# Patient Record
Sex: FEMALE | Race: WHITE | Employment: OTHER | URBAN - METROPOLITAN AREA
[De-identification: names, ages, dates, MRNs, and addresses within clinical notes are randomized per-mention and may not be internally consistent; named-entity substitution may affect disease eponyms.]

---

## 2017-03-27 ENCOUNTER — HOSPITAL ENCOUNTER (OUTPATIENT)
Dept: MRI IMAGING | Age: 72
Discharge: HOME | End: 2017-03-27
Attending: PHYSICAL MEDICINE & REHABILITATION
Payer: MEDICARE

## 2017-03-27 DIAGNOSIS — M51.36 DDD (DEGENERATIVE DISC DISEASE), LUMBAR: ICD-10-CM

## 2017-03-27 DIAGNOSIS — M54.50 BILATERAL LOW BACK PAIN: ICD-10-CM

## 2017-03-27 DIAGNOSIS — M48.061 SPINAL STENOSIS, LUMBAR REGION, WITHOUT NEUROGENIC CLAUDICATION: ICD-10-CM

## 2017-03-27 DIAGNOSIS — M47.817 LUMBOSACRAL SPONDYLOSIS WITHOUT MYELOPATHY: ICD-10-CM

## 2017-03-27 PROCEDURE — 72148 MRI LUMBAR SPINE W/O DYE: CPT

## 2017-09-19 ENCOUNTER — HOSPITAL ENCOUNTER (OUTPATIENT)
Dept: PHYSICAL THERAPY | Age: 72
Discharge: HOME | End: 2017-09-19
Payer: MEDICARE

## 2017-09-19 ENCOUNTER — HOSPITAL ENCOUNTER (OUTPATIENT)
Dept: SURGERY | Age: 72
Discharge: HOME | End: 2017-09-19
Payer: MEDICARE

## 2017-09-19 VITALS
BODY MASS INDEX: 44.39 KG/M2 | SYSTOLIC BLOOD PRESSURE: 115 MMHG | HEART RATE: 68 BPM | RESPIRATION RATE: 18 BRPM | WEIGHT: 260 LBS | DIASTOLIC BLOOD PRESSURE: 50 MMHG | OXYGEN SATURATION: 99 % | HEIGHT: 64 IN | TEMPERATURE: 96.8 F

## 2017-09-19 PROBLEM — R79.89 ELEVATED SERUM CREATININE: Status: ACTIVE | Noted: 2017-09-19

## 2017-09-19 PROBLEM — D72.829 ELEVATED WBC COUNT: Status: ACTIVE | Noted: 2017-09-19

## 2017-09-19 LAB
ANION GAP SERPL CALC-SCNC: 6 MMOL/L (ref 7–16)
APPEARANCE UR: ABNORMAL
APTT PPP: 26.8 SEC (ref 23.5–31.7)
ATRIAL RATE: 76 BPM
BACTERIA SPEC CULT: ABNORMAL
BASOPHILS # BLD: 0 K/UL (ref 0–0.2)
BASOPHILS NFR BLD: 0 % (ref 0–2)
BILIRUB UR QL: NEGATIVE
BUN SERPL-MCNC: 34 MG/DL (ref 8–23)
CALCIUM SERPL-MCNC: 8.8 MG/DL (ref 8.3–10.4)
CALCULATED P AXIS, ECG09: 79 DEGREES
CALCULATED R AXIS, ECG10: 21 DEGREES
CALCULATED T AXIS, ECG11: 19 DEGREES
CHLORIDE SERPL-SCNC: 101 MMOL/L (ref 98–107)
CO2 SERPL-SCNC: 28 MMOL/L (ref 21–32)
COLOR UR: YELLOW
CREAT SERPL-MCNC: 1.33 MG/DL (ref 0.6–1)
DIAGNOSIS, 93000: NORMAL
DIFFERENTIAL METHOD BLD: ABNORMAL
EOSINOPHIL # BLD: 0.1 K/UL (ref 0–0.8)
EOSINOPHIL NFR BLD: 0 % (ref 0.5–7.8)
ERYTHROCYTE [DISTWIDTH] IN BLOOD BY AUTOMATED COUNT: 14.4 % (ref 11.9–14.6)
GLUCOSE SERPL-MCNC: 114 MG/DL (ref 65–100)
GLUCOSE UR STRIP.AUTO-MCNC: NEGATIVE MG/DL
HCT VFR BLD AUTO: 42.4 % (ref 35.8–46.3)
HGB BLD-MCNC: 13.7 G/DL (ref 11.7–15.4)
HGB UR QL STRIP: NEGATIVE
IMM GRANULOCYTES # BLD: 0 K/UL (ref 0–0.5)
IMM GRANULOCYTES NFR BLD: 0.2 % (ref 0–5)
INR PPP: 1 (ref 0.9–1.2)
KETONES UR QL STRIP.AUTO: NEGATIVE MG/DL
LEUKOCYTE ESTERASE UR QL STRIP.AUTO: NEGATIVE
LYMPHOCYTES # BLD: 3 K/UL (ref 0.5–4.6)
LYMPHOCYTES NFR BLD: 21 % (ref 13–44)
MCH RBC QN AUTO: 28.3 PG (ref 26.1–32.9)
MCHC RBC AUTO-ENTMCNC: 32.3 G/DL (ref 31.4–35)
MCV RBC AUTO: 87.6 FL (ref 79.6–97.8)
MONOCYTES # BLD: 1 K/UL (ref 0.1–1.3)
MONOCYTES NFR BLD: 7 % (ref 4–12)
NEUTS SEG # BLD: 9.9 K/UL (ref 1.7–8.2)
NEUTS SEG NFR BLD: 72 % (ref 43–78)
NITRITE UR QL STRIP.AUTO: NEGATIVE
P-R INTERVAL, ECG05: 158 MS
PH UR STRIP: 5.5 [PH] (ref 5–9)
PLATELET # BLD AUTO: 364 K/UL (ref 150–450)
PMV BLD AUTO: 10.7 FL (ref 10.8–14.1)
POTASSIUM SERPL-SCNC: 4 MMOL/L (ref 3.5–5.1)
PROT UR STRIP-MCNC: NEGATIVE MG/DL
PROTHROMBIN TIME: 10.7 SEC (ref 9.6–12)
Q-T INTERVAL, ECG07: 370 MS
QRS DURATION, ECG06: 70 MS
QTC CALCULATION (BEZET), ECG08: 416 MS
RBC # BLD AUTO: 4.84 M/UL (ref 4.05–5.25)
SERVICE CMNT-IMP: ABNORMAL
SODIUM SERPL-SCNC: 135 MMOL/L (ref 136–145)
SP GR UR REFRACTOMETRY: 1.02 (ref 1–1.02)
UROBILINOGEN UR QL STRIP.AUTO: 0.2 EU/DL (ref 0.2–1)
VENTRICULAR RATE, ECG03: 76 BPM
WBC # BLD AUTO: 13.9 K/UL (ref 4.3–11.1)

## 2017-09-19 PROCEDURE — 85610 PROTHROMBIN TIME: CPT | Performed by: PHYSICIAN ASSISTANT

## 2017-09-19 PROCEDURE — 85730 THROMBOPLASTIN TIME PARTIAL: CPT | Performed by: PHYSICIAN ASSISTANT

## 2017-09-19 PROCEDURE — 87641 MR-STAPH DNA AMP PROBE: CPT | Performed by: PHYSICIAN ASSISTANT

## 2017-09-19 PROCEDURE — 80048 BASIC METABOLIC PNL TOTAL CA: CPT | Performed by: PHYSICIAN ASSISTANT

## 2017-09-19 PROCEDURE — G8980 MOBILITY D/C STATUS: HCPCS

## 2017-09-19 PROCEDURE — 93005 ELECTROCARDIOGRAM TRACING: CPT | Performed by: ORTHOPAEDIC SURGERY

## 2017-09-19 PROCEDURE — G8978 MOBILITY CURRENT STATUS: HCPCS

## 2017-09-19 PROCEDURE — 97162 PT EVAL MOD COMPLEX 30 MIN: CPT

## 2017-09-19 PROCEDURE — 85025 COMPLETE CBC W/AUTO DIFF WBC: CPT | Performed by: PHYSICIAN ASSISTANT

## 2017-09-19 PROCEDURE — 36415 COLL VENOUS BLD VENIPUNCTURE: CPT | Performed by: PHYSICIAN ASSISTANT

## 2017-09-19 PROCEDURE — 81003 URINALYSIS AUTO W/O SCOPE: CPT | Performed by: PHYSICIAN ASSISTANT

## 2017-09-19 PROCEDURE — G8979 MOBILITY GOAL STATUS: HCPCS

## 2017-09-19 RX ORDER — POLYETHYLENE GLYCOL 3350 17 G/17G
17 POWDER, FOR SOLUTION ORAL AS NEEDED
COMMUNITY

## 2017-09-19 RX ORDER — OXYBUTYNIN CHLORIDE 5 MG/1
5 TABLET, EXTENDED RELEASE ORAL DAILY
COMMUNITY
End: 2021-12-15

## 2017-09-19 RX ORDER — ACETAMINOPHEN 325 MG/1
325 TABLET ORAL AS NEEDED
COMMUNITY
End: 2017-10-12

## 2017-09-19 RX ORDER — AZELASTINE HCL 205.5 UG/1
SPRAY NASAL AS NEEDED
COMMUNITY
End: 2017-10-12

## 2017-09-19 RX ORDER — CEPHALEXIN 500 MG/1
500 CAPSULE ORAL 3 TIMES DAILY
COMMUNITY
End: 2017-09-19 | Stop reason: CLARIF

## 2017-09-19 RX ORDER — ESTRADIOL 0.1 MG/G
2 CREAM VAGINAL
COMMUNITY
End: 2017-10-12

## 2017-09-19 NOTE — PERIOP NOTES
Recent Results (from the past 12 hour(s))   CBC WITH AUTOMATED DIFF    Collection Time: 09/19/17 10:35 AM   Result Value Ref Range    WBC 13.9 (H) 4.3 - 11.1 K/uL    RBC 4.84 4.05 - 5.25 M/uL    HGB 13.7 11.7 - 15.4 g/dL    HCT 42.4 35.8 - 46.3 %    MCV 87.6 79.6 - 97.8 FL    MCH 28.3 26.1 - 32.9 PG    MCHC 32.3 31.4 - 35.0 g/dL    RDW 14.4 11.9 - 14.6 %    PLATELET 276 759 - 876 K/uL    MPV 10.7 (L) 10.8 - 14.1 FL    DF AUTOMATED      NEUTROPHILS 72 43 - 78 %    LYMPHOCYTES 21 13 - 44 %    MONOCYTES 7 4.0 - 12.0 %    EOSINOPHILS 0 (L) 0.5 - 7.8 %    BASOPHILS 0 0.0 - 2.0 %    IMMATURE GRANULOCYTES 0.2 0.0 - 5.0 %    ABS. NEUTROPHILS 9.9 (H) 1.7 - 8.2 K/UL    ABS. LYMPHOCYTES 3.0 0.5 - 4.6 K/UL    ABS. MONOCYTES 1.0 0.1 - 1.3 K/UL    ABS. EOSINOPHILS 0.1 0.0 - 0.8 K/UL    ABS. BASOPHILS 0.0 0.0 - 0.2 K/UL    ABS. IMM.  GRANS. 0.0 0.0 - 0.5 K/UL   PROTHROMBIN TIME + INR    Collection Time: 09/19/17 10:35 AM   Result Value Ref Range    Prothrombin time 10.7 9.6 - 12.0 sec    INR 1.0 0.9 - 1.2     PTT    Collection Time: 09/19/17 10:35 AM   Result Value Ref Range    aPTT 26.8 23.5 - 05.6 SEC   METABOLIC PANEL, BASIC    Collection Time: 09/19/17 10:35 AM   Result Value Ref Range    Sodium 135 (L) 136 - 145 mmol/L    Potassium 4.0 3.5 - 5.1 mmol/L    Chloride 101 98 - 107 mmol/L    CO2 28 21 - 32 mmol/L    Anion gap 6 (L) 7 - 16 mmol/L    Glucose 114 (H) 65 - 100 mg/dL    BUN 34 (H) 8 - 23 MG/DL    Creatinine 1.33 (H) 0.6 - 1.0 MG/DL    GFR est AA 51 (L) >60 ml/min/1.73m2    GFR est non-AA 42 (L) >60 ml/min/1.73m2    Calcium 8.8 8.3 - 10.4 MG/DL   URINALYSIS W/ RFLX MICROSCOPIC    Collection Time: 09/19/17 10:35 AM   Result Value Ref Range    Color YELLOW      Appearance CLOUDY      Specific gravity 1.024 (H) 1.001 - 1.023      pH (UA) 5.5 5.0 - 9.0      Protein NEGATIVE  NEG mg/dL    Glucose NEGATIVE  mg/dL    Ketone NEGATIVE  NEG mg/dL    Bilirubin NEGATIVE  NEG      Blood NEGATIVE  NEG      Urobilinogen 0.2 0.2 - 1.0 EU/dL    Nitrites NEGATIVE  NEG      Leukocyte Esterase NEGATIVE  NEG     EKG, 12 LEAD, INITIAL    Collection Time: 09/19/17 12:38 PM   Result Value Ref Range    Ventricular Rate 76 BPM    Atrial Rate 76 BPM    P-R Interval 158 ms    QRS Duration 70 ms    Q-T Interval 370 ms    QTC Calculation (Bezet) 416 ms    Calculated P Axis 79 degrees    Calculated R Axis 21 degrees    Calculated T Axis 19 degrees    Diagnosis       Normal sinus rhythm  Normal ECG  When compared with ECG of 08-FEB-2011 12:58,  No significant change was found

## 2017-09-19 NOTE — PERIOP NOTES
Patient verified name, , and surgery as listed in [facility]. Repeat CBC in one week at PCP to check WBC: Dr Gabbie Mathew [facility]: [phone]. Type 3 surgery, PAT Joint assessment complete. Labs per surgeon: cbc,bmp,pt,ptt,ua ; results within anesthesia limits except elevated WBC- VALERIO Lu notified  She  met with pt and discussed elevated WBC and kidney function test results. Pt reports recent steroid injection to neck and taking aleve. Pt to have repeat CBC in one week at PCP-Dr Gabbie [facility]. Prescription for CBC given to pt by [facility] Copy made for chart. Will obtain results of CBC. Copy of labs routed to PCP and surgeon. EKG: today- normal.  Noted in EMR: echo dated 17: within limits. Hibiclens and instructions to return bottle on DOS given per hospital policy. Nasal Swab collected per MD order and instructions for Mupirocin nasal ointment if required. Patient provided with handouts including Guide to Surgery, Pain Management, Hand Hygiene, Blood Transfusion Education, and [place] [facility] Brochure. Patient answered medical/surgical history questions at their best of ability. All prior to admission medications documented in [facility]. Original medication prescription bottle not visualized during patient appointment. Patient instructed to hold all vitamins 7 days prior to surgery and NSAIDS 5 days prior to surgery. Medications to be held: aleve (naproxen) - 5 days. Patient instructed to continue previous medications as prescribed prior to surgery and to take the following medications the day of surgery according to anesthesia guidelines with a small sip of water: aspirin 81 mg, use advair diskus, metaxalone, montelukast, omeprazole, lyrica, synthroid,tramadol, oxybutynin. Patient teach back successful and patient demonstrates knowledge of instruction.

## 2017-09-19 NOTE — PERIOP NOTES
Recent Results (from the past 12 hour(s))   CBC WITH AUTOMATED DIFF    Collection Time: 09/19/17 10:35 AM   Result Value Ref Range    WBC 13.9 (H) 4.3 - 11.1 K/uL    RBC 4.84 4.05 - 5.25 M/uL    HGB 13.7 11.7 - 15.4 g/dL    HCT 42.4 35.8 - 46.3 %    MCV 87.6 79.6 - 97.8 FL    MCH 28.3 26.1 - 32.9 PG    MCHC 32.3 31.4 - 35.0 g/dL    RDW 14.4 11.9 - 14.6 %    PLATELET 990 270 - 997 K/uL    MPV 10.7 (L) 10.8 - 14.1 FL    DF AUTOMATED      NEUTROPHILS 72 43 - 78 %    LYMPHOCYTES 21 13 - 44 %    MONOCYTES 7 4.0 - 12.0 %    EOSINOPHILS 0 (L) 0.5 - 7.8 %    BASOPHILS 0 0.0 - 2.0 %    IMMATURE GRANULOCYTES 0.2 0.0 - 5.0 %    ABS. NEUTROPHILS 9.9 (H) 1.7 - 8.2 K/UL    ABS. LYMPHOCYTES 3.0 0.5 - 4.6 K/UL    ABS. MONOCYTES 1.0 0.1 - 1.3 K/UL    ABS. EOSINOPHILS 0.1 0.0 - 0.8 K/UL    ABS. BASOPHILS 0.0 0.0 - 0.2 K/UL    ABS. IMM.  GRANS. 0.0 0.0 - 0.5 K/UL   PROTHROMBIN TIME + INR    Collection Time: 09/19/17 10:35 AM   Result Value Ref Range    Prothrombin time 10.7 9.6 - 12.0 sec    INR 1.0 0.9 - 1.2     PTT    Collection Time: 09/19/17 10:35 AM   Result Value Ref Range    aPTT 26.8 23.5 - 78.2 SEC   METABOLIC PANEL, BASIC    Collection Time: 09/19/17 10:35 AM   Result Value Ref Range    Sodium 135 (L) 136 - 145 mmol/L    Potassium 4.0 3.5 - 5.1 mmol/L    Chloride 101 98 - 107 mmol/L    CO2 28 21 - 32 mmol/L    Anion gap 6 (L) 7 - 16 mmol/L    Glucose 114 (H) 65 - 100 mg/dL    BUN 34 (H) 8 - 23 MG/DL    Creatinine 1.33 (H) 0.6 - 1.0 MG/DL    GFR est AA 51 (L) >60 ml/min/1.73m2    GFR est non-AA 42 (L) >60 ml/min/1.73m2    Calcium 8.8 8.3 - 10.4 MG/DL   URINALYSIS W/ RFLX MICROSCOPIC    Collection Time: 09/19/17 10:35 AM   Result Value Ref Range    Color YELLOW      Appearance CLOUDY      Specific gravity 1.024 (H) 1.001 - 1.023      pH (UA) 5.5 5.0 - 9.0      Protein NEGATIVE  NEG mg/dL    Glucose NEGATIVE  mg/dL    Ketone NEGATIVE  NEG mg/dL    Bilirubin NEGATIVE  NEG      Blood NEGATIVE  NEG      Urobilinogen 0.2 0.2 - 1.0 EU/dL    Nitrites NEGATIVE  NEG      Leukocyte Esterase NEGATIVE  NEG     EKG, 12 LEAD, INITIAL    Collection Time: 09/19/17 12:38 PM   Result Value Ref Range    Ventricular Rate 76 BPM    Atrial Rate 76 BPM    P-R Interval 158 ms    QRS Duration 70 ms    Q-T Interval 370 ms    QTC Calculation (Bezet) 416 ms    Calculated P Axis 79 degrees    Calculated R Axis 21 degrees    Calculated T Axis 19 degrees    Diagnosis       Normal sinus rhythm  Normal ECG  When compared with ECG of 08-FEB-2011 12:58,  No significant change was found

## 2017-09-19 NOTE — PROGRESS NOTES
17 1030   Oxygen Therapy   O2 Sat (%) 100 %   Pulse via Oximetry 83 beats per minute   O2 Device Room air   Pre-Treatment   Breath Sounds Bilateral Clear   Pre FEV1 (liters) 1.9 liters   % Predicted 84   Incentive Spirometry Treatment   Actual Volume (ml) 2500 ml     Initial respiratory Assessment completed with pt. Pt was interviewed and evaluated in Joint camp prior to surgery. Patient ID:  Oliver Cnao  [id number]  89 y.o.  1945  Surgeon: Dr. Gomez Perrin  Date of Surgery: 10/9/2017  Procedure: Total Right Hip Arthroplasty  Primary Care Physician: Slime Molina [phone]  Specialists:                                  Pt instructed in the use of Incentive Spirometry. Pt instructed to bring Incentive Spirometer back on date of surgery & to start using Is upon return to pt room.     Pt taught proper cough technique      History of smokin PPD FOR 14 YEARS  Quit date:1971    Secondhand smoke:FATHER      Past procedures with Oxygen desaturation:NONE    Past Medical History:   Diagnosis Date    Arthritis     Asthma     bronchio-asthma mostly seasonal    Chronic obstructive asthma, unspecified     Chronic pain     DDD, LOW BACK, SIATICA    DDD (degenerative disc disease)     WITH RADICULOPATHY    GERD (gastroesophageal reflux disease)     Hypertension     Hypothyroidism     HYPOTHYROIDISM     ON MEDS    Incontinence of urine     Morbid obesity ([address]     Neuropathy ([address]     ILYA (obstructive sleep apnea) 2009    Osteoarthritis of left knee 2009    Osteoarthritis of right knee 3/2/2011    S/P total knee replacement using cement 2009    Sleep apnea     HAS CPAP    Spastic colon     Type II or unspecified type diabetes mellitus without mention of complication, not stated as uncontrolled     avg  Respiratory history:ASTHMA                                 SOB  ON EXERTION                                  Respiratory meds:  ADVAIR BID                                       FAMILY PRESENT:                  NO                                        PAST SLEEP STUDY:        YES         HX OF ILYA:                        YES    - SEVERE ILYA                                   ILYA assessment:                                                                                              PHYSICAL EXAM   Body mass index is 44.63 kg/(m^2).    Visit Vitals    /50 (BP 1 Location: Right arm, BP Patient Position: At rest;Sitting)    Pulse 68    Temp 96.8 °F (36 °C)    Resp 18    Ht 5' 4\" (1.626 m)    Wt 117.9 kg (260 lb)    SpO2 99%    BMI 44.63 kg/m2     Neck circumference:  39.5    cm    Loud snoring:YES     Witnessed apnea or wakening gasping or choking:, APNEA    Awakens with headaches:DENIES    Morning or daytime tiredness/ sleepiness:    TIRED     Dry mouth or sore throat in morning:YES     Harper stage:4    SACS score:22    STOP/BAN                              CPAP:HOME CPAP        ALBUTEROL NEB Q6 PRN                   Pt. Phone Number:

## 2017-09-19 NOTE — ADVANCED PRACTICE NURSE
Preoperative Assessment Total Joint Replacement    Patient ID:  Karen Aggarwal  [id number]  21 y.o.  1945  Surgeon: Dr. Alcides Ballard  Date of Surgery: 10/9/2017  Procedure: Total Right Hip Arthroplasty  Primary Care Physician: Roverto Hancock [phone]    SUBJECTIVE:  Karen Aggarwal is a 80 y.o. WHITE OR  female who presents for preoperative evaluation for Total Right Hip arthroplasty.    Past Medical History:   Diagnosis Date    Arthritis     Asthma     bronchio-asthma mostly seasonal    Chronic obstructive asthma, unspecified     Chronic pain     DDD, LOW BACK, SIATICA    DDD (degenerative disc disease)     WITH RADICULOPATHY    GERD (gastroesophageal reflux disease)     Hypertension     Hypothyroidism     HYPOTHYROIDISM     ON MEDS    Incontinence of urine     Morbid obesity (HCC)     Neuropathy ([address]     ILYA (obstructive sleep apnea) 9/16/2009    Osteoarthritis of left knee 9/16/2009    Osteoarthritis of right knee 3/2/2011    S/P total knee replacement using cement 9/16/2009    Sleep apnea     HAS CPAP    Spastic colon     Type II or unspecified type diabetes mellitus without mention of complication, not stated as uncontrolled     avg         Past Surgical History:   Procedure Laterality Date    HX HERNIA REPAIR  8/71/77    UMBILICAL WITH MESH    HX HYSTERECTOMY  2000    HX KNEE REPLACEMENT Right 2011    [place] HAND/FINGER SURGERY UNLISTED Right 1990'S    TOTAL KNEE ARTHROPLASTY Left 2009     Family History   Problem Relation Age of Onset    Cancer Mother     Diabetes Mother     Heart Disease Father     COPD Father     Cancer Father      lung    Diabetes Father     Cancer Brother     Diabetes Brother     Heart Disease Brother      CAD      Social History   Substance Use Topics    Smoking status: Former Smoker     Years: 14.00     Quit date: 1/1/1977    Smokeless tobacco: Never Used      Comment: QUIT IN 1977    Alcohol use Yes      Comment: pt states she will drink 1 or 2 drinks per month       Prior to Admission medications    Medication Sig Start Date End Date Taking? Authorizing Provider   SITagliptin (JANUVIA) 100 mg tablet Take 100 mg by mouth daily. Yes Historical Provider   estradiol (ESTRACE) 0.01 % (0.1 mg/gram) vaginal cream Insert 2 g into vagina three (3) days a week. Yes Historical Provider   lidocaine-kinesiology tape 5 % kit by Apply Externally route. Yes Historical Provider   OTHER three (3) times daily. Lidocaine patch 5%   Yes Historical Provider   oxybutynin chloride XL (DITROPAN XL) 5 mg CR tablet Take 5 mg by mouth daily. Yes Historical Provider   polyethylene glycol (MIRALAX) 17 gram packet Take 17 g by mouth as needed. Yes Historical Provider   Azelastine (ASTEPRO) 0.15 % (205.5 mcg) nasal spray as needed. Yes Historical Provider   acetaminophen (TYLENOL) 325 mg tablet Take 325 mg by mouth as needed for Pain. Yes Historical Provider   cpap machine kit by Does Not Apply route. 9 cm H2O    Historical Provider   FLUTICASONE/SALMETEROL (ADVAIR DISKUS IN) Take 2 Puffs by inhalation two (2) times daily as needed. 2/10/11   Historical Provider   celecoxib (CELEBREX) 200 mg capsule Take 200 mg by mouth daily. 1 to 2 a day. 2/10/11   Historical Provider   PREGABALIN (LYRICA PO) Take 150 mg by mouth two (2) times a day. 2/10/11   Historical Provider   montelukast (SINGULAIR) 10 mg tablet Take 10 mg by mouth daily. Historical Provider   tramadol (ULTRAM) 50 mg tablet Take 50 mg by mouth as needed. Takes 2 to 4 a day. 2/10/11   Historical Provider   NEOMY SULF/POLYMYX B SULF/HC (CORTISPORIN OT) by Otic route as needed. 1 to 2 drops in each ear as needed. 2/10/11   Historical Provider   aspirin 81 mg tablet Take 81 mg by mouth daily. 2/10/11   Historical Provider   NAPROXEN SODIUM (ALEVE PO) Take  by mouth as needed.  2/10/11   Historical Provider   MULTIVITAMIN WITH MINERALS (MULTIVITAMIN & MINERAL FORMULA PO) Take 1 Tab by mouth daily. Multivitamin with D with Calcium  2/10/11   Historical Provider   metaxalone (SKELAXIN) 800 mg tablet Take 1 Tab by mouth three (3) times daily as needed for Pain. Patient taking differently: Take 800 mg by mouth two (2) times a day. 9/18/09   JEROME Olguin   OMEPRAZOLE PO 20 mg daily. Pt. Instructed to take morning of surgery per anesthesiologist.         CYANOCOBALAMIN (VITAMIN B-12 PO) Take 500 mg by mouth daily. Historical Provider   LISINOPRIL-HYDROCHLOROTHIAZIDE 10-12.5 mg per tablet take 1 Tab by mouth daily. Historical Provider   LASIX 20 mg Tab Take 20 mg by mouth daily as needed. 2/8/11   Historical Provider   GLYBURIDE-METFORMIN 2.5-500 mg per tablet take 1 Tab by mouth two (2) times a day. Historical Provider   SYNTHROID 100 mcg Tab Take 100 mcg by mouth daily. Pt. Instructed to take morning of surgery per anesthesiologist.      Historical Provider   PATANOL 0.1 % Drop 2 Drops by Both Eyes route as needed for Allergies. Historical Provider   AMBIEN 10 mg Tab Take 10 mg by mouth nightly. 2/10/11   Historical Provider   LIBRAX, WITH CLINIDIUM, 2.5-5 mg Cap Take 1 Cap by mouth four (4) times daily as needed. 2/8/11   Historical Provider     Allergies   Allergen Reactions    Codeine Other (comments)     dizziness    Erythromycin Rash    Tetracycline Rash        REVIEW OF SYSTEMS:  CONSTITUTIONAL:  Denies fever, decreased appetite, weight loss/gain or night sweats. Positive for fatigue. HEENT: Denies vision or hearing changes. CARDIAC: Denies CP, palpitations, carotid artery disease or syncopal episodes. RESPIRATORY: Denies dyspnea on exertion. Positive for obesity and snoring GI: + GERD. : Denies dysuria, hematuria. [facility] Lugo HEMATOLOGIC: Denies anemia or blood disorders. ENDOCRINE: + thyroid disease. MUSCULOSKELETAL: See HPI. NEUROLOGIC: Denies seizure, peripheral neuropathy or memory loss. PSYCH: + depression, anxiety and insomnia.  Controlled with medications   SKIN: Denies rash or open sores. OBJECTIVE:  PHYSICAL EXAM   Body mass index is 44.63 kg/(m^2). Visit Vitals    /50 (BP 1 Location: Right arm, BP Patient Position: At rest;Sitting)    Pulse 68    Temp 96.8 °F (36 °C)    Resp 18    Ht 5' 4\" (1.626 m)    Wt 117.9 kg (260 lb)    SpO2 99%    BMI 44.63 kg/m2     GENERAL  EYES: Normal Conjunctivae . NECK: Normal ROM. Trachea midline. CARDIOVASCULAR: Regular rate and rhythm. No murmur or gallops. No Lower extremity edema. LUNGS: CTA bilaterally. No wheezes, rhonchi or rales. GI: Abdomen nontender. NEUROLOGIC: Alert and oriented x 3. SKIN: No rash, bruising, swelling, redness or warmth. Labs:   Recent Results (from the past 24 hour(s))   CBC WITH AUTOMATED DIFF    Collection Time: 09/19/17 10:35 AM   Result Value Ref Range    WBC 13.9 (H) 4.3 - 11.1 K/uL    RBC 4.84 4.05 - 5.25 M/uL    HGB 13.7 11.7 - 15.4 g/dL    HCT 42.4 35.8 - 46.3 %    MCV 87.6 79.6 - 97.8 FL    MCH 28.3 26.1 - 32.9 PG    MCHC 32.3 31.4 - 35.0 g/dL    RDW 14.4 11.9 - 14.6 %    PLATELET 594 433 - 058 K/uL    MPV 10.7 (L) 10.8 - 14.1 FL    DF AUTOMATED      NEUTROPHILS 72 43 - 78 %    LYMPHOCYTES 21 13 - 44 %    MONOCYTES 7 4.0 - 12.0 %    EOSINOPHILS 0 (L) 0.5 - 7.8 %    BASOPHILS 0 0.0 - 2.0 %    IMMATURE GRANULOCYTES 0.2 0.0 - 5.0 %    ABS. NEUTROPHILS 9.9 (H) 1.7 - 8.2 K/UL    ABS. LYMPHOCYTES 3.0 0.5 - 4.6 K/UL    ABS. MONOCYTES 1.0 0.1 - 1.3 K/UL    ABS. EOSINOPHILS 0.1 0.0 - 0.8 K/UL    ABS. BASOPHILS 0.0 0.0 - 0.2 K/UL    ABS. IMM.  GRANS. 0.0 0.0 - 0.5 K/UL   PROTHROMBIN TIME + INR    Collection Time: 09/19/17 10:35 AM   Result Value Ref Range    Prothrombin time 10.7 9.6 - 12.0 sec    INR 1.0 0.9 - 1.2     PTT    Collection Time: 09/19/17 10:35 AM   Result Value Ref Range    aPTT 26.8 23.5 - 19.5 SEC   METABOLIC PANEL, BASIC    Collection Time: 09/19/17 10:35 AM   Result Value Ref Range    Sodium 135 (L) 136 - 145 mmol/L    Potassium 4.0 3.5 - 5.1 mmol/L    Chloride 101 98 - 107 mmol/L    CO2 28 21 - 32 mmol/L    Anion gap 6 (L) 7 - 16 mmol/L    Glucose 114 (H) 65 - 100 mg/dL    BUN 34 (H) 8 - 23 MG/DL    Creatinine 1.33 (H) 0.6 - 1.0 MG/DL    GFR est AA 51 (L) >60 ml/min/1.73m2    GFR est non-AA 42 (L) >60 ml/min/1.73m2    Calcium 8.8 8.3 - 10.4 MG/DL   URINALYSIS W/ RFLX MICROSCOPIC    Collection Time: 09/19/17 10:35 AM   Result Value Ref Range    Color YELLOW      Appearance CLOUDY      Specific gravity 1.024 (H) 1.001 - 1.023      pH (UA) 5.5 5.0 - 9.0      Protein NEGATIVE  NEG mg/dL    Glucose NEGATIVE  mg/dL    Ketone NEGATIVE  NEG mg/dL    Bilirubin NEGATIVE  NEG      Blood NEGATIVE  NEG      Urobilinogen 0.2 0.2 - 1.0 EU/dL    Nitrites NEGATIVE  NEG      Leukocyte Esterase NEGATIVE  NEG     EKG, 12 LEAD, INITIAL    Collection Time: 09/19/17 12:38 PM   Result Value Ref Range    Ventricular Rate 76 BPM    Atrial Rate 76 BPM    P-R Interval 158 ms    QRS Duration 70 ms    Q-T Interval 370 ms    QTC Calculation (Bezet) 416 ms    Calculated P Axis 79 degrees    Calculated R Axis 21 degrees    Calculated T Axis 19 degrees    Diagnosis       Normal sinus rhythm  Normal ECG  When compared with ECG of 08-FEB-2011 12:58,  No significant change was found  Confirmed by TOPHER REAL (), Reinier Monson ([id number]) on 9/19/2017 2:54:35 PM         Problem List:  Patient Active Problem List   Diagnosis Code    Osteoarthritis of left knee M17.12    S/P total knee replacement using cement Z96.659    ILYA (obstructive sleep apnea) G47.33    Asthma J45.909    Type II or unspecified type diabetes mellitus without mention of complication, not stated as uncontrolled E11.9    Hypothyroidism E03.9    Chronic pain G89.29    Osteoarthritis of right knee M17.11    S/P total knee replacement using cement Z96.659    Morbid obesity with BMI of 45.0-49.9, adult (HCC) E66.01, Z68.42    Hypertension I10    Neuropathy (HCC) G62.9    Elevated WBC count D72.829    Elevated serum creatinine R79.89         Total Joint Surgery Pre-Assessment Recommendations:           Repeat CBC in one week r/t elevated WBC. Patient states that she completed antibiotics for UTI 4 weeks ago. Received steroid injection in neck two weeks ago for chronic neck pain. Patient is using Aleeve  2 tabs up to 3 time a day. I discussed the potential for kidney damage with elevated serum creatinine. Advised to limit use as tolerated. Patient is to wear home CPAP during hospitalization. Renal protocol initiated. The patient's chart will be flagged renal risk. Renal RisK Alerts:  1. Caution with use of muscle relaxants and sedatives with reduced renal function  2. Caution with total amount of narcotics used   3. Avoid morphine if patient has reduced renal function due to accumulations of the highly active metabolite, morphine-6-glucuronide, which can lead to sedation and respiratory depression  4. Avoid nephrotoxic drugs such as MORIN inhibitors  5. Consider volume status monitoring in addition to [place]  6.  Ensure hand-off to hospitalist for appropriate perioperative IV fluid management          Signed By: Janak Fernandez NP-C    September 19, 2017

## 2017-09-19 NOTE — PROGRESS NOTES
Vincent Mcgowan  : (4 y.o.) [facility] at [address], [place] [address].  [phone]       Physical Therapy Prehab Plan of Treatment and Evaluation Summary:2017    ICD-10: Treatment Diagnosis:   · Pain in Right Hip (M25.551)  · Stiffness of Right Hip, Not elsewhere classified (M25.651)  · Difficulty in walking, Not elsewhere classified (R26.2)  · Other abnormalities of gait and mobility (R26.89)  Precautions/Allergies:   Codeine; Erythromycin; and Tetracycline  MEDICAL/REFERRING DIAGNOSIS:  Unilateral primary osteoarthritis, right hip [M16.11]  REFERRING PHYSICIAN: Rubina Rosenthal., *  DATE OF SURGERY: 10/9/17   Assessment:   Comments:  She is preparing for R DEYSI. Has had B TKAs. Lives alone and hopes to go to rehab at [facility]. Her obesity impairs her respiratory function during functional mobility. She says she doesn't have anyone who can help her at [place], so rehab will be her only option. I think she will be slow to progress. PROBLEM LIST (Impacting functional limitations):  Ms. Jina Arceo presents with the following right lower extremity(s) problems:  1. Gait  2. Strength  3. Range of Motion  4. Home Exercise Program  5. Pain   INTERVENTIONS PLANNED:  1. Home Exercise Program  2. Educational Discussion     TREATMENT PLAN: Effective Dates: 2017 TO 2017. Frequency/Duration: Patient to continue to perform home exercise program at least twice per day up until her surgery. GOALS: (Goals have been discussed and agreed upon with patient.)  Discharge Goals: Time Frame: 1 Day  1. Patient will demonstrate independence with a home exercise program designed to increase strength, range of motion and pain control to minimize functional deficits and optimize patient for total joint replacement.   Rehabilitation Potential For Stated Goals: Fair  Regarding Vincent Mcgowan's therapy, I certify that the treatment plan above will be carried out by a therapist or under their direction. Thank you for this referral,  Beltran Issa, PT               HISTORY:   Present Symptoms:  Pain Intensity 1: 9 (at its worst)  Pain Location 1: Groin, Hip, Knee (thigh)  Pain Orientation 1: Anterior, Lateral, Medial, Posterior, Right   History of Present Injury/Illness (Reason for Referral):  Medical/Referring Diagnosis: Unilateral primary osteoarthritis, right hip [M16.11]   Past Medical History/Comorbidities:   Ms. Estephania Montoya  has a past medical history of Arthritis; Asthma; Chronic obstructive asthma, unspecified; Chronic pain; DDD (degenerative disc disease); GERD (gastroesophageal reflux disease); Hypertension; Hypothyroidism; HYPOTHYROIDISM; Morbid obesity ([address] Neuropathy ([facility] [address] ILYA (obstructive sleep apnea) (9/16/2009); Osteoarthritis of left knee (9/16/2009); Osteoarthritis of right knee (3/2/2011); S/P total knee replacement using cement (9/16/2009); Sleep apnea; Spastic colon; and Type II or unspecified type diabetes mellitus without mention of complication, not stated as uncontrolled. Ms. Estephania Montoya  has a past surgical history that includes hernia repair (1/30/08); hysterectomy (2000); hand/finger surgery unlisted (Right, 1990'S); and total knee arthroplasty (Left, 2009).   Social History/Living Environment:   Home Environment: Private residence  # Steps to Enter: 4  Rails to Enter: Yes  One/Two Story Residence: One story  Living Alone: Yes  Support Systems: Friends \ neighbors  Patient Expects to be Discharged to[de-identified] Rehabilitation facility (interested in [facility])  Current DME Used/Available at Home: Cane, straight, Raised toilet seat  Tub or Shower Type: Tub/Shower combination  Work/Activity:  retired  Dominant Side:  RIGHT  Current Medications:  See Pre-assessment nursing note   Number of Personal Factors/Comorbidities that affect the Plan of Care: 1-2: MODERATE COMPLEXITY   EXAMINATION:   ADLs (Current Functional Status):   Ambulation:  [x] Independent  [] Walk Indoors Only  [] Walk Outdoors  [x] Use Assistive Device  [] Use Wheelchair Only Dressing:  [x] Independent  Requires Assistance from Someone for:  [] Sock/Shoes  [] Pants  [] Everything   Bathing/Showering:   [x] Independent  [] Requires Assistance from Someone  [] Sponge Bath Only Household Activities:  [] Routine house and yard work  [x] Light Housework Only  [] None   Observation/Orthostatic Postural Assessment:    Leg length discrepancy, right (R LE 1/8 shorter than L; B hip AB)  ROM/Flexibility:   AROM: Within functional limits (L LE)                       RLE AROM  R Hip Flexion: 60  R Hip ABduction: 10   Strength:   Strength: Generally decreased, functional (L LE 4/5)              RLE Strength  R Hip Flexion: 2  R Hip ABduction: 2  R Knee Extension: 3+  R Ankle Dorsiflexion: 3+   Functional Mobility:    Sensation: Intact (L LE)    Stand to Sit: Independent  Sit to Stand: Independent  Stand Pivot Transfers: Independent  Distance (ft): 500 Feet (ft)  Ambulation - Level of Assistance: Modified independent  Assistive Device: Cane, straight  Base of Support: Widened  Speed/Jayne: Slow  Step Length: Left shortened  Stance: Right decreased  Gait Abnormalities: Antalgic; Step to gait          Balance:    Sitting: Intact  Standing: With support   Body Structures Involved:  1. Lungs  2. Bones  3. Joints  4. Muscles Body Functions Affected:  1. Respiratory  2. Neuromusculoskeletal  3. Movement Related Activities and Participation Affected:  1. Mobility  2. Domestic Life   Number of elements that affect the Plan of Care: 4+: HIGH COMPLEXITY   CLINICAL PRESENTATION:   Presentation: Evolving clinical presentation with changing clinical characteristics: MODERATE COMPLEXITY   CLINICAL DECISION MAKING:   Outcome Measure:    Tool Used: Lower Extremity Functional Scale (LEFS)  Score:  Initial: 14/80 Most Recent: X/80 (Date: -- )   Interpretation of Score: 20 questions each scored on a 5 point scale with 0 representing \"extreme difficulty or unable to perform\" and 4 representing \"no difficulty\". The lower the score, the greater the functional disability. 80/80 represents no disability. Minimal detectable change is 9 points. Score 80 79-65 64-49 48-33 32-17 16-1 0   Modifier CH CI CJ CK CL CM CN     ? Mobility - Walking and Moving Around:     - CURRENT STATUS: CM - 80%-99% impaired, limited or restricted    - GOAL STATUS: CM - 80%-99% impaired, limited or restricted    - D/C STATUS:  CM - 80%-99% impaired, limited or restricted    Medical Necessity:   · Ms. Mcgowan is expected to optimize her lower extremity strength and ROM in preparation for joint replacement surgery. Reason for Services/Other Comments:  · Achieve baseline assesment of musculoskeletal system, functional mobility and home environment. , educate in PT HEP in preparation for surgery, educate in hospital plan of care. Use of outcome tool(s) and clinical judgement create a POC that gives a: Questionable prediction of patient's progress: MODERATE COMPLEXITY   TREATMENT:   Treatment/Session Assessment:  Patient was instructed in PT- HEP to increase strength and ROM in LEs. Answered all questions. · Post session pain:  2  · Compliance with Program/Exercises: compliant most of the time.   Total Treatment Duration:PT Patient Time In/Time Out  Time In: 2903  Time Out: [address], [address]

## 2017-09-20 NOTE — PERIOP NOTES
Prescription for Mupirocin ointment 22g tube, apply intranasally to both nostrils BID x5 days pre-operatively or for a total of 10 doses; called to [email or web address] at [phone]. Message left for patient to return call to [phone] for swab results. 9/19/2017  8:48 PM - Issac, Lab In [facility]   Component Results   Component Value Flag Ref Range Units Status   Special Requests: NO SPECIAL REQUESTS     Final   Culture result:  (A)    Final   MRSA target DNA detected, SA target DNA detected.       A positive test result does not necessarily indicate the presence of viable organisms.  It is however, presumptive for the presence of MRSA or SA.

## 2017-09-29 NOTE — PERIOP NOTES
Call to [facility] re: cbc results. Per office, pt has lab appt Tuesday 10-3-17. Will return call for results.

## 2017-10-05 NOTE — PERIOP NOTES
[facility], requested CBC results from 10/3/17 be faxed to pre assessment for review.

## 2017-10-05 NOTE — PERIOP NOTES
Received faxed lab report dated 10/3/17 from [facility]. WBC = 8.0. All other CBC results within limits per anesthesia protocol. Placed report on chart. [facility] for [facility], left detailed message reporting lab report received and WBC 8.0 on 10/3/17.

## 2017-10-05 NOTE — H&P
[facility]  Pre Operative History and Physical Exam    Patient ID:  Sofia Aburto  [id number]  68 y.o.  1945    Today: October 5, 2017       Assessment:   1. Arthritis of the right hip  2. Discussed patient with Dr. Kd Street (PCP) who said she rechecked her WBC and it was 8 on recheck and that sh was cleared for surgery        Plan:    1. Proceed with scheduled Procedure(s) (LRB):  RIGHT HIP ARTHROPLASTY TOTAL/DEPUY (Right)            CC:  Right hip pain    HPI:   The patient has end stage arthritis of the right hip. The patient was evaluated and examined during a consultation prior to this office visit. There have been no changes to the patient's orthopedic condition since the initial consultation. The patient has failed previous conservative treatment for this condition including antiinflammatories , and lifestyle modifications. The necessity for joint replacement is present.  The patient will be admitted the day of surgery for Procedure(s) (LRB):  RIGHT HIP ARTHROPLASTY TOTAL/DEPUY (Right)      Past Medical/Surgical History:  Past Medical History:   Diagnosis Date    Arthritis     Asthma     bronchio-asthma mostly seasonal    Chronic obstructive asthma, unspecified     Chronic pain     DDD, LOW BACK, SIATICA    DDD (degenerative disc disease)     WITH RADICULOPATHY    GERD (gastroesophageal reflux disease)     Hypertension     Hypothyroidism     HYPOTHYROIDISM     ON MEDS    Incontinence of urine     Morbid obesity (HCC)     Neuropathy     ILYA (obstructive sleep apnea) 9/16/2009    Osteoarthritis of left knee 9/16/2009    Osteoarthritis of right knee 3/2/2011    S/P total knee replacement using cement 9/16/2009    Sleep apnea     HAS CPAP    Spastic colon     Type II or unspecified type diabetes mellitus without mention of complication, not stated as uncontrolled     avg      Past Surgical History:   Procedure Laterality Date    HX HERNIA REPAIR  1/30/08 UMBILICAL WITH MESH    HX HYSTERECTOMY  2000    HX KNEE REPLACEMENT Right 2011    NH HAND/FINGER SURGERY UNLISTED Right 1990'S    TOTAL KNEE ARTHROPLASTY Left 2009        Allergies: Allergies   Allergen Reactions    Codeine Other (comments)     dizziness    Erythromycin Rash    Tetracycline Rash        Objective:                    HEENT: NC/AT                   Lungs:  Unlabored respirations, clear breath sounds                    Heart:   RRR                    Abdomen: soft                   Extremities:  Pain with ROM of the right hip    Meds:   No current facility-administered medications for this encounter. Current Outpatient Prescriptions   Medication Sig    mupirocin calcium (BACTROBAN) 2 % nasal ointment by Both Nostrils route two (2) times a day.  SITagliptin (JANUVIA) 100 mg tablet Take 100 mg by mouth daily.  estradiol (ESTRACE) 0.01 % (0.1 mg/gram) vaginal cream Insert 2 g into vagina three (3) days a week.  lidocaine-kinesiology tape 5 % kit by Apply Externally route.  OTHER three (3) times daily. Lidocaine patch 5%    oxybutynin chloride XL (DITROPAN XL) 5 mg CR tablet Take 5 mg by mouth daily.  polyethylene glycol (MIRALAX) 17 gram packet Take 17 g by mouth as needed.  Azelastine (ASTEPRO) 0.15 % (205.5 mcg) nasal spray as needed.  acetaminophen (TYLENOL) 325 mg tablet Take 325 mg by mouth as needed for Pain.  cpap machine kit by Does Not Apply route. 9 cm H2O    FLUTICASONE/SALMETEROL (ADVAIR DISKUS IN) Take 2 Puffs by inhalation two (2) times daily as needed.  celecoxib (CELEBREX) 200 mg capsule Take 200 mg by mouth daily. 1 to 2 a day.  PREGABALIN (LYRICA PO) Take 150 mg by mouth two (2) times a day.  montelukast (SINGULAIR) 10 mg tablet Take 10 mg by mouth daily.  tramadol (ULTRAM) 50 mg tablet Take 50 mg by mouth as needed. Takes 2 to 4 a day.  NEOMY SULF/POLYMYX B SULF/HC (CORTISPORIN OT) by Otic route as needed.  1 to 2 drops in each ear as needed.  aspirin 81 mg tablet Take 81 mg by mouth daily.  NAPROXEN SODIUM (ALEVE PO) Take  by mouth as needed.  MULTIVITAMIN WITH MINERALS (MULTIVITAMIN & MINERAL FORMULA PO) Take 1 Tab by mouth daily. Multivitamin with D with Calcium     metaxalone (SKELAXIN) 800 mg tablet Take 1 Tab by mouth three (3) times daily as needed for Pain. (Patient taking differently: Take 800 mg by mouth two (2) times a day.)    OMEPRAZOLE PO 20 mg daily. Pt. Instructed to take morning of surgery per anesthesiologist.      CYANOCOBALAMIN (VITAMIN B-12 PO) Take 500 mg by mouth daily.  LISINOPRIL-HYDROCHLOROTHIAZIDE 10-12.5 mg per tablet take 1 Tab by mouth daily.  LASIX 20 mg Tab Take 20 mg by mouth daily as needed.  GLYBURIDE-METFORMIN 2.5-500 mg per tablet take 1 Tab by mouth two (2) times a day.  SYNTHROID 100 mcg Tab Take 100 mcg by mouth daily. Pt. Instructed to take morning of surgery per anesthesiologist.      PATANOL 0.1 % Drop 2 Drops by Both Eyes route as needed for Allergies.  AMBIEN 10 mg Tab Take 10 mg by mouth nightly.  LIBRAX, WITH CLINIDIUM, 2.5-5 mg Cap Take 1 Cap by mouth four (4) times daily as needed.          Labs:  Hospital Outpatient Visit on 09/19/2017   Component Date Value Ref Range Status    WBC 09/19/2017 13.9* 4.3 - 11.1 K/uL Final    RBC 09/19/2017 4.84  4.05 - 5.25 M/uL Final    HGB 09/19/2017 13.7  11.7 - 15.4 g/dL Final    HCT 09/19/2017 42.4  35.8 - 46.3 % Final    MCV 09/19/2017 87.6  79.6 - 97.8 FL Final    MCH 09/19/2017 28.3  26.1 - 32.9 PG Final    MCHC 09/19/2017 32.3  31.4 - 35.0 g/dL Final    RDW 09/19/2017 14.4  11.9 - 14.6 % Final    PLATELET 07/67/0561 756  150 - 450 K/uL Final    MPV 09/19/2017 10.7* 10.8 - 14.1 FL Final    DF 09/19/2017 AUTOMATED    Final    NEUTROPHILS 09/19/2017 72  43 - 78 % Final    LYMPHOCYTES 09/19/2017 21  13 - 44 % Final    MONOCYTES 09/19/2017 7  4.0 - 12.0 % Final    EOSINOPHILS 09/19/2017 0* 0.5 - 7.8 % Final    BASOPHILS 09/19/2017 0  0.0 - 2.0 % Final    IMMATURE GRANULOCYTES 09/19/2017 0.2  0.0 - 5.0 % Final    ABS. NEUTROPHILS 09/19/2017 9.9* 1.7 - 8.2 K/UL Final    ABS. LYMPHOCYTES 09/19/2017 3.0  0.5 - 4.6 K/UL Final    ABS. MONOCYTES 09/19/2017 1.0  0.1 - 1.3 K/UL Final    ABS. EOSINOPHILS 09/19/2017 0.1  0.0 - 0.8 K/UL Final    ABS. BASOPHILS 09/19/2017 0.0  0.0 - 0.2 K/UL Final    ABS. IMM. GRANS. 09/19/2017 0.0  0.0 - 0.5 K/UL Final    Prothrombin time 09/19/2017 10.7  9.6 - 12.0 sec Final    INR 09/19/2017 1.0  0.9 - 1.2   Final    Comment: Suggested therapeutic INR range:  Venous thrombosis and embolus  2.0-3.0  Prosthetic heart valve         2.5-3.5      aPTT 09/19/2017 26.8  23.5 - 31.7 SEC Final    Heparin Therapeutic Range = 56.6-81.7 secs    Sodium 09/19/2017 135* 136 - 145 mmol/L Final    Potassium 09/19/2017 4.0  3.5 - 5.1 mmol/L Final    Chloride 09/19/2017 101  98 - 107 mmol/L Final    CO2 09/19/2017 28  21 - 32 mmol/L Final    Anion gap 09/19/2017 6* 7 - 16 mmol/L Final    Glucose 09/19/2017 114* 65 - 100 mg/dL Final    Comment: 47 - 60 mg/dl Consistent with, but not fully diagnostic of hypoglycemia. 101 - 125 mg/dl Impaired fasting glucose/consistent with pre-diabetes mellitus  > 126 mg/dl Fasting glucose consistent with overt diabetes mellitus      BUN 09/19/2017 34* 8 - 23 MG/DL Final    Creatinine 09/19/2017 1.33* 0.6 - 1.0 MG/DL Final    GFR est AA 09/19/2017 51* >60 ml/min/1.73m2 Final    GFR est non-AA 09/19/2017 42* >60 ml/min/1.73m2 Final    Comment: (NOTE)  Estimated GFR is calculated using the Modification of Diet in Renal   Disease (MDRD) Study equation, reported for both  Americans   (GFRAA) and non- Americans (GFRNA), and normalized to 1.73m2   body surface area. The physician must decide which value applies to   the patient. The MDRD study equation should only be used in   individuals age 25 or older.  It has not been validated for the   following: pregnant women, patients with serious comorbid conditions,   or on certain medications, or persons with extremes of body size,   muscle mass, or nutritional status.  Calcium 09/19/2017 8.8  8.3 - 10.4 MG/DL Final    Color 09/19/2017 YELLOW    Final    Appearance 09/19/2017 CLOUDY    Final    Specific gravity 09/19/2017 1.024* 1.001 - 1.023   Final    pH (UA) 09/19/2017 5.5  5.0 - 9.0   Final    Protein 09/19/2017 NEGATIVE   NEG mg/dL Final    Glucose 09/19/2017 NEGATIVE   mg/dL Final    Ketone 09/19/2017 NEGATIVE   NEG mg/dL Final    Bilirubin 09/19/2017 NEGATIVE   NEG   Final    Blood 09/19/2017 NEGATIVE   NEG   Final    Urobilinogen 09/19/2017 0.2  0.2 - 1.0 EU/dL Final    Nitrites 09/19/2017 NEGATIVE   NEG   Final    Leukocyte Esterase 09/19/2017 NEGATIVE   NEG   Final    Special Requests: 09/19/2017 NO SPECIAL REQUESTS    Final    Culture result: 09/19/2017 MRSA target DNA detected, SA target DNA detected. A positive test result does not necessarily indicate the presence of viable organisms. It is however, presumptive for the presence of MRSA or SA. *   Final    Ventricular Rate 09/19/2017 76  BPM Final    Atrial Rate 09/19/2017 76  BPM Final    P-R Interval 09/19/2017 158  ms Final    QRS Duration 09/19/2017 70  ms Final    Q-T Interval 09/19/2017 370  ms Final    QTC Calculation (Bezet) 09/19/2017 416  ms Final    Calculated P Axis 09/19/2017 79  degrees Final    Calculated R Axis 09/19/2017 21  degrees Final    Calculated T Axis 09/19/2017 19  degrees Final    Diagnosis 09/19/2017    Final                    Value:Normal sinus rhythm  Normal ECG  When compared with ECG of 08-FEB-2011 12:58,  No significant change was found  Confirmed by TOPHER REAL (), Francesca Holm ([id number]) on 9/19/2017 2:54:35 PM                   Patient Active Problem List   Diagnosis Code    Osteoarthritis of left knee M17.12    S/P total knee replacement using cement Z96.659    ILYA (obstructive sleep apnea) G47.33    Asthma J45.909    Type II or unspecified type diabetes mellitus without mention of complication, not stated as uncontrolled E11.9    Hypothyroidism E03.9    Chronic pain G89.29    Osteoarthritis of right knee M17.11    S/P total knee replacement using cement Z96.659    Morbid obesity with BMI of 45.0-49.9, adult ([facility]) E66.01, Z68.42    Hypertension I10    Neuropathy G62.9    Elevated WBC count D72.829    Elevated serum creatinine R79.89         Signed By: JEROME Tyson  October 5, 2017

## 2017-10-06 RX ORDER — VANCOMYCIN 1.75 GRAM/500 ML IN 0.9 % SODIUM CHLORIDE INTRAVENOUS
1750 ONCE
Status: COMPLETED | OUTPATIENT
Start: 2017-10-09 | End: 2017-10-09

## 2017-10-08 ENCOUNTER — ANESTHESIA EVENT (OUTPATIENT)
Dept: SURGERY | Age: 72
DRG: 470 | End: 2017-10-08
Payer: MEDICARE

## 2017-10-09 ENCOUNTER — HOSPITAL ENCOUNTER (INPATIENT)
Age: 72
LOS: 3 days | Discharge: HOME HEALTH | DRG: 470 | End: 2017-10-12
Attending: ORTHOPAEDIC SURGERY | Admitting: ORTHOPAEDIC SURGERY
Payer: MEDICARE

## 2017-10-09 ENCOUNTER — ANESTHESIA (OUTPATIENT)
Dept: SURGERY | Age: 72
DRG: 470 | End: 2017-10-09
Payer: MEDICARE

## 2017-10-09 ENCOUNTER — APPOINTMENT (OUTPATIENT)
Dept: GENERAL RADIOLOGY | Age: 72
DRG: 470 | End: 2017-10-09
Attending: ORTHOPAEDIC SURGERY
Payer: MEDICARE

## 2017-10-09 DIAGNOSIS — Z96.641 STATUS POST RIGHT HIP REPLACEMENT: Primary | ICD-10-CM

## 2017-10-09 LAB
ABO + RH BLD: NORMAL
BACTERIA SPEC CULT: ABNORMAL
BACTERIA SPEC CULT: ABNORMAL
BLOOD GROUP ANTIBODIES SERPL: NORMAL
GLUCOSE BLD STRIP.AUTO-MCNC: 180 MG/DL (ref 65–100)
GLUCOSE BLD STRIP.AUTO-MCNC: 246 MG/DL (ref 65–100)
GLUCOSE BLD STRIP.AUTO-MCNC: 90 MG/DL (ref 65–100)
HGB BLD-MCNC: 11.1 G/DL (ref 11.7–15.4)
SERVICE CMNT-IMP: ABNORMAL
SPECIMEN EXP DATE BLD: NORMAL

## 2017-10-09 PROCEDURE — 77010033678 HC OXYGEN DAILY

## 2017-10-09 PROCEDURE — 77030032490 HC SLV COMPR SCD KNE COVD -B

## 2017-10-09 PROCEDURE — 82962 GLUCOSE BLOOD TEST: CPT

## 2017-10-09 PROCEDURE — 0SR904A REPLACEMENT OF RIGHT HIP JOINT WITH CERAMIC ON POLYETHYLENE SYNTHETIC SUBSTITUTE, UNCEMENTED, OPEN APPROACH: ICD-10-PCS | Performed by: ORTHOPAEDIC SURGERY

## 2017-10-09 PROCEDURE — 74011636637 HC RX REV CODE- 636/637: Performed by: INTERNAL MEDICINE

## 2017-10-09 PROCEDURE — 77030008467 HC STPLR SKN COVD -B: Performed by: ORTHOPAEDIC SURGERY

## 2017-10-09 PROCEDURE — 77030012890

## 2017-10-09 PROCEDURE — 77030002966 HC SUT PDS J&J -A: Performed by: ORTHOPAEDIC SURGERY

## 2017-10-09 PROCEDURE — 94760 N-INVAS EAR/PLS OXIMETRY 1: CPT

## 2017-10-09 PROCEDURE — 87641 MR-STAPH DNA AMP PROBE: CPT | Performed by: ORTHOPAEDIC SURGERY

## 2017-10-09 PROCEDURE — 74011250637 HC RX REV CODE- 250/637: Performed by: ANESTHESIOLOGY

## 2017-10-09 PROCEDURE — 74011250637 HC RX REV CODE- 250/637: Performed by: ORTHOPAEDIC SURGERY

## 2017-10-09 PROCEDURE — 65270000029 HC RM PRIVATE

## 2017-10-09 PROCEDURE — 74011250636 HC RX REV CODE- 250/636: Performed by: PHYSICIAN ASSISTANT

## 2017-10-09 PROCEDURE — 97162 PT EVAL MOD COMPLEX 30 MIN: CPT

## 2017-10-09 PROCEDURE — 77030007880 HC KT SPN EPDRL BBMI -B: Performed by: ANESTHESIOLOGY

## 2017-10-09 PROCEDURE — 74011250637 HC RX REV CODE- 250/637: Performed by: PHYSICIAN ASSISTANT

## 2017-10-09 PROCEDURE — 77030011640 HC PAD GRND REM COVD -A: Performed by: ORTHOPAEDIC SURGERY

## 2017-10-09 PROCEDURE — 77030003665 HC NDL SPN BBMI -A: Performed by: ANESTHESIOLOGY

## 2017-10-09 PROCEDURE — 76060000034 HC ANESTHESIA 1.5 TO 2 HR: Performed by: ORTHOPAEDIC SURGERY

## 2017-10-09 PROCEDURE — 77030012341 HC CHMB SPCR OPTC MDI VYRM -A

## 2017-10-09 PROCEDURE — 36415 COLL VENOUS BLD VENIPUNCTURE: CPT | Performed by: PHYSICIAN ASSISTANT

## 2017-10-09 PROCEDURE — 77030019557 HC ELECTRD VES SEAL MEDT -F: Performed by: ORTHOPAEDIC SURGERY

## 2017-10-09 PROCEDURE — 77030008459 HC STPLR SKN COOP -B: Performed by: ORTHOPAEDIC SURGERY

## 2017-10-09 PROCEDURE — 77030018836 HC SOL IRR NACL ICUM -A: Performed by: ORTHOPAEDIC SURGERY

## 2017-10-09 PROCEDURE — 72170 X-RAY EXAM OF PELVIS: CPT

## 2017-10-09 PROCEDURE — 77030018846 HC SOL IRR STRL H20 ICUM -A

## 2017-10-09 PROCEDURE — 74011250636 HC RX REV CODE- 250/636

## 2017-10-09 PROCEDURE — 76210000016 HC OR PH I REC 1 TO 1.5 HR: Performed by: ORTHOPAEDIC SURGERY

## 2017-10-09 PROCEDURE — 74011250636 HC RX REV CODE- 250/636: Performed by: ANESTHESIOLOGY

## 2017-10-09 PROCEDURE — 77030018547 HC SUT ETHBND1 J&J -B: Performed by: ORTHOPAEDIC SURGERY

## 2017-10-09 PROCEDURE — 77030013727 HC IRR FAN PULSVC ZIMM -B: Performed by: ORTHOPAEDIC SURGERY

## 2017-10-09 PROCEDURE — 86580 TB INTRADERMAL TEST: CPT | Performed by: ORTHOPAEDIC SURGERY

## 2017-10-09 PROCEDURE — 77030020782 HC GWN BAIR PAWS FLX 3M -B: Performed by: ANESTHESIOLOGY

## 2017-10-09 PROCEDURE — 85018 HEMOGLOBIN: CPT | Performed by: PHYSICIAN ASSISTANT

## 2017-10-09 PROCEDURE — 97165 OT EVAL LOW COMPLEX 30 MIN: CPT

## 2017-10-09 PROCEDURE — 74011000250 HC RX REV CODE- 250: Performed by: ANESTHESIOLOGY

## 2017-10-09 PROCEDURE — 77030035236 HC SUT PDS STRATFX BARB J&J -B: Performed by: ORTHOPAEDIC SURGERY

## 2017-10-09 PROCEDURE — 74011000250 HC RX REV CODE- 250: Performed by: ORTHOPAEDIC SURGERY

## 2017-10-09 PROCEDURE — 76010000162 HC OR TIME 1.5 TO 2 HR INTENSV-TIER 1: Performed by: ORTHOPAEDIC SURGERY

## 2017-10-09 PROCEDURE — 77030034849: Performed by: ORTHOPAEDIC SURGERY

## 2017-10-09 PROCEDURE — 77030006777 HC BLD SAW OSC CNMD -B: Performed by: ORTHOPAEDIC SURGERY

## 2017-10-09 PROCEDURE — 77030031139 HC SUT VCRL2 J&J -A: Performed by: ORTHOPAEDIC SURGERY

## 2017-10-09 PROCEDURE — C1776 JOINT DEVICE (IMPLANTABLE): HCPCS | Performed by: ORTHOPAEDIC SURGERY

## 2017-10-09 PROCEDURE — 74011250636 HC RX REV CODE- 250/636: Performed by: ORTHOPAEDIC SURGERY

## 2017-10-09 PROCEDURE — 77030027138 HC INCENT SPIROMETER -A

## 2017-10-09 PROCEDURE — 74011000258 HC RX REV CODE- 258: Performed by: ORTHOPAEDIC SURGERY

## 2017-10-09 PROCEDURE — 74011000250 HC RX REV CODE- 250

## 2017-10-09 PROCEDURE — 77030012935 HC DRSG AQUACEL BMS -B: Performed by: ORTHOPAEDIC SURGERY

## 2017-10-09 PROCEDURE — 74011000302 HC RX REV CODE- 302: Performed by: ORTHOPAEDIC SURGERY

## 2017-10-09 PROCEDURE — 86900 BLOOD TYPING SEROLOGIC ABO: CPT | Performed by: PHYSICIAN ASSISTANT

## 2017-10-09 DEVICE — ELIMINATOR H APEX FOR 48-60MM PINN HIP SHELL: Type: IMPLANTABLE DEVICE | Site: HIP | Status: FUNCTIONAL

## 2017-10-09 DEVICE — LINER ACET OD52MM ID36MM HIP ALTRX PINN: Type: IMPLANTABLE DEVICE | Site: HIP | Status: FUNCTIONAL

## 2017-10-09 DEVICE — CUP ACET DIA52MM HIP GRIPTION PRI CEMENTLESS FIX 100 SER: Type: IMPLANTABLE DEVICE | Site: HIP | Status: FUNCTIONAL

## 2017-10-09 DEVICE — HEAD FEM DIA36MM +5MM OFFSET 12/14 TAPR HIP CERAMIC BIOLOX: Type: IMPLANTABLE DEVICE | Site: HIP | Status: FUNCTIONAL

## 2017-10-09 RX ORDER — KETOROLAC TROMETHAMINE 30 MG/ML
INJECTION, SOLUTION INTRAMUSCULAR; INTRAVENOUS AS NEEDED
Status: DISCONTINUED | OUTPATIENT
Start: 2017-10-09 | End: 2017-10-09 | Stop reason: HOSPADM

## 2017-10-09 RX ORDER — ONDANSETRON 2 MG/ML
4 INJECTION INTRAMUSCULAR; INTRAVENOUS
Status: DISCONTINUED | OUTPATIENT
Start: 2017-10-09 | End: 2017-10-09 | Stop reason: HOSPADM

## 2017-10-09 RX ORDER — MIDAZOLAM HYDROCHLORIDE 1 MG/ML
INJECTION, SOLUTION INTRAMUSCULAR; INTRAVENOUS AS NEEDED
Status: DISCONTINUED | OUTPATIENT
Start: 2017-10-09 | End: 2017-10-09 | Stop reason: HOSPADM

## 2017-10-09 RX ORDER — ACETAMINOPHEN 10 MG/ML
1000 INJECTION, SOLUTION INTRAVENOUS ONCE
Status: COMPLETED | OUTPATIENT
Start: 2017-10-09 | End: 2017-10-09

## 2017-10-09 RX ORDER — POLYETHYLENE GLYCOL 3350 17 G/17G
17 POWDER, FOR SOLUTION ORAL
Status: DISCONTINUED | OUTPATIENT
Start: 2017-10-09 | End: 2017-10-12 | Stop reason: HOSPADM

## 2017-10-09 RX ORDER — SODIUM CHLORIDE 9 MG/ML
100 INJECTION, SOLUTION INTRAVENOUS CONTINUOUS
Status: DISPENSED | OUTPATIENT
Start: 2017-10-09 | End: 2017-10-10

## 2017-10-09 RX ORDER — OXYBUTYNIN CHLORIDE 5 MG/1
5 TABLET, EXTENDED RELEASE ORAL DAILY
Status: DISCONTINUED | OUTPATIENT
Start: 2017-10-09 | End: 2017-10-09 | Stop reason: SDUPTHER

## 2017-10-09 RX ORDER — ASPIRIN 325 MG
325 TABLET, DELAYED RELEASE (ENTERIC COATED) ORAL EVERY 12 HOURS
Qty: 70 TAB | Refills: 0 | Status: SHIPPED | OUTPATIENT
Start: 2017-10-09 | End: 2017-11-13

## 2017-10-09 RX ORDER — MORPHINE SULFATE 10 MG/ML
INJECTION, SOLUTION INTRAMUSCULAR; INTRAVENOUS AS NEEDED
Status: DISCONTINUED | OUTPATIENT
Start: 2017-10-09 | End: 2017-10-09 | Stop reason: HOSPADM

## 2017-10-09 RX ORDER — CEFAZOLIN SODIUM IN 0.9 % NACL 2 G/50 ML
2 INTRAVENOUS SOLUTION, PIGGYBACK (ML) INTRAVENOUS ONCE
Status: COMPLETED | OUTPATIENT
Start: 2017-10-09 | End: 2017-10-09

## 2017-10-09 RX ORDER — SODIUM CHLORIDE 0.9 % (FLUSH) 0.9 %
5-10 SYRINGE (ML) INJECTION AS NEEDED
Status: DISCONTINUED | OUTPATIENT
Start: 2017-10-09 | End: 2017-10-09 | Stop reason: HOSPADM

## 2017-10-09 RX ORDER — ROPIVACAINE HYDROCHLORIDE 2 MG/ML
INJECTION, SOLUTION EPIDURAL; INFILTRATION; PERINEURAL AS NEEDED
Status: DISCONTINUED | OUTPATIENT
Start: 2017-10-09 | End: 2017-10-09 | Stop reason: HOSPADM

## 2017-10-09 RX ORDER — PROPOFOL 10 MG/ML
INJECTION, EMULSION INTRAVENOUS
Status: DISCONTINUED | OUTPATIENT
Start: 2017-10-09 | End: 2017-10-09 | Stop reason: HOSPADM

## 2017-10-09 RX ORDER — ONDANSETRON 2 MG/ML
INJECTION INTRAMUSCULAR; INTRAVENOUS AS NEEDED
Status: DISCONTINUED | OUTPATIENT
Start: 2017-10-09 | End: 2017-10-09 | Stop reason: HOSPADM

## 2017-10-09 RX ORDER — SODIUM CHLORIDE 0.9 % (FLUSH) 0.9 %
5-10 SYRINGE (ML) INJECTION EVERY 8 HOURS
Status: DISCONTINUED | OUTPATIENT
Start: 2017-10-09 | End: 2017-10-09 | Stop reason: HOSPADM

## 2017-10-09 RX ORDER — HYDROMORPHONE HYDROCHLORIDE 2 MG/1
2 TABLET ORAL
Status: DISCONTINUED | OUTPATIENT
Start: 2017-10-09 | End: 2017-10-12 | Stop reason: HOSPADM

## 2017-10-09 RX ORDER — CEFAZOLIN SODIUM IN 0.9 % NACL 2 G/50 ML
2 INTRAVENOUS SOLUTION, PIGGYBACK (ML) INTRAVENOUS EVERY 8 HOURS
Status: COMPLETED | OUTPATIENT
Start: 2017-10-09 | End: 2017-10-10

## 2017-10-09 RX ORDER — METAXALONE 800 MG/1
800 TABLET ORAL 2 TIMES DAILY
Status: DISCONTINUED | OUTPATIENT
Start: 2017-10-09 | End: 2017-10-12 | Stop reason: HOSPADM

## 2017-10-09 RX ORDER — ACETAMINOPHEN 500 MG
1000 TABLET ORAL ONCE
Status: COMPLETED | OUTPATIENT
Start: 2017-10-09 | End: 2017-10-09

## 2017-10-09 RX ORDER — FUROSEMIDE 20 MG/1
20 TABLET ORAL
Status: DISCONTINUED | OUTPATIENT
Start: 2017-10-09 | End: 2017-10-12 | Stop reason: HOSPADM

## 2017-10-09 RX ORDER — LIDOCAINE HYDROCHLORIDE 20 MG/ML
INJECTION, SOLUTION EPIDURAL; INFILTRATION; INTRACAUDAL; PERINEURAL AS NEEDED
Status: DISCONTINUED | OUTPATIENT
Start: 2017-10-09 | End: 2017-10-09 | Stop reason: HOSPADM

## 2017-10-09 RX ORDER — NALOXONE HYDROCHLORIDE 0.4 MG/ML
.2-.4 INJECTION, SOLUTION INTRAMUSCULAR; INTRAVENOUS; SUBCUTANEOUS
Status: DISCONTINUED | OUTPATIENT
Start: 2017-10-09 | End: 2017-10-12 | Stop reason: HOSPADM

## 2017-10-09 RX ORDER — SODIUM CHLORIDE, SODIUM LACTATE, POTASSIUM CHLORIDE, CALCIUM CHLORIDE 600; 310; 30; 20 MG/100ML; MG/100ML; MG/100ML; MG/100ML
1000 INJECTION, SOLUTION INTRAVENOUS CONTINUOUS
Status: DISCONTINUED | OUTPATIENT
Start: 2017-10-09 | End: 2017-10-09 | Stop reason: HOSPADM

## 2017-10-09 RX ORDER — LEVOTHYROXINE SODIUM 100 UG/1
100 TABLET ORAL DAILY
Status: DISCONTINUED | OUTPATIENT
Start: 2017-10-09 | End: 2017-10-12 | Stop reason: HOSPADM

## 2017-10-09 RX ORDER — TRANEXAMIC ACID 100 MG/ML
INJECTION, SOLUTION INTRAVENOUS AS NEEDED
Status: DISCONTINUED | OUTPATIENT
Start: 2017-10-09 | End: 2017-10-09 | Stop reason: HOSPADM

## 2017-10-09 RX ORDER — MIDAZOLAM HYDROCHLORIDE 1 MG/ML
2 INJECTION, SOLUTION INTRAMUSCULAR; INTRAVENOUS
Status: DISCONTINUED | OUTPATIENT
Start: 2017-10-09 | End: 2017-10-09 | Stop reason: HOSPADM

## 2017-10-09 RX ORDER — SODIUM CHLORIDE 0.9 % (FLUSH) 0.9 %
5-10 SYRINGE (ML) INJECTION AS NEEDED
Status: DISCONTINUED | OUTPATIENT
Start: 2017-10-09 | End: 2017-10-12 | Stop reason: HOSPADM

## 2017-10-09 RX ORDER — ONDANSETRON 2 MG/ML
4 INJECTION INTRAMUSCULAR; INTRAVENOUS
Status: DISCONTINUED | OUTPATIENT
Start: 2017-10-09 | End: 2017-10-12 | Stop reason: HOSPADM

## 2017-10-09 RX ORDER — MONTELUKAST SODIUM 10 MG/1
10 TABLET ORAL
Status: DISCONTINUED | OUTPATIENT
Start: 2017-10-09 | End: 2017-10-12 | Stop reason: HOSPADM

## 2017-10-09 RX ORDER — HYDROMORPHONE HYDROCHLORIDE 1 MG/ML
1 INJECTION, SOLUTION INTRAMUSCULAR; INTRAVENOUS; SUBCUTANEOUS
Status: DISCONTINUED | OUTPATIENT
Start: 2017-10-09 | End: 2017-10-12 | Stop reason: HOSPADM

## 2017-10-09 RX ORDER — DEXAMETHASONE SODIUM PHOSPHATE 100 MG/10ML
10 INJECTION INTRAMUSCULAR; INTRAVENOUS ONCE
Status: ACTIVE | OUTPATIENT
Start: 2017-10-10 | End: 2017-10-11

## 2017-10-09 RX ORDER — HYDROMORPHONE HYDROCHLORIDE 2 MG/ML
0.5 INJECTION, SOLUTION INTRAMUSCULAR; INTRAVENOUS; SUBCUTANEOUS
Status: DISCONTINUED | OUTPATIENT
Start: 2017-10-09 | End: 2017-10-09 | Stop reason: HOSPADM

## 2017-10-09 RX ORDER — SODIUM CHLORIDE 0.9 % (FLUSH) 0.9 %
5-10 SYRINGE (ML) INJECTION EVERY 8 HOURS
Status: DISCONTINUED | OUTPATIENT
Start: 2017-10-09 | End: 2017-10-12 | Stop reason: HOSPADM

## 2017-10-09 RX ORDER — DIPHENHYDRAMINE HCL 25 MG
25 CAPSULE ORAL
Status: DISCONTINUED | OUTPATIENT
Start: 2017-10-09 | End: 2017-10-12 | Stop reason: HOSPADM

## 2017-10-09 RX ORDER — OXYBUTYNIN CHLORIDE 5 MG/1
5 TABLET ORAL DAILY
Status: DISCONTINUED | OUTPATIENT
Start: 2017-10-10 | End: 2017-10-12 | Stop reason: HOSPADM

## 2017-10-09 RX ORDER — CHLORDIAZEPOXIDE HYDROCHLORIDE AND CLIDINIUM BROMIDE 5; 2.5 MG/1; MG/1
1 CAPSULE ORAL
Status: DISCONTINUED | OUTPATIENT
Start: 2017-10-09 | End: 2017-10-12 | Stop reason: HOSPADM

## 2017-10-09 RX ORDER — ASPIRIN 325 MG
325 TABLET, DELAYED RELEASE (ENTERIC COATED) ORAL EVERY 12 HOURS
Status: DISCONTINUED | OUTPATIENT
Start: 2017-10-09 | End: 2017-10-12 | Stop reason: HOSPADM

## 2017-10-09 RX ORDER — GLYBURIDE-METFORMIN HYDROCHLORIDE 2.5; 5 MG/1; MG/1
1 TABLET ORAL 2 TIMES DAILY
Status: DISCONTINUED | OUTPATIENT
Start: 2017-10-09 | End: 2017-10-09 | Stop reason: SDUPTHER

## 2017-10-09 RX ORDER — INSULIN LISPRO 100 [IU]/ML
INJECTION, SOLUTION INTRAVENOUS; SUBCUTANEOUS
Status: DISCONTINUED | OUTPATIENT
Start: 2017-10-09 | End: 2017-10-12 | Stop reason: HOSPADM

## 2017-10-09 RX ORDER — HYDROMORPHONE HYDROCHLORIDE 2 MG/1
2 TABLET ORAL
Qty: 40 TAB | Refills: 0 | Status: SHIPPED | OUTPATIENT
Start: 2017-10-09

## 2017-10-09 RX ORDER — ALBUTEROL SULFATE 0.83 MG/ML
2.5 SOLUTION RESPIRATORY (INHALATION) AS NEEDED
Status: DISCONTINUED | OUTPATIENT
Start: 2017-10-09 | End: 2017-10-09 | Stop reason: HOSPADM

## 2017-10-09 RX ORDER — LIDOCAINE HYDROCHLORIDE 10 MG/ML
0.1 INJECTION INFILTRATION; PERINEURAL AS NEEDED
Status: DISCONTINUED | OUTPATIENT
Start: 2017-10-09 | End: 2017-10-09 | Stop reason: HOSPADM

## 2017-10-09 RX ORDER — ZOLPIDEM TARTRATE 5 MG/1
5 TABLET ORAL
Status: DISCONTINUED | OUTPATIENT
Start: 2017-10-09 | End: 2017-10-12 | Stop reason: HOSPADM

## 2017-10-09 RX ORDER — ACETAMINOPHEN 500 MG
1000 TABLET ORAL EVERY 6 HOURS
Status: DISCONTINUED | OUTPATIENT
Start: 2017-10-10 | End: 2017-10-12 | Stop reason: HOSPADM

## 2017-10-09 RX ORDER — PREGABALIN 150 MG/1
150 CAPSULE ORAL 2 TIMES DAILY
Status: DISCONTINUED | OUTPATIENT
Start: 2017-10-09 | End: 2017-10-12 | Stop reason: HOSPADM

## 2017-10-09 RX ORDER — CELECOXIB 200 MG/1
200 CAPSULE ORAL EVERY 12 HOURS
Status: DISCONTINUED | OUTPATIENT
Start: 2017-10-09 | End: 2017-10-12 | Stop reason: HOSPADM

## 2017-10-09 RX ORDER — OLOPATADINE HYDROCHLORIDE 1 MG/ML
2 SOLUTION/ DROPS OPHTHALMIC AS NEEDED
Status: DISCONTINUED | OUTPATIENT
Start: 2017-10-09 | End: 2017-10-12 | Stop reason: HOSPADM

## 2017-10-09 RX ORDER — PANTOPRAZOLE SODIUM 40 MG/1
40 TABLET, DELAYED RELEASE ORAL
Status: DISCONTINUED | OUTPATIENT
Start: 2017-10-10 | End: 2017-10-12 | Stop reason: HOSPADM

## 2017-10-09 RX ORDER — DEXAMETHASONE SODIUM PHOSPHATE 4 MG/ML
INJECTION, SOLUTION INTRA-ARTICULAR; INTRALESIONAL; INTRAMUSCULAR; INTRAVENOUS; SOFT TISSUE AS NEEDED
Status: DISCONTINUED | OUTPATIENT
Start: 2017-10-09 | End: 2017-10-09 | Stop reason: HOSPADM

## 2017-10-09 RX ORDER — AMOXICILLIN 250 MG
2 CAPSULE ORAL DAILY
Status: DISCONTINUED | OUTPATIENT
Start: 2017-10-10 | End: 2017-10-12 | Stop reason: HOSPADM

## 2017-10-09 RX ORDER — LISINOPRIL AND HYDROCHLOROTHIAZIDE 10; 12.5 MG/1; MG/1
1 TABLET ORAL DAILY
Status: DISCONTINUED | OUTPATIENT
Start: 2017-10-10 | End: 2017-10-12 | Stop reason: HOSPADM

## 2017-10-09 RX ADMIN — METAXALONE 800 MG: 800 TABLET ORAL at 22:49

## 2017-10-09 RX ADMIN — DEXAMETHASONE SODIUM PHOSPHATE 10 MG: 4 INJECTION, SOLUTION INTRA-ARTICULAR; INTRALESIONAL; INTRAMUSCULAR; INTRAVENOUS; SOFT TISSUE at 09:33

## 2017-10-09 RX ADMIN — ONDANSETRON 4 MG: 2 INJECTION INTRAMUSCULAR; INTRAVENOUS at 09:33

## 2017-10-09 RX ADMIN — HYDROMORPHONE HYDROCHLORIDE 2 MG: 2 TABLET ORAL at 21:20

## 2017-10-09 RX ADMIN — ACETAMINOPHEN 1000 MG: 500 TABLET, FILM COATED ORAL at 06:48

## 2017-10-09 RX ADMIN — INSULIN LISPRO 2 UNITS: 100 INJECTION, SOLUTION INTRAVENOUS; SUBCUTANEOUS at 22:50

## 2017-10-09 RX ADMIN — HYDROMORPHONE HYDROCHLORIDE 0.5 MG: 2 INJECTION, SOLUTION INTRAMUSCULAR; INTRAVENOUS; SUBCUTANEOUS at 10:52

## 2017-10-09 RX ADMIN — PROPOFOL 75 MCG/KG/MIN: 10 INJECTION, EMULSION INTRAVENOUS at 09:17

## 2017-10-09 RX ADMIN — SODIUM CHLORIDE 100 ML/HR: 900 INJECTION, SOLUTION INTRAVENOUS at 13:13

## 2017-10-09 RX ADMIN — VANCOMYCIN HYDROCHLORIDE 1.75 G: 10 INJECTION, POWDER, LYOPHILIZED, FOR SOLUTION INTRAVENOUS at 08:34

## 2017-10-09 RX ADMIN — MONTELUKAST SODIUM 10 MG: 10 TABLET, FILM COATED ORAL at 22:49

## 2017-10-09 RX ADMIN — MIDAZOLAM HYDROCHLORIDE 2 MG: 1 INJECTION, SOLUTION INTRAMUSCULAR; INTRAVENOUS at 08:45

## 2017-10-09 RX ADMIN — INSULIN LISPRO 4 UNITS: 100 INJECTION, SOLUTION INTRAVENOUS; SUBCUTANEOUS at 17:43

## 2017-10-09 RX ADMIN — CEFAZOLIN 2 G: 1 INJECTION, POWDER, FOR SOLUTION INTRAMUSCULAR; INTRAVENOUS; PARENTERAL at 08:46

## 2017-10-09 RX ADMIN — GLYBURIDE: 5 TABLET ORAL at 17:44

## 2017-10-09 RX ADMIN — TRANEXAMIC ACID 1000 MG: 100 INJECTION, SOLUTION INTRAVENOUS at 09:33

## 2017-10-09 RX ADMIN — ONDANSETRON 4 MG: 2 INJECTION INTRAMUSCULAR; INTRAVENOUS at 13:10

## 2017-10-09 RX ADMIN — TUBERCULIN PURIFIED PROTEIN DERIVATIVE 5 UNITS: 5 INJECTION, SOLUTION INTRADERMAL at 06:48

## 2017-10-09 RX ADMIN — PREGABALIN 150 MG: 150 CAPSULE ORAL at 22:49

## 2017-10-09 RX ADMIN — SODIUM CHLORIDE, SODIUM LACTATE, POTASSIUM CHLORIDE, AND CALCIUM CHLORIDE 1000 ML: 600; 310; 30; 20 INJECTION, SOLUTION INTRAVENOUS at 06:51

## 2017-10-09 RX ADMIN — HYDROMORPHONE HYDROCHLORIDE 1 MG: 1 INJECTION, SOLUTION INTRAMUSCULAR; INTRAVENOUS; SUBCUTANEOUS at 16:31

## 2017-10-09 RX ADMIN — CEFAZOLIN 2 G: 1 INJECTION, POWDER, FOR SOLUTION INTRAMUSCULAR; INTRAVENOUS; PARENTERAL at 16:33

## 2017-10-09 RX ADMIN — ZOLPIDEM TARTRATE 5 MG: 5 TABLET ORAL at 22:49

## 2017-10-09 RX ADMIN — VANCOMYCIN HYDROCHLORIDE 1750 MG: 10 INJECTION, POWDER, LYOPHILIZED, FOR SOLUTION INTRAVENOUS at 08:29

## 2017-10-09 RX ADMIN — LIDOCAINE HYDROCHLORIDE 80 MG: 20 INJECTION, SOLUTION EPIDURAL; INFILTRATION; INTRACAUDAL; PERINEURAL at 09:16

## 2017-10-09 RX ADMIN — SODIUM CHLORIDE, SODIUM LACTATE, POTASSIUM CHLORIDE, AND CALCIUM CHLORIDE: 600; 310; 30; 20 INJECTION, SOLUTION INTRAVENOUS at 10:00

## 2017-10-09 RX ADMIN — ACETAMINOPHEN 1000 MG: 10 INJECTION, SOLUTION INTRAVENOUS at 17:44

## 2017-10-09 RX ADMIN — CEFAZOLIN 1 G: 1 INJECTION, POWDER, FOR SOLUTION INTRAMUSCULAR; INTRAVENOUS; PARENTERAL at 08:50

## 2017-10-09 RX ADMIN — HYDROMORPHONE HYDROCHLORIDE 2 MG: 2 TABLET ORAL at 13:10

## 2017-10-09 RX ADMIN — CELECOXIB 200 MG: 200 CAPSULE ORAL at 22:49

## 2017-10-09 RX ADMIN — SODIUM CHLORIDE, SODIUM LACTATE, POTASSIUM CHLORIDE, AND CALCIUM CHLORIDE: 600; 310; 30; 20 INJECTION, SOLUTION INTRAVENOUS at 08:44

## 2017-10-09 RX ADMIN — SODIUM CHLORIDE 100 ML/HR: 900 INJECTION, SOLUTION INTRAVENOUS at 22:48

## 2017-10-09 RX ADMIN — LIDOCAINE HYDROCHLORIDE 0.1 ML: 10 INJECTION, SOLUTION INFILTRATION; PERINEURAL at 06:48

## 2017-10-09 RX ADMIN — ASPIRIN 325 MG: 325 TABLET, DELAYED RELEASE ORAL at 22:49

## 2017-10-09 NOTE — H&P
The patient has end stage arthritis of the right hip. The patient was see and examined and there are no changes to the patient's orthopedic condition. They have tried conservative treatment for this condition; including antiinflammatories and lifestyle modifications and have failed. The necessity for the joint replacement is still present, and the H&P from the office is still current.  The patient will be admitted today for Procedure(s) (LRB):  RIGHT HIP ARTHROPLASTY TOTAL/DEPUY VANCO / ANCEF 2gm ([place] 10/6/2017) (Right)

## 2017-10-09 NOTE — PERIOP NOTES
Teach back method used with patient concerning hibiclens wash, TB screening, incentive spirometer, and pain management goals.  Patient and family were provided with home discharge needs list.

## 2017-10-09 NOTE — PROGRESS NOTES
Assessment completed. Pt alert and oriented X4. Wiggling bilateral feet, some numbness. Pedal pulses are palpable. No signs of distress noted. Oriented pt to room, unit, dining on call, IS, and pain management. Pt on 1 liter oxygen via nasal cannula. Lungs clear to auscultation. Bowel sounds present. [place] to drainage with no loops in tubing, and stat lock in place. Yellow/straw color urine noted. IV intact with no redness, or swelling noted infusing. Incision to right hip is covered with Aquacel dressing. Ice applied and plexi-pulses on. Pt denies pain. Call light within reach and bed in low position and locked. Pt informed to call out for needs verbalizes understanding. Family at bedside.

## 2017-10-09 NOTE — PROGRESS NOTES
Problem: Mobility Impaired (Adult and Pediatric)  Goal: *Acute Goals and Plan of Care (Insert Text)  GOALS (1-4 days):  (1.)Ms. Mcgowan will move from supine to sit and sit to supine in bed with SUPERVISION. (2.)Ms. Mcgowan will transfer from bed to chair and chair to bed with STAND BY ASSIST using the least restrictive device. (3.)Ms. Mcgowan will ambulate with STAND BY ASSIST for 200 feet with the least restrictive device. (4.)Ms. Mcgowan will ambulate up/down 4 steps with left railing with CONTACT GUARD ASSIST with no device. (5.)Ms. Mcgowan will state/observe DEYSI precautions with No verbal cues. ________________________________________________________________________________________________      PHYSICAL THERAPY JOINT CAMP DEYSI: INITIAL ASSESSMENT, TREATMENT DAY: DAY OF ASSESSMENT, PM 10/9/2017  INPATIENT: Hospital Day: 1  [place] / Product Type: Medicare /      NAME/AGE/GENDER: Kenyon Bradford is a 80 y.o. female              PRIMARY DIAGNOSIS:  Primary osteoarthritis of right hip [M16.11]              Procedure(s) and Anesthesia Type:     * RIGHT HIP ARTHROPLASTY TOTAL/  - Spinal (Right)  ICD-10: Treatment Diagnosis:        · Pain in Right Hip (M25.551)  · Stiffness of Right Hip, Not elsewhere classified (M25.651)  · Difficulty in walking, Not elsewhere classified (R26.2)       ASSESSMENT:      Ms. Tasha Casanova presents with impaired strength & mobility s/p right DEYSI. Pt also had decreased stability during out of bed activity. Pt will benefit from follow up therapy to help restore safe function prior to returning home with caregiver. If no caregiver available on discharge, pt may need additional in-pt therapy time to become independent prior to returning home alone. This section established at most recent assessment   PROBLEM LIST (Impairments causing functional limitations):  1. Decreased Strength  2. Decreased ADL/Functional Activities  3.  Decreased Transfer Abilities  4. Decreased Ambulation Ability/Technique  5. Decreased Balance  6. Increased Pain  7. Decreased Activity Tolerance  8. Decreased Knowledge of Precautions  9. Decreased [place] with Home Exercise Program    INTERVENTIONS PLANNED: (Benefits and precautions of physical therapy have been discussed with the patient.)  1. Bed Mobility  2. Gait Training  3. Home Exercise Program (HEP)  4. Therapeutic Exercise/Strengthening  5. Transfer Training  6. Range of Motion: active/assisted/passive  7. Therapeutic Activities  8. Group Therapy      TREATMENT PLAN: Frequency/Duration: Follow patient BID   to address above goals. Rehabilitation Potential For Stated Goals: GOOD      RECOMMENDED REHABILITATION/EQUIPMENT: (at time of discharge pending progress): Continue Skilled Therapy and as noted in assessment. HISTORY:   History of Present Injury/Illness (Reason for Referral): The patient has end stage arthritis of the right hip. The patient was evaluated and examined during a consultation prior to this office visit. There have been no changes to the patient's orthopedic condition since the initial consultation. The patient has failed previous conservative treatment for this condition including antiinflammatories , and lifestyle modifications. The necessity for joint replacement is present. The patient will be admitted the day of surgery for Procedure(s) (LRB):  RIGHT HIP ARTHROPLASTY TOTAL/DEPUY (Right)       Past Medical History/Comorbidities:   Ms. Bandar Georges  has a past medical history of Arthritis; Asthma; Chronic obstructive asthma, unspecified; Chronic pain; DDD (degenerative disc disease); GERD (gastroesophageal reflux disease); Hypertension; Hypothyroidism; HYPOTHYROIDISM; Incontinence of urine; Morbid obesity ([address] Neuropathy; ILYA (obstructive sleep apnea) (9/16/2009); Osteoarthritis of left knee (9/16/2009); Osteoarthritis of right knee (3/2/2011);  S/P total knee replacement using cement (9/16/2009); Sleep apnea; Spastic colon; Status post right hip replacement (10/9/2017); and Type II or unspecified type diabetes mellitus without mention of complication, not stated as uncontrolled. She also has no past medical history of Adverse effect of anesthesia; Aneurysm ([facility] [address] Arrhythmia; Autoimmune disease ([facility] [address] CAD (coronary artery disease); Cancer ([facility] [address] Chronic kidney disease; Chronic obstructive pulmonary disease ([address] Coagulation disorder ([facility] [address] Difficult intubation; Endocarditis; Heart failure ([facility] [address] Ill-defined condition; Liver disease; Malignant hyperthermia due to anesthesia; Nausea & vomiting; Nicotine vapor product user; Non-nicotine vapor product user; Pseudocholinesterase deficiency; Psychiatric disorder; PUD (peptic ulcer disease); Rheumatic fever; Seizures ([facility] [address] Stroke [facility]); or Thromboembolus ([facility] [address] Ms. Dutch Wilks  has a past surgical history that includes hernia repair (1/30/08); hysterectomy (2000); hand/finger surgery unlisted (Right, 1990'S); total knee arthroplasty (Left, 2009); and knee replacement (Right, 2011).   Social History/Living Environment:   Home Environment: Private residence  # Steps to Enter: 4  Rails to Enter: Yes  Hand Rails : Left  One/Two Story Residence: One story  Living Alone: Yes  Support Systems: Friends \ neighbors  Patient Expects to be Discharged to[de-identified] Skilled nursing facility  Current DME Used/Available at Home: None  Tub or Shower Type: Tub/Shower combination  Prior Level of Function/Work/Activity:  Pt was functioning with a cane prior to this admission   Number of Personal Factors/Comorbidities that affect the Plan of Care: 3+: HIGH COMPLEXITY   EXAMINATION:   Most Recent Physical Functioning:   Gross Assessment: Yes  Gross Assessment  AROM: Generally decreased, functional (left LE)  Strength: Generally decreased, functional (left LE)  Coordination: Generally decreased, functional (left LE)  Sensation:  (nerve block still active)                        Bed Mobility  Supine to Sit: Minimum assistance  Sit to Supine: Minimum assistance  Scooting: Contact guard assistance     Transfers  Sit to Stand: Minimum assistance  Stand to Sit: Minimum assistance  Bed to Chair:  (NT)     Balance  Sitting: Intact; Without support  Standing: Impaired; With support (walker)                Weight Bearing Status  Right Side Weight Bearing: As tolerated  Distance (ft): 5 Feet (ft)  Ambulation - Level of Assistance: Minimal assistance;Assist x2  Assistive Device: Walker, rolling  Speed/Jayne: Shuffled; Slow  Step Length: Left shortened;Right shortened  Stance: Right decreased  Gait Abnormalities: Antalgic;Decreased step clearance         Braces/Orthotics: none             Body Structures Involved:  1. Joints  2. Muscles Body Functions Affected:  1. Sensory/Pain  2. Movement Related Activities and Participation Affected:  1. General Tasks and Demands  2. Mobility   Number of elements that affect the Plan of Care: 4+: HIGH COMPLEXITY   CLINICAL PRESENTATION:   Presentation: Evolving clinical presentation with changing clinical characteristics: MODERATE COMPLEXITY   CLINICAL DECISION MAKIN [facility] Inpatient Short Form  How much difficulty does the patient currently have. .. Unable A Lot A Little None   1. Turning over in bed (including adjusting bedclothes, sheets and blankets)? [ ] 1   [ ] 2   [X] 3   [ ] 4   2. Sitting down on and standing up from a chair with arms ( e.g., wheelchair, bedside commode, etc.)   [ ] 1   [ ] 2   [X] 3   [ ] 4   3. Moving from lying on back to sitting on the side of the bed? [ ] 1   [ ] 2   [X] 3   [ ] 4   How much help from another person does the patient currently need. .. Total A Lot A Little None   4. Moving to and from a bed to a chair (including a wheelchair)? [ ] 1   [ ] 2   [X] 3   [ ] 4   5. Need to walk in hospital room? [ ] 1   [ ] 2   [X] 3   [ ] 4   6. Climbing 3-5 steps with a railing? [X] 1   [ ] 2   [ ] 3   [ ] 4   © 2007, Trustees of [address] [place] [address] [address] [address], under license to [employer]. All rights reserved       Score:  Initial: 16 Most Recent: X (Date: -- )     Interpretation of Tool:  Represents activities that are increasingly more difficult (i.e. Bed mobility, Transfers, Gait). Score 24 23 22-20 19-15 14-10 9-7 6       Modifier CH CI CJ CK CL CM CN         · Mobility - Walking and Moving Around:               - CURRENT STATUS:    CK - 40%-59% impaired, limited or restricted               - GOAL STATUS:           CJ - 20%-39% impaired, limited or restricted               - D/C STATUS:                       ---------------To be determined---------------  [place] / [place] / Product Type: Medicare /       Medical Necessity:     · Patient is expected to demonstrate progress in strength, balance, coordination and functional technique to decrease assistance required with bed mobility, transfers & gait. Reason for Services/Other Comments:  · Patient continues to require skilled intervention due to pt not safe with functional mobility.    Use of outcome tool(s) and clinical judgement create a POC that gives a: Questionable prediction of patient's progress: MODERATE COMPLEXITY                 TREATMENT:   (In addition to Assessment/Re-Assessment sessions the following treatments were rendered)      Pre-treatment Symptoms/Complaints:  \" I am not  a morning person \"  Pain: Initial: visual scale  Pain Intensity 1: 3  Pain Location 1: Hip  Pain Orientation 1: Right  Pain Intervention(s) 1: Cold pack, Repositioned  Post Session:  3/10      Assessment/Reassessment only, no treatment provided today            Date:    Date:    Date:      ACTIVITY/EXERCISE AM PM AM PM AM PM   GROUP THERAPY  [ ]  [ ]  [ ]  [ ]  [ ]  [ ]   Ankle Pumps               Quad Sets               Gluteal Sets               Hip ABd/ADduction               Straight Leg Raises Knee Slides               Short Arc Quads               Long Arc Quads               Chair Slides                               B = bilateral; AA = active assistive; A = active; P = passive       Treatment/Session Assessment:         Response to Treatment:  Tolerated well. Education:  [ ] Home Exercises  [X] Fall Precautions  [X] Hip Precautions [ ] D/C Instruction Review  [ ] Knee/Hip Prosthesis Review  [X] Walker Management/Safety [ ] Adaptive Equipment as Needed         Interdisciplinary Collaboration:   · Occupational Therapist  · Registered Nurse     After treatment position/precautions:   · Supine in bed  · Bed/Chair-wheels locked  · Bed in low position  · Call light within reach  · RN notified     Compliance with Program/Exercises: Will assess as treatment progresses. Recommendations/Intent for next treatment session:  Treatment next visit will focus on increasing Ms. Mcgowan's independence with bed mobility, transfers, gait training, strength/ROM exercises, modalities for pain, and patient education.        Total Treatment Duration:  PT Patient Time In/Time Out  Time In: 1335  Time Out: 628 [facility], PT

## 2017-10-09 NOTE — PERIOP NOTES
Betadine lavage: 17.5cc of betadine lot # [id number] , exp. Date [id number]  ,  in 1cc of . 9NS Lot # [id number]    , exp.  Date :  [id number]

## 2017-10-09 NOTE — PROGRESS NOTES
TRANSFER - IN REPORT:    Verbal report received from Nba Schmitt(name) on Bambi Gitelman  being received from PACU(unit) for routine progression of care      Report consisted of patients Situation, Background, Assessment and   Recommendations(SBAR). Information from the following report(s) SBAR, Kardex, Intake/Output, MAR and Recent Results was reviewed with the receiving nurse. Opportunity for questions and clarification was provided. Assessment completed upon patients arrival to unit and care assumed.

## 2017-10-09 NOTE — OP NOTES
Total Hip Procedure Note    Melissa Flores,  [id number],  1945    Pre-operative Diagnosis:  Primary osteoarthritis of right hip [M16.11]  Post-operative Diagnosis: Status post right hip replacement    Procedure: Procedure(s) (LRB):  RIGHT HIP ARTHROPLASTY TOTAL/  (Right)  Location: [address] [place] [address]  Surgeon: Colette Muro MD  Assistant: Bebe Jaquez PA-C     Anesthesia: Spinal  Findings: severe degenerative arthritis, acetabular osteophytes and bone spurs around the femoral head. EBL: 300cc  BMI: Body mass index is 44.63 kg/(m^2). Procedure: The complexity of total joint surgery requires the use of a first assistant for positioning, retraction and expertise in closure. Melissa Flores was brought to the operating room and positioned on the operating table. Anesthesia was administered. A aviles catheter was placed preoperatively and IV antibiotics were administered. A time out identifying the patient, procedure, operative side and surgeon was administered and charted by the OR Nurse. The patient was positioned on the contralateral decubitus position. The limb was prepped and draped in the usual sterile fashion. The Posterior approach was utilized to expose the hip. The incision was carried through the subcutaneous tissue and underlying fascia with hemostasis obtained using the bovie cauterization. A Charmley retractor was inserted. The short external rotators were divided at their insertion. The sciatic nerve was palpated and identified. Next, a T-shaped capsular incision was accomplished and femoral head was dislocated. The neck was osteotomized in the appropriate position just above the lesser trochanteric region. The neck cut was measured. Acetabular retractors were placed and the appropriate capsulotomy performed. Soft tissue was removed from the acetabulum. The acetabulum was sequentially reamed. Using trial components the acetabular component was sized.  The acetabular component was impacted at approximately 40 degrees horizontal tilt and 20 degrees anteversion. No  screws were used to fixate the cup. The real polyethylene liner was impacted into position. Utilizing the femoral retractor, the canal was prepared with appropriate lateralization and reamed with the starter reamer. The canal was then broached progressively to accommodate the DePuy stem. The broach was positioned with the anatomic femoral anteversion. A calcar planar was utilized. Trials were preformed using various neck length until the most suitable one was determined and found to be stable to flexion greater than 90 degrees and on internal and external rotation. Limb lengths were thought to be equilibrated and with appropriate stability as mentioned above. All trial components were removed. The cementless permanent stem was impacted into place. A trial reduction was performed and the above neck length was selected. The permanent ceramic femoral head was impacted into place. The hip was reduced and the stability was as mentioned as above. Copious irrigation was accomplished about the surgical site. The sciatic nerve was palpated and noted to be intact. The capsule was repaired with an absorbable suture and the external rotators were reattached with a #5 Mersilene. The operative hip was injected with 60 cc of Neuropin, 1cc of 10 mg of morphine, and 1 cc of 30 mg of Toradol. The Hip was then soaked with a diluted betadine solution for approximately Min and then thoroughly irrigated. A #1 PDS suture was used to re-approximate the fascia. Then, 1 gram (100 mg/ml) of Transexamic Acid was injected into the joint space. An insorb stapler with absorbable sutures were used to approximate the subcutaneous layers. Staples were applied in an occlusive fashion and a sterile bandage was placed. The patient was then rolled to a supine position. The sponge, needle and instrument counts were correct.  The patient tolerated the procedure without difficulty and left the operating room in satisfactory condition. Implants:   Implant Name Type Inv.  Item Serial No.  Lot No. LRB No. Used   CUP ACET PINN 100 W/ 52 --  - [id number]  CUP ACET PINN 100 W/ 52 --  [id number] [facility] ORTHOPEDICS [id number] Right 1   PLUG ACET APCL H ELIM POS STP --  - [id number]  PLUG ACET APCL H ELIM POS STP --  [id number] [facility] [id number] Right 1   LINER ACET PINN NEUT [id number] -- [employer] - [id number]  LINER ACET PINN NEUT [id number] -- [employer] [id number] [facility] ORTHOPEDICS [id number] Right 1   FEMORAL STEM   [id number] [facility] ORTHO [id number] Right 1   HEAD FEM S-ROM 36MM +5MM NK -- [employer] - [id number]   HEAD FEM S-ROM 36MM +5MM NK -- Susana Gaudikatherine [id number] [facility] ORTHOPEDICS [id number] Right 1           Signed By: Alberto Power MD  10/9/2017,  10:35 AM

## 2017-10-09 NOTE — CONSULTS
MD Joel   Medical Director  [address] [place] [address], [address] [facility]  Tel: [phone]     Physical Medicine & Rehabilitation Note-consult    Patient: Sruthi Mccarthy MRN: [id number]  SSN: [id number]    YOB: 1945  Age: 80 y.o. Sex: female      Admit Date: 10/9/2017  Admitting Physician: Mya Kuhn MD    Medical Decision Making/Plan/Recommend: Gait impairment and functional deficits following right total hip arthroplasty. Post op PT session tolerated  well, and shows no major barriers to progress. Patient plans for SNF discharge. Best care option is admission to a sub acute rehab program at [facility]. S She will benefit from continued daily skilled rehabilitation efforts and regular medical and nursing care at SNF. Continue PT, OT for active/assisted/passive left TKA ROM, strengthening, mobility, transfers, gait training. Will follow progress. Chief Complaint : Gait dysfunction secondary to below.   Admit Diagnosis: Primary osteoarthritis of right hip [M16.11]  right total hip arthroplasty  10/9/2017  Pain  DVT risk  Post op hemorrhagic anemia  DDD (degenerative disc disease)  Type II  diabetes mellitus    DDD (degenerative disc disease)  Hypertension  Acute Rehab Dx:  Gait impairment  Debility    deconditioning  Mobility and ambulation deficits  Self Care/ADL deficits    Medical Dx:  Past Medical History:   Diagnosis Date    Arthritis     Asthma     bronchio-asthma mostly seasonal    Chronic obstructive asthma, unspecified     Chronic pain     DDD, LOW BACK, SIATICA    DDD (degenerative disc disease)     WITH RADICULOPATHY    GERD (gastroesophageal reflux disease)     Hypertension     Hypothyroidism     HYPOTHYROIDISM     ON MEDS    Incontinence of urine     Morbid obesity (HCC)     Neuropathy     ILYA (obstructive sleep apnea) 9/16/2009    Osteoarthritis of left knee 9/16/2009    Osteoarthritis of right knee 3/2/2011  S/P total knee replacement using cement 9/16/2009    Sleep apnea     HAS CPAP    Spastic colon     Status post right hip replacement 10/9/2017    Type II or unspecified type diabetes mellitus without mention of complication, not stated as uncontrolled     avg      Subjective:     Date of Evaluation:  October 9, 2017    HPI: Ricardo Agee is a 80 y.o. female patient at [address] [place] [address] who was admitted on 10/9/2017  by Ludin Chou MD with below mentioned medical history, is being seen for Physical Medicine and Rehabilitation consult. Ricardo Agee had longstanding painful right hip  due to severe DJD. The symptoms were refractory to conservative treatment. She underwent a right DEYSI per Dr. Ludin Chou MD on 10/9/2017. The patient's post operative course has been uncomplicated. We are consulted to assist with rehab needs and placement. Patient's is to be WBAT RLE. Hip precautions to be followed strictly for RLE. Patient is  Tolerating post op acute PT and OT well without unusual barriers. She is l;imited by post op  right hip pain, decreased ROM and decreased strength. Ricardo Agee is seen and examined today. Medical Records reviewed. Pt has been independent with ambulation  prior to admission, at her baseline. She is s/p prior R and L TKAs. Prior Level of Function/Work/Activity:   Pt was functioning with a cane prior to this admission.     Current Level of Function:  bed mobility - min A, transfers - min A x2, decreased balance, ambulation - 5' with min Ax2  using a RW         Family History   Problem Relation Age of Onset    Cancer Mother     Diabetes Mother     Heart Disease Father     COPD Father     Cancer Father      lung    Diabetes Father     Cancer Brother     Diabetes Brother     Heart Disease Brother      CAD      Social History   Substance Use Topics    Smoking status: Former Smoker     Years: 14.00     Quit date: 1/1/1977    Smokeless tobacco: Former User     Quit date: 9/19/1977      Comment: QUIT IN 1977    Alcohol use Yes      Comment: pt states she will drink 1 or 2 drinks per month     Past Surgical History:   Procedure Laterality Date    HX HERNIA REPAIR  8/06/24    UMBILICAL WITH MESH    HX HYSTERECTOMY  2000    HX KNEE REPLACEMENT Right 2011    PA HAND/FINGER SURGERY UNLISTED Right 1990'S    TOTAL KNEE ARTHROPLASTY Left 2009      Prior to Admission medications    Medication Sig Start Date End Date Taking? Authorizing Provider   aspirin delayed-release 325 mg tablet Take 1 Tab by mouth every twelve (12) hours every twelve (12) hours for 35 days. 10/9/17 11/13/17 Yes JEROME Pineda   HYDROmorphone (DILAUDID) 2 mg tablet Take 1 Tab by mouth every four (4) hours as needed. Max Daily Amount: 12 mg. 10/9/17  Yes JEROME Pineda   mupirocin calcium (BACTROBAN) 2 % nasal ointment by Both Nostrils route two (2) times a day. Yes Historical Provider   SITagliptin (JANUVIA) 100 mg tablet Take 100 mg by mouth daily. Yes Historical Provider   OTHER three (3) times daily. Lidocaine patch 5%   Yes Historical Provider   oxybutynin chloride XL (DITROPAN XL) 5 mg CR tablet Take 5 mg by mouth daily. Yes Historical Provider   acetaminophen (TYLENOL) 325 mg tablet Take 325 mg by mouth as needed for Pain. Yes Historical Provider   cpap machine kit by Does Not Apply route. 9 cm H2O   Yes Historical Provider   PREGABALIN (LYRICA PO) Take 150 mg by mouth two (2) times a day. 2/10/11  Yes Historical Provider   montelukast (SINGULAIR) 10 mg tablet Take 10 mg by mouth daily. Yes Historical Provider   metaxalone (SKELAXIN) 800 mg tablet Take 1 Tab by mouth three (3) times daily as needed for Pain. Patient taking differently: Take 800 mg by mouth two (2) times a day. 9/18/09  Yes JEROME Newell   OMEPRAZOLE PO 20 mg daily. Pt.  Instructed to take morning of surgery per anesthesiologist.     Yes    CYANOCOBALAMIN (VITAMIN B-12 PO) Take 500 mg by mouth daily. Yes Historical Provider   LISINOPRIL-HYDROCHLOROTHIAZIDE 10-12.5 mg per tablet take 1 Tab by mouth daily. Yes Historical Provider   LASIX 20 mg Tab Take 20 mg by mouth daily as needed. 2/8/11  Yes Historical Provider   GLYBURIDE-METFORMIN 2.5-500 mg per tablet take 1 Tab by mouth two (2) times a day. Yes Historical Provider   SYNTHROID 100 mcg Tab Take 100 mcg by mouth daily. Pt. Instructed to take morning of surgery per anesthesiologist.     Yes Historical Provider   PATANOL 0.1 % Drop 2 Drops by Both Eyes route as needed for Allergies. Yes Historical Provider   AMBIEN 10 mg Tab Take 10 mg by mouth nightly. 2/10/11  Yes Historical Provider   estradiol (ESTRACE) 0.01 % (0.1 mg/gram) vaginal cream Insert 2 g into vagina three (3) days a week. Historical Provider   lidocaine-kinesiology tape 5 % kit by Apply Externally route. Historical Provider   polyethylene glycol (MIRALAX) 17 gram packet Take 17 g by mouth as needed. Historical Provider   Azelastine (ASTEPRO) 0.15 % (205.5 mcg) nasal spray as needed. Historical Provider   FLUTICASONE/SALMETEROL (ADVAIR DISKUS IN) Take 2 Puffs by inhalation two (2) times daily as needed. 2/10/11   Historical Provider   celecoxib (CELEBREX) 200 mg capsule Take 200 mg by mouth daily. 1 to 2 a day. 2/10/11   Historical Provider   tramadol (ULTRAM) 50 mg tablet Take 50 mg by mouth as needed. Takes 2 to 4 a day. 2/10/11   Historical Provider   NEOMY SULF/POLYMYX B SULF/HC (CORTISPORIN OT) by Otic route as needed. 1 to 2 drops in each ear as needed. 2/10/11   Historical Provider   aspirin 81 mg tablet Take 81 mg by mouth daily. 2/10/11   Historical Provider   NAPROXEN SODIUM (ALEVE PO) Take  by mouth as needed. 2/10/11   Historical Provider   MULTIVITAMIN WITH MINERALS (MULTIVITAMIN & MINERAL FORMULA PO) Take 1 Tab by mouth daily.  Multivitamin with D with Calcium  2/10/11   Historical Provider   LIBRAX, WITH CLINIDIUM, 2.5-5 mg Cap Take 1 Cap by mouth four (4) times daily as needed. 11   Historical Provider     Allergies   Allergen Reactions    Codeine Other (comments)     dizziness    Erythromycin Rash    Tetracycline Rash        Review of Systems: +right hip pain. Denies chest pain, shortness of breath, cough, headache, visual problems, abdominal pain, dysurea, calf pain. Pertinent positives are as noted in the medical records and unremarkable otherwise. Objective:     Vitals:  Blood pressure 108/67, pulse 65, temperature 97.7 °F (36.5 °C), resp. rate 15, height 5' 4\" (1.626 m), weight 260 lb (117.9 kg), SpO2 94 %. Temp (24hrs), Av.4 °F (36.3 °C), Min:97.2 °F (36.2 °C), Max:97.7 °F (36.5 °C)    BMI (calculated): 44.6 (10/05/17 1014)   Intake and Output:       Physical Exam:   General: Alert and age appropriately oriented. No acute cardio respiratory distress. HEENT: Normocephalic, no conjunctival pallor, no scleral icterus  Oral mucosa moist without cyanosis   Lungs: Clear to auscultation bilaterally. Respiration even and unlabored   Heart: Regular rate and rhythm, S1, S2   No  murmurs, clicks, rub or gallops   Abdomen: Soft, non-tender, non-distended. Genitourinary: defered   Neuromuscular:      Grossly no focal motor deficits. Right knee extension strong. Right ankle dorsiflexion 5/5  Right ankle plantarflexion 5/5  No sensory deficits distally BLE to soft touch. Skin/extremity: Calves non tender BLE. No rashes, no marginal erythema.                                                                                          Labs/Studies:  Recent Results (from the past 72 hour(s))   TYPE & SCREEN    Collection Time: 10/09/17  7:13 AM   Result Value Ref Range    Crossmatch Expiration 10/12/2017     ABO/Rh(D) Thompson Rappe POSITIVE     Antibody screen NEG    GLUCOSE, POC    Collection Time: 10/09/17  7:27 AM   Result Value Ref Range    Glucose (POC) 90 65 - 100 mg/dL       Functional Assessment:  Reviewed participation and progress in therapies  Gross Assessment  AROM: Generally decreased, functional (left LE) (10/09/17 1400)  Strength: Generally decreased, functional (left LE) (10/09/17 1400)  Coordination: Generally decreased, functional (left LE) (10/09/17 1400)  Sensation:  (nerve block still active) (10/09/17 1400)   Bed Mobility  Supine to Sit: Minimum assistance (10/09/17 1400)  Sit to Supine: Minimum assistance (10/09/17 1400)  Scooting: Contact guard assistance (10/09/17 1400)   Balance  Sitting: Intact; Without support (10/09/17 1400)  Standing: Impaired; With support (walker) (10/09/17 1400)               Bed/Mat Mobility  Supine to Sit: Minimum assistance (10/09/17 1400)  Sit to Supine: Minimum assistance (10/09/17 1400)  Sit to Stand: Minimum assistance (10/09/17 1400)  Bed to Chair:  (NT) (10/09/17 1400)  Scooting: Contact guard assistance (10/09/17 1400)     Ambulation:  Gait  Speed/Jayne: Shuffled; Slow (10/09/17 1400)  Step Length: Left shortened;Right shortened (10/09/17 1400)  Stance: Right decreased (10/09/17 1400)  Gait Abnormalities: Antalgic;Decreased step clearance (10/09/17 1400)  Ambulation - Level of Assistance: Minimal assistance;Assist x2 (10/09/17 1400)  Distance (ft): 5 Feet (ft) (10/09/17 1400)  Assistive Device: Walker, rolling (10/09/17 1400)    Impression/Plan:     Principal Problem:    Status post right hip replacement (10/9/2017)        Current Facility-Administered Medications   Medication Dose Route Frequency Provider Last Rate Last Dose    tuberculin injection 5 Units  5 Units IntraDERMal ONCE Edi Hart MD   5 Units at 10/09/17 0648    zolpidem (AMBIEN) tablet 5 mg  5 mg Oral QHS JEROME Bass       [address] [place] [address] . PHARMACY TO SUBSTITUTE PER PROTOCOL    Per Protocol JEROME Bass        furosemide (LASIX) tablet 20 mg  20 mg Oral DAILY PRN JEROME Bass        clindinium-chlordiazePOXIDE (LIBRAX) capsule 1 Cap  1 Cap Oral QID PRN JEROME Bass        [START ON 10/10/2017] lisinopril-hydroCHLOROthiazide (PRINZIDE, ZESTORETIC) 10-12.5 mg per tablet 1 Tab  1 Tab Oral DAILY JEROME Sharif        metaxalone [facility]) tablet 800 mg  800 mg Oral BID Daquan Sharif        montelukast (SINGULAIR) tablet 10 mg  10 mg Oral QHS Daquan Sharif        [START ON 10/10/2017] pantoprazole (PROTONIX) tablet 40 mg  40 mg Oral ACB Daquan Sharif        olopatadine (PATANOL) 0.1 % ophthalmic solution 2 Drop (patient Supply)  2 Drop Both Eyes PRN JEROME Sharif        polyethylene glycol (MIRALAX) packet 17 g  17 g Oral DAILY PRN JEROME Sharif        [START ON 10/10/2017] SITagliptin (JANUVIA) tablet 100 mg  100 mg Oral DAILY Daquan Sharif        levothyroxine (SYNTHROID) tablet 100 mcg  100 mcg Oral DAILY Daquan Sharif        0.9% sodium chloride infusion  100 mL/hr IntraVENous CONTINUOUS JEROME Sharif 100 mL/hr at 10/09/17 1313 100 mL/hr at 10/09/17 1313    sodium chloride (NS) flush 5-10 mL  5-10 mL IntraVENous Q8H Daquan Sharif        sodium chloride (NS) flush 5-10 mL  5-10 mL IntraVENous PRN JEROME Sharif        ceFAZolin in 0.9% NS (ANCEF) IVPB soln 2 g  2 g IntraVENous Q8H Daquan Sharif        acetaminophen (OFIRMEV) infusion 1,000 mg  1,000 mg IntraVENous Daquan Rosales        [START ON 10/10/2017] acetaminophen (TYLENOL) tablet 1,000 mg  1,000 mg Oral Q6H Daquan Sharif        celecoxib (CELEBREX) capsule 200 mg  200 mg Oral Q12H Daquan Sharif        HYDROmorphone (DILAUDID) tablet 2 mg  2 mg Oral Q4H PRN JEROME Sharif   2 mg at 10/09/17 1310    HYDROmorphone (PF) (DILAUDID) injection 1 mg  1 mg IntraVENous Q3H PRN JEROME Sharif        naloxone [facility]) injection 0.2-0.4 mg  0.2-0.4 mg IntraVENous Q10MIN PRN JEROME Sharif        [START ON 10/10/2017] dexamethasone (DECADRON) injection 10 mg  10 mg IntraVENous ONCE JEROME Sharif        ondansetron [facility]) injection 4 mg  4 mg IntraVENous Q4H PRN JEROME Ramos   4 mg at 10/09/17 1310    diphenhydrAMINE (BENADRYL) capsule 25 mg  25 mg Oral Q4H PRN Daquan Ramos        [START ON 10/10/2017] senna-docusate (PERICOLACE) 8.6-50 mg per tablet 2 Tab  2 Tab Oral DAILY Daquan Ramos        aspirin delayed-release tablet 325 mg  325 mg Oral Q12H Daquan Ramos        glyBURIDE/metFORMIN (GLUCOVANCE) 2.5/500 mg   Oral BID WITH MEALS Ludin Chou MD        Doloris Nine ON 10/10/2017] oxybutynin (DITROPAN) tablet 5 mg  5 mg Oral DAILY Ludin Chou MD        pregabalin (LYRICA) capsule 150 mg  150 mg Oral BID Ludin Chou MD        [START ON 10/10/2017] READ PPD 24HR, 48HR, 72HR  1 Each Other Q24H Ludin Chou MD        insulin lispro (HUMALOG) injection   SubCUTAneous AC&HS Fransisca Loreto, DO            Recommendations: Recommend sub acute rehab at [facility]. Continue Acute Rehab Program. Continue gait training, transfer training, balance activities. Monitoring and management of rehab conditions per the plan of care/orders. Will follow along with you for PM&R issues and provide you follow up. Will follow with SW/ /Admissions Coordinators regarding insurance approvals and acceptance. Rehabilitation Management/ Medical Management: 1. Devices:Walkers, Type: Rolling Walker  2. Consult:Rehab team including PT, OT,  and . 3. Disposition Rehab-discussed with patient. 4. Thigh-high or knee-high RAHUL's when out of bed. 5. DVT Prophylaxis - continued on aspirin 325mg bid x 30days. 6. Incentive spirometer Q1H while awake  7. Post op hemorrhagic anemia- monitor. Asymptomatic. 8. Activity: WBAT RLE, total hip precautions. 9. Planned Labs: CBC,BMP  10. Pain Control: Continue scheduled tylenol, celebrex and  PRN meds. Continue current management. Patient continued on Skelaxin, lyrica.   11. Wound Care: Keep hip  wound clean and dry and reinforce dressing PRN. May remove Aquacel 1 week post op ad replace with new one. Remove staples 12-14 post surgery, when incision appears appropriately closed and apply benzoin and 1/2\" steristrips. Follow up with Dr Barrett Rivers  2 weeks after discharge from rehab. Follow up with ORTHO per instructions. Thank you for the opportunity to participate in the care of this patient.     Signed By: eNtta Fabian MD     October 9, 2017

## 2017-10-09 NOTE — PERIOP NOTES
TRANSFER - IN REPORT:    Verbal report received from [facility] on Ricardo Rota  being received from 3rd floor ortho for routine progression of care      Report consisted of patients Situation, Background, Assessment and   Recommendations(SBAR). Information from the following report(s) Kardex and MAR was reviewed with the receiving nurse. Opportunity for questions and clarification was provided. Assessment completed upon patients arrival to unit and care assumed.

## 2017-10-09 NOTE — PROGRESS NOTES
Problem: Self Care Deficits Care Plan (Adult)  Goal: *Acute Goals and Plan of Care (Insert Text)  GOALS:   DISCHARGE GOALS (in preparation for going home/rehab): 3 days  1. Ms. Caroline Marino will perform one lower body dressing activity with minimal assistance with adaptive equipment to demonstrate improved functional mobility and safety. 2. Ms. Caroline Marino will perform one lower body bathing activity with minimal assistance with adaptive equipment to demonstrate improved functional mobility and safety. 3. Ms. Caroline Marino will perform toileting/toilet transfer with contact guard assistance with adaptive equipment to demonstrate improved functional mobility and safety. 4. Ms. Caroline Marino will perform shower transfer with contact guard assistance with adaptive equipment to demonstrate improved functional mobility and safety. 5. Ms. Caroline Marino will state DEYSI precautions with two verbal cues to demonstrate improved functional mobility and safety. JOINT CAMP OCCUPATIONAL THERAPY DEYSI: Initial Assessment and PM 10/9/2017  INPATIENT: Hospital Day: 1  [place] / [place] / Product Type: Medicare /      NAME/AGE/GENDER: Roel Hanson is a 80 y.o. female              PRIMARY DIAGNOSIS:  Primary osteoarthritis of right hip [M16.11]              Procedure(s) and Anesthesia Type:     * RIGHT HIP ARTHROPLASTY TOTAL/  - Spinal (Right)  ICD-10: Treatment Diagnosis:        · Pain in Right Hip (M25.551)  · Stiffness of Right Hip, Not elsewhere classified (M25.651)  · Other lack of cordination (R27.8)       ASSESSMENT:      Ms. Caroline Marino is s/p right DEYSI and presents with decreased weight bearing on right LE and decreased independence with functional mobility and activities of daily living. Patient would benefit from skilled Occupational Therapy to maximize independence and safety with self-care task and functional mobility.   Pt would also benefit from education on lower body adaptive equipment and hip precautions post-surgery in preparation for going home or for recommendations for post-hospital rehab program.  Patient plans for further rehab at home with home health services and good family support . OT reviewed therapy schedule and plan of care with patient. Patient was able to transfer and perform self care skills as charted below. Patient instructed to call for assistance when needing to get up from the bed and all needs in reach. Patient verbalized understanding of call light. This section established at most recent assessment   PROBLEM LIST (Impairments causing functional limitations):  1. Decreased Strength  2. Decreased ADL/Functional Activities  3. Decreased Transfer Abilities  4. Increased Pain  5. Increased Fatigue  6. Decreased Flexibility/Joint Mobility  7. Decreased Knowledge of Precautions    INTERVENTIONS PLANNED: (Benefits and precautions of occupational therapy have been discussed with the patient.)  1. Activities of daily living training  2. Adaptive equipment training  3. Balance training  4. Clothing management  5. Donning&doffing training  6. Theraputic activity      TREATMENT PLAN: Frequency/Duration: Follow patient 1 time to address above goals. Rehabilitation Potential For Stated Goals: GOOD      RECOMMENDED REHABILITATION/EQUIPMENT: (at time of discharge pending progress): Continue Skilled Therapy and Home Health: Physical Therapy. OCCUPATIONAL PROFILE AND HISTORY:   History of Present Injury/Illness (Reason for Referral): Pt presents this date s/p (right) DEYSI. Past Medical History/Comorbidities:   Ms. Kary Marcos  has a past medical history of Arthritis; Asthma; Chronic obstructive asthma, unspecified; Chronic pain; DDD (degenerative disc disease); GERD (gastroesophageal reflux disease); Hypertension; Hypothyroidism; HYPOTHYROIDISM; Incontinence of urine; Morbid obesity ([address] Neuropathy; ILYA (obstructive sleep apnea) (9/16/2009); Osteoarthritis of left knee (9/16/2009);  Osteoarthritis of right knee (3/2/2011); S/P total knee replacement using cement (9/16/2009); Sleep apnea; Spastic colon; Status post right hip replacement (10/9/2017); and Type II or unspecified type diabetes mellitus without mention of complication, not stated as uncontrolled. She also has no past medical history of Adverse effect of anesthesia; Aneurysm ([address] Arrhythmia; Autoimmune disease ([facility] CAD (coronary artery disease); Cancer ([address] Chronic kidney disease; Chronic obstructive pulmonary disease ([address] Coagulation disorder ([address] Difficult intubation; Endocarditis; Heart failure ([address] Ill-defined condition; Liver disease; Malignant hyperthermia due to anesthesia; Nausea & vomiting; Nicotine vapor product user; Non-nicotine vapor product user; Pseudocholinesterase deficiency; Psychiatric disorder; PUD (peptic ulcer disease); Rheumatic fever; Seizures ([facility] Stroke [facility]); or Thromboembolus ([facility] Ms. Calvin Finch  has a past surgical history that includes hernia repair (1/30/08); hysterectomy (2000); hand/finger surgery unlisted (Right, 1990'S); total knee arthroplasty (Left, 2009); and knee replacement (Right, 2011).   Social History/Living Environment:   Home Environment: Private residence  # Steps to Enter: 4  Rails to Enter: Yes  One/Two Story Residence: One story  Living Alone: No  Support Systems: Spouse/Significant Other/Partner  Patient Expects to be Discharged to[de-identified] Rehabilitation facility  Current DME Used/Available at Home: Cane, straight, Crutches  Tub or Shower Type: Shower  Prior Level of Function/Work/Activity:  Indep with BADLs and IADLs      Number of Personal Factors/Comorbidities that affect the Plan of Care: Brief history (0):  LOW COMPLEXITY   ASSESSMENT OF OCCUPATIONAL PERFORMANCE[de-identified]   Most Recent Physical Functioning:            Patient Vitals for the past 6 hrs:       BP BP Patient Position SpO2 O2 Flow Rate (L/min) Pulse   10/09/17 1032 - At rest - 3 l/min -   10/09/17 1033 (!) 86/49 - 96 % - 65   10/09/17 1037 (!) 87/49 - 100 % 3 l/min 66   10/09/17 1042 (!) 87/51 - 100 % 3 l/min (!) 59   10/09/17 1047 102/51 - 100 % 3 l/min (!) 56   10/09/17 1052 - - 100 % - -   10/09/17 1102 96/52 - 99 % 3 l/min (!) 48   10/09/17 1117 99/55 - 100 % 3 l/min (!) 48   10/09/17 1132 101/59 - 97 % 2 l/min (!) 50   10/09/17 1148 98/61 At rest 97 % - 61   10/09/17 1255 - - 95 % - -                       Coordination  Fine Motor Skills-Upper: Left Intact; Right Intact  Gross Motor Skills-Upper: Left Intact; Right Intact           Mental Status  Neurologic State: Alert  Orientation Level: Oriented X4  Cognition: Appropriate decision making; Appropriate for age attention/concentration; Appropriate safety awareness; Follows commands  Perception: Appears intact  Perseveration: No perseveration noted  Safety/Judgement: Awareness of environment; Fall prevention                    Basic ADLs (From Assessment) Complex ADLs (From Assessment)   Basic ADL  Feeding: Supervision  Oral Facial Hygiene/Grooming: Supervision  Bathing: Moderate assistance  Upper Body Dressing: Minimum assistance  Lower Body Dressing: Moderate assistance  Toileting: Total assistance     Grooming/Bathing/Dressing Activities of Daily Living     Cognitive Retraining  Safety/Judgement: Awareness of environment; Fall prevention                 Functional Transfers  Toilet Transfer : Minimum assistance;Assist x2  Shower Transfer: Minimum assistance;Assist x2     Bed/Mat Mobility  Sit to Stand: Minimum assistance;Assist x2           Physical Skills Involved:  1. Range of Motion  2. Balance  3. Strength Cognitive Skills Affected (resulting in the inability to perform in a timely and safe manner):  1. None Psychosocial Skills Affected:  1. none   Number of elements that affect the Plan of Care: 1-3:  LOW COMPLEXITY   CLINICAL DECISION MAKING:   [facility] AM-PAC 6 Clicks   Daily Activity Inpatient Short Form  How much help from another person does the patient currently need. .. Total A Lot A Little None   1. Putting on and taking off regular lower body clothing?   [ ] 1   [X] 2   [ ] 3   [ ] 4   2. Bathing (including washing, rinsing, drying)? [ ] 1   [X] 2   [ ] 3   [ ] 4   3. Toileting, which includes using toilet, bedpan or urinal?   [ ] 1   [X] 2   [ ] 3   [ ] 4   4. Putting on and taking off regular upper body clothing?   [ ] 1   [ ] 2   [X] 3   [ ] 4   5. Taking care of personal grooming such as brushing teeth? [ ] 1   [ ] 2   [X] 3   [ ] 4   6. Eating meals? [ ] 1   [ ] 2   [X] 3   [ ] 4   © 2007, Trustees of [address] [place] [address] [address] [address], under license to [employer]. All rights reserved   Score:  Initial: 15 Most Recent: X (Date: -- )     Interpretation of Tool:  Represents activities that are increasingly more difficult (i.e. Bed mobility, Transfers, Gait). Score 24 23 22-20 19-15 14-10 9-7 6       Modifier CH CI CJ CK CL CM CN         · Self Care:               - CURRENT STATUS:    CK - 40%-59% impaired, limited or restricted               - GOAL STATUS:           CJ - 20%-39% impaired, limited or restricted               - D/C STATUS:                       ---------------To be determined---------------  [place] / Product Type: Medicare /       Medical Necessity:     · Patient is expected to demonstrate progress in balance, coordination and functional technique to decrease assistance required with self care and functional mobility. Reason for Services/Other Comments:  · Patient continues to require skilled intervention due to decreased self care and functional mobility.    Use of outcome tool(s) and clinical judgement create a POC that gives a: LOW COMPLEXITY                 TREATMENT:   (In addition to Assessment/Re-Assessment sessions the following treatments were rendered)      Pre-treatment Symptoms/Complaints:  none  Pain: Initial:   Pain Intensity 1: 2  Pain Location 1: Hip  Pain Orientation 1: Right  Pain Intervention(s) 1: Ice  Post Session:        Assessment/Reassessment only, no treatment provided today     Treatment/Session Assessment:         Response to Treatment:  Tolerated well. Education:  [ ] Home Exercises  [X] Fall Precautions  [X] Hip Precautions [ ] Going Home Video  [ ] Knee/Hip Prosthesis Review  [X] Walker Management/Safety [X] Adaptive Equipment as Needed         Interdisciplinary Collaboration:   · Physical Therapist  · Occupational Therapist  · Registered Nurse     After treatment position/precautions:   · Supine in bed  · Bed/Chair-wheels locked  · Caregiver at bedside  · Call light within reach  · RN notified  · Side rails x 3     Compliance with Program/Exercises: compliant all of the time. Recommendations/Intent for next treatment session:  Treatment next visit will focus on increasing Ms. Mcgowan's independence with bed mobility, transfers, self care, functional mobility, modalities for pain, and patient education.        Total Treatment Duration:  OT Patient Time In/Time Out  Time In: 1345  Time Out: Ervin Downing OT

## 2017-10-09 NOTE — ANESTHESIA POSTPROCEDURE EVALUATION
Post-Anesthesia Evaluation and Assessment    Patient: Kenyon Bradford MRN: [id number]  SSN: [id number]    YOB: 1945  Age: 80 y.o. Sex: female       Cardiovascular Function/Vital Signs  Visit Vitals    /51    Pulse (!) 56    Temp 36.2 °C (97.2 °F)    Resp 15    Ht 5' 4\" (1.626 m)    Wt 117.9 kg (260 lb)    SpO2 100%    BMI 44.63 kg/m2       Patient is status post spinal, general - backup anesthesia for Procedure(s):  RIGHT HIP ARTHROPLASTY TOTAL/ . Nausea/Vomiting: None    Postoperative hydration reviewed and adequate. Pain:  Pain Scale 1: Numeric (0 - 10) (10/09/17 1032)  Pain Intensity 1: 0 (10/09/17 1032)   Managed    Neurological Status:   Neuro (WDL): Exceptions to WDL (10/09/17 1032)  Neuro  Neurologic State: Drowsy (10/09/17 1032)  Cognition: Follows commands (10/09/17 1032)  LUE Motor Response: Purposeful (10/09/17 1032)  LLE Motor Response: Pharmacologically paralyzed (10/09/17 1032)  RUE Motor Response: Purposeful (10/09/17 1032)  RLE Motor Response: Pharmacologically paralyzed (10/09/17 1032)   At baseline    Mental Status and Level of Consciousness: Arousable    Pulmonary Status:   O2 Device: Nasal cannula (10/09/17 1047)   Adequate oxygenation and airway patent    Complications related to anesthesia: None    Post-anesthesia assessment completed.  No concerns    Signed By: Cathi Salmon MD     October 9, 2017

## 2017-10-09 NOTE — CONSULTS
HOSPITALIST H&P/CONSULT  NAME:  Tyler Ordaz   Age:  80 y.o.  :   1945   MRN:   [id number]  PCP: Naima Elise MD  Consulting MD:  Treatment Team: Attending Provider: Izabel Coates MD; Utilization Review: Dora Hudson; Consulting Provider: Mabel Hurtado MD; Consulting Provider: Stuart Clark MD  HPI:   Patient with pmhx of DM, HTN, hypothyroidism with extensive OA now s/p right DEYSI on 10/9 without any immediate postoperative complications. Hospitalist consulted for medical management. She denies any acute complaints. Complete ROS done and is as stated in HPI or otherwise negative  Past Medical History:   Diagnosis Date    Arthritis     Asthma     bronchio-asthma mostly seasonal    Chronic obstructive asthma, unspecified     Chronic pain     DDD, LOW BACK, SIATICA    DDD (degenerative disc disease)     WITH RADICULOPATHY    GERD (gastroesophageal reflux disease)     Hypertension     Hypothyroidism     HYPOTHYROIDISM     ON MEDS    Incontinence of urine     Morbid obesity ([address]     Neuropathy     ILYA (obstructive sleep apnea) 2009    Osteoarthritis of left knee 2009    Osteoarthritis of right knee 3/2/2011    S/P total knee replacement using cement 2009    Sleep apnea     HAS CPAP    Spastic colon     Status post right hip replacement 10/9/2017    Type II or unspecified type diabetes mellitus without mention of complication, not stated as uncontrolled     avg       Past Surgical History:   Procedure Laterality Date    HX HERNIA REPAIR  3/60/35    UMBILICAL WITH MESH    HX HYSTERECTOMY      HX KNEE REPLACEMENT Right     WI HAND/FINGER SURGERY UNLISTED Right 'S    TOTAL KNEE ARTHROPLASTY Left       Prior to Admission Medications   Prescriptions Last Dose Informant Patient Reported? Taking? AMBIEN 10 mg Tab 10/7/2017 at Unknown time  Yes Yes   Sig: Take 10 mg by mouth nightly.    Azelastine (ASTEPRO) 0.15 % (205.5 mcg) nasal spray Unknown at Unknown time  Yes No   Sig: as needed. CYANOCOBALAMIN (VITAMIN B-12 PO) 10/2/2017 at Unknown time  Yes Yes   Sig: Take 500 mg by mouth daily. FLUTICASONE/SALMETEROL (ADVAIR DISKUS IN) 10/5/2017  Yes No   Sig: Take 2 Puffs by inhalation two (2) times daily as needed. GLYBURIDE-METFORMIN 2.5-500 mg per tablet 10/8/2017 at Unknown time  Yes Yes   Sig: take 1 Tab by mouth two (2) times a day. LASIX 20 mg Tab 10/2/2017 at Unknown time  Yes Yes   Sig: Take 20 mg by mouth daily as needed. LIBRAX, WITH CLINIDIUM, 2.5-5 mg Cap 10/5/2017  Yes No   Sig: Take 1 Cap by mouth four (4) times daily as needed. LISINOPRIL-HYDROCHLOROTHIAZIDE 10-12.5 mg per tablet 10/8/2017 at Unknown time  Yes Yes   Sig: take 1 Tab by mouth daily. MULTIVITAMIN WITH MINERALS (MULTIVITAMIN & MINERAL FORMULA PO) 10/2/2017  Yes No   Sig: Take 1 Tab by mouth daily. Multivitamin with D with Calcium    NAPROXEN SODIUM (ALEVE PO) 10/4/2017  Yes No   Sig: Take  by mouth as needed. NEOMY SULF/POLYMYX B SULF/HC (CORTISPORIN OT) Unknown at Unknown time  Yes No   Sig: by Otic route as needed. 1 to 2 drops in each ear as needed. OMEPRAZOLE PO 10/8/2017 at Unknown time  Yes Yes   Si mg daily. Pt. Instructed to take morning of surgery per anesthesiologist.     OTHER 10/8/2017 at Unknown time  Yes Yes   Sig: three (3) times daily. Lidocaine patch 5%   PATANOL 0.1 % Drop 10/8/2017 at Unknown time  Yes Yes   Si Drops by Both Eyes route as needed for Allergies. PREGABALIN (LYRICA PO) 10/8/2017 at Unknown time  Yes Yes   Sig: Take 150 mg by mouth two (2) times a day. SITagliptin (JANUVIA) 100 mg tablet 10/8/2017 at Unknown time  Yes Yes   Sig: Take 100 mg by mouth daily. SYNTHROID 100 mcg Tab 10/8/2017 at Unknown time  Yes Yes   Sig: Take 100 mcg by mouth daily. Pt.  Instructed to take morning of surgery per anesthesiologist.     acetaminophen (TYLENOL) 325 mg tablet 10/8/2017 at Unknown time  Yes Yes   Sig: Take 325 mg by mouth as needed for Pain. aspirin 81 mg tablet 10/2/2017  Yes No   Sig: Take 81 mg by mouth daily. celecoxib (CELEBREX) 200 mg capsule 10/8/2017  Yes No   Sig: Take 200 mg by mouth daily. 1 to 2 a day. cpap machine kit 10/8/2017 at Unknown time  Yes Yes   Sig: by Does Not Apply route. 9 cm H2O   estradiol (ESTRACE) 0.01 % (0.1 mg/gram) vaginal cream 10/7/2017  Yes No   Sig: Insert 2 g into vagina three (3) days a week.   lidocaine-kinesiology tape 5 % kit 10/8/2017  Yes No   Sig: by Apply Externally route. metaxalone (SKELAXIN) 800 mg tablet 10/8/2017 at Unknown time  No Yes   Sig: Take 1 Tab by mouth three (3) times daily as needed for Pain. Patient taking differently: Take 800 mg by mouth two (2) times a day. montelukast (SINGULAIR) 10 mg tablet 10/8/2017 at Unknown time  Yes Yes   Sig: Take 10 mg by mouth daily. mupirocin calcium (BACTROBAN) 2 % nasal ointment 10/8/2017 at Unknown time  Yes Yes   Sig: by Both Nostrils route two (2) times a day. oxybutynin chloride XL (DITROPAN XL) 5 mg CR tablet 10/8/2017 at Unknown time  Yes Yes   Sig: Take 5 mg by mouth daily. polyethylene glycol (MIRALAX) 17 gram packet 10/7/2017  Yes No   Sig: Take 17 g by mouth as needed. tramadol (ULTRAM) 50 mg tablet Unknown at Unknown time  Yes No   Sig: Take 50 mg by mouth as needed. Takes 2 to 4 a day.        Facility-Administered Medications: None     Allergies   Allergen Reactions    Codeine Other (comments)     dizziness    Erythromycin Rash    Tetracycline Rash      Social History   Substance Use Topics    Smoking status: Former Smoker     Years: 14.00     Quit date: 1/1/1977    Smokeless tobacco: Former User     Quit date: 9/19/1977      Comment: Jenny Estes 54    Alcohol use Yes      Comment: pt states she will drink 1 or 2 drinks per month      Family History   Problem Relation Age of Onset    Cancer Mother     Diabetes Mother     Heart Disease Father     COPD Father     Cancer Father      lung    Diabetes Father     Cancer Brother     Diabetes Brother     Heart Disease Brother      CAD      Objective:     Visit Vitals    BP 98/61 (BP 1 Location: Left arm, BP Patient Position: At rest)    Pulse 61    Temp 97.2 °F (36.2 °C)    Resp 15    Ht 5' 4\" (1.626 m)    Wt 117.9 kg (260 lb)    SpO2 97%    BMI 44.63 kg/m2      Temp (24hrs), Av.3 °F (36.3 °C), Min:97.2 °F (36.2 °C), Max:97.3 °F (36.3 °C)    Oxygen Therapy  O2 Sat (%): 97 % (10/09/17 1148)  O2 Device: Nasal cannula (10/09/17 1102)  O2 Flow Rate (L/min): 2 l/min (10/09/17 1132)  Physical Exam:  General:    Alert, cooperative, no distress, appears stated age. Head:   Normocephalic, without obvious abnormality, atraumatic. Nose:  Nares normal. No drainage or sinus tenderness. Lungs:   Clear to auscultation bilaterally. No Wheezing or Rhonchi. No rales. Heart:   Regular rate and rhythm,  no murmur, rub or gallop. Abdomen:   Soft, non-tender. Not distended. Bowel sounds normal.   Extremities: No cyanosis. No edema. No clubbing. Wriggles toes bilaterally  Skin:     Texture, turgor normal. No rashes or lesions. Not Jaundiced  Neurologic: Alert and oriented x 3, no focal deficits   Data Review:   Recent Results (from the past 24 hour(s))   TYPE & SCREEN    Collection Time: 10/09/17  7:13 AM   Result Value Ref Range    Crossmatch Expiration 10/12/2017     ABO/Rh(D) [facility] POSITIVE     Antibody screen NEG    GLUCOSE, POC    Collection Time: 10/09/17  7:27 AM   Result Value Ref Range    Glucose (POC) 90 65 - 100 mg/dL     Imaging /Procedures /Studies     Assessment and Plan: Active Hospital Problems    Diagnosis Date Noted    Status post right hip replacement 10/09/2017     A/p  - s/p Right DEYSI - as per primary, orthopedics  - DM2 -  Cont glucovance, add SSI. Cont [place] and [place]  - Hypothyroidism - cont synthroid  - HTN - cont home meds    Code Status: full code    Thank you for this consult. Hospitalist will sign off.  Please re-call if needed    Signed By: Karine Auguste,      October 9, 2017

## 2017-10-09 NOTE — ANESTHESIA PROCEDURE NOTES
Spinal Block    Start time: 10/9/2017 8:45 AM  End time: 10/9/2017 8:50 AM  Performed by: Samuel Saleh  Authorized by: Samuel Saleh     Pre-procedure: Indications: primary anesthetic  Preanesthetic Checklist: patient identified, risks and benefits discussed, anesthesia consent, patient being monitored and timeout performed    Timeout Time: 08:45          Spinal Block:   Patient Position:  Seated  Prep Region:  Lumbar  Prep: chlorhexidine and patient draped      Location:  L3-4  Technique:  Single shot  Local:  Lidocaine 1%  Local Dose (mL):  3    Needle:   Needle Type:  Pencan  Needle Gauge:  25 G  Attempts:  1      Events: CSF confirmed, no blood with aspiration and no paresthesia        Assessment:  Insertion:  Uncomplicated  Patient tolerance:  Patient tolerated the procedure well with no immediate complications  6.1NI heavy bupiv administered.

## 2017-10-09 NOTE — ANESTHESIA PREPROCEDURE EVALUATION
Anesthetic History               Review of Systems / Medical History  Patient summary reviewed and pertinent labs reviewed    Pulmonary    COPD    Sleep apnea: CPAP    Asthma (intermittant inhaler use)        Neuro/Psych              Cardiovascular    Hypertension              Exercise tolerance: <4 METS     GI/Hepatic/Renal     GERD: well controlled           Endo/Other    Diabetes: well controlled, type 2  Hypothyroidism  Morbid obesity and arthritis     Other Findings              Physical Exam    Airway  Mallampati: II  TM Distance: 4 - 6 cm  Neck ROM: normal range of motion   Mouth opening: Normal     Cardiovascular    Rhythm: regular  Rate: normal      Pertinent negatives: No murmur, JVD and peripheral edema   Dental  No notable dental hx       Pulmonary  Breath sounds clear to auscultation               Abdominal         Other Findings            Anesthetic Plan    ASA: 3  Anesthesia type: spinal and general - backup          Induction: Intravenous  Anesthetic plan and risks discussed with: Patient      Spinal with sedation

## 2017-10-09 NOTE — PROGRESS NOTES
10/09/17 1255   Oxygen Therapy   O2 Sat (%) 95 %   Pulse via Oximetry 60 beats per minute   O2 Device Room air   Patient achieved   2000    Ml/sec on IS. Patient encouraged to do every hour while awake-patient agreed and demonstrated. No shortness of breath or distress noted. BS are clear b/l. [facility] notes reviewed. Spacer given for home mdi. Patient brought home cpap unit.

## 2017-10-09 NOTE — PROGRESS NOTES
Pt is moving bilateral feet. Requested pain medication for incisional pain at right hip. Dilaudid 2 mg po given with Zofran 4 mg slow IVP.

## 2017-10-09 NOTE — DISCHARGE INSTRUCTIONS
[facility]   Patient Discharge Instructions    erencboone Rosario / [id number] : 1945    Admitted 10/9/2017 Discharged: 10/9/2017     IF YOU HAVE ANY PROBLEMS ONCE YOU ARE AT HOME CALL THE FOLLOWING NUMBERS:   Main office number: [phone]      Medications    · The medications you are to continue on are listed on the medication reconciliation sheet. · Narcotic pain medications as well as supplemental iron can cause constipation. If this occurs try stopping the narcotic pain medication and/or the iron. · It is important that you take the medication exactly as they are prescribed. · Medications which increase your risk of blood clots are listed to stop for 5 weeks after surgery as well as medications or supplements which increase your risk of bleeding complications. · Keep your medication in the bottles provided by the pharmacist and keep a list of the medication names, dosages, and times to be taken in your wallet. · Do not take other medications without consulting your doctor. Important Information    Do NOT smoke as this will greatly increase your risk of infection! Resume your prehospital diet. If you have excessive nausea or vomitting call your doctor's office     Leg swelling and warmth is normal for 6 months after surgery. If you experience swelling in your leg elevate you leg while laying down with your toes above your heart. If you have sudden onset severe swelling with leg pain call our office. Use Ash Hose stockings until we see you in the office for your follow up appointment. The stitches deep inside take approximately 6 months to dissolve. There will be sharp shooting, stinging and burning pain. This is normal and will resolve between 3-6 months after surgery. Difficulty sleeping is normal following total Knee and Hip replacement. You may try melatonin, an over-the-counter sleep aid or benadryl to help with sleep.  Most patients will resume sleeping through the night 8 weeks after surgery. Home Physical Therapy is arranged. Home Health will contact you within 48 hrs of discharge that you have chosen. If you have not received a call within this time frame please contact that provider you chose. You should be given this information before you leave the hospital.     You are at a risk for falls. Use the rolling walker when walking. Patients who have had a joint replacement should not drive if they are still taking narcotic pain mediation during the daytime hours. Most patients wean themselves off of pain medication within 2-5 weeks after surgery. When to Call the office    - If you have a temperature greater then 101  - Uncontrolled vomiting   - Loose control of your bladder or bowel function  - Are unable to bear any wieght   - Need a pain medication refill     Information obtained by :  I understand that if any problems occur once I am at home I am to contact my physician. I understand and acknowledge receipt of the instructions indicated above.                                                                                                                                            Physician's or R.N.'s Signature                                                                  Date/Time                                                                                                                                              Patient or Representative Signature                                                          Date/Time

## 2017-10-09 NOTE — PERIOP NOTES
TRANSFER - OUT REPORT:    Verbal report given to receiving nurse Sterling(name) on [facility] Doctor  being transferred to [facility](unit) for routine progression of care       Report consisted of patients Situation, Background, Assessment and   Recommendations(SBAR). Information from the following report(s) OR Summary, Procedure Summary, Intake/Output and MAR was reviewed with the receiving nurse. Opportunity for questions and clarification was provided.       Patient transported with:   O2 @ 2 liters  Tech

## 2017-10-10 LAB
ANION GAP SERPL CALC-SCNC: 6 MMOL/L (ref 7–16)
BUN SERPL-MCNC: 21 MG/DL (ref 8–23)
CALCIUM SERPL-MCNC: 7.8 MG/DL (ref 8.3–10.4)
CHLORIDE SERPL-SCNC: 107 MMOL/L (ref 98–107)
CO2 SERPL-SCNC: 26 MMOL/L (ref 21–32)
CREAT SERPL-MCNC: 1.09 MG/DL (ref 0.6–1)
GLUCOSE BLD STRIP.AUTO-MCNC: 131 MG/DL (ref 65–100)
GLUCOSE BLD STRIP.AUTO-MCNC: 172 MG/DL (ref 65–100)
GLUCOSE BLD STRIP.AUTO-MCNC: 173 MG/DL (ref 65–100)
GLUCOSE SERPL-MCNC: 146 MG/DL (ref 65–100)
HGB BLD-MCNC: 9.5 G/DL (ref 11.7–15.4)
MM INDURATION POC: 0 MM (ref 0–5)
POTASSIUM SERPL-SCNC: 4.3 MMOL/L (ref 3.5–5.1)
PPD POC: 0 NEGATIVE
SODIUM SERPL-SCNC: 139 MMOL/L (ref 136–145)

## 2017-10-10 PROCEDURE — 74011250637 HC RX REV CODE- 250/637: Performed by: ORTHOPAEDIC SURGERY

## 2017-10-10 PROCEDURE — 97116 GAIT TRAINING THERAPY: CPT

## 2017-10-10 PROCEDURE — 97110 THERAPEUTIC EXERCISES: CPT

## 2017-10-10 PROCEDURE — 36415 COLL VENOUS BLD VENIPUNCTURE: CPT | Performed by: PHYSICIAN ASSISTANT

## 2017-10-10 PROCEDURE — 80048 BASIC METABOLIC PNL TOTAL CA: CPT | Performed by: PHYSICIAN ASSISTANT

## 2017-10-10 PROCEDURE — 74011250636 HC RX REV CODE- 250/636: Performed by: PHYSICIAN ASSISTANT

## 2017-10-10 PROCEDURE — 94760 N-INVAS EAR/PLS OXIMETRY 1: CPT

## 2017-10-10 PROCEDURE — 85018 HEMOGLOBIN: CPT | Performed by: PHYSICIAN ASSISTANT

## 2017-10-10 PROCEDURE — 82962 GLUCOSE BLOOD TEST: CPT

## 2017-10-10 PROCEDURE — 74011250637 HC RX REV CODE- 250/637: Performed by: PHYSICIAN ASSISTANT

## 2017-10-10 PROCEDURE — 97535 SELF CARE MNGMENT TRAINING: CPT

## 2017-10-10 PROCEDURE — 74011636637 HC RX REV CODE- 636/637: Performed by: INTERNAL MEDICINE

## 2017-10-10 PROCEDURE — 65270000029 HC RM PRIVATE

## 2017-10-10 RX ADMIN — CELECOXIB 200 MG: 200 CAPSULE ORAL at 21:43

## 2017-10-10 RX ADMIN — GLYBURIDE: 5 TABLET ORAL at 09:28

## 2017-10-10 RX ADMIN — ACETAMINOPHEN 1000 MG: 500 TABLET, FILM COATED ORAL at 00:48

## 2017-10-10 RX ADMIN — PANTOPRAZOLE SODIUM 40 MG: 40 TABLET, DELAYED RELEASE ORAL at 05:52

## 2017-10-10 RX ADMIN — INSULIN LISPRO 2 UNITS: 100 INJECTION, SOLUTION INTRAVENOUS; SUBCUTANEOUS at 21:36

## 2017-10-10 RX ADMIN — SENNOSIDES AND DOCUSATE SODIUM 2 TABLET: 8.6; 5 TABLET ORAL at 09:20

## 2017-10-10 RX ADMIN — MONTELUKAST SODIUM 10 MG: 10 TABLET, FILM COATED ORAL at 21:42

## 2017-10-10 RX ADMIN — HYDROMORPHONE HYDROCHLORIDE 2 MG: 2 TABLET ORAL at 01:36

## 2017-10-10 RX ADMIN — LISINOPRIL AND HYDROCHLOROTHIAZIDE 1 TABLET: 12.5; 1 TABLET ORAL at 09:20

## 2017-10-10 RX ADMIN — PREGABALIN 150 MG: 150 CAPSULE ORAL at 09:20

## 2017-10-10 RX ADMIN — METAXALONE 800 MG: 800 TABLET ORAL at 09:20

## 2017-10-10 RX ADMIN — ACETAMINOPHEN 1000 MG: 500 TABLET, FILM COATED ORAL at 13:53

## 2017-10-10 RX ADMIN — GLYBURIDE: 5 TABLET ORAL at 17:34

## 2017-10-10 RX ADMIN — CELECOXIB 200 MG: 200 CAPSULE ORAL at 09:20

## 2017-10-10 RX ADMIN — ACETAMINOPHEN 1000 MG: 500 TABLET, FILM COATED ORAL at 05:52

## 2017-10-10 RX ADMIN — ASPIRIN 325 MG: 325 TABLET, DELAYED RELEASE ORAL at 21:42

## 2017-10-10 RX ADMIN — ACETAMINOPHEN 1000 MG: 500 TABLET, FILM COATED ORAL at 17:34

## 2017-10-10 RX ADMIN — ASPIRIN 325 MG: 325 TABLET, DELAYED RELEASE ORAL at 09:20

## 2017-10-10 RX ADMIN — HYDROMORPHONE HYDROCHLORIDE 2 MG: 2 TABLET ORAL at 17:34

## 2017-10-10 RX ADMIN — HYDROMORPHONE HYDROCHLORIDE 2 MG: 2 TABLET ORAL at 13:53

## 2017-10-10 RX ADMIN — INSULIN LISPRO 2 UNITS: 100 INJECTION, SOLUTION INTRAVENOUS; SUBCUTANEOUS at 17:33

## 2017-10-10 RX ADMIN — ZOLPIDEM TARTRATE 5 MG: 5 TABLET ORAL at 21:43

## 2017-10-10 RX ADMIN — Medication 10 ML: at 22:00

## 2017-10-10 RX ADMIN — HYDROMORPHONE HYDROCHLORIDE 2 MG: 2 TABLET ORAL at 09:20

## 2017-10-10 RX ADMIN — HYDROMORPHONE HYDROCHLORIDE 2 MG: 2 TABLET ORAL at 05:52

## 2017-10-10 RX ADMIN — OXYBUTYNIN CHLORIDE 5 MG: 5 TABLET ORAL at 09:20

## 2017-10-10 RX ADMIN — LEVOTHYROXINE SODIUM 100 MCG: 100 TABLET ORAL at 09:00

## 2017-10-10 RX ADMIN — Medication 5 ML: at 15:29

## 2017-10-10 RX ADMIN — CEFAZOLIN 2 G: 1 INJECTION, POWDER, FOR SOLUTION INTRAMUSCULAR; INTRAVENOUS; PARENTERAL at 01:00

## 2017-10-10 RX ADMIN — METAXALONE 800 MG: 800 TABLET ORAL at 21:42

## 2017-10-10 RX ADMIN — PREGABALIN 150 MG: 150 CAPSULE ORAL at 21:42

## 2017-10-10 RX ADMIN — HYDROMORPHONE HYDROCHLORIDE 2 MG: 2 TABLET ORAL at 21:43

## 2017-10-10 RX ADMIN — SITAGLIPTIN 100 MG: 100 TABLET, FILM COATED ORAL at 09:20

## 2017-10-10 NOTE — PROGRESS NOTES
Problem: Mobility Impaired (Adult and Pediatric)  Goal: *Acute Goals and Plan of Care (Insert Text)  GOALS (1-4 days):  (1.)Ms. Mcgowan will move from supine to sit and sit to supine in bed with SUPERVISION. (2.)Ms. Mcgowan will transfer from bed to chair and chair to bed with STAND BY ASSIST using the least restrictive device. (3.)Ms. Mcgowan will ambulate with STAND BY ASSIST for 200 feet with the least restrictive device. (4.)Ms. Mcgowan will ambulate up/down 4 steps with left railing with CONTACT GUARD ASSIST with no device. (5.)Ms. Mcgowan will state/observe DEYSI precautions with No verbal cues. ________________________________________________________________________________________________      PHYSICAL THERAPY JOINT CAMP DEYSI: Daily Note, Treatment Day: 1st and PM 10/10/2017  INPATIENT: Hospital Day: 2  [place] / [place] / Product Type: Medicare /      NAME/AGE/GENDER: Zahra Peralta is a 80 y.o. female              PRIMARY DIAGNOSIS:  Primary osteoarthritis of right hip [M16.11]              Procedure(s) and Anesthesia Type:     * RIGHT HIP ARTHROPLASTY TOTAL/  - Spinal (Right)  ICD-10: Treatment Diagnosis:        · Pain in Right Hip (M25.551)  · Stiffness of Right Hip, Not elsewhere classified (M25.651)  · Difficulty in walking, Not elsewhere classified (R26.2)       ASSESSMENT:      Ms. Verlan Brittle presents sitting up in chair on contact and ready to walk. Walked into bathroom and pt voided, she then brushed her teeth at the sink. Worked on gait training in the hallway and then back in room worked on [employer] exercises in the chair. Pt progressing nicely with distance and with repetitions this afternoon. She wanted to stay up in the chair, needs in reach. Hope to further progress this afternoon. This section established at most recent assessment   PROBLEM LIST (Impairments causing functional limitations):  1. Decreased Strength  2. Decreased ADL/Functional Activities  3.  Decreased Transfer Abilities  4. Decreased Ambulation Ability/Technique  5. Decreased Balance  6. Increased Pain  7. Decreased Activity Tolerance  8. Decreased Knowledge of Precautions  9. Decreased [place] with Home Exercise Program    INTERVENTIONS PLANNED: (Benefits and precautions of physical therapy have been discussed with the patient.)  1. Bed Mobility  2. Gait Training  3. Home Exercise Program (HEP)  4. Therapeutic Exercise/Strengthening  5. Transfer Training  6. Range of Motion: active/assisted/passive  7. Therapeutic Activities  8. Group Therapy      TREATMENT PLAN: Frequency/Duration: Follow patient BID   to address above goals. Rehabilitation Potential For Stated Goals: GOOD      RECOMMENDED REHABILITATION/EQUIPMENT: (at time of discharge pending progress): Continue Skilled Therapy and as noted in assessment. HISTORY:   History of Present Injury/Illness (Reason for Referral): The patient has end stage arthritis of the right hip. The patient was evaluated and examined during a consultation prior to this office visit. There have been no changes to the patient's orthopedic condition since the initial consultation. The patient has failed previous conservative treatment for this condition including antiinflammatories , and lifestyle modifications. The necessity for joint replacement is present. The patient will be admitted the day of surgery for Procedure(s) (LRB):  RIGHT HIP ARTHROPLASTY TOTAL/DEPUY (Right)       Past Medical History/Comorbidities:   Ms. Anne Garcia  has a past medical history of Arthritis; Asthma; Chronic obstructive asthma, unspecified; Chronic pain; DDD (degenerative disc disease); GERD (gastroesophageal reflux disease); Hypertension; Hypothyroidism; HYPOTHYROIDISM; Incontinence of urine; Morbid obesity ([address] Neuropathy; ILYA (obstructive sleep apnea) (9/16/2009); Osteoarthritis of left knee (9/16/2009); Osteoarthritis of right knee (3/2/2011);  S/P total knee replacement using cement (9/16/2009); Sleep apnea; Spastic colon; Status post right hip replacement (10/9/2017); and Type II or unspecified type diabetes mellitus without mention of complication, not stated as uncontrolled. She also has no past medical history of Adverse effect of anesthesia; Aneurysm ([address] [facility] [address] Arrhythmia; Autoimmune disease ([address] CAD (coronary artery disease); Cancer ([address] Chronic kidney disease; Chronic obstructive pulmonary disease ([address] Coagulation disorder ([address] Difficult intubation; Endocarditis; Heart failure ([address] Ill-defined condition; Liver disease; Malignant hyperthermia due to anesthesia; Nausea & vomiting; Nicotine vapor product user; Non-nicotine vapor product user; Pseudocholinesterase deficiency; Psychiatric disorder; PUD (peptic ulcer disease); Rheumatic fever; Seizures ([address] Stroke [facility]); or Thromboembolus ([address] Ms. Jennifer Fisher  has a past surgical history that includes hernia repair (1/30/08); hysterectomy (2000); hand/finger surgery unlisted (Right, 1990'S); total knee arthroplasty (Left, 2009); and knee replacement (Right, 2011). Social History/Living Environment:   Home Environment: Private residence  # Steps to Enter: 4  Rails to Enter: Yes  Hand Rails : Left  One/Two Story Residence: One story  Living Alone: Yes  Support Systems: Friends \ neighbors  Patient Expects to be Discharged to[de-identified] Skilled nursing facility  Current DME Used/Available at Home: None  Tub or Shower Type: Tub/Shower combination  Prior Level of Function/Work/Activity:  Pt was functioning with a cane prior to this admission   Number of Personal Factors/Comorbidities that affect the Plan of Care: 3+: HIGH COMPLEXITY   EXAMINATION:   Most Recent Physical Functioning:                               Bed Mobility  Supine to Sit: Minimum assistance; Moderate assistance; Additional time  Sit to Supine:  (stayed up in chair)  Scooting: Minimum assistance     Transfers  Sit to Stand: Contact guard assistance; Additional time  Stand to Sit: Contact guard assistance; Additional time     Balance  Sitting: Intact  Standing: Intact; With support          Gait Training: Yes     Weight Bearing Status  Right Side Weight Bearing: As tolerated  Distance (ft): 110 Feet (ft)  Ambulation - Level of Assistance: Contact guard assistance  Assistive Device: Walker, rolling  Base of Support: Widened  Speed/Jayne: Slow  Step Length: Left shortened  Stance: Right decreased  Gait Abnormalities: Antalgic  Interventions: Safety awareness training;Verbal cues      Braces/Orthotics: none     Right Hip Cold  Type: Cold/ice packs  Patient Position: Sitting       Body Structures Involved:  1. Joints  2. Muscles Body Functions Affected:  1. Sensory/Pain  2. Movement Related Activities and Participation Affected:  1. General Tasks and Demands  2. Mobility   Number of elements that affect the Plan of Care: 4+: HIGH COMPLEXITY   CLINICAL PRESENTATION:   Presentation: Evolving clinical presentation with changing clinical characteristics: MODERATE COMPLEXITY   CLINICAL DECISION MAKIN [facility] Mobility Inpatient Short Form  How much difficulty does the patient currently have. .. Unable A Lot A Little None   1. Turning over in bed (including adjusting bedclothes, sheets and blankets)? [ ] 1   [ ] 2   [X] 3   [ ] 4   2. Sitting down on and standing up from a chair with arms ( e.g., wheelchair, bedside commode, etc.)   [ ] 1   [ ] 2   [X] 3   [ ] 4   3. Moving from lying on back to sitting on the side of the bed? [ ] 1   [ ] 2   [X] 3   [ ] 4   How much help from another person does the patient currently need. .. Total A Lot A Little None   4. Moving to and from a bed to a chair (including a wheelchair)? [ ] 1   [ ] 2   [X] 3   [ ] 4   5. Need to walk in hospital room? [ ] 1   [ ] 2   [X] 3   [ ] 4   6. Climbing 3-5 steps with a railing?    [X] 1   [ ] 2   [ ] 3   [ ] 4   © , Trustees of [address] [place] [address] [address] [address], under license to [employer]. All rights reserved       Score:  Initial: 16 Most Recent: X (Date: -- )     Interpretation of Tool:  Represents activities that are increasingly more difficult (i.e. Bed mobility, Transfers, Gait). Score 24 23 22-20 19-15 14-10 9-7 6       Modifier CH CI CJ CK CL CM CN         · Mobility - Walking and Moving Around:               - CURRENT STATUS:    CK - 40%-59% impaired, limited or restricted               - GOAL STATUS:           CJ - 20%-39% impaired, limited or restricted               - D/C STATUS:                       ---------------To be determined---------------  [place] / Product Type: Medicare /       Medical Necessity:     · Patient is expected to demonstrate progress in strength, balance, coordination and functional technique to decrease assistance required with bed mobility, transfers & gait. Reason for Services/Other Comments:  · Patient continues to require skilled intervention due to pt not safe with functional mobility. Use of outcome tool(s) and clinical judgement create a POC that gives a: Questionable prediction of patient's progress: MODERATE COMPLEXITY                 TREATMENT:   (In addition to Assessment/Re-Assessment sessions the following treatments were rendered)      Pre-treatment Symptoms/Complaints: some burning in her hip with bed mobility  Pain: Initial: none at rest     Post Session:  No complaints once situated in chair, she said she was glad to be up      Gait Training (15 Minutes):  Gait training to improve and/or restore physical functioning as related to mobility and strength. Ambulated 110 Feet (ft) with Contact guard assistance using a Walker, rolling and minimal Safety awareness training;Verbal cues related to their stance phase and stride length to promote proper body alignment and promote proper body posture.   Instruction in performance of walker use and gait sequencing to correct stance phase and stride length. Therapeutic Exercise: (15 Minutes):  Exercises per grid below to improve mobility and strength. Required minimal verbal cues to promote proper body alignment and promote proper body posture. Progressed repetitions as indicated. Date:   10/10/17 Date:    Date:      ACTIVITY/EXERCISE AM PM AM PM AM PM   GROUP THERAPY  [ ]  [ ]  [ ]  [ ]  [ ]  [ ]   Ankle Pumps 10  15           Quad Sets  10  15           Gluteal Sets  10  15           Hip ABd/ADduction  10  15           Straight Leg Raises               Knee Slides  10  15           Short Arc Quads    15           Long Arc Quads    15           Chair Slides                               B = bilateral; AA = active assistive; A = active; P = passive       Treatment/Session Assessment:         Response to Treatment:  Tolerated well, nice progress from the morning. Education:  [ ] Home Exercises  [X] Fall Precautions  [X] Hip Precautions [ ] D/C Instruction Review  [ ] Knee/Hip Prosthesis Review  [X] Walker Management/Safety [ ] Adaptive Equipment as Needed         Interdisciplinary Collaboration:   · Registered Nurse     After treatment position/precautions:   · Up in chair, Bed/Chair-wheels locked and Call light within reach     Compliance with Program/Exercises: compliant all the time. Recommendations/Intent for next treatment session:  Treatment next visit will focus on increasing Ms. Mcgowan's independence with bed mobility, transfers, gait training, strength/ROM exercises, modalities for pain, and patient education.        Total Treatment Duration:  PT Patient Time In/Time Out  Time In: 0900  Time Out: [facility],

## 2017-10-10 NOTE — PROGRESS NOTES
[address]  Face to Face Encounter    Patients Name: Pelon Alcala    YOB: 1945    Ordering Physician:  Karthik Turner    Primary Diagnosis: Primary osteoarthritis of right hip [M16.11]  S/p right DEYSI    Date of Face to Face:   10-9-17                                 Face to Face Encounter findings are related to primary reason for home care:   yes. 1. I certify that the patient needs intermittent care as follows: physical therapy: gait/stair training    2. I certify that this patient is homebound, that is: 1) patient requires the use of a walker device, special transportation, or assistance of another to leave the home; or 2) patient's condition makes leaving the home medically contraindicated; and 3) patient has a normal inability to leave the home and leaving the home requires considerable and taxing effort. Patient may leave the home for infrequent and short duration for medical reasons, and occasional absences for non-medical reasons. Homebound status is due to the following functional limitations: Patient's ambulation limited secondary to severe pain and requires the use of an assistive device and the assistance of a caregiver for safe completion. Patient with strength and ROM deficits limiting ambulation endurance requiring the use of an assistive device and the assistance of a caregiver. Patient deemed temporarily homebound secondary to increased risk for infection when leaving home and going out into the community. 3. I certify that this patient is under my care and that I, or a nurse practitioner or  [id number], or clinical nurse specialist, or certified nurse midwife, working with me, had a Face-to-Face Encounter that meets the physician Face-to-Face Encounter requirements.   The following are the clinical findings from the [address] [place] [address] encounter that support the need for skilled services and is a summary of the encounter: see hospital chart        Gary Gibson Zahida Valentin  10/10/2017      THE FOLLOWING TO BE COMPLETED BY THE COMMUNITY PHYSICIAN:    I concur with the findings described above from the F2F encounter that this patient is homebound and in need of a skilled service.     Certifying Physician: _____________________________________      Printed Certifying Physician Name: _____________________________________    Date: _________________

## 2017-10-10 NOTE — PROGRESS NOTES
October 10, 2017         Post Op day: 1 Day Post-OpProcedure(s) (LRB):  RIGHT HIP ARTHROPLASTY TOTAL/  (Right)      Admit Date: 10/9/2017  Admit Diagnosis: Primary osteoarthritis of right hip [M16.11]    LAB:    Recent Results (from the past 24 hour(s))   GLUCOSE, POC    Collection Time: 10/09/17  5:15 PM   Result Value Ref Range    Glucose (POC) 246 (H) 65 - 100 mg/dL   HEMOGLOBIN    Collection Time: 10/09/17  7:22 PM   Result Value Ref Range    HGB 11.1 (L) 11.7 - 15.4 g/dL   GLUCOSE, POC    Collection Time: 10/09/17  8:38 PM   Result Value Ref Range    Glucose (POC) 180 (H) 65 - 100 mg/dL   MSSA/MRSA SC BY PCR, NASAL SWAB    Collection Time: 10/09/17  9:15 PM   Result Value Ref Range    Special Requests: NO SPECIAL REQUESTS      Culture result: (A)       MRSA target DNA detected, SA target DNA detected. A positive test result does not necessarily indicate the presence of viable organisms. It is however, presumptive for the presence of MRSA or SA. Culture result:        RESULTS VERIFIED, PHONED TO AND READ BACK BY  FLAVIO BEDOYA AT 2318 ON [id number]. KDB     HEMOGLOBIN    Collection Time: 10/10/17  6:07 AM   Result Value Ref Range    HGB 9.5 (L) 11.7 - 15.4 g/dL     Vital Signs:    Patient Vitals for the past 8 hrs:   BP Temp Pulse Resp SpO2   10/10/17 0400 103/54 97.9 °F (36.6 °C) (!) 59 18 94 %   10/10/17 0045 111/57 98.6 °F (37 °C) 60 16 94 %     Temp (24hrs), Av.9 °F (36.6 °C), Min:97.2 °F (36.2 °C), Max:98.6 °F (37 °C)    Body mass index is 44.63 kg/(m^2).   Pain Control:   Pain Assessment  Pain Scale 1: Visual  Pain Intensity 1: 0  Pain Onset 1: at rest  Pain Location 1: Hip  Pain Orientation 1: Right  Pain Description 1: Aching  Pain Intervention(s) 1: Medication (see MAR)    Subjective: Doing well, No complaints, No SOB, No Chest Pain, No Nausea or Vomitting     Objective: Vital Signs are Stable, No Acute Distress, Alert and Oriented, Dressing is Dry,  Neurovascular exam is normal.       PT/OT: Assistive Device: Walker (comment)                Weight Bearing Status: WBAT    Meds:  [unfilled]  [unfilled]  [unfilled]    Assessment:   Patient Active Problem List   Diagnosis Code    Osteoarthritis of left knee M17.12    S/P total knee replacement using cement Z96.659    ILYA (obstructive sleep apnea) G47.33    Asthma J45.909    Type II or unspecified type diabetes mellitus without mention of complication, not stated as uncontrolled E11.9    Hypothyroidism E03.9    Chronic pain G89.29    Osteoarthritis of right knee M17.11    S/P total knee replacement using cement Z96.659    Morbid obesity with BMI of 45.0-49.9, adult (HCC) E66.01, Z68.42    Hypertension I10    Neuropathy G62.9    Elevated WBC count D72.829    Elevated serum creatinine R79.89    Status post right hip replacement Z96.641             Plan: Continue Physical Therapy, Monitor  Hbg, rehab thurs.         Signed By: Ta Stovall MD

## 2017-10-10 NOTE — PROGRESS NOTES
Patient resting quietly, alert and oriented, no distress noted. Right hip dressing c/d/i, aviles with clear, yellow urine draining. Patient encouraged to use incentive spirometer. IV patent infusing fluids as ordered. Neurovascular and peripheral vascular checks WNL. Bed low and locked position. Call light within reach. Patient instructed to call for assistance, verbalizes understanding. Nursing assessment complete.

## 2017-10-10 NOTE — PROGRESS NOTES
Problem: Self Care Deficits Care Plan (Adult)  Goal: *Acute Goals and Plan of Care (Insert Text)  GOALS:   DISCHARGE GOALS (in preparation for going home/rehab): 3 days  1. Ms. Anne Garcia will perform one lower body dressing activity with minimal assistance with adaptive equipment to demonstrate improved functional mobility and safety. 2. Ms. Anne Garcia will perform one lower body bathing activity with minimal assistance with adaptive equipment to demonstrate improved functional mobility and safety. GOAL MET 10/10/2017  3. Ms. Anne Garcia will perform toileting/toilet transfer with contact guard assistance with adaptive equipment to demonstrate improved functional mobility and safety. GOAL MET 10/10/2017  4. Ms. Anne Garcia will perform shower transfer with contact guard assistance with adaptive equipment to demonstrate improved functional mobility and safety. GOAL MET 10/10/2017  5. Ms. Anne Garcia will state DEYSI precautions with two verbal cues to demonstrate improved functional mobility and safety. JOINT CAMP OCCUPATIONAL THERAPY DEYSI: Daily Note, Treatment Day: 1st, AM and PM 10/10/2017  INPATIENT: Hospital Day: 2  [place] / [place] / Product Type: Medicare /      NAME/AGE/GENDER: Loreta Andrews is a 80 y.o. female              PRIMARY DIAGNOSIS:  Primary osteoarthritis of right hip [M16.11]              Procedure(s) and Anesthesia Type:     * RIGHT HIP ARTHROPLASTY TOTAL/  - Spinal (Right)  ICD-10: Treatment Diagnosis:        · Pain in Right Hip (M25.551)  · Stiffness of Right Hip, Not elsewhere classified (M25.651)  · Other lack of cordination (R27.8)       ASSESSMENT:      Ms. Anne Garcia is s/p right DEYSI and presents with decreased weight bearing on right LE and decreased independence with functional mobility and activities of daily living.   Patient completed shower and dressing as charter below in ADL grid and is ambulating with rolling walker and stand by assist.  Patient has met 3/5 goals and plans to go to rehab as she lives alone. OT reviewed hip precautions throughout session and issued long handled sponge for home use. Patient instructed to call for assistance when needing to get up from recliner and all needs in reach. Patient verbalized understanding of call light. This section established at most recent assessment   PROBLEM LIST (Impairments causing functional limitations):  1. Decreased Strength  2. Decreased ADL/Functional Activities  3. Decreased Transfer Abilities  4. Increased Pain  5. Increased Fatigue  6. Decreased Flexibility/Joint Mobility  7. Decreased Knowledge of Precautions    INTERVENTIONS PLANNED: (Benefits and precautions of occupational therapy have been discussed with the patient.)  1. Activities of daily living training  2. Adaptive equipment training  3. Balance training  4. Clothing management  5. Donning&doffing training  6. Theraputic activity      TREATMENT PLAN: Frequency/Duration: Follow patient 1 time to address above goals. Rehabilitation Potential For Stated Goals: GOOD      RECOMMENDED REHABILITATION/EQUIPMENT: (at time of discharge pending progress): Continue Skilled Therapy and Home Health: Physical Therapy. OCCUPATIONAL PROFILE AND HISTORY:   History of Present Injury/Illness (Reason for Referral): Pt presents this date s/p (right) DEYSI. Past Medical History/Comorbidities:   Ms. Winsome Lake  has a past medical history of Arthritis; Asthma; Chronic obstructive asthma, unspecified; Chronic pain; DDD (degenerative disc disease); GERD (gastroesophageal reflux disease); Hypertension; Hypothyroidism; HYPOTHYROIDISM; Incontinence of urine; Morbid obesity ([address] Neuropathy; ILYA (obstructive sleep apnea) (9/16/2009); Osteoarthritis of left knee (9/16/2009); Osteoarthritis of right knee (3/2/2011); S/P total knee replacement using cement (9/16/2009);  Sleep apnea; Spastic colon; Status post right hip replacement (10/9/2017); and Type II or unspecified type diabetes mellitus without mention of complication, not stated as uncontrolled. She also has no past medical history of Adverse effect of anesthesia; Aneurysm ([facility] Arrhythmia; Autoimmune disease ([facility] CAD (coronary artery disease); Cancer ([facility] Chronic kidney disease; Chronic obstructive pulmonary disease ([facility] Coagulation disorder ([facility] Difficult intubation; Endocarditis; Heart failure ([facility] Ill-defined condition; Liver disease; Malignant hyperthermia due to anesthesia; Nausea & vomiting; Nicotine vapor product user; Non-nicotine vapor product user; Pseudocholinesterase deficiency; Psychiatric disorder; PUD (peptic ulcer disease); Rheumatic fever; Seizures ([facility] Stroke [facility]); or Thromboembolus ([facility] Ms. Calvin Finch  has a past surgical history that includes hernia repair (1/30/08); hysterectomy (2000); hand/finger surgery unlisted (Right, 1990'S); total knee arthroplasty (Left, 2009); and knee replacement (Right, 2011). Social History/Living Environment:   Home Environment: Private residence  # Steps to Enter: 4  Rails to Enter: Yes  Hand Rails : Left  One/Two Story Residence: One story  Living Alone: Yes  Support Systems: Friends \ neighbors  Patient Expects to be Discharged to[de-identified] Skilled nursing facility  Current DME Used/Available at Home: None  Tub or Shower Type: Tub/Shower combination  Prior Level of Function/Work/Activity:  Indep with BADLs and IADLs      Number of Personal Factors/Comorbidities that affect the Plan of Care: Brief history (0):  LOW COMPLEXITY   ASSESSMENT OF OCCUPATIONAL PERFORMANCE[de-identified]   Most Recent Physical Functioning:   Balance  Sitting: Intact  Standing: Intact; With support        Patient Vitals for the past 6 hrs:   BP SpO2 Pulse   10/10/17 0801 109/55 95 % 66                                   Mental Status  Neurologic State: Alert  Orientation Level: Oriented X4  Cognition: Appropriate for age attention/concentration                    Basic ADLs (From Assessment) Complex ADLs (From Assessment)   Basic ADL  Feeding: Supervision  Oral Facial Hygiene/Grooming: Supervision  Bathing: Moderate assistance  Upper Body Dressing: Minimum assistance  Lower Body Dressing: Moderate assistance  Toileting: Total assistance     Grooming/Bathing/Dressing Activities of Daily Living   Grooming  Grooming Assistance: Supervision/set up (seated in recliner)     Upper Body Bathing  Bathing Assistance: Supervision/set-up (seated on shower chair )     Lower Body Bathing  Bathing Assistance: Minimum assistance (seated on shower chair ) [address]: Moderate assistance (for brief donning )   Upper Body Dressing Assistance  Dressing Assistance: Supervision/set-up Functional Transfers  Bathroom Mobility: Stand-by assistance  Toilet Transfer : Stand-by asssistance  Shower Transfer: Stand-by asssistance  Cues: Verbal cues provided  Adaptive Equipment: Grab bars; Shower chair with back; Walker (comment)   Lower Body Dressing Assistance  Dressing Assistance: Moderate assistance (breif ) Bed/Mat Mobility  Supine to Sit: Minimum assistance; Moderate assistance; Additional time  Sit to Supine:  (stayed up in chair)  Sit to Stand: Contact guard assistance;Minimum assistance; Additional time  Scooting: Minimum assistance           Physical Skills Involved:  1. Range of Motion  2. Balance  3. Strength Cognitive Skills Affected (resulting in the inability to perform in a timely and safe manner):  1. None Psychosocial Skills Affected:  1. none   Number of elements that affect the Plan of Care: 1-3:  LOW COMPLEXITY   CLINICAL DECISION MAKIN [facility] [address] [address] AM-PAC 6 Clicks   Daily Activity Inpatient Short Form  How much help from another person does the patient currently need. .. Total A Lot A Little None   1. Putting on and taking off regular lower body clothing?   [ ] 1   [X] 2   [ ] 3   [ ] 4   2. Bathing (including washing, rinsing, drying)? [ ] 1   [X] 2   [ ] 3   [ ] 4   3.   Toileting, which includes using toilet, bedpan or urinal?   [ ] 1   [X] 2   [ ] 3   [ ] 4   4. Putting on and taking off regular upper body clothing?   [ ] 1   [ ] 2   [X] 3   [ ] 4   5. Taking care of personal grooming such as brushing teeth? [ ] 1   [ ] 2   [X] 3   [ ] 4   6. Eating meals? [ ] 1   [ ] 2   [X] 3   [ ] 4   © 2007, Trustees of [address] [place] [address] [address] [address], under license to [employer]. All rights reserved   Score:  Initial: 15 Most Recent: X (Date: -- )     Interpretation of Tool:  Represents activities that are increasingly more difficult (i.e. Bed mobility, Transfers, Gait). Score 24 23 22-20 19-15 14-10 9-7 6       Modifier CH CI CJ CK CL CM CN         · Self Care:               - CURRENT STATUS:    CK - 40%-59% impaired, limited or restricted               - GOAL STATUS:           CJ - 20%-39% impaired, limited or restricted               - D/C STATUS:                       ---------------To be determined---------------  [place] / [place] / Product Type: Medicare /       Medical Necessity:     · Patient is expected to demonstrate progress in balance, coordination and functional technique to decrease assistance required with self care and functional mobility. Reason for Services/Other Comments:  · Patient continues to require skilled intervention due to decreased self care and functional mobility. Use of outcome tool(s) and clinical judgement create a POC that gives a: LOW COMPLEXITY                 TREATMENT:   (In addition to Assessment/Re-Assessment sessions the following treatments were rendered)      Pre-treatment Symptoms/Complaints:  none  Pain: Initial:   Pain Intensity 1: 0  Post Session:        Self Care: (45min): Procedure(s) (per grid) utilized to improve and/or restore self-care/home management as related to dressing, bathing, toileting and grooming.  Required minimal verbal and   cueing to facilitate activities of daily living skills and compensatory activities. Treatment/Session Assessment:         Response to Treatment:  Tolerated well. Education:  [ ] Home Exercises  [X] Fall Precautions  [X] Hip Precautions [ ] Going Home Video  [ ] Knee/Hip Prosthesis Review  [X] Walker Management/Safety [X] Adaptive Equipment as Needed         Interdisciplinary Collaboration:   · Physical Therapist  · Occupational Therapist  · Registered Nurse     After treatment position/precautions:   · Up in chair, Bed/Chair-wheels locked, Call light within reach, RN notified and Side rails x 3     Compliance with Program/Exercises: compliant all of the time. Recommendations/Intent for next treatment session:  Treatment next visit will focus on increasing Ms. Mcgowan's independence with bed mobility, transfers, self care, functional mobility, modalities for pain, and patient education.  OT will continue at rehab      Total Treatment Duration:  OT Patient Time In/Time Out  Time In: 1000  Time Out: [address] OT

## 2017-10-10 NOTE — PROGRESS NOTES
Care Management Interventions  Mode of Transport at Discharge: Self  Transition of Care Consult (CM Consult): SNF  Partner SNF: Yes  Physical Therapy Consult: Yes  Occupational Therapy Consult: Yes  Current Support Network: Own Home  Confirm Follow Up Transport: Family  Plan discussed with Pt/Family/Caregiver: Yes  Freedom of Choice Offered: Yes  Discharge Location  Discharge Placement: Skilled nursing facility    Patient is a 80y.o. year old female admitted for Right DEYSI . Patient reports she has made arrangements to go to [facility] to rehab on d/c. I called and spoke to Rob Song at [facility] and confirmed these plans. Referral sent for their review. We will make plans for transfer on Thurs 10-12 after her three night Inpt stay. Will follow until discharge. Reza Bah    Rec'd call from Sanjeev at [facility]. Patient's supplemential BC policy is out of network with [facility] so she will only have 30 days of complete coverage at [facility]. 21 days are Medicare covered and 9 days of BC coveage (out of network). I offered to find her another facility that was In-network but she feels she will ready to go home within 20 days and even stated she has the money for the copay of approx $200/day beyond 30 days if needed. Patient wants to proceed with [facility] on Thurs. Bed will not be ready until Thurs afternoon. They are requesting she not leave before 2pm.  Patient will have her ride here at 2pm on  Thurs.  Reza Bah

## 2017-10-10 NOTE — PROGRESS NOTES
Problem: Mobility Impaired (Adult and Pediatric)  Goal: *Acute Goals and Plan of Care (Insert Text)  GOALS (1-4 days):  (1.)Ms. Mcgowan will move from supine to sit and sit to supine in bed with SUPERVISION. (2.)Ms. Mcgowan will transfer from bed to chair and chair to bed with STAND BY ASSIST using the least restrictive device. (3.)Ms. Mcgowan will ambulate with STAND BY ASSIST for 200 feet with the least restrictive device. (4.)Ms. Mcgowan will ambulate up/down 4 steps with left railing with CONTACT GUARD ASSIST with no device. (5.)Ms. Mcgowan will state/observe DEYSI precautions with No verbal cues. ________________________________________________________________________________________________      PHYSICAL THERAPY JOINT CAMP DEYSI: Daily Note, Treatment Day: 1st and AM 10/10/2017  INPATIENT: Hospital Day: 2  [place] / Product Type: Medicare /      NAME/AGE/GENDER: Dannie Rosario is a 80 y.o. female              PRIMARY DIAGNOSIS:  Primary osteoarthritis of right hip [M16.11]              Procedure(s) and Anesthesia Type:     * RIGHT HIP ARTHROPLASTY TOTAL/  - Spinal (Right)  ICD-10: Treatment Diagnosis:        · Pain in Right Hip (M25.551)  · Stiffness of Right Hip, Not elsewhere classified (M25.651)  · Difficulty in walking, Not elsewhere classified (R26.2)       ASSESSMENT:      Ms. Simona Ackerman presents supine on contact and ready to get out of bed. Increased effort with bed mobility and pt having a hard time scooting-used the bed pad to assist her hips out to the edge of the bed and verbal cues for pt. Once pt was sitting she did well standing and taking steps with walker. Moving slow this morning but safe and steady on her feet with minimal assist. Pt walked into bathroom and voided. She states she has a nervous bladder and wanted a brief on. In chair worked on [employer] exercises with verbal cues and reviewed hip precautions. Hope to progress this afternoon.        This section established at most recent assessment   PROBLEM LIST (Impairments causing functional limitations):  1. Decreased Strength  2. Decreased ADL/Functional Activities  3. Decreased Transfer Abilities  4. Decreased Ambulation Ability/Technique  5. Decreased Balance  6. Increased Pain  7. Decreased Activity Tolerance  8. Decreased Knowledge of Precautions  9. Decreased [place] with Home Exercise Program    INTERVENTIONS PLANNED: (Benefits and precautions of physical therapy have been discussed with the patient.)  1. Bed Mobility  2. Gait Training  3. Home Exercise Program (HEP)  4. Therapeutic Exercise/Strengthening  5. Transfer Training  6. Range of Motion: active/assisted/passive  7. Therapeutic Activities  8. Group Therapy      TREATMENT PLAN: Frequency/Duration: Follow patient BID   to address above goals. Rehabilitation Potential For Stated Goals: GOOD      RECOMMENDED REHABILITATION/EQUIPMENT: (at time of discharge pending progress): Continue Skilled Therapy and as noted in assessment. HISTORY:   History of Present Injury/Illness (Reason for Referral): The patient has end stage arthritis of the right hip. The patient was evaluated and examined during a consultation prior to this office visit. There have been no changes to the patient's orthopedic condition since the initial consultation. The patient has failed previous conservative treatment for this condition including antiinflammatories , and lifestyle modifications. The necessity for joint replacement is present. The patient will be admitted the day of surgery for Procedure(s) (LRB):  RIGHT HIP ARTHROPLASTY TOTAL/DEPUY (Right)       Past Medical History/Comorbidities:   Ms. Stewart Deleon  has a past medical history of Arthritis; Asthma; Chronic obstructive asthma, unspecified; Chronic pain; DDD (degenerative disc disease); GERD (gastroesophageal reflux disease); Hypertension; Hypothyroidism; HYPOTHYROIDISM; Incontinence of urine;  Morbid obesity ([address] Neuropathy; ILYA (obstructive sleep apnea) (9/16/2009); Osteoarthritis of left knee (9/16/2009); Osteoarthritis of right knee (3/2/2011); S/P total knee replacement using cement (9/16/2009); Sleep apnea; Spastic colon; Status post right hip replacement (10/9/2017); and Type II or unspecified type diabetes mellitus without mention of complication, not stated as uncontrolled. She also has no past medical history of Adverse effect of anesthesia; Aneurysm ([address] Arrhythmia; Autoimmune disease ([address] CAD (coronary artery disease); Cancer ([address] Chronic kidney disease; Chronic obstructive pulmonary disease ([address] Coagulation disorder ([address] Difficult intubation; Endocarditis; Heart failure ([address] Ill-defined condition; Liver disease; Malignant hyperthermia due to anesthesia; Nausea & vomiting; Nicotine vapor product user; Non-nicotine vapor product user; Pseudocholinesterase deficiency; Psychiatric disorder; PUD (peptic ulcer disease); Rheumatic fever; Seizures ([address] Stroke [facility]); or Thromboembolus ([address] Ms. Winsome Lake  has a past surgical history that includes hernia repair (1/30/08); hysterectomy (2000); hand/finger surgery unlisted (Right, 1990'S); total knee arthroplasty (Left, 2009); and knee replacement (Right, 2011). Social History/Living Environment:   Home Environment: Private residence  # Steps to Enter: 4  Rails to Enter: Yes  Hand Rails : Left  One/Two Story Residence: One story  Living Alone: Yes  Support Systems: Friends \ neighbors  Patient Expects to be Discharged to[de-identified] Skilled nursing facility  Current DME Used/Available at Home: None  Tub or Shower Type: Tub/Shower combination  Prior Level of Function/Work/Activity:  Pt was functioning with a cane prior to this admission   Number of Personal Factors/Comorbidities that affect the Plan of Care: 3+: HIGH COMPLEXITY   EXAMINATION:   Most Recent Physical Functioning:                               Bed Mobility  Supine to Sit: Minimum assistance; Moderate assistance; Additional time  Sit to Supine:  (stayed up in chair)  Scooting: Minimum assistance     Transfers  Sit to Stand: Contact guard assistance;Minimum assistance; Additional time  Stand to Sit: Contact guard assistance;Minimum assistance     Balance  Sitting: Intact  Standing: Intact; With support          Gait Training: Yes     Weight Bearing Status  Right Side Weight Bearing: As tolerated  Distance (ft): 25 Feet (ft)  Ambulation - Level of Assistance: Minimal assistance  Assistive Device: Walker, rolling  Base of Support: Widened  Speed/Jayne: Slow  Step Length: Left shortened  Stance: Right decreased  Gait Abnormalities: Antalgic;Decreased step clearance; Step to gait  Interventions: Safety awareness training;Verbal cues      Braces/Orthotics: none     Right Hip Cold  Type: Cold/ice packs  Patient Position: Sitting       Body Structures Involved:  1. Joints  2. Muscles Body Functions Affected:  1. Sensory/Pain  2. Movement Related Activities and Participation Affected:  1. General Tasks and Demands  2. Mobility   Number of elements that affect the Plan of Care: 4+: HIGH COMPLEXITY   CLINICAL PRESENTATION:   Presentation: Evolving clinical presentation with changing clinical characteristics: MODERATE COMPLEXITY   CLINICAL DECISION MAKIN [facility] Mobility Inpatient Short Form  How much difficulty does the patient currently have. .. Unable A Lot A Little None   1. Turning over in bed (including adjusting bedclothes, sheets and blankets)? [ ] 1   [ ] 2   [X] 3   [ ] 4   2. Sitting down on and standing up from a chair with arms ( e.g., wheelchair, bedside commode, etc.)   [ ] 1   [ ] 2   [X] 3   [ ] 4   3. Moving from lying on back to sitting on the side of the bed? [ ] 1   [ ] 2   [X] 3   [ ] 4   How much help from another person does the patient currently need. .. Total A Lot A Little None   4. Moving to and from a bed to a chair (including a wheelchair)?    [ ] 1 [ ] 2   [X] 3   [ ] 4   5. Need to walk in hospital room? [ ] 1   [ ] 2   [X] 3   [ ] 4   6. Climbing 3-5 steps with a railing? [X] 1   [ ] 2   [ ] 3   [ ] 4   © 2007, Trustees of [address] [place] [address] [address] [address], under license to [employer]. All rights reserved       Score:  Initial: 16 Most Recent: X (Date: -- )     Interpretation of Tool:  Represents activities that are increasingly more difficult (i.e. Bed mobility, Transfers, Gait). Score 24 23 22-20 19-15 14-10 9-7 6       Modifier CH CI CJ CK CL CM CN         · Mobility - Walking and Moving Around:               - CURRENT STATUS:    CK - 40%-59% impaired, limited or restricted               - GOAL STATUS:           CJ - 20%-39% impaired, limited or restricted               - D/C STATUS:                       ---------------To be determined---------------  [place] / Product Type: Medicare /       Medical Necessity:     · Patient is expected to demonstrate progress in strength, balance, coordination and functional technique to decrease assistance required with bed mobility, transfers & gait. Reason for Services/Other Comments:  · Patient continues to require skilled intervention due to pt not safe with functional mobility. Use of outcome tool(s) and clinical judgement create a POC that gives a: Questionable prediction of patient's progress: MODERATE COMPLEXITY                 TREATMENT:   (In addition to Assessment/Re-Assessment sessions the following treatments were rendered)      Pre-treatment Symptoms/Complaints: some burning in her hip with bed mobility  Pain: Initial: none at rest     Post Session:  No complaints once situated in chair, she said she was glad to be up      Gait Training (15 Minutes):  Gait training to improve and/or restore physical functioning as related to mobility and strength.   Ambulated 25 Feet (ft) with Minimal assistance using a Walker, rolling and minimal Safety awareness training;Verbal cues related to their stance phase and stride length to promote proper body alignment and promote proper body posture. Instruction in performance of walker use and gait sequencing to correct stance phase and stride length. Therapeutic Exercise: (15 Minutes):  Exercises per grid below to improve mobility and strength. Required minimal verbal cues to promote proper body alignment and promote proper body posture. Progressed repetitions as indicated. Date:   10/10/17 Date:    Date:      ACTIVITY/EXERCISE AM PM AM PM AM PM   GROUP THERAPY  [ ]  [ ]  [ ]  [ ]  [ ]  [ ]   Ankle Pumps 10             Quad Sets  10             Gluteal Sets  10             Hip ABd/ADduction  10             Straight Leg Raises               Knee Slides  10             Short Arc Quads               Long Arc Quads               Chair Slides                               B = bilateral; AA = active assistive; A = active; P = passive       Treatment/Session Assessment:         Response to Treatment:  Tolerated well. Education:  [ ] Home Exercises  [X] Fall Precautions  [X] Hip Precautions [ ] D/C Instruction Review  [ ] Knee/Hip Prosthesis Review  [X] Walker Management/Safety [ ] Adaptive Equipment as Needed         Interdisciplinary Collaboration:   · Registered Nurse     After treatment position/precautions:   · Up in chair, Bed/Chair-wheels locked, Call light within reach and RN notified     Compliance with Program/Exercises: compliant all the time. Recommendations/Intent for next treatment session:  Treatment next visit will focus on increasing Ms. Mcgowan's independence with bed mobility, transfers, gait training, strength/ROM exercises, modalities for pain, and patient education.        Total Treatment Duration:  PT Patient Time In/Time Out  Time In: 0900  Time Out: [address] [facility],

## 2017-10-11 LAB
GLUCOSE BLD STRIP.AUTO-MCNC: 125 MG/DL (ref 65–100)
GLUCOSE BLD STRIP.AUTO-MCNC: 161 MG/DL (ref 65–100)
GLUCOSE BLD STRIP.AUTO-MCNC: 88 MG/DL (ref 65–100)
HGB BLD-MCNC: 9.4 G/DL (ref 11.7–15.4)
MM INDURATION POC: 0 MM (ref 0–5)
PPD POC: 0 NEGATIVE

## 2017-10-11 PROCEDURE — 65270000029 HC RM PRIVATE

## 2017-10-11 PROCEDURE — 97110 THERAPEUTIC EXERCISES: CPT

## 2017-10-11 PROCEDURE — 97116 GAIT TRAINING THERAPY: CPT

## 2017-10-11 PROCEDURE — 36415 COLL VENOUS BLD VENIPUNCTURE: CPT | Performed by: PHYSICIAN ASSISTANT

## 2017-10-11 PROCEDURE — 74011636637 HC RX REV CODE- 636/637: Performed by: INTERNAL MEDICINE

## 2017-10-11 PROCEDURE — 97150 GROUP THERAPEUTIC PROCEDURES: CPT

## 2017-10-11 PROCEDURE — 74011250637 HC RX REV CODE- 250/637: Performed by: PHYSICIAN ASSISTANT

## 2017-10-11 PROCEDURE — 94760 N-INVAS EAR/PLS OXIMETRY 1: CPT

## 2017-10-11 PROCEDURE — 82962 GLUCOSE BLOOD TEST: CPT

## 2017-10-11 PROCEDURE — 77030012890

## 2017-10-11 PROCEDURE — 85018 HEMOGLOBIN: CPT | Performed by: PHYSICIAN ASSISTANT

## 2017-10-11 PROCEDURE — 74011250637 HC RX REV CODE- 250/637: Performed by: ORTHOPAEDIC SURGERY

## 2017-10-11 RX ORDER — NYSTATIN 100000 [USP'U]/G
POWDER TOPICAL 2 TIMES DAILY
Status: DISCONTINUED | OUTPATIENT
Start: 2017-10-11 | End: 2017-10-12 | Stop reason: HOSPADM

## 2017-10-11 RX ADMIN — CELECOXIB 200 MG: 200 CAPSULE ORAL at 21:11

## 2017-10-11 RX ADMIN — NYSTATIN: 100000 POWDER TOPICAL at 16:42

## 2017-10-11 RX ADMIN — ACETAMINOPHEN 1000 MG: 500 TABLET, FILM COATED ORAL at 16:44

## 2017-10-11 RX ADMIN — SITAGLIPTIN 100 MG: 100 TABLET, FILM COATED ORAL at 09:14

## 2017-10-11 RX ADMIN — OXYBUTYNIN CHLORIDE 5 MG: 5 TABLET ORAL at 09:14

## 2017-10-11 RX ADMIN — LEVOTHYROXINE SODIUM 100 MCG: 100 TABLET ORAL at 09:14

## 2017-10-11 RX ADMIN — HYDROMORPHONE HYDROCHLORIDE 2 MG: 2 TABLET ORAL at 02:12

## 2017-10-11 RX ADMIN — HYDROMORPHONE HYDROCHLORIDE 2 MG: 2 TABLET ORAL at 10:18

## 2017-10-11 RX ADMIN — ACETAMINOPHEN 1000 MG: 500 TABLET, FILM COATED ORAL at 06:03

## 2017-10-11 RX ADMIN — CELECOXIB 200 MG: 200 CAPSULE ORAL at 09:13

## 2017-10-11 RX ADMIN — HYDROMORPHONE HYDROCHLORIDE 2 MG: 2 TABLET ORAL at 21:11

## 2017-10-11 RX ADMIN — GLYBURIDE: 5 TABLET ORAL at 09:13

## 2017-10-11 RX ADMIN — PANTOPRAZOLE SODIUM 40 MG: 40 TABLET, DELAYED RELEASE ORAL at 06:03

## 2017-10-11 RX ADMIN — HYDROMORPHONE HYDROCHLORIDE 2 MG: 2 TABLET ORAL at 14:26

## 2017-10-11 RX ADMIN — HYDROMORPHONE HYDROCHLORIDE 2 MG: 2 TABLET ORAL at 06:03

## 2017-10-11 RX ADMIN — MONTELUKAST SODIUM 10 MG: 10 TABLET, FILM COATED ORAL at 21:11

## 2017-10-11 RX ADMIN — PREGABALIN 150 MG: 150 CAPSULE ORAL at 21:11

## 2017-10-11 RX ADMIN — METAXALONE 800 MG: 800 TABLET ORAL at 09:13

## 2017-10-11 RX ADMIN — ASPIRIN 325 MG: 325 TABLET, DELAYED RELEASE ORAL at 09:14

## 2017-10-11 RX ADMIN — Medication 10 ML: at 21:07

## 2017-10-11 RX ADMIN — ACETAMINOPHEN 1000 MG: 500 TABLET, FILM COATED ORAL at 00:02

## 2017-10-11 RX ADMIN — INSULIN LISPRO 2 UNITS: 100 INJECTION, SOLUTION INTRAVENOUS; SUBCUTANEOUS at 21:11

## 2017-10-11 RX ADMIN — ASPIRIN 325 MG: 325 TABLET, DELAYED RELEASE ORAL at 21:11

## 2017-10-11 RX ADMIN — METAXALONE 800 MG: 800 TABLET ORAL at 21:11

## 2017-10-11 RX ADMIN — SENNOSIDES AND DOCUSATE SODIUM 2 TABLET: 8.6; 5 TABLET ORAL at 09:14

## 2017-10-11 RX ADMIN — PREGABALIN 150 MG: 150 CAPSULE ORAL at 09:13

## 2017-10-11 RX ADMIN — Medication 10 ML: at 06:04

## 2017-10-11 RX ADMIN — LISINOPRIL AND HYDROCHLOROTHIAZIDE 1 TABLET: 12.5; 1 TABLET ORAL at 09:14

## 2017-10-11 RX ADMIN — ZOLPIDEM TARTRATE 5 MG: 5 TABLET ORAL at 21:11

## 2017-10-11 RX ADMIN — GLYBURIDE: 5 TABLET ORAL at 16:42

## 2017-10-11 NOTE — PROGRESS NOTES
Problem: Mobility Impaired (Adult and Pediatric)  Goal: *Acute Goals and Plan of Care (Insert Text)  GOALS (1-4 days):  (1.)Ms. Mcgowan will move from supine to sit and sit to supine in bed with SUPERVISION. (2.)Ms. Mcgowan will transfer from bed to chair and chair to bed with STAND BY ASSIST using the least restrictive device. (3.)Ms. Mcgowan will ambulate with STAND BY ASSIST for 200 feet with the least restrictive device. (4.)Ms. Mcgowan will ambulate up/down 4 steps with left railing with CONTACT GUARD ASSIST with no device. (5.)Ms. Mcgowan will state/observe DEYSI precautions with No verbal cues. ________________________________________________________________________________________________      PHYSICAL THERAPY JOINT CAMP DEYSI: Daily Note, Treatment Day: 2nd and PM 10/11/2017  INPATIENT: Hospital Day: 3  [place] / Product Type: Medicare /      NAME/AGE/GENDER: David Saldana is a 80 y.o. female              PRIMARY DIAGNOSIS:  Primary osteoarthritis of right hip [M16.11]              Procedure(s) and Anesthesia Type:     * RIGHT HIP ARTHROPLASTY TOTAL/  - Spinal (Right)  ICD-10: Treatment Diagnosis:        · Pain in Right Hip (M25.551)  · Stiffness of Right Hip, Not elsewhere classified (M25.651)  · Difficulty in walking, Not elsewhere classified (R26.2)       ASSESSMENT:      Ms. Manny Leach is sitting up in chair on arrival.  She is agreeable to PT and plans to go to rehab tomorrow. This section established at most recent assessment   PROBLEM LIST (Impairments causing functional limitations):  1. Decreased Strength  2. Decreased ADL/Functional Activities  3. Decreased Transfer Abilities  4. Decreased Ambulation Ability/Technique  5. Decreased Balance  6. Increased Pain  7. Decreased Activity Tolerance  8. Decreased Knowledge of Precautions  9.  Decreased [place] with Home Exercise Program    INTERVENTIONS PLANNED: (Benefits and precautions of physical therapy have been discussed with the patient.)  1. Bed Mobility  2. Gait Training  3. Home Exercise Program (HEP)  4. Therapeutic Exercise/Strengthening  5. Transfer Training  6. Range of Motion: active/assisted/passive  7. Therapeutic Activities  8. Group Therapy      TREATMENT PLAN: Frequency/Duration: Follow patient BID   to address above goals. Rehabilitation Potential For Stated Goals: GOOD      RECOMMENDED REHABILITATION/EQUIPMENT: (at time of discharge pending progress): Continue Skilled Therapy and as noted in assessment. HISTORY:   History of Present Injury/Illness (Reason for Referral): The patient has end stage arthritis of the right hip. The patient was evaluated and examined during a consultation prior to this office visit. There have been no changes to the patient's orthopedic condition since the initial consultation. The patient has failed previous conservative treatment for this condition including antiinflammatories , and lifestyle modifications. The necessity for joint replacement is present. The patient will be admitted the day of surgery for Procedure(s) (LRB):  RIGHT HIP ARTHROPLASTY TOTAL/DEPUY (Right)       Past Medical History/Comorbidities:   Ms. Zohreh Pollack  has a past medical history of Arthritis; Asthma; Chronic obstructive asthma, unspecified; Chronic pain; DDD (degenerative disc disease); GERD (gastroesophageal reflux disease); Hypertension; Hypothyroidism; HYPOTHYROIDISM; Incontinence of urine; Morbid obesity ([address] Neuropathy; ILYA (obstructive sleep apnea) (9/16/2009); Osteoarthritis of left knee (9/16/2009); Osteoarthritis of right knee (3/2/2011); S/P total knee replacement using cement (9/16/2009); Sleep apnea; Spastic colon; Status post right hip replacement (10/9/2017); and Type II or unspecified type diabetes mellitus without mention of complication, not stated as uncontrolled. She also has no past medical history of Adverse effect of anesthesia; Aneurysm ([address]  Arrhythmia; Autoimmune disease ([facility] CAD (coronary artery disease); Cancer ([facility] Chronic kidney disease; Chronic obstructive pulmonary disease ([facility] Coagulation disorder ([facility] [address] Difficult intubation; Endocarditis; Heart failure ([facility] Ill-defined condition; Liver disease; Malignant hyperthermia due to anesthesia; Nausea & vomiting; Nicotine vapor product user; Non-nicotine vapor product user; Pseudocholinesterase deficiency; Psychiatric disorder; PUD (peptic ulcer disease); Rheumatic fever; Seizures ([facility] [address] Stroke [facility]); or Thromboembolus ([facility] [address] Ms. Abdias Agee  has a past surgical history that includes hernia repair (1/30/08); hysterectomy (2000); hand/finger surgery unlisted (Right, 1990'S); total knee arthroplasty (Left, 2009); and knee replacement (Right, 2011).   Social History/Living Environment:   Home Environment: Private residence  # Steps to Enter: 4  Rails to Enter: Yes  Hand Rails : Left  One/Two Story Residence: One story  Living Alone: Yes  Support Systems: Family member(s)  Patient Expects to be Discharged to[de-identified] Private residence  Current DME Used/Available at Home: None  Tub or Shower Type: Tub/Shower combination  Prior Level of Function/Work/Activity:  Pt was functioning with a cane prior to this admission   Number of Personal Factors/Comorbidities that affect the Plan of Care: 3+: HIGH COMPLEXITY   EXAMINATION:   Most Recent Physical Functioning:                                     Transfers  Sit to Stand: Stand-by asssistance  Stand to Sit: Stand-by asssistance  Bed to Chair: Stand-by asssistance                      Weight Bearing Status  Right Side Weight Bearing: As tolerated  Distance (ft): 200 Feet (ft)  Ambulation - Level of Assistance: Stand-by asssistance  Assistive Device: Walker, rolling  Speed/Jayne: Delayed;Pace decreased (<100 feet/min)  Step Length: Left shortened;Right shortened  Stance: Right decreased  Gait Abnormalities: Antalgic  Interventions: Safety awareness training;Verbal cues Braces/Orthotics: none     Right Hip Cold  Type: Cold/ice packs       Body Structures Involved:  1. Joints  2. Muscles Body Functions Affected:  1. Sensory/Pain  2. Movement Related Activities and Participation Affected:  1. General Tasks and Demands  2. Mobility   Number of elements that affect the Plan of Care: 4+: HIGH COMPLEXITY   CLINICAL PRESENTATION:   Presentation: Evolving clinical presentation with changing clinical characteristics: MODERATE COMPLEXITY   CLINICAL DECISION MAKIN [facility] Inpatient Short Form  How much difficulty does the patient currently have. .. Unable A Lot A Little None   1. Turning over in bed (including adjusting bedclothes, sheets and blankets)? [ ] 1   [ ] 2   [X] 3   [ ] 4   2. Sitting down on and standing up from a chair with arms ( e.g., wheelchair, bedside commode, etc.)   [ ] 1   [ ] 2   [X] 3   [ ] 4   3. Moving from lying on back to sitting on the side of the bed? [ ] 1   [ ] 2   [X] 3   [ ] 4   How much help from another person does the patient currently need. .. Total A Lot A Little None   4. Moving to and from a bed to a chair (including a wheelchair)? [ ] 1   [ ] 2   [X] 3   [ ] 4   5. Need to walk in hospital room? [ ] 1   [ ] 2   [X] 3   [ ] 4   6. Climbing 3-5 steps with a railing? [X] 1   [ ] 2   [ ] 3   [ ] 4   © , Trustees of [address] [place] [address], under license to [employer]. All rights reserved       Score:  Initial: 16 Most Recent: X (Date: -- )     Interpretation of Tool:  Represents activities that are increasingly more difficult (i.e. Bed mobility, Transfers, Gait).        Score 24 23 22-20 19-15 14-10 9-7 6       Modifier CH CI CJ CK CL CM CN         · Mobility - Walking and Moving Around:               - CURRENT STATUS:    CK - 40%-59% impaired, limited or restricted               - GOAL STATUS:           CJ - 20%-39% impaired, limited or restricted               - D/C STATUS: ---------------To be determined---------------  [place] / Product Type: Medicare /       Medical Necessity:     · Patient is expected to demonstrate progress in strength, balance, coordination and functional technique to decrease assistance required with bed mobility, transfers & gait. Reason for Services/Other Comments:  · Patient continues to require skilled intervention due to pt not safe with functional mobility. Use of outcome tool(s) and clinical judgement create a POC that gives a: Questionable prediction of patient's progress: MODERATE COMPLEXITY                 TREATMENT:   (In addition to Assessment/Re-Assessment sessions the following treatments were rendered)      Pre-treatment Symptoms/Complaints: some burning in her hip with bed mobility  Pain: Initial: 2/10 visaully     Post Session:  4/10 visually      Gait Training (15 Minutes):  Gait training to improve and/or restore physical functioning as related to mobility and strength. Ambulated 200 Feet (ft) with Stand-by asssistance using a Walker, rolling and minimal Safety awareness training;Verbal cues related to their stance phase and stride length to promote proper body alignment and promote proper body posture. Instruction in performance of walker use and gait sequencing to correct stance phase and stride length. Therapeutic Exercise: (15 Minutes):  Exercises per grid below to improve mobility and strength. Required minimal verbal cues to promote proper body alignment and promote proper body posture. Progressed repetitions as indicated.           Date:   10/10/17 Date:   10/11/17 Date:      ACTIVITY/EXERCISE AM PM AM PM AM PM   GROUP THERAPY  [ ]  [ ]  [place]bey.[email or web address] ]  [ ]  [ ]  [ ]   Ankle Pumps 10  15  20 20        Quad Sets  10  15  20  20       Gluteal Sets  10  15  20  20       Hip ABd/ADduction  10  15  20  20       Straight Leg Raises               Knee Slides  10  15  20  20 Short Arc Quads    15  20  20       Long Arc Quads    15  20  20       Chair Slides                               B = bilateral; AA = active assistive; A = active; P = passive       Treatment/Session Assessment:         Response to Treatment:  Continues to do well. Education:  [ ] Home Exercises  [X] Fall Precautions  [X] Hip Precautions [ ] D/C Instruction Review  [ ] Knee/Hip Prosthesis Review  [X] Walker Management/Safety [ ] Adaptive Equipment as Needed         Interdisciplinary Collaboration:   · Registered Nurse     After treatment position/precautions:   · Up in chair, Bed/Chair-wheels locked and Call light within reach     Compliance with Program/Exercises: compliant all the time. Recommendations/Intent for next treatment session:  Treatment next visit will focus on increasing Ms. Mcgowan's independence with bed mobility, transfers, gait training, strength/ROM exercises, modalities for pain, and patient education.        Total Treatment Duration:  PT Patient Time In/Time Out  Time In: 1415  Time Out: [address] [facility], [facility]

## 2017-10-11 NOTE — PROGRESS NOTES
Pt c/o bruising on right thigh. Minimal bruising present. Barrier cream given to place on reddened area underneath left abdomen pannus. Explained to pt she needs to keep area dry. Neurovascular and peripheral vascular checks are WDL to BLE. Instructed not to ambulate without assistance from staff. Pt verbalized understanding. Call light in reach.

## 2017-10-11 NOTE — PROGRESS NOTES
Shift assessment completed. Pt is alert & oriented x4. Able to verbalize needs. Resting quietly with no distress noted. Dressing to right hip is clean, dry and intact. Neurovascular and peripheral vascular checks WNL. Patient voiding clear yellow urine without difficulty. Pain is being managed with Dilaudid with tolerating well. Bed low and locked. Call light within reach. Patient instructed to call for assistance. Pt verbalizes understanding. Will monitor.

## 2017-10-11 NOTE — PROGRESS NOTES
2017         Post Op day: 2 Days Post-OpProcedure(s) (LRB):  RIGHT HIP ARTHROPLASTY TOTAL/  (Right)      Admit Date: 10/9/2017  Admit Diagnosis: Primary osteoarthritis of right hip [M16.11]    LAB:    Recent Results (from the past 24 hour(s))   GLUCOSE, POC    Collection Time: 10/10/17 11:22 AM   Result Value Ref Range    Glucose (POC) 131 (H) 65 - 100 mg/dL   GLUCOSE, POC    Collection Time: 10/10/17  4:14 PM   Result Value Ref Range    Glucose (POC) 173 (H) 65 - 100 mg/dL   GLUCOSE, POC    Collection Time: 10/10/17  7:53 PM   Result Value Ref Range    Glucose (POC) 172 (H) 65 - 100 mg/dL   HEMOGLOBIN    Collection Time: 10/11/17  5:00 AM   Result Value Ref Range    HGB 9.4 (L) 11.7 - 15.4 g/dL   GLUCOSE, POC    Collection Time: 10/11/17  6:06 AM   Result Value Ref Range    Glucose (POC) 88 65 - 100 mg/dL     Vital Signs:    Patient Vitals for the past 8 hrs:   BP Temp Pulse Resp SpO2   10/11/17 0419 136/65 95.3 °F (35.2 °C) 69 16 93 %   10/11/17 0002 134/71 97.1 °F (36.2 °C) 71 16 95 %     Temp (24hrs), Av.2 °F (36.2 °C), Min:95.3 °F (35.2 °C), Max:98.9 °F (37.2 °C)    Body mass index is 44.63 kg/(m^2).   Pain Control:   Pain Assessment  Pain Scale 1: Numeric (0 - 10)  Pain Intensity 1: 5  Pain Onset 1: at rest  Pain Location 1: Hip  Pain Orientation 1: Right  Pain Description 1: Aching  Pain Intervention(s) 1: Medication (see MAR)    Subjective: Doing well, No complaints, No SOB, No Chest Pain, No Nausea or Vomitting     Objective: Vital Signs are Stable, No Acute Distress, Alert and Oriented, Dressing is Dry,  Neurovascular exam is normal.       PT/OT:            Assistive Device: Walker (comment)                Weight Bearing Status: WBAT    Meds:  [unfilled]  [unfilled]  [unfilled]    Assessment:   Patient Active Problem List   Diagnosis Code    Osteoarthritis of left knee M17.12    S/P total knee replacement using cement Z96.659    ILYA (obstructive sleep apnea) G47.33    Asthma J45.909    Type II or unspecified type diabetes mellitus without mention of complication, not stated as uncontrolled E11.9    Hypothyroidism E03.9    Chronic pain G89.29    Osteoarthritis of right knee M17.11    S/P total knee replacement using cement Z96.659    Morbid obesity with BMI of 45.0-49.9, adult ([facility]) E66.01, Z68.42    Hypertension I10    Neuropathy G62.9    Elevated WBC count D72.829    Elevated serum creatinine R79.89    Status post right hip replacement Z96.641             Plan: Continue Physical Therapy, Monitor  Hbg, rehab tomorrow.         Signed By: Low Steele MD

## 2017-10-11 NOTE — PROGRESS NOTES
Problem: Mobility Impaired (Adult and Pediatric)  Goal: *Acute Goals and Plan of Care (Insert Text)  GOALS (1-4 days):  (1.)Ms. Mcgowan will move from supine to sit and sit to supine in bed with SUPERVISION. (2.)Ms. Mcgowan will transfer from bed to chair and chair to bed with STAND BY ASSIST using the least restrictive device. (3.)Ms. Mcgowan will ambulate with STAND BY ASSIST for 200 feet with the least restrictive device. (4.)Ms. Mcgowan will ambulate up/down 4 steps with left railing with CONTACT GUARD ASSIST with no device. (5.)Ms. Mcgowan will state/observe DEYSI precautions with No verbal cues. ________________________________________________________________________________________________      PHYSICAL THERAPY JOINT CAMP DEYSI: Daily Note, Treatment Day: 2nd and AM 10/11/2017  INPATIENT: Hospital Day: 3  [place] / [place] / Product Type: Medicare /      NAME/AGE/GENDER: Tyler Ordaz is a 80 y.o. female              PRIMARY DIAGNOSIS:  Primary osteoarthritis of right hip [M16.11]              Procedure(s) and Anesthesia Type:     * RIGHT HIP ARTHROPLASTY TOTAL/  - Spinal (Right)  ICD-10: Treatment Diagnosis:        · Pain in Right Hip (M25.551)  · Stiffness of Right Hip, Not elsewhere classified (M25.651)  · Difficulty in walking, Not elsewhere classified (R26.2)       ASSESSMENT:      Ms. Isaura Armstrong is sitting up in chair on arrival.  She is agreeable to PT and plans to go to rehab tomorrow with HHPT. Pt states she is having increased pain on and off today. This section established at most recent assessment   PROBLEM LIST (Impairments causing functional limitations):  1. Decreased Strength  2. Decreased ADL/Functional Activities  3. Decreased Transfer Abilities  4. Decreased Ambulation Ability/Technique  5. Decreased Balance  6. Increased Pain  7. Decreased Activity Tolerance  8. Decreased Knowledge of Precautions  9.  Decreased [place] with Home Exercise Program INTERVENTIONS PLANNED: (Benefits and precautions of physical therapy have been discussed with the patient.)  1. Bed Mobility  2. Gait Training  3. Home Exercise Program (HEP)  4. Therapeutic Exercise/Strengthening  5. Transfer Training  6. Range of Motion: active/assisted/passive  7. Therapeutic Activities  8. Group Therapy      TREATMENT PLAN: Frequency/Duration: Follow patient BID   to address above goals. Rehabilitation Potential For Stated Goals: GOOD      RECOMMENDED REHABILITATION/EQUIPMENT: (at time of discharge pending progress): Continue Skilled Therapy and as noted in assessment. HISTORY:   History of Present Injury/Illness (Reason for Referral): The patient has end stage arthritis of the right hip. The patient was evaluated and examined during a consultation prior to this office visit. There have been no changes to the patient's orthopedic condition since the initial consultation. The patient has failed previous conservative treatment for this condition including antiinflammatories , and lifestyle modifications. The necessity for joint replacement is present. The patient will be admitted the day of surgery for Procedure(s) (LRB):  RIGHT HIP ARTHROPLASTY TOTAL/DEPUY (Right)       Past Medical History/Comorbidities:   Ms. Omayra Mohr  has a past medical history of Arthritis; Asthma; Chronic obstructive asthma, unspecified; Chronic pain; DDD (degenerative disc disease); GERD (gastroesophageal reflux disease); Hypertension; Hypothyroidism; HYPOTHYROIDISM; Incontinence of urine; Morbid obesity ([address] Neuropathy; ILYA (obstructive sleep apnea) (9/16/2009); Osteoarthritis of left knee (9/16/2009); Osteoarthritis of right knee (3/2/2011); S/P total knee replacement using cement (9/16/2009); Sleep apnea; Spastic colon; Status post right hip replacement (10/9/2017); and Type II or unspecified type diabetes mellitus without mention of complication, not stated as uncontrolled.  She also has no past medical history of Adverse effect of anesthesia; Aneurysm ([facility] Arrhythmia; Autoimmune disease ([facility] [address] [facility] CAD (coronary artery disease); Cancer ([facility] [address] Chronic kidney disease; Chronic obstructive pulmonary disease ([facility] [address] Coagulation disorder ([facility] [address] Difficult intubation; Endocarditis; Heart failure ([facility] [address] Ill-defined condition; Liver disease; Malignant hyperthermia due to anesthesia; Nausea & vomiting; Nicotine vapor product user; Non-nicotine vapor product user; Pseudocholinesterase deficiency; Psychiatric disorder; PUD (peptic ulcer disease); Rheumatic fever; Seizures ([facility] [address] Stroke [facility]); or Thromboembolus ([facility] [address] Ms. Jack Adkins  has a past surgical history that includes hernia repair (1/30/08); hysterectomy (2000); hand/finger surgery unlisted (Right, 1990'S); total knee arthroplasty (Left, 2009); and knee replacement (Right, 2011).   Social History/Living Environment:   Home Environment: Private residence  # Steps to Enter: 4  Rails to Enter: Yes  Hand Rails : Left  One/Two Story Residence: One story  Living Alone: Yes  Support Systems: Family member(s)  Patient Expects to be Discharged to[de-identified] Private residence  Current DME Used/Available at Home: None  Tub or Shower Type: Tub/Shower combination  Prior Level of Function/Work/Activity:  Pt was functioning with a cane prior to this admission   Number of Personal Factors/Comorbidities that affect the Plan of Care: 3+: HIGH COMPLEXITY   EXAMINATION:   Most Recent Physical Functioning:                                     Transfers  Sit to Stand: Stand-by asssistance  Stand to Sit: Stand-by asssistance  Bed to Chair: Stand-by asssistance                      Weight Bearing Status  Right Side Weight Bearing: As tolerated  Distance (ft): 150 Feet (ft) (x2)  Ambulation - Level of Assistance: Stand-by asssistance  Assistive Device: Walker, rolling  Speed/Jayne: Delayed;Pace decreased (<100 feet/min)  Step Length: Left shortened;Right shortened  Stance: Right decreased  Gait Abnormalities: Antalgic  Interventions: Safety awareness training;Verbal cues      Braces/Orthotics: none     Right Hip Cold  Type: Cold/ice packs       Body Structures Involved:  1. Joints  2. Muscles Body Functions Affected:  1. Sensory/Pain  2. Movement Related Activities and Participation Affected:  1. General Tasks and Demands  2. Mobility   Number of elements that affect the Plan of Care: 4+: HIGH COMPLEXITY   CLINICAL PRESENTATION:   Presentation: Evolving clinical presentation with changing clinical characteristics: MODERATE COMPLEXITY   CLINICAL DECISION MAKIN [facility] In[facility] Short Form  How much difficulty does the patient currently have. .. Unable A Lot A Little None   1. Turning over in bed (including adjusting bedclothes, sheets and blankets)? [ ] 1   [ ] 2   [X] 3   [ ] 4   2. Sitting down on and standing up from a chair with arms ( e.g., wheelchair, bedside commode, etc.)   [ ] 1   [ ] 2   [X] 3   [ ] 4   3. Moving from lying on back to sitting on the side of the bed? [ ] 1   [ ] 2   [X] 3   [ ] 4   How much help from another person does the patient currently need. .. Total A Lot A Little None   4. Moving to and from a bed to a chair (including a wheelchair)? [ ] 1   [ ] 2   [X] 3   [ ] 4   5. Need to walk in hospital room? [ ] 1   [ ] 2   [X] 3   [ ] 4   6. Climbing 3-5 steps with a railing? [X] 1   [ ] 2   [ ] 3   [ ] 4   © , [facility] of [address] [place], [place] [id number], under license to [employer]. All rights reserved       Score:  Initial: 16 Most Recent: X (Date: -- )     Interpretation of Tool:  Represents activities that are increasingly more difficult (i.e. Bed mobility, Transfers, Gait).        Score 24 23 22-20 19-15 14-10 9-7 6       Modifier CH CI CJ CK CL CM CN         · Mobility - Walking and Moving Around:               - CURRENT STATUS:    CK - 40%-59% impaired, limited or restricted               - GOAL STATUS: CJ - 20%-39% impaired, limited or restricted               - D/C STATUS:                       ---------------To be determined---------------  [place] / [place] / Product Type: Medicare /       Medical Necessity:     · Patient is expected to demonstrate progress in strength, balance, coordination and functional technique to decrease assistance required with bed mobility, transfers & gait. Reason for Services/Other Comments:  · Patient continues to require skilled intervention due to pt not safe with functional mobility. Use of outcome tool(s) and clinical judgement create a POC that gives a: Questionable prediction of patient's progress: MODERATE COMPLEXITY                 TREATMENT:   (In addition to Assessment/Re-Assessment sessions the following treatments were rendered)      Pre-treatment Symptoms/Complaints: some burning in her hip with bed mobility  Pain: Initial: 2/10 visaully     Post Session:  4/10 visually      Gait Training (15 Minutes):  Gait training to improve and/or restore physical functioning as related to mobility and strength. Ambulated 150 Feet (ft) (x2) with Stand-by asssistance using a Walker, rolling and minimal Safety awareness training;Verbal cues related to their stance phase and stride length to promote proper body alignment and promote proper body posture. Instruction in performance of walker use and gait sequencing to correct stance phase and stride length. Therapeutic Exercise: (45 Minutes (group)):  Exercises per grid below to improve mobility and strength. Required minimal verbal cues to promote proper body alignment and promote proper body posture. Progressed repetitions as indicated.           Date:   10/10/17 Date:   10/11/17 Date:      ACTIVITY/EXERCISE AM PM AM PM AM PM   GROUP THERAPY  [ ]  [ ]  Carmelita ]  [ ]  [ ]  [ ]   Ankle Pumps 10  15  20         Quad Sets  10  15  20         Gluteal Sets  10  15  20         Hip ABd/ADduction  10 15  20         Straight Leg Raises               Knee Slides  10  15  20         Short Arc Quads    15  20         Long Arc Quads    15  20         Chair Slides                               B = bilateral; AA = active assistive; A = active; P = passive       Treatment/Session Assessment:         Response to Treatment:  Doing well with mobility. Education:  [ ] Home Exercises  [X] Fall Precautions  [X] Hip Precautions [ ] D/C Instruction Review  [ ] Knee/Hip Prosthesis Review  [X] Walker Management/Safety [ ] Adaptive Equipment as Needed         Interdisciplinary Collaboration:   · Registered Nurse     After treatment position/precautions:   · Up in chair, Bed/Chair-wheels locked and Call light within reach     Compliance with Program/Exercises: compliant all the time. Recommendations/Intent for next treatment session:  Treatment next visit will focus on increasing Ms. Mcgowan's independence with bed mobility, transfers, gait training, strength/ROM exercises, modalities for pain, and patient education.        Total Treatment Duration:  PT Patient Time In/Time Out  Time In: 1030  Time Out: [address], PTA

## 2017-10-11 NOTE — DISCHARGE SUMMARY
Keya Almonte MD  Medical Director  [address] [place] [address], [address] [facility]  Tel: [phone]       REHABILITATION DISCHARGE SUMMARY     Patient: Roque Jackson MRN: [id number]  SSN: [id number]    YOB: 1945  Age: 80 y.o. Sex: female      Date: 10/11/2017  Admit Date: 10/9/2017  Discharge Date: 10/11/2017    Admitting Physician: Marija Laughlin MD   Primary Care Physician: Rachel Gaona MD     Admission Condition: good    Chief Complaint : Gait dysfunction secondary to below. Admit Diagnosis: Primary osteoarthritis of right hip [M16.11]  right total hip arthroplasty  10/9/2017  Pain  DVT risk  Post op hemorrhagic anemia  DDD (degenerative disc disease)  Type II  diabetes mellitus    DDD (degenerative disc disease)  Hypertension  Acute Rehab Dx:  Gait impairment  Debility    deconditioning  Mobility and ambulation deficits  Self Care/ADL deficits    HPI: Roque Jackson is a 80 y.o. female patient at [address] [place] [address] who was admitted on 10/9/2017  by Marija Laughlin MD with below mentioned medical history, is being seen for Physical Medicine and Rehabilitation. Roque Jackson had longstanding painful right hip  due to severe DJD. The symptoms were refractory to conservative treatment. She underwent a right DEYSI per Dr. Marija Laughlin MD on 10/9/2017. The patient's post operative course has been uncomplicated. Patient's is to be WBAT RLE. Hip precautions to be followed strictly for RLE. Patient is  Tolerating post op acute PT and OT well without unusual barriers. She is l;imited by post op  right hip pain, decreased ROM and decreased strength. Roque Jackson is seen and examined today. Medical Records reviewed. Pt has been independent with ambulation  prior to admission, at her baseline. She is s/p prior R and L TKAs.         Rehabiitation Course:   Functional  Level On Admission: Transfers:  Moderate [place]  Functional Level At Discharge: Walk: Contact Guard Assist  Home Architecture: Home Situation  Home Environment: Private residence (10/11/17 0900)  # Steps to Enter: 4 (10/11/17 0900)  Rails to Enter: Yes (10/09/17 1400)  Hand Rails : Left (10/09/17 1400)  One/Two Story Residence: One story (10/11/17 0900)  Living Alone: Yes (10/11/17 0900)  Support Systems: Family member(s) (10/11/17 0900)  Patient Expects to be Discharged to[de-identified] Private residence (10/11/17 0900)  Current DME Used/Available at Home: None (10/09/17 1400)  Tub or Shower Type: Tub/Shower combination (10/09/17 1400)     Past Medical History:   Diagnosis Date    Arthritis     Asthma     bronchio-asthma mostly seasonal    Chronic obstructive asthma, unspecified     Chronic pain     DDD, LOW BACK, SIATICA    DDD (degenerative disc disease)     WITH RADICULOPATHY    GERD (gastroesophageal reflux disease)     Hypertension     Hypothyroidism     HYPOTHYROIDISM     ON MEDS    Incontinence of urine     Morbid obesity ([address]     Neuropathy     ILYA (obstructive sleep apnea) 9/16/2009    Osteoarthritis of left knee 9/16/2009    Osteoarthritis of right knee 3/2/2011    S/P total knee replacement using cement 9/16/2009    Sleep apnea     HAS CPAP    Spastic colon     Status post right hip replacement 10/9/2017    Type II or unspecified type diabetes mellitus without mention of complication, not stated as uncontrolled     avg       Past Surgical History:   Procedure Laterality Date    HX HERNIA REPAIR  4/15/48    UMBILICAL WITH MESH    HX HYSTERECTOMY  2000    HX KNEE REPLACEMENT Right 2011    UT HAND/FINGER SURGERY UNLISTED Right 1990'S    TOTAL KNEE ARTHROPLASTY Left 2009      Family History   Problem Relation Age of Onset    Cancer Mother     Diabetes Mother     Heart Disease Father     COPD Father     Cancer Father      lung    Diabetes Father     Cancer Brother     Diabetes Brother     Heart Disease Brother CAD      Social History   Substance Use Topics    Smoking status: Former Smoker     Years: 14.00     Quit date: 1/1/1977    Smokeless tobacco: Former User     Quit date: 9/19/1977      Comment: L[address]ant[address] 54    Alcohol use Yes      Comment: pt states she will drink 1 or 2 drinks per month       Allergies   Allergen Reactions    Codeine Other (comments)     dizziness    Erythromycin Rash    Tetracycline Rash       Prior to Admission medications    Medication Sig Start Date End Date Taking? Authorizing Provider   aspirin delayed-release 325 mg tablet Take 1 Tab by mouth every twelve (12) hours every twelve (12) hours for 35 days. 10/9/17 11/13/17 Yes JEROME Sharif   HYDROmorphone (DILAUDID) 2 mg tablet Take 1 Tab by mouth every four (4) hours as needed. Max Daily Amount: 12 mg. 10/9/17  Yes JEROME Sharif   mupirocin calcium (BACTROBAN) 2 % nasal ointment by Both Nostrils route two (2) times a day. Yes Historical Provider   SITagliptin (JANUVIA) 100 mg tablet Take 100 mg by mouth daily. Yes Historical Provider   OTHER three (3) times daily. Lidocaine patch 5%   Yes Historical Provider   oxybutynin chloride XL (DITROPAN XL) 5 mg CR tablet Take 5 mg by mouth daily. Yes Historical Provider   acetaminophen (TYLENOL) 325 mg tablet Take 325 mg by mouth as needed for Pain. Yes Historical Provider   cpap machine kit by Does Not Apply route. 9 cm H2O   Yes Historical Provider   PREGABALIN (LYRICA PO) Take 150 mg by mouth two (2) times a day. 2/10/11  Yes Historical Provider   montelukast (SINGULAIR) 10 mg tablet Take 10 mg by mouth daily. Yes Historical Provider   metaxalone (SKELAXIN) 800 mg tablet Take 1 Tab by mouth three (3) times daily as needed for Pain. Patient taking differently: Take 800 mg by mouth two (2) times a day. 9/18/09  Yes JEROME Newell   OMEPRAZOLE PO 20 mg daily. Pt.  Instructed to take morning of surgery per anesthesiologist.     Yes    CYANOCOBALAMIN (VITAMIN B-12 PO) Take 500 mg by mouth daily. Yes Historical Provider   LISINOPRIL-HYDROCHLOROTHIAZIDE 10-12.5 mg per tablet take 1 Tab by mouth daily. Yes Historical Provider   LASIX 20 mg Tab Take 20 mg by mouth daily as needed. 2/8/11  Yes Historical Provider   GLYBURIDE-METFORMIN 2.5-500 mg per tablet take 1 Tab by mouth two (2) times a day. Yes Historical Provider   SYNTHROID 100 mcg Tab Take 100 mcg by mouth daily. Pt. Instructed to take morning of surgery per anesthesiologist.     Yes Historical Provider   PATANOL 0.1 % Drop 2 Drops by Both Eyes route as needed for Allergies. Yes Historical Provider   AMBIEN 10 mg Tab Take 10 mg by mouth nightly. 2/10/11  Yes Historical Provider   estradiol (ESTRACE) 0.01 % (0.1 mg/gram) vaginal cream Insert 2 g into vagina three (3) days a week. Historical Provider   lidocaine-kinesiology tape 5 % kit by Apply Externally route. Historical Provider   polyethylene glycol (MIRALAX) 17 gram packet Take 17 g by mouth as needed. Historical Provider   Azelastine (ASTEPRO) 0.15 % (205.5 mcg) nasal spray as needed. Historical Provider   FLUTICASONE/SALMETEROL (ADVAIR DISKUS IN) Take 2 Puffs by inhalation two (2) times daily as needed. 2/10/11   Historical Provider   celecoxib (CELEBREX) 200 mg capsule Take 200 mg by mouth daily. 1 to 2 a day. 2/10/11   Historical Provider   tramadol (ULTRAM) 50 mg tablet Take 50 mg by mouth as needed. Takes 2 to 4 a day. 2/10/11   Historical Provider   NEOMY SULF/POLYMYX B SULF/HC (CORTISPORIN OT) by Otic route as needed. 1 to 2 drops in each ear as needed. 2/10/11   Historical Provider   aspirin 81 mg tablet Take 81 mg by mouth daily. 2/10/11   Historical Provider   NAPROXEN SODIUM (ALEVE PO) Take  by mouth as needed. 2/10/11   Historical Provider   MULTIVITAMIN WITH MINERALS (MULTIVITAMIN & MINERAL FORMULA PO) Take 1 Tab by mouth daily.  Multivitamin with D with Calcium  2/10/11   Historical Provider   LIBRAX, WITH CLINIDIUM, 2.5-5 mg Cap Take 1 Cap by mouth four (4) times daily as needed.  2/8/11   Historical Provider       Current Medications:  Current Facility-Administered Medications   Medication Dose Route Frequency Provider Last Rate Last Dose    nystatin (MYCOSTATIN) 100,000 unit/gram powder   Topical BID Ta Stovall MD        zolpidem [facility]) tablet 5 mg  5 mg Oral QHS Tomas Squires [place]   5 mg at 10/10/17 2143    furosemide (LASIX) tablet 20 mg  20 mg Oral DAILY PRN JEROME Whelan        clindinium-chlordiazePOXIDE (LIBRAX) capsule 1 Cap (Patient Supply)  1 Cap Oral QID PRN JEROME Whelan        lisinopril-hydroCHLOROthiazide (PRINZIDE, ZESTORETIC) 10-12.5 mg per tablet 1 Tab  1 Tab Oral DAILY Daquan Whelan   1 Tab at 10/11/17 0914    metaxalone (SKELAXIN) tablet 800 mg  800 mg Oral BID JEROME Whelan   800 mg at 10/11/17 0913    montelukast (SINGULAIR) tablet 10 mg  10 mg Oral QHS JEROME Whelan   10 mg at 10/10/17 2142    pantoprazole (PROTONIX) tablet 40 mg  40 mg Oral ACB Daquan Whelan   40 mg at 10/11/17 0603    olopatadine (PATANOL) 0.1 % ophthalmic solution 2 Drop (patient Supply)  2 Drop Both Eyes PRN JEROME Whelan        polyethylene glycol (MIRALAX) packet 17 g  17 g Oral DAILY PRN JEROME Whelan        SITagliptin (JANUVIA) tablet 100 mg  100 mg Oral DAILY JEROME Whelan   100 mg at 10/11/17 0914    levothyroxine (SYNTHROID) tablet 100 mcg  100 mcg Oral DAILY JEROME Whelan   100 mcg at 10/11/17 0914    sodium chloride (NS) flush 5-10 mL  5-10 mL IntraVENous Q8H JEROME Whelan   10 mL at 10/11/17 0604    sodium chloride (NS) flush 5-10 mL  5-10 mL IntraVENous PRN JEROME Whelan        acetaminophen (TYLENOL) tablet 1,000 mg  1,000 mg Oral Q6H JEROME Whelan   1,000 mg at 10/11/17 0603    celecoxib (CELEBREX) capsule 200 mg  200 mg Oral Q12H JEROME Whelan   200 mg at 10/11/17 0913    HYDROmorphone (DILAUDID) tablet 2 mg  2 mg Oral Q4H PRN JEROME Ramos   2 mg at 10/11/17 1426    HYDROmorphone (PF) (DILAUDID) injection 1 mg  1 mg IntraVENous Q3H PRN JEROME Ramos   1 mg at 10/09/17 1631    naloxone [facility]) injection 0.2-0.4 mg  0.2-0.4 mg IntraVENous Q10MIN PRN JEROME Ramos        ondansetron [facility]) injection 4 mg  4 mg IntraVENous Q4H PRN JEROME Ramos   4 mg at 10/09/17 1310    diphenhydrAMINE (BENADRYL) capsule 25 mg  25 mg Oral Q4H PRN JEROME Ramos        senna-docusate (PERICOLACE) 8.6-50 mg per tablet 2 Tab  2 Tab Oral DAILY JEROME Ramos   2 Tab at 10/11/17 2599    aspirin delayed-release tablet 325 mg  325 mg Oral Q12H Daquan Ramos   325 mg at 10/11/17 8617    glyBURIDE/metFORMIN (GLUCOVANCE) 2.5/500 mg   Oral BID WITH MEALS Ludin Chou MD        oxybutynin [facility]) tablet 5 mg  5 mg Oral DAILY Ludin Chou MD   5 mg at 10/11/17 0914    pregabalin (LYRICA) capsule 150 mg  150 mg Oral BID Ludin Chou MD   150 mg at 10/11/17 0913    READ PPD 24HR, 48HR, 72HR  1 Each Other Q24H Ludin Chou MD   1 Each at 10/10/17 0700    insulin lispro (HUMALOG) injection   SubCUTAneous AC&HS Fransisca Loreto DO   2 Units at 10/10/17 2136        Review of Systems: Denies chest pain, shortness of breath, cough, headache, visual problems, abdominal pain, dysurea, calf pain. Pertinent positives are as noted in the medical records and unremarkable otherwise. Vital Signs: Patient Vitals for the past 8 hrs:   BP Temp Pulse Resp SpO2   10/11/17 1600 116/60 98 °F (36.7 °C) 71 18 94 %   10/11/17 1225 112/50 99 °F (37.2 °C) 68 20 94 %   10/11/17 0902 - - - - 95 %   10/11/17 0847 135/78 98 °F (36.7 °C) 72 18 93 %        Physical Exam:   General: Alert and age appropriately oriented. No acute cardio respiratory distress. HEENT: Normocephalic. Oral mucosa moist without cyanosis. Lungs: Clear to auscultation  bilaterally.   Respiration even and unlabored   Heart: Regular rate and rhythm, S1, S2   No  murmurs, clicks, rub or gallops   Abdomen: Soft, non-tender, nondistended. Bowel sounds present   Genitourinary: defered   Neuromuscular:      Grossly no focal neurological deficits noted. L Ankle dorsiflexion 5/5   L Ankle plantarflexion 5/5  R Ankle dorsiflexion 5/5   R Ankle plantarflexion 5/5  No sensory deficits. Skin/extremity: No rashes, no erythema. Calves soft. Wound covered. Skin Incision(s)/Wound(s):        Lab Review:   Recent Results (from the past 72 hour(s))   TYPE & SCREEN    Collection Time: 10/09/17  7:13 AM   Result Value Ref Range    Crossmatch Expiration 10/12/2017     ABO/Rh(D) Eveleen Prost POSITIVE     Antibody screen NEG    GLUCOSE, POC    Collection Time: 10/09/17  7:27 AM   Result Value Ref Range    Glucose (POC) 90 65 - 100 mg/dL   GLUCOSE, POC    Collection Time: 10/09/17  5:15 PM   Result Value Ref Range    Glucose (POC) 246 (H) 65 - 100 mg/dL   HEMOGLOBIN    Collection Time: 10/09/17  7:22 PM   Result Value Ref Range    HGB 11.1 (L) 11.7 - 15.4 g/dL   GLUCOSE, POC    Collection Time: 10/09/17  8:38 PM   Result Value Ref Range    Glucose (POC) 180 (H) 65 - 100 mg/dL   MSSA/MRSA SC BY PCR, NASAL SWAB    Collection Time: 10/09/17  9:15 PM   Result Value Ref Range    Special Requests: NO SPECIAL REQUESTS      Culture result: (A)       MRSA target DNA detected, SA target DNA detected. A positive test result does not necessarily indicate the presence of viable organisms. It is however, presumptive for the presence of MRSA or SA. Culture result:        RESULTS VERIFIED, PHONED TO AND READ BACK BY  FLAVIO BEDOYA AT 4465 ON [id number]. KDB     HEMOGLOBIN    Collection Time: 10/10/17  6:07 AM   Result Value Ref Range    HGB 9.5 (L) 11.7 - 96.6 g/dL   METABOLIC PANEL, BASIC    Collection Time: 10/10/17  6:07 AM   Result Value Ref Range    Sodium 139 136 - 145 mmol/L    Potassium 4.3 3.5 - 5.1 mmol/L    Chloride 107 98 - 107 mmol/L    CO2 26 21 - 32 mmol/L    Anion gap 6 (L) 7 - 16 mmol/L    Glucose 146 (H) 65 - 100 mg/dL    BUN 21 8 - 23 MG/DL    Creatinine 1.09 (H) 0.6 - 1.0 MG/DL    GFR est AA >60 >60 ml/min/1.73m2    GFR est non-AA 53 (L) >60 ml/min/1.73m2    Calcium 7.8 (L) 8.3 - 10.4 MG/DL   GLUCOSE, POC    Collection Time: 10/10/17 11:22 AM   Result Value Ref Range    Glucose (POC) 131 (H) 65 - 100 mg/dL   GLUCOSE, POC    Collection Time: 10/10/17  4:14 PM   Result Value Ref Range    Glucose (POC) 173 (H) 65 - 100 mg/dL   GLUCOSE, POC    Collection Time: 10/10/17  7:53 PM   Result Value Ref Range    Glucose (POC) 172 (H) 65 - 100 mg/dL   HEMOGLOBIN    Collection Time: 10/11/17  5:00 AM   Result Value Ref Range    HGB 9.4 (L) 11.7 - 15.4 g/dL   GLUCOSE, POC    Collection Time: 10/11/17  6:06 AM   Result Value Ref Range    Glucose (POC) 88 65 - 100 mg/dL   GLUCOSE, POC    Collection Time: 10/11/17  4:10 PM   Result Value Ref Range    Glucose (POC) 125 (H) 65 - 100 mg/dL       PT Initial  PT Most Recent   AROM: Generally decreased, functional (left LE) (10/09/17 1400) Generally decreased, functional (left LE) (10/09/17 1400)         Strength: Generally decreased, functional (left LE) (10/09/17 1400) Generally decreased, functional (left LE) (10/09/17 1400)   Coordination: Generally decreased, functional (left LE) (10/09/17 1400) Generally decreased, functional (left LE) (10/09/17 1400)         Sensation:  (nerve block still active) (10/09/17 1400)  (nerve block still active) (10/09/17 1400)         Distance (ft): 5 Feet (ft) (10/09/17 1400) 200 Feet (ft) (10/11/17 1500)   Assistive Device: Walker, rolling (10/09/17 1400) Walker, rolling (10/11/17 1500)       OT Initial OT Most Recent                                               ST Initial ST Most Recent                        Principal Problem:    Status post right hip replacement (10/9/2017)          Condition on discharge from [facility] to SNF:  good  Rehabilitation  potential : Good   Goals in Rehab : Patient to reach maximal rehabilitation potential in functional mobility,ambulation and ADL/ self care skills ; to improve strength and endurance  Plan / Disposition :STR-Rehab  as discussed with patient/ family and the Case management/ Social service team  Expected Length Of Stay : Depending on pace of progress in mobility and self care in PT and OT ,therapies    Discharge Instructions:  Rehabilitation Management/ Medical Management: 1. Devices:Walkers, Type: Rolling Walker  2. Consult:Rehab team including PT, OT,  and . 3. Disposition Rehab-discussed with patient. 4. Thigh-high or knee-high RAHUL's when out of bed. 5. DVT Prophylaxis - aspirin 325mg bid x 30days. Please notify surgeon at [address] [place] [address] if DVT diagnosed. 6. Incentive spirometer Q1H while awake  7. Post op hemorrhagic anemia- monitor. 8. Activity: WBAT RLE, total hip precautions. 9. Planned Labs: CBC,BMP  10. Pain Control: fair control. Continue scheduled tylenol, celebrex and  PRN meds. Continue current management. 11. Wound Care: May remove Aquacel 1 week post op and replace with Tegaderm and sterile gauze dressing. Keep wound clean and dry and reinforce dressing PRN. Remove staples 12-14 post surgery, when incision appears appropriately closed and apply benzoin and 1/2\" steristrips. 12. In case of complications: Please notify surgeon at [address] [place] [address] if suspect infection, cellulitis, wound dehiscence or instrument failure, and if considering starting antibiotic. Follow up with ORTHO per instructions. [address] [facility] [phone]  Follow up with Dr Tristin Valentin  2 weeks after discharge from rehab. [phone].       Transfer Medications:                  acetaminophen (TYLENOL) tablet 1,000 mg Ordered Dose: 1,000 mg Route: Oral Frequency: EVERY 8 HOURS x 1 week then change to prn.          senna-docusate (PERICOLACE) 8.6-50 mg per tablet 2 Tab Ordered Dose: 2 Tab Route: Oral Frequency: DAILY       Current Discharge Medication List      START taking these medications    Details   aspirin delayed-release 325 mg tablet Take 1 Tab by mouth every twelve (12) hours every twelve (12) hours for 35 days. x 35 days then stop. Take with food or milk or full glass of water. Comments: This medication is to prevent blood clots for 5 weeks after surgery. HYDROmorphone (DILAUDID) 2 mg tablet Take 1 Tab by mouth every four (4) hours as needed. Max Daily Amount: 12 mg.  Qty: 40 Tab, Refills: 0         CONTINUE these medications which have NOT CHANGED    Details   SITagliptin (JANUVIA) 100 mg tablet Take 100 mg by mouth daily. oxybutynin chloride XL (DITROPAN XL) 5 mg CR tablet Take 5 mg by mouth daily. cpap machine kit by Does Not Apply route. 9 cm H2O      PREGABALIN (LYRICA PO) Take 150 mg by mouth two (2) times a day. montelukast (SINGULAIR) 10 mg tablet Take 10 mg by mouth daily. metaxalone (SKELAXIN) 800 mg tablet Take 1 Tab by mouth three (3) times daily as needed for Pain. OMEPRAZOLE PO 20 mg daily. CYANOCOBALAMIN (VITAMIN B-12 PO) Take 500 mg by mouth daily. LISINOPRIL-HYDROCHLOROTHIAZIDE 10-12.5 mg per tablet take 1 Tab by mouth daily. LASIX 20 mg Tab Take 20 mg by mouth daily as needed. GLYBURIDE-METFORMIN 2.5-500 mg per tablet take 1 Tab by mouth two (2) times a day. SYNTHROID 100 mcg Tab Take 100 mcg by mouth daily. PATANOL 0.1 % Drop 2 Drops by Both Eyes route as needed for Allergies. AMBIEN 10 mg Tab Take 10 mg by mouth nightly. HOLD.       lidocaine-kinesiology tape 5 % kit by Apply Externally route. polyethylene glycol (MIRALAX) 17 gram packet Take 17 g by mouth as needed. FLUTICASONE/SALMETEROL (ADVAIR DISKUS IN) Take 2 Puffs by inhalation two (2) times daily as needed. celecoxib (CELEBREX) 200 mg capsule Take 200 mg by mouth daily. 1 to 2 a day. NEOMY SULF/POLYMYX B SULF/HC (CORTISPORIN OT) by Otic route as needed.  1 to 2 drops in each ear as needed. MULTIVITAMIN WITH MINERALS (MULTIVITAMIN & MINERAL FORMULA PO) Take 1 Tab by mouth daily. Multivitamin with D with Calcium       LIBRAX, WITH CLINIDIUM, 2.5-5 mg Cap Take 1 Cap by mouth four (4) times daily as needed. STOP taking these medications       mupirocin calcium (BACTROBAN) 2 % nasal ointment Comments:   Reason for Stopping:         acetaminophen (TYLENOL) 325 mg tablet Comments:   Reason for Stopping:         estradiol (ESTRACE) 0.01 % (0.1 mg/gram) vaginal cream Comments:   Reason for Stopping:         Azelastine (ASTEPRO) 0.15 % (205.5 mcg) nasal spray Comments:   Reason for Stopping:         tramadol (ULTRAM) 50 mg tablet Comments:   Reason for Stopping:         aspirin 81 mg tablet Comments:   Reason for Stopping:         NAPROXEN SODIUM (ALEVE PO) Comments:   Reason for Stopping:             O.K. Admit to sub acute rehab today. Discharge time: 35 minutes.     Signed By: Lula Encinas MD     October 11, 2017

## 2017-10-12 VITALS
OXYGEN SATURATION: 92 % | RESPIRATION RATE: 17 BRPM | WEIGHT: 260 LBS | BODY MASS INDEX: 44.39 KG/M2 | DIASTOLIC BLOOD PRESSURE: 66 MMHG | TEMPERATURE: 97.9 F | HEART RATE: 71 BPM | SYSTOLIC BLOOD PRESSURE: 159 MMHG | HEIGHT: 64 IN

## 2017-10-12 LAB
GLUCOSE BLD STRIP.AUTO-MCNC: 125 MG/DL (ref 65–100)
HGB BLD-MCNC: 10.2 G/DL (ref 11.7–15.4)
MM INDURATION POC: 0 MM (ref 0–5)
PPD POC: 0 NEGATIVE

## 2017-10-12 PROCEDURE — 36415 COLL VENOUS BLD VENIPUNCTURE: CPT | Performed by: PHYSICIAN ASSISTANT

## 2017-10-12 PROCEDURE — 74011250637 HC RX REV CODE- 250/637: Performed by: ORTHOPAEDIC SURGERY

## 2017-10-12 PROCEDURE — 97116 GAIT TRAINING THERAPY: CPT

## 2017-10-12 PROCEDURE — 85018 HEMOGLOBIN: CPT | Performed by: PHYSICIAN ASSISTANT

## 2017-10-12 PROCEDURE — 74011250637 HC RX REV CODE- 250/637: Performed by: PHYSICIAN ASSISTANT

## 2017-10-12 PROCEDURE — 82962 GLUCOSE BLOOD TEST: CPT

## 2017-10-12 PROCEDURE — 97150 GROUP THERAPEUTIC PROCEDURES: CPT

## 2017-10-12 RX ADMIN — HYDROMORPHONE HYDROCHLORIDE 2 MG: 2 TABLET ORAL at 09:08

## 2017-10-12 RX ADMIN — CELECOXIB 200 MG: 200 CAPSULE ORAL at 09:07

## 2017-10-12 RX ADMIN — ACETAMINOPHEN 1000 MG: 500 TABLET, FILM COATED ORAL at 00:00

## 2017-10-12 RX ADMIN — NYSTATIN: 100000 POWDER TOPICAL at 09:05

## 2017-10-12 RX ADMIN — LEVOTHYROXINE SODIUM 100 MCG: 100 TABLET ORAL at 09:08

## 2017-10-12 RX ADMIN — OXYBUTYNIN CHLORIDE 5 MG: 5 TABLET ORAL at 09:08

## 2017-10-12 RX ADMIN — ACETAMINOPHEN 1000 MG: 500 TABLET, FILM COATED ORAL at 06:54

## 2017-10-12 RX ADMIN — METAXALONE 800 MG: 800 TABLET ORAL at 09:08

## 2017-10-12 RX ADMIN — HYDROMORPHONE HYDROCHLORIDE 2 MG: 2 TABLET ORAL at 13:18

## 2017-10-12 RX ADMIN — ASPIRIN 325 MG: 325 TABLET, DELAYED RELEASE ORAL at 09:07

## 2017-10-12 RX ADMIN — PANTOPRAZOLE SODIUM 40 MG: 40 TABLET, DELAYED RELEASE ORAL at 06:54

## 2017-10-12 RX ADMIN — ACETAMINOPHEN 1000 MG: 500 TABLET, FILM COATED ORAL at 13:18

## 2017-10-12 RX ADMIN — Medication 10 ML: at 06:54

## 2017-10-12 RX ADMIN — SITAGLIPTIN 100 MG: 100 TABLET, FILM COATED ORAL at 09:07

## 2017-10-12 RX ADMIN — PREGABALIN 150 MG: 150 CAPSULE ORAL at 09:08

## 2017-10-12 RX ADMIN — GLYBURIDE: 5 TABLET ORAL at 09:07

## 2017-10-12 RX ADMIN — SENNOSIDES AND DOCUSATE SODIUM 2 TABLET: 8.6; 5 TABLET ORAL at 09:07

## 2017-10-12 RX ADMIN — LISINOPRIL AND HYDROCHLOROTHIAZIDE 1 TABLET: 12.5; 1 TABLET ORAL at 09:07

## 2017-10-12 NOTE — PROGRESS NOTES
Problem: Mobility Impaired (Adult and Pediatric)  Goal: *Acute Goals and Plan of Care (Insert Text)  GOALS (1-4 days):  (1.)Ms. Mcgowan will move from supine to sit and sit to supine in bed with SUPERVISION. (2.)Ms. Mcgowan will transfer from bed to chair and chair to bed with STAND BY ASSIST using the least restrictive device. (3.)Ms. Mcgowan will ambulate with STAND BY ASSIST for 200 feet with the least restrictive device. (4.)Ms. Mcgowan will ambulate up/down 4 steps with left railing with CONTACT GUARD ASSIST with no device. (5.)Ms. Mcgowan will state/observe DEYSI precautions with No verbal cues. ________________________________________________________________________________________________      PHYSICAL THERAPY JOINT CAMP DEYSI: Daily Note, Treatment Day: 3rd and AM 10/12/2017  INPATIENT: Hospital Day: 4  [place] / Product Type: Medicare /      NAME/AGE/GENDER: Glen Miner is a 80 y.o. female              PRIMARY DIAGNOSIS:  Primary osteoarthritis of right hip [M16.11]              Procedure(s) and Anesthesia Type:     * RIGHT HIP ARTHROPLASTY TOTAL/  - Spinal (Right)  ICD-10: Treatment Diagnosis:        · Pain in Right Hip (M25.551)  · Stiffness of Right Hip, Not elsewhere classified (M25.651)  · Difficulty in walking, Not elsewhere classified (R26.2)       ASSESSMENT:      Ms. Winsome Lake is sitting up in chair on contact and agreeable to therapy although she says she did not get her second cup of coffee. Her gait distance was limited this morning pt saying she just does not have any energy, she was not necessarily reporting increased pain. In the gym worked on [employer] exercises, making progress with repetitions. Returned to room, stayed up in chair with ice in place and needs in reach. Pt plans to go to rehab later today. This section established at most recent assessment   PROBLEM LIST (Impairments causing functional limitations):  1. Decreased Strength  2.  Decreased ADL/Functional Activities  3. Decreased Transfer Abilities  4. Decreased Ambulation Ability/Technique  5. Decreased Balance  6. Increased Pain  7. Decreased Activity Tolerance  8. Decreased Knowledge of Precautions  9. Decreased Ward with Home Exercise Program    INTERVENTIONS PLANNED: (Benefits and precautions of physical therapy have been discussed with the patient.)  1. Bed Mobility  2. Gait Training  3. Home Exercise Program (HEP)  4. Therapeutic Exercise/Strengthening  5. Transfer Training  6. Range of Motion: active/assisted/passive  7. Therapeutic Activities  8. Group Therapy      TREATMENT PLAN: Frequency/Duration: Follow patient BID   to address above goals. Rehabilitation Potential For Stated Goals: GOOD      RECOMMENDED REHABILITATION/EQUIPMENT: (at time of discharge pending progress): Continue Skilled Therapy and as noted in assessment. HISTORY:   History of Present Injury/Illness (Reason for Referral): The patient has end stage arthritis of the right hip. The patient was evaluated and examined during a consultation prior to this office visit. There have been no changes to the patient's orthopedic condition since the initial consultation. The patient has failed previous conservative treatment for this condition including antiinflammatories , and lifestyle modifications. The necessity for joint replacement is present. The patient will be admitted the day of surgery for Procedure(s) (LRB):  RIGHT HIP ARTHROPLASTY TOTAL/DEPUY (Right)       Past Medical History/Comorbidities:   Ms. Caroline Marino  has a past medical history of Arthritis; Asthma; Chronic obstructive asthma, unspecified; Chronic pain; DDD (degenerative disc disease); GERD (gastroesophageal reflux disease); Hypertension; Hypothyroidism; HYPOTHYROIDISM; Incontinence of urine; Morbid obesity ([address] Neuropathy; ILYA (obstructive sleep apnea) (9/16/2009); Osteoarthritis of left knee (9/16/2009);  Osteoarthritis of right knee (3/2/2011); S/P total knee replacement using cement (9/16/2009); Sleep apnea; Spastic colon; Status post right hip replacement (10/9/2017); and Type II or unspecified type diabetes mellitus without mention of complication, not stated as uncontrolled. She also has no past medical history of Adverse effect of anesthesia; Aneurysm ([facility] Arrhythmia; Autoimmune disease ([facility] CAD (coronary artery disease); Cancer ([address] Chronic kidney disease; Chronic obstructive pulmonary disease ([address] Coagulation disorder ([address] Difficult intubation; Endocarditis; Heart failure ([address] Ill-defined condition; Liver disease; Malignant hyperthermia due to anesthesia; Nausea & vomiting; Nicotine vapor product user; Non-nicotine vapor product user; Pseudocholinesterase deficiency; Psychiatric disorder; PUD (peptic ulcer disease); Rheumatic fever; Seizures ([facility] [address] Stroke [facility]); or Thromboembolus ([facility] Ms. Yelena Dorado  has a past surgical history that includes hernia repair (1/30/08); hysterectomy (2000); hand/finger surgery unlisted (Right, 1990'S); total knee arthroplasty (Left, 2009); and knee replacement (Right, 2011). Social History/Living Environment:   Home Environment: Private residence  # Steps to Enter: 4  Rails to Enter: Yes  Hand Rails : Left  One/Two Story Residence: One story  Living Alone: Yes  Support Systems: Family member(s)  Patient Expects to be Discharged to[de-identified] Private residence  Current DME Used/Available at Home: None  Tub or Shower Type: Tub/Shower combination  Prior Level of Function/Work/Activity:  Pt was functioning with a cane prior to this admission   Number of Personal Factors/Comorbidities that affect the Plan of Care: 3+: HIGH COMPLEXITY   EXAMINATION:   Most Recent Physical Functioning:                               Bed Mobility  Supine to Sit:  (pt up in chair on contact)  Scooting: Stand-by asssistance; Additional time     Transfers  Sit to Stand: Stand-by asssistance; Additional time  Stand to Sit: Stand-by asssistance; Additional time     Balance  Sitting: Intact  Standing: Intact; With support          Gait Training: Yes     Weight Bearing Status  Right Side Weight Bearing: As tolerated  Distance (ft): 80 Feet (ft)  Ambulation - Level of Assistance: Stand-by asssistance  Assistive Device: Walker, rolling  Base of Support: Widened  Speed/Jayne: Pace decreased (<100 feet/min)  Step Length: Left shortened;Right shortened  Stance: Right decreased  Gait Abnormalities: Antalgic  Interventions: Safety awareness training;Verbal cues      Braces/Orthotics: none     Right Hip Cold  Type: Cold/ice packs       Body Structures Involved:  1. Joints  2. Muscles Body Functions Affected:  1. Sensory/Pain  2. Movement Related Activities and Participation Affected:  1. General Tasks and Demands  2. Mobility   Number of elements that affect the Plan of Care: 4+: HIGH COMPLEXITY   CLINICAL PRESENTATION:   Presentation: Evolving clinical presentation with changing clinical characteristics: MODERATE COMPLEXITY   CLINICAL DECISION MAKIN [facility] Mobility Inpatient Short Form  How much difficulty does the patient currently have. .. Unable A Lot A Little None   1. Turning over in bed (including adjusting bedclothes, sheets and blankets)? [ ] 1   [ ] 2   [X] 3   [ ] 4   2. Sitting down on and standing up from a chair with arms ( e.g., wheelchair, bedside commode, etc.)   [ ] 1   [ ] 2   [X] 3   [ ] 4   3. Moving from lying on back to sitting on the side of the bed? [ ] 1   [ ] 2   [X] 3   [ ] 4   How much help from another person does the patient currently need. .. Total A Lot A Little None   4. Moving to and from a bed to a chair (including a wheelchair)? [ ] 1   [ ] 2   [X] 3   [ ] 4   5. Need to walk in hospital room? [ ] 1   [ ] 2   [X] 3   [ ] 4   6. Climbing 3-5 steps with a railing? [X] 1   [ ] 2   [ ] 3   [ ] 4   © , Trustees of [address] [place] [address] [address] [address], under license to [employer]. All rights reserved       Score:  Initial: 16 Most Recent: X (Date: -- )     Interpretation of Tool:  Represents activities that are increasingly more difficult (i.e. Bed mobility, Transfers, Gait). Score 24 23 22-20 19-15 14-10 9-7 6       Modifier CH CI CJ CK CL CM CN         · Mobility - Walking and Moving Around:               - CURRENT STATUS:    CK - 40%-59% impaired, limited or restricted               - GOAL STATUS:           CJ - 20%-39% impaired, limited or restricted               - D/C STATUS:                       ---------------To be determined---------------  [place] / Product Type: Medicare /       Medical Necessity:     · Patient is expected to demonstrate progress in strength, balance, coordination and functional technique to decrease assistance required with bed mobility, transfers & gait. Reason for Services/Other Comments:  · Patient continues to require skilled intervention due to pt not safe with functional mobility. Use of outcome tool(s) and clinical judgement create a POC that gives a: Questionable prediction of patient's progress: MODERATE COMPLEXITY                 TREATMENT:   (In addition to Assessment/Re-Assessment sessions the following treatments were rendered)      Pre-treatment Symptoms/Complaints: some burning in her hip with bed mobility  Pain: Initial: 2/10 visaully  Pain Intensity 1: 4  Post Session:  4/10 visually      Gait Training (15 Minutes):  Gait training to improve and/or restore physical functioning as related to mobility and strength. Ambulated 80 Feet (ft) with Stand-by asssistance using a Walker, rolling and minimal Safety awareness training;Verbal cues related to their stance phase and stride length to promote proper body alignment and promote proper body posture. Instruction in performance of walker use and gait sequencing to correct stance phase and stride length.   Therapeutic Exercise: (30 Minutes (group)):  Exercises per grid below to improve mobility and strength. Required minimal verbal cues to promote proper body alignment and promote proper body posture. Progressed repetitions as indicated. Date:   10/10/17 Date:   10/11/17 Date:  10/12/17    ACTIVITY/EXERCISE AM PM AM PM AM PM   GROUP THERAPY  [ ]  [ ]  Sam ]  [ ]  [ Norman Castilloave  [ ]   Ankle Pumps 10  15  20 20   25     Quad Sets  10  15  20  20  25     Gluteal Sets  10  15  20  20  25     Hip ABd/ADduction  10  15  20  20  25     Straight Leg Raises               Knee Slides  10  15  20  20  25     Short Arc Quads    15  20  20  25     Long Arc Quads    15  20  20  25     Chair Slides                               B = bilateral; AA = active assistive; A = active; P = passive       Treatment/Session Assessment:         Response to Treatment:  Continues to do well. Education:  [ ] Home Exercises  [X] Fall Precautions  [X] Hip Precautions [ ] D/C Instruction Review  [ ] Knee/Hip Prosthesis Review  [X] Walker Management/Safety [ ] Adaptive Equipment as Needed         Interdisciplinary Collaboration:   · Registered Nurse and Rehabilitation Attendant     After treatment position/precautions:   · Up in chair, Bed/Chair-wheels locked and Call light within reach     Compliance with Program/Exercises: compliant all the time. Recommendations/Intent for next treatment session:  Treatment next visit will focus on increasing Ms. Mcgowan's independence with bed mobility, transfers, gait training, strength/ROM exercises, modalities for pain, and patient education.        Total Treatment Duration:  PT Patient Time In/Time Out  Time In: 0905  Time Out: Carlos 32, PT

## 2017-10-12 NOTE — PROGRESS NOTES
Patient in recliner. Dressing intact with small amount of old serosanguinous drainage noted medially. Patient denies needs at present. Neurovascular status WDL. Palpable pulses. Positive dorsiflexion and plantar flexion. Instructed not to get up by themselves and call for assistance or any needs. Patient verbalized understanding. Call bell within reach. Side rails up x3. Bed low and locked. Recliner wheels locked. No distress noted.

## 2017-10-12 NOTE — PROGRESS NOTES
Patient is A&Ox4. Able to verbalize needs. Resting quietly with no distress noted. Dressing to surgical site is intact. Old drainage noted. Neurovascular and peripheral vascular checks WNL. Voiding clear yellow urine. Ambulates with walker. Denies needs. Bed low and locked. Call light within reach. Instructed to call for assistance. Patient verbalizes understanding. Will monitor.

## 2017-10-12 NOTE — PROGRESS NOTES
TRANSFER - OUT REPORT:    Verbal report given to Jc Whitt LPN on Viviann Skiff  being transferred to [facility] for routine progression of care       Report consisted of patients Situation, Background, Assessment and   Recommendations(SBAR). Information from the following report(s) SBAR, Kardex, OR Summary, Procedure Summary, Intake/Output, MAR, Accordion and Recent Results was reviewed with the receiving nurse. Lines:       Opportunity for questions and clarification was provided.       Patient transported with:   Tech, cousins via car

## 2017-10-12 NOTE — PROGRESS NOTES
2017         Post Op day: 3 Days Post-OpProcedure(s) (LRB):  RIGHT HIP ARTHROPLASTY TOTAL/  (Right)      Admit Date: 10/9/2017  Admit Diagnosis: Primary osteoarthritis of right hip [M16.11]    LAB:    Recent Results (from the past 24 hour(s))   GLUCOSE, POC    Collection Time: 10/11/17  4:10 PM   Result Value Ref Range    Glucose (POC) 125 (H) 65 - 100 mg/dL   GLUCOSE, POC    Collection Time: 10/11/17  8:23 PM   Result Value Ref Range    Glucose (POC) 161 (H) 65 - 100 mg/dL   HEMOGLOBIN    Collection Time: 10/12/17  5:12 AM   Result Value Ref Range    HGB 10.2 (L) 11.7 - 15.4 g/dL   GLUCOSE, POC    Collection Time: 10/12/17  6:58 AM   Result Value Ref Range    Glucose (POC) 125 (H) 65 - 100 mg/dL     Vital Signs:    Patient Vitals for the past 8 hrs:   BP Temp Pulse Resp SpO2   10/12/17 0400 144/69 96.8 °F (36 °C) 68 17 94 %   10/12/17 0000 141/74 98.8 °F (37.1 °C) 76 18 93 %     Temp (24hrs), Av.1 °F (36.7 °C), Min:96.8 °F (36 °C), Max:99 °F (37.2 °C)    Body mass index is 44.63 kg/(m^2).   Pain Control:   Pain Assessment  Pain Scale 1: Numeric (0 - 10)  Pain Intensity 1: 1  Pain Onset 1: at rest  Pain Location 1: Hip  Pain Orientation 1: Right  Pain Description 1: Aching, Constant  Pain Intervention(s) 1: Declines    Subjective: Doing well, No complaints, No SOB, No Chest Pain, No Nausea or Vomitting     Objective: Vital Signs are Stable, No Acute Distress, Alert and Oriented, Dressing is Dry,  Neurovascular exam is normal.       PT/OT:         Activity Response: Fairly tolerated  Assistive Device: Walker (comment)                Weight Bearing Status: WBAT    Meds:  [unfilled]  [unfilled]  [unfilled]    Assessment:   Patient Active Problem List   Diagnosis Code    Osteoarthritis of left knee M17.12    S/P total knee replacement using cement Z96.659    ILYA (obstructive sleep apnea) G47.33    Asthma J45.909    Type II or unspecified type diabetes mellitus without mention of complication, not stated as uncontrolled E11.9    Hypothyroidism E03.9    Chronic pain G89.29    Osteoarthritis of right knee M17.11    S/P total knee replacement using cement Z96.659    Morbid obesity with BMI of 45.0-49.9, adult (HCC) E66.01, Z68.42    Hypertension I10    Neuropathy G62.9    Elevated WBC count D72.829    Elevated serum creatinine R79.89    Status post right hip replacement Z96.641             Plan: Continue Physical Therapy, Monitor  Hbg, [place] today.         Signed By: Madison Quintana MD

## 2017-10-20 NOTE — DISCHARGE SUMMARY
[facility]  Total Joint Discharge Summary      Patient ID:  Bambi Gitelman  [id number]  89 y.o.  1945    Admit date: 10/9/2017  Discharge date and time: 10/12/2017   Admitting Physician: Chester Gonzales MD  Surgeon: Same    Hospital Course    Admission Diagnoses: Pre op diagnosis: Primary osteoarthritis of right hip [M16.11] . Prior to surgery the patient was seen for consultation in the office or hospital and a complete history and physical was taken as it pertained to their condition. Discharge Diagnoses: Status post right hip replacement. Chronic and Acute medical problems addressed during this hospital stay by consulting physicians include: They underwent Procedure(s) (LRB):  RIGHT HIP ARTHROPLASTY TOTAL/  (Right) for this. Principle Problem: Status post right hip replacement. Other Chronic and Acute Medical Issues: managed by the hospitalist during admission included: Principal Problem:    Status post right hip replacement (10/9/2017)                                 Perioperative Antibiotics:  Prior to surgery Ancef 1 to 2 mg was given depending on patient's weight and allergies. If the patient was allergic to Ancef or MRSA positive  the patient was given Vancomycin and Cleocin        Hospital Medications:   No current facility-administered medications for this encounter. Current Outpatient Prescriptions   Medication Sig    aspirin delayed-release 325 mg tablet Take 1 Tab by mouth every twelve (12) hours every twelve (12) hours for 35 days.  HYDROmorphone (DILAUDID) 2 mg tablet Take 1 Tab by mouth every four (4) hours as needed. Max Daily Amount: 12 mg.    SITagliptin (JANUVIA) 100 mg tablet Take 100 mg by mouth daily.  OTHER three (3) times daily. Lidocaine patch 5%    oxybutynin chloride XL (DITROPAN XL) 5 mg CR tablet Take 5 mg by mouth daily.  cpap machine kit by Does Not Apply route.  9 cm H2O    PREGABALIN (LYRICA PO) Take 150 mg by mouth two (2) times a day.  montelukast (SINGULAIR) 10 mg tablet Take 10 mg by mouth daily.  metaxalone (SKELAXIN) 800 mg tablet Take 1 Tab by mouth three (3) times daily as needed for Pain. (Patient taking differently: Take 800 mg by mouth two (2) times a day.)    OMEPRAZOLE PO 20 mg daily. Pt. Instructed to take morning of surgery per anesthesiologist.      CYANOCOBALAMIN (VITAMIN B-12 PO) Take 500 mg by mouth daily.  LISINOPRIL-HYDROCHLOROTHIAZIDE 10-12.5 mg per tablet take 1 Tab by mouth daily.  LASIX 20 mg Tab Take 20 mg by mouth daily as needed.  GLYBURIDE-METFORMIN 2.5-500 mg per tablet take 1 Tab by mouth two (2) times a day.  SYNTHROID 100 mcg Tab Take 100 mcg by mouth daily. Pt. Instructed to take morning of surgery per anesthesiologist.      PATANOL 0.1 % Drop 2 Drops by Both Eyes route as needed for Allergies.  AMBIEN 10 mg Tab Take 10 mg by mouth nightly.  lidocaine-kinesiology tape 5 % kit by Apply Externally route.  polyethylene glycol (MIRALAX) 17 gram packet Take 17 g by mouth as needed.  FLUTICASONE/SALMETEROL (ADVAIR DISKUS IN) Take 2 Puffs by inhalation two (2) times daily as needed.  celecoxib (CELEBREX) 200 mg capsule Take 200 mg by mouth daily. 1 to 2 a day.  NEOMY SULF/POLYMYX B SULF/HC (CORTISPORIN OT) by Otic route as needed. 1 to 2 drops in each ear as needed.  MULTIVITAMIN WITH MINERALS (MULTIVITAMIN & MINERAL FORMULA PO) Take 1 Tab by mouth daily. Multivitamin with D with Calcium     LIBRAX, WITH CLINIDIUM, 2.5-5 mg Cap Take 1 Cap by mouth four (4) times daily as needed.          Additional DVT Prophylaxis:  RAHUL Hose, SCDs     Postoperative Blood Report: If the patient received blood products during their admission they are listed below:     Lab Results   Component Value Date/Time    Crossmatch Expiration 10/12/2017 10/09/2017 07:13 AM     Lab Results   Component Value Date/Time    ABO/Rh(D) Leti Cabello POSITIVE 10/09/2017 07:13 AM     Lab Results   Component Value Date/Time    ABO/Rh(D) O POSITIVE 10/09/2017 07:13 AM     No results found for: PCTUN  No results found for: PCTCT  No results found for: PCTUDIV  No results found for: PCTXM    Post Op complications: none       Physical Therapy: PT was started on the day of surgery and progressed. PT/OT:       Activity Response: Fairly tolerated  Assistive Device: Walker (comment)                  Disposition: Good    Upon Discharge: The wound appears to be healing without any evidence of infection. Hemoglobin at discharge:   Lab Results   Component Value Date/Time    HGB 10.2 10/12/2017 05:12 AM       Pt Discharged to: [address] [place] [address].     Discharge instructions:  - Refer to the Medication Reconciliation sheet for all discharge medications   -Rx pain medication given   -Resume pre hospital diet              -Ambulate with walker, appropriate total joint protocol  -Follow up in office as scheduled       Signed:  JEROME Pineda  10/20/2017  10:27 AM

## 2017-10-31 ENCOUNTER — HOME HEALTH ADMISSION (OUTPATIENT)
Dept: HOME HEALTH SERVICES | Facility: HOME HEALTH | Age: 72
End: 2017-10-31
Payer: MEDICARE

## 2017-11-02 ENCOUNTER — HOME CARE VISIT (OUTPATIENT)
Dept: SCHEDULING | Facility: HOME HEALTH | Age: 72
End: 2017-11-02
Payer: MEDICARE

## 2017-11-02 VITALS
SYSTOLIC BLOOD PRESSURE: 110 MMHG | DIASTOLIC BLOOD PRESSURE: 70 MMHG | RESPIRATION RATE: 20 BRPM | HEART RATE: 67 BPM | TEMPERATURE: 98.1 F | OXYGEN SATURATION: 99 %

## 2017-11-02 VITALS
OXYGEN SATURATION: 99 % | SYSTOLIC BLOOD PRESSURE: 110 MMHG | RESPIRATION RATE: 20 BRPM | TEMPERATURE: 98.1 F | HEART RATE: 87 BPM | DIASTOLIC BLOOD PRESSURE: 70 MMHG

## 2017-11-02 PROCEDURE — 3331090001 HH PPS REVENUE CREDIT

## 2017-11-02 PROCEDURE — 3331090002 HH PPS REVENUE DEBIT

## 2017-11-02 PROCEDURE — G0151 HHCP-SERV OF PT,EA 15 MIN: HCPCS

## 2017-11-02 PROCEDURE — 400013 HH SOC

## 2017-11-02 PROCEDURE — G0299 HHS/HOSPICE OF RN EA 15 MIN: HCPCS

## 2017-11-03 PROCEDURE — 3331090002 HH PPS REVENUE DEBIT

## 2017-11-03 PROCEDURE — 3331090001 HH PPS REVENUE CREDIT

## 2017-11-04 PROCEDURE — 3331090002 HH PPS REVENUE DEBIT

## 2017-11-04 PROCEDURE — 3331090001 HH PPS REVENUE CREDIT

## 2017-11-05 ENCOUNTER — HOME CARE VISIT (OUTPATIENT)
Dept: HOME HEALTH SERVICES | Facility: HOME HEALTH | Age: 72
End: 2017-11-05
Payer: MEDICARE

## 2017-11-05 PROCEDURE — 3331090002 HH PPS REVENUE DEBIT

## 2017-11-05 PROCEDURE — 3331090001 HH PPS REVENUE CREDIT

## 2017-11-06 ENCOUNTER — HOME CARE VISIT (OUTPATIENT)
Dept: SCHEDULING | Facility: HOME HEALTH | Age: 72
End: 2017-11-06
Payer: MEDICARE

## 2017-11-06 VITALS
SYSTOLIC BLOOD PRESSURE: 120 MMHG | DIASTOLIC BLOOD PRESSURE: 60 MMHG | HEART RATE: 80 BPM | RESPIRATION RATE: 17 BRPM | TEMPERATURE: 97.6 F

## 2017-11-06 PROCEDURE — G0157 HHC PT ASSISTANT EA 15: HCPCS

## 2017-11-06 PROCEDURE — 3331090002 HH PPS REVENUE DEBIT

## 2017-11-06 PROCEDURE — 3331090001 HH PPS REVENUE CREDIT

## 2017-11-07 PROCEDURE — 3331090001 HH PPS REVENUE CREDIT

## 2017-11-07 PROCEDURE — 3331090002 HH PPS REVENUE DEBIT

## 2017-11-08 ENCOUNTER — HOME CARE VISIT (OUTPATIENT)
Dept: SCHEDULING | Facility: HOME HEALTH | Age: 72
End: 2017-11-08
Payer: MEDICARE

## 2017-11-08 VITALS
DIASTOLIC BLOOD PRESSURE: 60 MMHG | TEMPERATURE: 98.1 F | OXYGEN SATURATION: 98 % | SYSTOLIC BLOOD PRESSURE: 102 MMHG | HEART RATE: 70 BPM

## 2017-11-08 PROCEDURE — 3331090001 HH PPS REVENUE CREDIT

## 2017-11-08 PROCEDURE — G0299 HHS/HOSPICE OF RN EA 15 MIN: HCPCS

## 2017-11-08 PROCEDURE — 3331090002 HH PPS REVENUE DEBIT

## 2017-11-09 ENCOUNTER — HOME CARE VISIT (OUTPATIENT)
Dept: SCHEDULING | Facility: HOME HEALTH | Age: 72
End: 2017-11-09
Payer: MEDICARE

## 2017-11-09 VITALS
TEMPERATURE: 97.3 F | RESPIRATION RATE: 17 BRPM | SYSTOLIC BLOOD PRESSURE: 120 MMHG | HEART RATE: 80 BPM | DIASTOLIC BLOOD PRESSURE: 70 MMHG

## 2017-11-09 PROCEDURE — 3331090001 HH PPS REVENUE CREDIT

## 2017-11-09 PROCEDURE — 3331090002 HH PPS REVENUE DEBIT

## 2017-11-09 PROCEDURE — G0157 HHC PT ASSISTANT EA 15: HCPCS

## 2017-11-10 PROCEDURE — 3331090001 HH PPS REVENUE CREDIT

## 2017-11-10 PROCEDURE — 3331090002 HH PPS REVENUE DEBIT

## 2017-11-11 PROCEDURE — 3331090001 HH PPS REVENUE CREDIT

## 2017-11-11 PROCEDURE — 3331090002 HH PPS REVENUE DEBIT

## 2017-11-12 PROCEDURE — 3331090002 HH PPS REVENUE DEBIT

## 2017-11-12 PROCEDURE — 3331090001 HH PPS REVENUE CREDIT

## 2017-11-13 ENCOUNTER — HOME CARE VISIT (OUTPATIENT)
Dept: SCHEDULING | Facility: HOME HEALTH | Age: 72
End: 2017-11-13
Payer: MEDICARE

## 2017-11-13 VITALS
TEMPERATURE: 97 F | HEART RATE: 80 BPM | SYSTOLIC BLOOD PRESSURE: 122 MMHG | DIASTOLIC BLOOD PRESSURE: 70 MMHG | RESPIRATION RATE: 17 BRPM

## 2017-11-13 PROCEDURE — 3331090002 HH PPS REVENUE DEBIT

## 2017-11-13 PROCEDURE — 3331090001 HH PPS REVENUE CREDIT

## 2017-11-13 PROCEDURE — G0157 HHC PT ASSISTANT EA 15: HCPCS

## 2017-11-14 PROCEDURE — 3331090001 HH PPS REVENUE CREDIT

## 2017-11-14 PROCEDURE — 3331090002 HH PPS REVENUE DEBIT

## 2017-11-15 PROCEDURE — 3331090002 HH PPS REVENUE DEBIT

## 2017-11-15 PROCEDURE — 3331090001 HH PPS REVENUE CREDIT

## 2017-11-16 ENCOUNTER — HOME CARE VISIT (OUTPATIENT)
Dept: HOME HEALTH SERVICES | Facility: HOME HEALTH | Age: 72
End: 2017-11-16
Payer: MEDICARE

## 2017-11-16 PROCEDURE — 3331090001 HH PPS REVENUE CREDIT

## 2017-11-16 PROCEDURE — 3331090002 HH PPS REVENUE DEBIT

## 2017-11-17 ENCOUNTER — HOME CARE VISIT (OUTPATIENT)
Dept: SCHEDULING | Facility: HOME HEALTH | Age: 72
End: 2017-11-17
Payer: MEDICARE

## 2017-11-17 PROCEDURE — G0151 HHCP-SERV OF PT,EA 15 MIN: HCPCS

## 2017-11-17 PROCEDURE — 3331090003 HH PPS REVENUE ADJ

## 2017-11-17 PROCEDURE — 3331090002 HH PPS REVENUE DEBIT

## 2017-11-17 PROCEDURE — 3331090001 HH PPS REVENUE CREDIT

## 2017-11-18 VITALS
RESPIRATION RATE: 24 BRPM | TEMPERATURE: 99.1 F | DIASTOLIC BLOOD PRESSURE: 66 MMHG | OXYGEN SATURATION: 97 % | SYSTOLIC BLOOD PRESSURE: 130 MMHG | HEART RATE: 66 BPM

## 2017-11-18 PROCEDURE — 3331090002 HH PPS REVENUE DEBIT

## 2017-11-18 PROCEDURE — 3331090001 HH PPS REVENUE CREDIT

## 2017-11-19 PROCEDURE — 3331090001 HH PPS REVENUE CREDIT

## 2017-11-19 PROCEDURE — 3331090002 HH PPS REVENUE DEBIT

## 2017-11-20 PROCEDURE — 3331090002 HH PPS REVENUE DEBIT

## 2017-11-20 PROCEDURE — 3331090001 HH PPS REVENUE CREDIT

## 2018-02-27 ENCOUNTER — HOSPITAL ENCOUNTER (OUTPATIENT)
Dept: GENERAL RADIOLOGY | Age: 73
Discharge: HOME | End: 2018-02-27
Payer: MEDICARE

## 2018-02-27 DIAGNOSIS — J45.909 UNCOMPLICATED ASTHMA, UNSPECIFIED ASTHMA SEVERITY, UNSPECIFIED WHETHER PERSISTENT: ICD-10-CM

## 2018-02-27 PROCEDURE — 71046 X-RAY EXAM CHEST 2 VIEWS: CPT

## 2018-04-04 ENCOUNTER — HOSPITAL ENCOUNTER (OUTPATIENT)
Dept: PHYSICAL THERAPY | Age: 73
Discharge: HOME | End: 2018-04-04
Payer: MEDICARE

## 2018-04-04 DIAGNOSIS — J45.909 ASTHMA, UNSPECIFIED ASTHMA SEVERITY, UNSPECIFIED WHETHER COMPLICATED, UNSPECIFIED WHETHER PERSISTENT: Chronic | ICD-10-CM

## 2018-04-04 PROCEDURE — G8979 MOBILITY GOAL STATUS: HCPCS

## 2018-04-04 PROCEDURE — G8978 MOBILITY CURRENT STATUS: HCPCS

## 2018-04-04 PROCEDURE — 97162 PT EVAL MOD COMPLEX 30 MIN: CPT

## 2018-04-04 NOTE — PROGRESS NOTES
Ambulatory/Rehab Services H2 Model Falls Risk Assessment    Risk Factor Pts. ·   Confusion/Disorientation/Impulsivity  []    4 ·   Symptomatic Depression  []   2 ·   Altered Elimination  []   1 ·   Dizziness/Vertigo  []   1 ·   Gender (Male)  []   1 ·   Any administered antiepileptics (anticonvulsants):  []   2 ·   Any administered benzodiazepines:  []   1 ·   Visual Impairment (specify):  []   1 ·   Portable Oxygen Use  []   1 ·   Orthostatic ? BP  []   1 ·   History of Recent Falls (within 3 mos.)  []   5     Ability to Rise from Chair (choose one) Pts. ·   Ability to rise in a single movement  []   0 ·   Pushes up, successful in one attempt  [x]   1 ·   Multiple attempts, but successful  []   3 ·   Unable to rise without assistance  []   4   Total: (5 or greater = High Risk) 1     Falls Prevention Plan:   []                Physical Limitations to Exercise (specify):   []                Mobility Assistance Device (type):   []                Exercise/Equipment Adaptation (specify):    ©2010 [facility] of [address] Patent #[id number].  Federal Law prohibits the replication, distribution or use without written permission from [facility] [employer]

## 2018-04-04 NOTE — THERAPY EVALUATION
Kelsey Mcgowan  : 1945 [id number] [address].  [phone]   [phone] -       OUTPATIENT PHYSICAL THERAPY:Initial Assessment 2018        ICD-10: Treatment Diagnosis: Pain in right hip (M25.551)  Pain in right knee (M25.561)  Pain in left knee (M25.562)  Difficulty in walking, not elsewhere classified (R26.2)  Precautions/Allergies:   Codeine; Erythromycin; and Tetracycline   Fall Risk Score: 1 (? 5 = High Risk)  MD Orders: Evaluate and Treat: strengthening exercises/ aquatic therapy MEDICAL/REFERRING DIAGNOSIS:  Asthma, unspecified asthma severity, unspecified whether complicated, unspecified whether persistent [J45.909]   DATE OF ONSET: 10/2017  REFERRING PHYSICIAN: Cristine Dey., *  RETURN PHYSICIAN APPOINTMENT: late 2018     INITIAL ASSESSMENT:  Ms. Joy Kaiser (pt) is a 71year old WF seen for physical therapy per MD orders with complaint of R hip pain, low back pain and B knee pain with long history of back pain and arthritis. Pt evaluated for outpatient physical therapy today with the following deficits: low back, right hip and bilateral knee pain, decreased strength in B hip. Pt would benefit from physical therapy to address the above deficits in order to return to increased independence with functional mobility with decreased pain. Pt would benefit from initially working in aquatic setting to off load spine, bilateral lower extremities while addressing goals. As patient progresses, plan to transition to gravity challenging, land based exercises/ activities. Plan of care and goals reviewed with patient who verbalizes agreement. PROBLEM LIST (Impacting functional limitations):  1. Decreased Strength  2. Decreased ADL/Functional Activities  3. Decreased Ambulation Ability/Technique  4. Decreased Balance  5. Increased Pain  6. Decreased Activity Tolerance  7.  Decreased [place] with Home Exercise Program INTERVENTIONS PLANNED:  1. Balance Exercise  2. Gait Training  3. Home Exercise Program (HEP)  4. Manual Therapy  5. Neuromuscular Re-education/Strengthening  6. Range of Motion (ROM)  7. Therapeutic Activites  8. Therapeutic Exercise/Strengthening  9. modalities   10. aquatics    TREATMENT PLAN:  Effective Dates: 4/4/2018 TO 7/3/2018 (90 days). Frequency/Duration: 2-3 times a week for 90 Days    GOALS: (Goals have been discussed and agreed upon with patient.)  Short-Term Functional Goals: Time Frame: 2 weeks  Pt will demonstrate independence/ compliance with home exercise program for management of symptoms (aquatic). Pt will demonstrate improved Lower Extremity Functional Scale by 3-4 points for improved functional mobility. Pt will tolerate 35 to 40 minutes of aquatic therapy with pain of 2/10. Discharge Goals: Time Frame: 8 weeks  Pt will demonstrate improved manual muscle test score by 1/2 to 1  grade for R hip complex to participate in heavy housework activities and yard work. Pt will ambulate >2000ft with normal gait pattern with even stance time/ step length for bilateral lower extremities for community entry. Pt will report Lower Extremity Functional Scale score of 50/80 for improved functional mobility in home/ community setting. 4.  Pt will demonstrate independence/ compliance with home exercise program for management of symptoms (land). Rehabilitation Potential For Stated Goals: Good  Regarding Kei Mcgowan's therapy, I certify that the treatment plan above will be carried out by a therapist or under their direction.   Thank you for this referral,  Glenys Chilel, PT       Referring Physician Signature: Charlee Mercer., *              Date                      HISTORY:   History of Present Injury/Illness (Reason for Referral):  Pt is 72 y/o WF seen for PT per MD orders following L DEYSI 10/2017 with multiple complications/ illness over the winter with noted weakness and debility. Pt has complicated hx of arthritis, chronic low back pain as well as spinal stenosis. Pt has sx hx of B TKA (2009, 2011) along with recent hip replacement. Pt reports her goal is to get stronger so she can do yard work and housework as well as get into community to visit with friends. Past Medical History/Comorbidities:   Ms. Shahab Hurley  has a past medical history of Arthritis; Asthma; Chronic obstructive asthma, unspecified; Chronic pain; DDD (degenerative disc disease); GERD (gastroesophageal reflux disease); Hypertension; Hypothyroidism; HYPOTHYROIDISM; Incontinence of urine; Morbid obesity ([address] Neuropathy; ILYA (obstructive sleep apnea) (9/16/2009); Osteoarthritis of left knee (9/16/2009); Osteoarthritis of right knee (3/2/2011); S/P total knee replacement using cement (9/16/2009); Sleep apnea; Spastic colon; Status post right hip replacement (10/9/2017); and Type II or unspecified type diabetes mellitus without mention of complication, not stated as uncontrolled. She also has no past medical history of Adverse effect of anesthesia; Aneurysm ([address] Arrhythmia; Autoimmune disease ([address] CAD (coronary artery disease); Cancer ([address] Chronic kidney disease; Chronic obstructive pulmonary disease ([address] Coagulation disorder ([address] Difficult intubation; Endocarditis; Heart failure ([address] Ill-defined condition; Liver disease; Malignant hyperthermia due to anesthesia; Nausea & vomiting; Nicotine vapor product user; Non-nicotine vapor product user; Pseudocholinesterase deficiency; Psychiatric disorder; PUD (peptic ulcer disease); Rheumatic fever; Seizures ([address] Stroke [facility]); or Thromboembolus ([address] Ms. Shahab Hurley  has a past surgical history that includes hx hernia repair (1/30/08); hx hysterectomy (2000); pr hand/finger surgery unlisted (Right, 1990'S); pr total knee arthroplasty (Left, 2009); and hx knee replacement (Right, 2011).   Social History/Living Environment:     Pt lives in one story home with 4 steps to enter with unilateral handrail. Pt lives alone. Prior Level of Function/Work/Activity:  Retired   Dominant Side:         RIGHT  Current Medications:    Current Outpatient Prescriptions:     amLODIPine (NORVASC) 5 mg tablet, Take 5 mg by mouth daily. , Disp: , Rfl:     ciprofloxacin HCl (CIPRO) 500 mg tablet, Take  by mouth two (2) times a day., Disp: , Rfl:     neomycin-colist-hydrocortisone-thonzonium (CORTISPORIN-TC) otic suspension, by Otic route four (4) times daily. , Disp: , Rfl:     Azelastine (ASTEPRO) 0.15 % (205.5 mcg) nasal spray, 2 Sprays by Both Nostrils route as needed. , Disp: , Rfl:     fluticasone (FLONASE ALLERGY RELIEF) 50 mcg/actuation nasal spray, 2 Sprays by Both Nostrils route as needed for Rhinitis., Disp: , Rfl:     budesonide (PULMICORT) 0.5 mg/2 mL nbsp, 500 mcg by Nebulization route two (2) times a day., Disp: , Rfl:     formoterol (PERFOROMIST) 20 mcg/2 mL nebu neb solution, 20 mcg by Nebulization route two (2) times a day., Disp: , Rfl:     Lactobacillus acidoph-L.bulgar (RUMA ASSIST) 100 million cell pwpk, Take  by mouth., Disp: , Rfl:     Biotin 2,500 mcg cap, Take  by mouth., Disp: , Rfl:     cranberry extract 450 mg tab tablet, Take 450 mg by mouth., Disp: , Rfl:     OTHER, Vein and leg support--- 1 tab po qd, Disp: , Rfl:     estradiol (ESTRACE) 0.01 % (0.1 mg/gram) vaginal cream, Insert 1 g into vagina daily. , Disp: , Rfl:     neomycin-polymyxin-hydrocortisone, buffered, (PEDIOTIC) 3.5-10,000-1 mg/mL-unit/mL-% otic suspension, Administer 2 Drops into each ear daily. , Disp: , Rfl:     traMADol (ULTRAM) 50 mg tablet, Take 50 mg by mouth every six (6) hours as needed for Pain., Disp: , Rfl:     HYDROmorphone (DILAUDID) 2 mg tablet, Take 1 Tab by mouth every four (4) hours as needed. Max Daily Amount: 12 mg., Disp: 40 Tab, Rfl: 0    SITagliptin (JANUVIA) 100 mg tablet, Take 100 mg by mouth daily. , Disp: , Rfl:     lidocaine-kinesiology tape 5 % kit, by Apply Externally route., Disp: , Rfl:     OTHER, three (3) times daily. Lidocaine patch 5%, Disp: , Rfl:     oxybutynin chloride XL (DITROPAN XL) 5 mg CR tablet, Take 5 mg by mouth daily. , Disp: , Rfl:     polyethylene glycol (MIRALAX) 17 gram packet, Take 17 g by mouth as needed. , Disp: , Rfl:     cpap machine kit, by Does Not Apply route. 9 cm H2O, Disp: , Rfl:     FLUTICASONE/SALMETEROL (ADVAIR DISKUS IN), Take 2 Puffs by inhalation two (2) times daily as needed. , Disp: , Rfl:     celecoxib (CELEBREX) 200 mg capsule, Take 200 mg by mouth daily. 1 to 2 a day. , Disp: , Rfl:     PREGABALIN (LYRICA PO), Take 150 mg by mouth two (2) times a day., Disp: , Rfl:     montelukast (SINGULAIR) 10 mg tablet, Take 10 mg by mouth daily. , Disp: , Rfl:     NEOMY SULF/POLYMYX B SULF/HC (CORTISPORIN OT), by Otic route as needed. 1 to 2 drops in each ear as needed. , Disp: , Rfl:     MULTIVITAMIN WITH MINERALS (MULTIVITAMIN & MINERAL FORMULA PO), Take 1 Tab by mouth daily. Multivitamin with D with Calcium , Disp: , Rfl:     metaxalone (SKELAXIN) 800 mg tablet, Take 1 Tab by mouth three (3) times daily as needed for Pain. (Patient taking differently: Take 800 mg by mouth two (2) times a day.), Disp: 1 Tab, Rfl: 0    OMEPRAZOLE PO, 20 mg daily. Pt. Instructed to take morning of surgery per anesthesiologist.  , Disp: , Rfl:     CYANOCOBALAMIN (VITAMIN B-12 PO), Take 500 mg by mouth daily. , Disp: , Rfl:     LISINOPRIL-HYDROCHLOROTHIAZIDE 10-12.5 mg per tablet, take 1 Tab by mouth daily. , Disp: , Rfl:     LASIX 20 mg Tab, Take 20 mg by mouth daily as needed. , Disp: , Rfl:     GLYBURIDE-METFORMIN 2.5-500 mg per tablet, take 1 Tab by mouth two (2) times a day., Disp: , Rfl:     SYNTHROID 100 mcg Tab, Take 100 mcg by mouth daily. Pt. Instructed to take morning of surgery per anesthesiologist.  , Disp: , Rfl:     PATANOL 0.1 % Drop, 2 Drops by Both Eyes route as needed for Allergies. , Disp: , Rfl:     AMBIEN 10 mg Tab, Take 10 mg by mouth nightly., Disp: , Rfl:     LIBRAX, WITH CLINIDIUM, 2.5-5 mg Cap, Take 1 Cap by mouth four (4) times daily as needed. , Disp: , Rfl:    Date Last Reviewed:  4/4/2018   # of Personal Factors/Comorbidities that affect the Plan of Care: 1-2: MODERATE COMPLEXITY   EXAMINATION:   Observation/Orthostatic Postural Assessment:          Morbid obesity  Palpation:          Pitting edema in B ankle  Circumferential measurements:  30 cm R/L (above malleolus)  ROM:  BUE/ cervical WNL for patient                     Date:  04/04/18 Date:   Date:     Trunk ROM 25 % limitation                          LE ROM Left Right       Hip Flexion [facility]       Hip Abduction [facility] WF       Straight Leg Raise (SLR) N/T N/T                Knee Flexion 125 125       Knee Extension 0 0                Ankle Dorsiflexion [facility] WF         Strength:     Date:  04/04/18 Date:   Date:     LE MMT Left Right Left Right Left Right   Hip Flex (L1/L2) 4/5 4-/5       Hip Abd (glut med) 4/5 4/5       Hip Ext 4-/5 4-/5       Quad    ( L3/4) 4/5 4-/5       Hamstring 4/5 4-/5       Anterior Tib (L4/L5)         Gastroc (S1/2)         EHL (L5)                             Special Tests:   deferred  Neurological Screen:        Sensation: Intact for light touch  Functional Mobility:         Gait/Ambulation:  Amb without A.D with increased lateral wt shift BLE lumbering gait pattern        Transfers:  Sit to stand to sit with use of BUE for support        Bed Mobility:  independent  Balance:          Single Leg Stance:  Unable to assume single leg stance. Good static standing balance   Body Structures Involved:  1. Nerves  2. Bones  3. Joints  4. Muscles  5. Ligaments Body Functions Affected:  1. Sensory/Pain  2. Neuromusculoskeletal  3. Movement Related Activities and Participation Affected:  1. General Tasks and Demands  2. Mobility  3. Self Care  4. Domestic Life  5.  Community, Social and [place]   # of elements that affect the Plan of Care: 3: MODERATE COMPLEXITY   CLINICAL PRESENTATION:   Presentation: Evolving clinical presentation with changing clinical characteristics: MODERATE COMPLEXITY   CLINICAL DECISION MAKING:   Outcome Measure: Tool Used: Lower Extremity Functional Scale (LEFS)  Score:  Initial: 40/80 Most Recent: X/80 (Date: -- )   Interpretation of Score: 20 questions each scored on a 5 point scale with 0 representing \"extreme difficulty or unable to perform\" and 4 representing \"no difficulty\". The lower the score, the greater the functional disability. 80/80 represents no disability. Minimal detectable change is 9 points. Score 80 79-63 62-48 47-32 31-16 15-1 0   Modifier CH CI CJ CK CL CM CN     ? Mobility - Walking and Moving Around:     - CURRENT STATUS: CK - 40%-59% impaired, limited or restricted    - GOAL STATUS: CJ - 20%-39% impaired, limited or restricted    - D/C STATUS:  ---------------To be determined---------------    Medical Necessity:   · Patient is expected to demonstrate progress in strength, range of motion and balance to increase independence with functional mobility with decreased pain. Reason for Services/Other Comments:  · Patient continues to require modification of therapeutic interventions to increase complexity of exercises. Use of outcome tool(s) and clinical judgement create a POC that gives a: Questionable prediction of patient's progress: MODERATE COMPLEXITY   TREATMENT:   (In addition to Assessment/Re-Assessment sessions the following treatments were rendered)    Therapeutic Exercise: (see flow sheet below for minutes) :  Exercises per grid below to improve mobility, strength and balance. Required minimal visual and verbal cues to promote proper body alignment and promote proper body mechanics. Progressed resistance and range as indicated. Aquatic Therapy (see flow sheet below for minutes):  Aquatic treatment performed per flow grid for Decreased muscle strength, Decreased static/dynamic balance and reactive control, Decreased range of motion, Decreased activity endurance and Ease of movement. Cues provided for technique. Assistance by therapist provided for progression of exercises/ activities. Patient has difficulty with low back/ R hip pain. Date: 04/04/18  (EVAL)       Modalities:                                Manual Therapy:                                Aquatic Exercise:        Gait F/B/S/M        Heel/Toe walk        4-way        PF/DF        Hamstring Curls        Hip Flexion with knee ext. (zully)        Squats          SLR march        Lunges        Hip circles        Hip horizontal abduction/adduction        Core:          Core:        Deep well bike        Deep well jumping nav        Deep well scissors        Deep well:  traction                Hamstring Stretch        Step Lunge        Piriformis stretch        PF Stretch        Balance: Single leg Stance        Balance: Tandem                          Therapeutic Exercise:                                                        F=forward  B=Backward  S=sidesteps  M=marches    H=hold    Manual: (see flow sheet above for minutes)   Modalities: (see flow sheet above for minutes)     HEP: Plan to instruct in HEP as therapy progresses  Treatment/Session Assessment:  Initial assessment completed with plan to initiate aquatics at next visit to address core/ BLE strengthening as well as normalized gait. Pt very motivated to participate in aquatics to increased I with functional mobility and community entry. · Pain/ Symptoms: Initial:   3/10 in shoulder. Neck and upper back/ hips Post Session:  2-3/10 ·   Compliance with Program/Exercises: Will assess as treatment progresses. · Recommendations/Intent for next treatment session: \"Next visit will focus on advancements to more challenging activities\". Plan to initiate aquatics at next visit.     Total Treatment Duration:  PT Patient Time In/Time Out  Time In: 1349  Time Out: 309 [facility]

## 2018-04-09 ENCOUNTER — APPOINTMENT (OUTPATIENT)
Dept: PHYSICAL THERAPY | Age: 73
End: 2018-04-09
Payer: MEDICARE

## 2018-04-10 ENCOUNTER — HOSPITAL ENCOUNTER (OUTPATIENT)
Dept: PHYSICAL THERAPY | Age: 73
Discharge: HOME | End: 2018-04-10
Payer: MEDICARE

## 2018-04-10 PROCEDURE — 97113 AQUATIC THERAPY/EXERCISES: CPT | Performed by: PHYSICAL THERAPIST

## 2018-04-10 NOTE — THERAPY EVALUATION
Shahid Mcgowan  : 1945 [id number] [address].  [phone]   [phone] -       OUTPATIENT PHYSICAL THERAPY:Daily Note 4/10/2018        ICD-10: Treatment Diagnosis: Pain in right hip (M25.551)  Pain in right knee (M25.561)  Pain in left knee (M25.562)  Difficulty in walking, not elsewhere classified (R26.2)  Precautions/Allergies:   Codeine; Erythromycin; and Tetracycline   Fall Risk Score: 1 (? 5 = High Risk)  MD Orders: Evaluate and Treat: strengthening exercises/ aquatic therapy MEDICAL/REFERRING DIAGNOSIS:  Hip pain [M25.559]   DATE OF ONSET: 10/2017  REFERRING PHYSICIAN: Roselyn Kovacs., *  RETURN PHYSICIAN APPOINTMENT: late 2018     INITIAL ASSESSMENT:  Ms. Ephraim Saleh (pt) is a 71year old WF seen for physical therapy per MD orders with complaint of R hip pain, low back pain and B knee pain with long history of back pain and arthritis. Pt evaluated for outpatient physical therapy today with the following deficits: low back, right hip and bilateral knee pain, decreased strength in B hip. Pt would benefit from physical therapy to address the above deficits in order to return to increased independence with functional mobility with decreased pain. Pt would benefit from initially working in aquatic setting to off load spine, bilateral lower extremities while addressing goals. As patient progresses, plan to transition to gravity challenging, land based exercises/ activities. Plan of care and goals reviewed with patient who verbalizes agreement. PROBLEM LIST (Impacting functional limitations):  1. Decreased Strength  2. Decreased ADL/Functional Activities  3. Decreased Ambulation Ability/Technique  4. Decreased Balance  5. Increased Pain  6. Decreased Activity Tolerance  7. Decreased [place] with Home Exercise Program INTERVENTIONS PLANNED:  1. Balance Exercise  2. Gait Training  3.  Home Exercise Program (HEP)  4. Manual Therapy  5. Neuromuscular Re-education/Strengthening  6. Range of Motion (ROM)  7. Therapeutic Activites  8. Therapeutic Exercise/Strengthening  9. modalities   10. aquatics    TREATMENT PLAN:  Effective Dates: 4/4/2018 TO 7/3/2018 (90 days). Frequency/Duration: 2-3 times a week for 90 Days    GOALS: (Goals have been discussed and agreed upon with patient.)  Short-Term Functional Goals: Time Frame: 2 weeks  Pt will demonstrate independence/ compliance with home exercise program for management of symptoms (aquatic). Pt will demonstrate improved Lower Extremity Functional Scale by 3-4 points for improved functional mobility. Pt will tolerate 35 to 40 minutes of aquatic therapy with pain of 2/10. Discharge Goals: Time Frame: 8 weeks  Pt will demonstrate improved manual muscle test score by 1/2 to 1  grade for R hip complex to participate in heavy housework activities and yard work. Pt will ambulate >2000ft with normal gait pattern with even stance time/ step length for bilateral lower extremities for community entry. Pt will report Lower Extremity Functional Scale score of 50/80 for improved functional mobility in home/ community setting. 4.  Pt will demonstrate independence/ compliance with home exercise program for management of symptoms (land). Rehabilitation Potential For Stated Goals: Good  Regarding José Mcgowan's therapy, I certify that the treatment plan above will be carried out by a therapist or under their direction. Thank you for this referral,  Yojana Jin, PT                   HISTORY:   History of Present Injury/Illness (Reason for Referral):  Pt is 70 y/o WF seen for PT per MD orders following L DEYSI 10/2017 with multiple complications/ illness over the winter with noted weakness and debility. Pt has complicated hx of arthritis, chronic low back pain as well as spinal stenosis. Pt has sx hx of B TKA (2009, 2011) along with recent hip replacement.   Pt reports her goal is to get stronger so she can do yard work and housework as well as get into community to visit with friends. Past Medical History/Comorbidities:   Ms. Gladys Johnson  has a past medical history of Arthritis; Asthma; Chronic obstructive asthma, unspecified; Chronic pain; DDD (degenerative disc disease); GERD (gastroesophageal reflux disease); Hypertension; Hypothyroidism; HYPOTHYROIDISM; Incontinence of urine; Morbid obesity ([address] Neuropathy; ILYA (obstructive sleep apnea) (9/16/2009); Osteoarthritis of left knee (9/16/2009); Osteoarthritis of right knee (3/2/2011); S/P total knee replacement using cement (9/16/2009); Sleep apnea; Spastic colon; Status post right hip replacement (10/9/2017); and Type II or unspecified type diabetes mellitus without mention of complication, not stated as uncontrolled. She also has no past medical history of Adverse effect of anesthesia; Aneurysm ([address] Arrhythmia; Autoimmune disease ([address] CAD (coronary artery disease); Cancer ([address] Chronic kidney disease; Chronic obstructive pulmonary disease ([address] Coagulation disorder ([address] Difficult intubation; Endocarditis; Heart failure ([address] Ill-defined condition; Liver disease; Malignant hyperthermia due to anesthesia; Nausea & vomiting; Nicotine vapor product user; Non-nicotine vapor product user; Pseudocholinesterase deficiency; Psychiatric disorder; PUD (peptic ulcer disease); Rheumatic fever; Seizures ([address] Stroke [facility]); or Thromboembolus ([address] Ms. Gladys Johnson  has a past surgical history that includes hx hernia repair (1/30/08); hx hysterectomy (2000); pr hand/finger surgery unlisted (Right, 1990'S); pr total knee arthroplasty (Left, 2009); and hx knee replacement (Right, 2011). Social History/Living Environment:     Pt lives in one story home with 4 steps to enter with unilateral handrail. Pt lives alone.   Prior Level of Function/Work/Activity:  Retired   Dominant Side:         RIGHT  Current Medications:    Current Outpatient Prescriptions:     amLODIPine (NORVASC) 5 mg tablet, Take 5 mg by mouth daily. , Disp: , Rfl:     ciprofloxacin HCl (CIPRO) 500 mg tablet, Take  by mouth two (2) times a day., Disp: , Rfl:     neomycin-colist-hydrocortisone-thonzonium (CORTISPORIN-TC) otic suspension, by Otic route four (4) times daily. , Disp: , Rfl:     Azelastine (ASTEPRO) 0.15 % (205.5 mcg) nasal spray, 2 Sprays by Both Nostrils route as needed. , Disp: , Rfl:     fluticasone (FLONASE ALLERGY RELIEF) 50 mcg/actuation nasal spray, 2 Sprays by Both Nostrils route as needed for Rhinitis., Disp: , Rfl:     budesonide (PULMICORT) 0.5 mg/2 mL nbsp, 500 mcg by Nebulization route two (2) times a day., Disp: , Rfl:     formoterol (PERFOROMIST) 20 mcg/2 mL nebu neb solution, 20 mcg by Nebulization route two (2) times a day., Disp: , Rfl:     Lactobacillus acidoph-L.bulgar (RUMA ASSIST) 100 million cell pwpk, Take  by mouth., Disp: , Rfl:     Biotin 2,500 mcg cap, Take  by mouth., Disp: , Rfl:     cranberry extract 450 mg tab tablet, Take 450 mg by mouth., Disp: , Rfl:     OTHER, Vein and leg support--- 1 tab po qd, Disp: , Rfl:     estradiol (ESTRACE) 0.01 % (0.1 mg/gram) vaginal cream, Insert 1 g into vagina daily. , Disp: , Rfl:     neomycin-polymyxin-hydrocortisone, buffered, (PEDIOTIC) 3.5-10,000-1 mg/mL-unit/mL-% otic suspension, Administer 2 Drops into each ear daily. , Disp: , Rfl:     traMADol (ULTRAM) 50 mg tablet, Take 50 mg by mouth every six (6) hours as needed for Pain., Disp: , Rfl:     HYDROmorphone (DILAUDID) 2 mg tablet, Take 1 Tab by mouth every four (4) hours as needed. Max Daily Amount: 12 mg., Disp: 40 Tab, Rfl: 0    SITagliptin (JANUVIA) 100 mg tablet, Take 100 mg by mouth daily. , Disp: , Rfl:     lidocaine-kinesiology tape 5 % kit, by Apply Externally route., Disp: , Rfl:     OTHER, three (3) times daily.  Lidocaine patch 5%, Disp: , Rfl:     oxybutynin chloride XL (DITROPAN XL) 5 mg CR tablet, Take 5 mg by mouth daily. , Disp: , Rfl:     polyethylene glycol (MIRALAX) 17 gram packet, Take 17 g by mouth as needed. , Disp: , Rfl:     cpap machine kit, by Does Not Apply route. 9 cm H2O, Disp: , Rfl:     FLUTICASONE/SALMETEROL (ADVAIR DISKUS IN), Take 2 Puffs by inhalation two (2) times daily as needed. , Disp: , Rfl:     celecoxib (CELEBREX) 200 mg capsule, Take 200 mg by mouth daily. 1 to 2 a day. , Disp: , Rfl:     PREGABALIN (LYRICA PO), Take 150 mg by mouth two (2) times a day., Disp: , Rfl:     montelukast (SINGULAIR) 10 mg tablet, Take 10 mg by mouth daily. , Disp: , Rfl:     NEOMY SULF/POLYMYX B SULF/HC (CORTISPORIN OT), by Otic route as needed. 1 to 2 drops in each ear as needed. , Disp: , Rfl:     MULTIVITAMIN WITH MINERALS (MULTIVITAMIN & MINERAL FORMULA PO), Take 1 Tab by mouth daily. Multivitamin with D with Calcium , Disp: , Rfl:     metaxalone (SKELAXIN) 800 mg tablet, Take 1 Tab by mouth three (3) times daily as needed for Pain. (Patient taking differently: Take 800 mg by mouth two (2) times a day.), Disp: 1 Tab, Rfl: 0    OMEPRAZOLE PO, 20 mg daily. Pt. Instructed to take morning of surgery per anesthesiologist.  , Disp: , Rfl:     CYANOCOBALAMIN (VITAMIN B-12 PO), Take 500 mg by mouth daily. , Disp: , Rfl:     LISINOPRIL-HYDROCHLOROTHIAZIDE 10-12.5 mg per tablet, take 1 Tab by mouth daily. , Disp: , Rfl:     LASIX 20 mg Tab, Take 20 mg by mouth daily as needed. , Disp: , Rfl:     GLYBURIDE-METFORMIN 2.5-500 mg per tablet, take 1 Tab by mouth two (2) times a day., Disp: , Rfl:     SYNTHROID 100 mcg Tab, Take 100 mcg by mouth daily. Pt. Instructed to take morning of surgery per anesthesiologist.  , Disp: , Rfl:     PATANOL 0.1 % Drop, 2 Drops by Both Eyes route as needed for Allergies. , Disp: , Rfl:     AMBIEN 10 mg Tab, Take 10 mg by mouth nightly., Disp: , Rfl:     LIBRAX, WITH CLINIDIUM, 2.5-5 mg Cap, Take 1 Cap by mouth four (4) times daily as needed. , Disp: , Rfl:    Date Last Reviewed: 4/10/2018   EXAMINATION:   Observation/Orthostatic Postural Assessment:          Morbid obesity  Palpation:          Pitting edema in B ankle  Circumferential measurements:  30 cm R/L (above malleolus)  ROM:  BUE/ cervical WNL for patient                     Date:  04/04/18 Date:   Date:     Trunk ROM 25 % limitation                          LE ROM Left Right       Hip Flexion [facility]       Hip Abduction [facility]       Straight Leg Raise (SLR) N/T N/T                Knee Flexion 125 125       Knee Extension 0 0                Ankle Dorsiflexion [facility] WFL         Strength:     Date:  04/04/18 Date:   Date:     LE MMT Left Right Left Right Left Right   Hip Flex (L1/L2) 4/5 4-/5       Hip Abd (glut med) 4/5 4/5       Hip Ext 4-/5 4-/5       Quad    ( L3/4) 4/5 4-/5       Hamstring 4/5 4-/5       Anterior Tib (L4/L5)         Gastroc (S1/2)         EHL (L5)                             Special Tests:   deferred  Neurological Screen:        Sensation: Intact for light touch  Functional Mobility:         Gait/Ambulation:  Amb without A.D with increased lateral wt shift BLE lumbering gait pattern        Transfers:  Sit to stand to sit with use of BUE for support        Bed Mobility:  independent  Balance:          Single Leg Stance:  Unable to assume single leg stance. Good static standing balance   Body Structures Involved:  1. Nerves  2. Bones  3. Joints  4. Muscles  5. Ligaments Body Functions Affected:  1. Sensory/Pain  2. Neuromusculoskeletal  3. Movement Related Activities and Participation Affected:  1. General Tasks and Demands  2. Mobility  3. Self Care  4. Domestic Life  5. Community, Social and Civic Life   CLINICAL DECISION MAKING:   Outcome Measure:    Tool Used: Lower Extremity Functional Scale (LEFS)  Score:  Initial: 40/80 Most Recent: X/80 (Date: -- )   Interpretation of Score: 20 questions each scored on a 5 point scale with 0 representing \"extreme difficulty or unable to perform\" and 4 representing \"no difficulty\". The lower the score, the greater the functional disability. 80/80 represents no disability. Minimal detectable change is 9 points. Score 80 79-63 62-48 47-32 31-16 15-1 0   Modifier CH CI CJ CK CL CM CN     ? Mobility - Walking and Moving Around:     - CURRENT STATUS: CK - 40%-59% impaired, limited or restricted    - GOAL STATUS: CJ - 20%-39% impaired, limited or restricted    - D/C STATUS:  ---------------To be determined---------------    Medical Necessity:   · Patient is expected to demonstrate progress in strength, range of motion and balance to increase independence with functional mobility with decreased pain. Reason for Services/Other Comments:  · Patient continues to require modification of therapeutic interventions to increase complexity of exercises. Use of outcome tool(s) and clinical judgement create a POC that gives a: Questionable prediction of patient's progress: MODERATE COMPLEXITY   TREATMENT:   (In addition to Assessment/Re-Assessment sessions the following treatments were rendered)    Therapeutic Exercise: (see flow sheet below for minutes) :  Exercises per grid below to improve mobility, strength and balance. Required minimal visual and verbal cues to promote proper body alignment and promote proper body mechanics. Progressed resistance and range as indicated. Aquatic Therapy (see flow sheet below for minutes): Aquatic treatment performed per flow grid for Decreased muscle strength, Decreased static/dynamic balance and reactive control, Decreased range of motion, Decreased activity endurance and Ease of movement. Cues provided for technique. Assistance by therapist provided for progression of exercises/ activities. Patient has difficulty with low back/ R hip pain.      Date: 04/04/18  (EVAL) 4/10/18      Modalities:                                Manual Therapy:                                Aquatic Exercise:  1.5#/55 minutes      Gait F/B/S/M  x6L F  x4L B/S/M      Heel/Toe walk        3-way  x15 BLE      PF/DF  x15      Hamstring Curls  x15 BLE      Hip Flexion with knee ext. (rockette)  next      Squats    next      SLR march  x4L      Lunges        Hip circles        Hip horizontal abduction/adduction        Core:          Core:        Deep well bike  3 minutes      Deep well jumping nav  1 minute      Deep well scissors  1 minute      Deep well:  traction  5 minutes              Hamstring Stretch  2 H 30 BLE      Step Lunge        Piriformis stretch        PF Stretch        Balance: Single leg Stance        Balance: Tandem                          Therapeutic Exercise:                                                        F=forward  B=Backward  S=sidesteps  M=marches    H=hold    Manual: (see flow sheet above for minutes)   Modalities: (see flow sheet above for minutes)     HEP: Plan to instruct in HEP as therapy progresses  Treatment/Session Assessment: Initiated aquatic exercises per flow. Pt was instructed with maintaining upright posture. Began in 3.5ft water with gait exercise; however, her LBP fatigued and moved to 4.0ft water to off-load her spine more and pain resolved and no other c/o. · Pain/ Symptoms: Initial:   1/10 back/hips  \"Today is a good day and I do not have very much pain. \" Post Session:  0/10 no c/o afterwards except fatigue ·   Compliance with Program/Exercises: Will assess as treatment progresses. · Recommendations/Intent for next treatment session: \"Next visit will focus on advancements to more challenging activities\". Plan to continue advancing aquatics at next visit.     Total Treatment Duration:  PT Patient Time In/Time Out  Time In: 1500  Time Out: 1555     Yojana Jin, PT

## 2018-04-13 ENCOUNTER — HOSPITAL ENCOUNTER (OUTPATIENT)
Dept: PHYSICAL THERAPY | Age: 73
Discharge: HOME | End: 2018-04-13
Payer: MEDICARE

## 2018-04-13 ENCOUNTER — APPOINTMENT (OUTPATIENT)
Dept: PHYSICAL THERAPY | Age: 73
End: 2018-04-13
Payer: MEDICARE

## 2018-04-13 NOTE — PROGRESS NOTES
Patient cancelled appointment on 4/13/18; due to not being able to find her agrawal. Will cont. Current POC next appointment.

## 2018-04-16 ENCOUNTER — HOSPITAL ENCOUNTER (OUTPATIENT)
Dept: PHYSICAL THERAPY | Age: 73
Discharge: HOME | End: 2018-04-16
Payer: MEDICARE

## 2018-04-16 PROCEDURE — 97113 AQUATIC THERAPY/EXERCISES: CPT

## 2018-04-16 NOTE — PROGRESS NOTES
[facility]  [facility] [id number] [facility],[address]  [address] [place] [address].  [phone]   [phone] -       OUTPATIENT PHYSICAL THERAPY:Daily Note 2018        ICD-10: Treatment Diagnosis: Pain in right hip (M25.551)  Pain in right knee (M25.561)  Pain in left knee (M25.562)  Difficulty in walking, not elsewhere classified (R26.2)  Precautions/Allergies:   Codeine; Erythromycin; and Tetracycline   Fall Risk Score: 1 (? 5 = High Risk)  MD Orders: Evaluate and Treat: strengthening exercises/ aquatic therapy MEDICAL/REFERRING DIAGNOSIS:  Hip pain [M25.559]   DATE OF ONSET: 10/2017  REFERRING PHYSICIAN: Evert Vivas., *  RETURN PHYSICIAN APPOINTMENT: late 2018     INITIAL ASSESSMENT:  Ms. Itz Cisneros (pt) is a 71year old WF seen for physical therapy per MD orders with complaint of R hip pain, low back pain and B knee pain with long history of back pain and arthritis. Pt evaluated for outpatient physical therapy today with the following deficits: low back, right hip and bilateral knee pain, decreased strength in B hip. Pt would benefit from physical therapy to address the above deficits in order to return to increased independence with functional mobility with decreased pain. Pt would benefit from initially working in aquatic setting to off load spine, bilateral lower extremities while addressing goals. As patient progresses, plan to transition to gravity challenging, land based exercises/ activities. Plan of care and goals reviewed with patient who verbalizes agreement. PROBLEM LIST (Impacting functional limitations):  1. Decreased Strength  2. Decreased ADL/Functional Activities  3. Decreased Ambulation Ability/Technique  4. Decreased Balance  5. Increased Pain  6. Decreased Activity Tolerance  7. Decreased [place] with Home Exercise Program INTERVENTIONS PLANNED:  1. Balance Exercise  2. Gait Training  3.  Home Exercise Program (HEP)  4. Manual Therapy  5. Neuromuscular Re-education/Strengthening  6. Range of Motion (ROM)  7. Therapeutic Activites  8. Therapeutic Exercise/Strengthening  9. modalities   10. aquatics    TREATMENT PLAN:  Effective Dates: 4/4/2018 TO 7/3/2018 (90 days). Frequency/Duration: 2-3 times a week for 90 Days    GOALS: (Goals have been discussed and agreed upon with patient.)  Short-Term Functional Goals: Time Frame: 2 weeks  Pt will demonstrate independence/ compliance with home exercise program for management of symptoms (aquatic). Pt will demonstrate improved Lower Extremity Functional Scale by 3-4 points for improved functional mobility. Pt will tolerate 35 to 40 minutes of aquatic therapy with pain of 2/10. Discharge Goals: Time Frame: 8 weeks  Pt will demonstrate improved manual muscle test score by 1/2 to 1  grade for R hip complex to participate in heavy housework activities and yard work. Pt will ambulate >2000ft with normal gait pattern with even stance time/ step length for bilateral lower extremities for community entry. Pt will report Lower Extremity Functional Scale score of 50/80 for improved functional mobility in home/ community setting. 4.  Pt will demonstrate independence/ compliance with home exercise program for management of symptoms (land). Rehabilitation Potential For Stated Goals: Good  Regarding Edelmira Mcgowan's therapy, I certify that the treatment plan above will be carried out by a therapist or under their direction. Thank you for this referral,  Princess Alcides PTA                   HISTORY:   History of Present Injury/Illness (Reason for Referral):  Pt is 72 y/o WF seen for PT per MD orders following L DEYSI 10/2017 with multiple complications/ illness over the winter with noted weakness and debility. Pt has complicated hx of arthritis, chronic low back pain as well as spinal stenosis. Pt has sx hx of B TKA (2009, 2011) along with recent hip replacement.   Pt reports her goal is to get stronger so she can do yard work and housework as well as get into community to visit with friends. Past Medical History/Comorbidities:   Ms. Shahab Hurley  has a past medical history of Arthritis; Asthma; Chronic obstructive asthma, unspecified; Chronic pain; DDD (degenerative disc disease); GERD (gastroesophageal reflux disease); Hypertension; Hypothyroidism; HYPOTHYROIDISM; Incontinence of urine; Morbid obesity ([address] Neuropathy; ILYA (obstructive sleep apnea) (9/16/2009); Osteoarthritis of left knee (9/16/2009); Osteoarthritis of right knee (3/2/2011); S/P total knee replacement using cement (9/16/2009); Sleep apnea; Spastic colon; Status post right hip replacement (10/9/2017); and Type II or unspecified type diabetes mellitus without mention of complication, not stated as uncontrolled. She also has no past medical history of Adverse effect of anesthesia; Aneurysm ([address] Arrhythmia; Autoimmune disease ([address] CAD (coronary artery disease); Cancer ([address] Chronic kidney disease; Chronic obstructive pulmonary disease ([address] Coagulation disorder ([address] Difficult intubation; Endocarditis; Heart failure ([address] Ill-defined condition; Liver disease; Malignant hyperthermia due to anesthesia; Nausea & vomiting; Nicotine vapor product user; Non-nicotine vapor product user; Pseudocholinesterase deficiency; Psychiatric disorder; PUD (peptic ulcer disease); Rheumatic fever; Seizures ([address] Stroke [facility]); or Thromboembolus ([address] Ms. Shahab Hurley  has a past surgical history that includes hx hernia repair (1/30/08); hx hysterectomy (2000); pr hand/finger surgery unlisted (Right, 1990'S); pr total knee arthroplasty (Left, 2009); and hx knee replacement (Right, 2011). Social History/Living Environment:     Pt lives in one story home with 4 steps to enter with unilateral handrail. Pt lives alone.   Prior Level of Function/Work/Activity:  Retired   Dominant Side:         RIGHT  Current Medications:    Current Outpatient Prescriptions:     amLODIPine (NORVASC) 5 mg tablet, Take 5 mg by mouth daily. , Disp: , Rfl:     ciprofloxacin HCl (CIPRO) 500 mg tablet, Take  by mouth two (2) times a day., Disp: , Rfl:     neomycin-colist-hydrocortisone-thonzonium (CORTISPORIN-TC) otic suspension, by Otic route four (4) times daily. , Disp: , Rfl:     Azelastine (ASTEPRO) 0.15 % (205.5 mcg) nasal spray, 2 Sprays by Both Nostrils route as needed. , Disp: , Rfl:     fluticasone (FLONASE ALLERGY RELIEF) 50 mcg/actuation nasal spray, 2 Sprays by Both Nostrils route as needed for Rhinitis., Disp: , Rfl:     budesonide (PULMICORT) 0.5 mg/2 mL nbsp, 500 mcg by Nebulization route two (2) times a day., Disp: , Rfl:     formoterol (PERFOROMIST) 20 mcg/2 mL nebu neb solution, 20 mcg by Nebulization route two (2) times a day., Disp: , Rfl:     Lactobacillus acidoph-L.bulgar (RUMA ASSIST) 100 million cell pwpk, Take  by mouth., Disp: , Rfl:     Biotin 2,500 mcg cap, Take  by mouth., Disp: , Rfl:     cranberry extract 450 mg tab tablet, Take 450 mg by mouth., Disp: , Rfl:     OTHER, Vein and leg support--- 1 tab po qd, Disp: , Rfl:     estradiol (ESTRACE) 0.01 % (0.1 mg/gram) vaginal cream, Insert 1 g into vagina daily. , Disp: , Rfl:     neomycin-polymyxin-hydrocortisone, buffered, (PEDIOTIC) 3.5-10,000-1 mg/mL-unit/mL-% otic suspension, Administer 2 Drops into each ear daily. , Disp: , Rfl:     traMADol (ULTRAM) 50 mg tablet, Take 50 mg by mouth every six (6) hours as needed for Pain., Disp: , Rfl:     HYDROmorphone (DILAUDID) 2 mg tablet, Take 1 Tab by mouth every four (4) hours as needed. Max Daily Amount: 12 mg., Disp: 40 Tab, Rfl: 0    SITagliptin (JANUVIA) 100 mg tablet, Take 100 mg by mouth daily. , Disp: , Rfl:     lidocaine-kinesiology tape 5 % kit, by Apply Externally route., Disp: , Rfl:     OTHER, three (3) times daily.  Lidocaine patch 5%, Disp: , Rfl:     oxybutynin chloride XL (DITROPAN XL) 5 mg CR tablet, Take 5 mg by mouth daily. , Disp: , Rfl:     polyethylene glycol (MIRALAX) 17 gram packet, Take 17 g by mouth as needed. , Disp: , Rfl:     cpap machine kit, by Does Not Apply route. 9 cm H2O, Disp: , Rfl:     FLUTICASONE/SALMETEROL (ADVAIR DISKUS IN), Take 2 Puffs by inhalation two (2) times daily as needed. , Disp: , Rfl:     celecoxib (CELEBREX) 200 mg capsule, Take 200 mg by mouth daily. 1 to 2 a day. , Disp: , Rfl:     PREGABALIN (LYRICA PO), Take 150 mg by mouth two (2) times a day., Disp: , Rfl:     montelukast (SINGULAIR) 10 mg tablet, Take 10 mg by mouth daily. , Disp: , Rfl:     NEOMY SULF/POLYMYX B SULF/HC (CORTISPORIN OT), by Otic route as needed. 1 to 2 drops in each ear as needed. , Disp: , Rfl:     MULTIVITAMIN WITH MINERALS (MULTIVITAMIN & MINERAL FORMULA PO), Take 1 Tab by mouth daily. Multivitamin with D with Calcium , Disp: , Rfl:     metaxalone (SKELAXIN) 800 mg tablet, Take 1 Tab by mouth three (3) times daily as needed for Pain. (Patient taking differently: Take 800 mg by mouth two (2) times a day.), Disp: 1 Tab, Rfl: 0    OMEPRAZOLE PO, 20 mg daily. Pt. Instructed to take morning of surgery per anesthesiologist.  , Disp: , Rfl:     CYANOCOBALAMIN (VITAMIN B-12 PO), Take 500 mg by mouth daily. , Disp: , Rfl:     LISINOPRIL-HYDROCHLOROTHIAZIDE 10-12.5 mg per tablet, take 1 Tab by mouth daily. , Disp: , Rfl:     LASIX 20 mg Tab, Take 20 mg by mouth daily as needed. , Disp: , Rfl:     GLYBURIDE-METFORMIN 2.5-500 mg per tablet, take 1 Tab by mouth two (2) times a day., Disp: , Rfl:     SYNTHROID 100 mcg Tab, Take 100 mcg by mouth daily. Pt. Instructed to take morning of surgery per anesthesiologist.  , Disp: , Rfl:     PATANOL 0.1 % Drop, 2 Drops by Both Eyes route as needed for Allergies. , Disp: , Rfl:     AMBIEN 10 mg Tab, Take 10 mg by mouth nightly., Disp: , Rfl:     LIBRAX, WITH CLINIDIUM, 2.5-5 mg Cap, Take 1 Cap by mouth four (4) times daily as needed. , Disp: , Rfl:    Date Last Reviewed: 4/16/2018   EXAMINATION:   Observation/Orthostatic Postural Assessment:          Morbid obesity  Palpation:          Pitting edema in B ankle  Circumferential measurements:  30 cm R/L (above malleolus)  ROM:  BUE/ cervical WNL for patient                     Date:  04/04/18 Date:   Date:     Trunk ROM 25 % limitation                          LE ROM Left Right       Hip Flexion [facility]       Hip Abduction [facility]       Straight Leg Raise (SLR) N/T N/T                Knee Flexion 125 125       Knee Extension 0 0                Ankle Dorsiflexion [facility] WFL         Strength:     Date:  04/04/18 Date:   Date:     LE MMT Left Right Left Right Left Right   Hip Flex (L1/L2) 4/5 4-/5       Hip Abd (glut med) 4/5 4/5       Hip Ext 4-/5 4-/5       Quad    ( L3/4) 4/5 4-/5       Hamstring 4/5 4-/5       Anterior Tib (L4/L5)         Gastroc (S1/2)         EHL (L5)                             Special Tests:   deferred  Neurological Screen:        Sensation: Intact for light touch  Functional Mobility:         Gait/Ambulation:  Amb without A.D with increased lateral wt shift BLE lumbering gait pattern        Transfers:  Sit to stand to sit with use of BUE for support        Bed Mobility:  independent  Balance:          Single Leg Stance:  Unable to assume single leg stance. Good static standing balance   Body Structures Involved:  1. Nerves  2. Bones  3. Joints  4. Muscles  5. Ligaments Body Functions Affected:  1. Sensory/Pain  2. Neuromusculoskeletal  3. Movement Related Activities and Participation Affected:  1. General Tasks and Demands  2. Mobility  3. Self Care  4. Domestic Life  5. Community, Social and Civic Life   CLINICAL DECISION MAKING:   Outcome Measure:    Tool Used: Lower Extremity Functional Scale (LEFS)  Score:  Initial: 40/80 Most Recent: X/80 (Date: -- )   Interpretation of Score: 20 questions each scored on a 5 point scale with 0 representing \"extreme difficulty or unable to perform\" and 4 representing \"no difficulty\". The lower the score, the greater the functional disability. 80/80 represents no disability. Minimal detectable change is 9 points. Score 80 79-63 62-48 47-32 31-16 15-1 0   Modifier CH CI CJ CK CL CM CN     ? Mobility - Walking and Moving Around:     - CURRENT STATUS: CK - 40%-59% impaired, limited or restricted    - GOAL STATUS: CJ - 20%-39% impaired, limited or restricted    - D/C STATUS:  ---------------To be determined---------------    Medical Necessity:   · Patient is expected to demonstrate progress in strength, range of motion and balance to increase independence with functional mobility with decreased pain. Reason for Services/Other Comments:  · Patient continues to require modification of therapeutic interventions to increase complexity of exercises. TREATMENT:   (In addition to Assessment/Re-Assessment sessions the following treatments were rendered)    Therapeutic Exercise: (see flow sheet below for minutes) :  Exercises per grid below to improve mobility, strength and balance. Required minimal visual and verbal cues to promote proper body alignment and promote proper body mechanics. Progressed resistance and range as indicated. Aquatic Therapy (see flow sheet below for minutes): Aquatic treatment performed per flow grid for Decreased muscle strength, Decreased static/dynamic balance and reactive control, Decreased range of motion, Decreased activity endurance and Ease of movement. Cues provided for technique. Assistance by therapist provided for progression of exercises/ activities. Patient has difficulty with low back/ R hip pain.      Date: 04/04/18  (EVAL) 4/10/18 4/16/18     Modalities:                                Manual Therapy:                                Aquatic Exercise:  1.5#/55 minutes 1.5# 60 min     Gait F/B/S/M  x6L F  x4L B/S/M x4L ea     Heel/Toe walk        3-way  x15 BLE x15 BLE     PF/DF  x15 x15     Hamstring Curls  x15 BLE x4L     Hip Flexion with knee ext. (rockette)  next      Squats    next x15     SLR march  x4L x4L     Lunges        Hip circles        Hip horizontal abduction/adduction        Core:          Core:        Deep well bike  3 minutes 3min     Deep well jumping nav  1 minute 2 min     Deep well scissors  1 minute 2 min     Deep well:  traction  5 minutes 8 min             Hamstring Stretch  2 H 30 BLE 2h30     Step Lunge        Piriformis stretch        PF Stretch        Balance: Single leg Stance        Balance: Tandem                          Therapeutic Exercise:                                                        F=forward  B=Backward  S=sidesteps  M=marches    H=hold    Manual: (see flow sheet above for minutes)   Modalities: (see flow sheet above for minutes)     HEP: Plan to instruct in HEP as therapy progresses  Treatment/Session Assessment: Continued aquatics and tolerated well. Pt demonstrates decreased core strength/stability, plan to increase challenge to this area as treatements progress. · Pain/ Symptoms: Initial:   0/10 pt reports no pain in hips. States she has some arthritic pain in neck but not much today Post Session:  0/10  ·   Compliance with Program/Exercises: Will assess as treatment progresses. · Recommendations/Intent for next treatment session: \"Next visit will focus on advancements to more challenging activities\". Plan to continue advancing aquatics at next visit.     Total Treatment Duration:  PT Patient Time In/Time Out  Time In: 1415  Time Out: 1007 JEAN-PIERRE Robison

## 2018-04-17 ENCOUNTER — HOSPITAL ENCOUNTER (OUTPATIENT)
Dept: PHYSICAL THERAPY | Age: 73
Discharge: HOME | End: 2018-04-17
Payer: MEDICARE

## 2018-04-17 PROCEDURE — 97113 AQUATIC THERAPY/EXERCISES: CPT

## 2018-04-17 NOTE — PROGRESS NOTES
Catherine Mcgowan  : 1945 [address][birth year] [address]  [phone]   [phone] -       OUTPATIENT PHYSICAL THERAPY:Daily Note 2018        ICD-10: Treatment Diagnosis: Pain in right hip (M25.551)  Pain in right knee (M25.561)  Pain in left knee (M25.562)  Difficulty in walking, not elsewhere classified (R26.2)  Precautions/Allergies:   Codeine; Erythromycin; and Tetracycline   Fall Risk Score: 1 (? 5 = High Risk)  MD Orders: Evaluate and Treat: strengthening exercises/ aquatic therapy MEDICAL/REFERRING DIAGNOSIS:  Hip pain [M25.559]   DATE OF ONSET: 10/2017  REFERRING PHYSICIAN: Veronica Baker., *  RETURN PHYSICIAN APPOINTMENT: late 2018     INITIAL ASSESSMENT:  Ms. Hardeep Donovan (pt) is a 71year old WF seen for physical therapy per MD orders with complaint of R hip pain, low back pain and B knee pain with long history of back pain and arthritis. Pt evaluated for outpatient physical therapy today with the following deficits: low back, right hip and bilateral knee pain, decreased strength in B hip. Pt would benefit from physical therapy to address the above deficits in order to return to increased independence with functional mobility with decreased pain. Pt would benefit from initially working in aquatic setting to off load spine, bilateral lower extremities while addressing goals. As patient progresses, plan to transition to gravity challenging, land based exercises/ activities. Plan of care and goals reviewed with patient who verbalizes agreement. PROBLEM LIST (Impacting functional limitations):  1. Decreased Strength  2. Decreased ADL/Functional Activities  3. Decreased Ambulation Ability/Technique  4. Decreased Balance  5. Increased Pain  6. Decreased Activity Tolerance  7. Decreased Nance with Home Exercise Program INTERVENTIONS PLANNED:  1. Balance Exercise  2. Gait Training  3.  Home Exercise Program (HEP)  4. Manual Therapy  5. Neuromuscular Re-education/Strengthening  6. Range of Motion (ROM)  7. Therapeutic Activites  8. Therapeutic Exercise/Strengthening  9. modalities   10. aquatics    TREATMENT PLAN:  Effective Dates: 4/4/2018 TO 7/3/2018 (90 days). Frequency/Duration: 2-3 times a week for 90 Days    GOALS: (Goals have been discussed and agreed upon with patient.)  Short-Term Functional Goals: Time Frame: 2 weeks  Pt will demonstrate independence/ compliance with home exercise program for management of symptoms (aquatic). Pt will demonstrate improved Lower Extremity Functional Scale by 3-4 points for improved functional mobility. Pt will tolerate 35 to 40 minutes of aquatic therapy with pain of 2/10. Discharge Goals: Time Frame: 8 weeks  Pt will demonstrate improved manual muscle test score by 1/2 to 1  grade for R hip complex to participate in heavy housework activities and yard work. Pt will ambulate >2000ft with normal gait pattern with even stance time/ step length for bilateral lower extremities for community entry. Pt will report Lower Extremity Functional Scale score of 50/80 for improved functional mobility in home/ community setting. 4.  Pt will demonstrate independence/ compliance with home exercise program for management of symptoms (land). Rehabilitation Potential For Stated Goals: Good  Regarding Farnaz Mcgowan's therapy, I certify that the treatment plan above will be carried out by a therapist or under their direction. Thank you for this referral,  Delisa Babb                   HISTORY:   History of Present Injury/Illness (Reason for Referral):  Pt is 72 y/o WF seen for PT per MD orders following L DEYSI 10/2017 with multiple complications/ illness over the winter with noted weakness and debility. Pt has complicated hx of arthritis, chronic low back pain as well as spinal stenosis. Pt has sx hx of B TKA (2009, 2011) along with recent hip replacement.   Pt reports her goal is to get stronger so she can do yard work and housework as well as get into community to visit with friends. Past Medical History/Comorbidities:   Ms. Collette Ross  has a past medical history of Arthritis; Asthma; Chronic obstructive asthma, unspecified; Chronic pain; DDD (degenerative disc disease); GERD (gastroesophageal reflux disease); Hypertension; Hypothyroidism; HYPOTHYROIDISM; Incontinence of urine; Morbid obesity ([address] Neuropathy; ILYA (obstructive sleep apnea) (9/16/2009); Osteoarthritis of left knee (9/16/2009); Osteoarthritis of right knee (3/2/2011); S/P total knee replacement using cement (9/16/2009); Sleep apnea; Spastic colon; Status post right hip replacement (10/9/2017); and Type II or unspecified type diabetes mellitus without mention of complication, not stated as uncontrolled. She also has no past medical history of Adverse effect of anesthesia; Aneurysm ([address] Arrhythmia; Autoimmune disease ([address] CAD (coronary artery disease); Cancer ([address] Chronic kidney disease; Chronic obstructive pulmonary disease ([address] Coagulation disorder ([address] Difficult intubation; Endocarditis; Heart failure ([address] Ill-defined condition; Liver disease; Malignant hyperthermia due to anesthesia; Nausea & vomiting; Nicotine vapor product user; Non-nicotine vapor product user; Pseudocholinesterase deficiency; Psychiatric disorder; PUD (peptic ulcer disease); Rheumatic fever; Seizures ([address] Stroke [facility]); or Thromboembolus ([address] Ms. Collette Ross  has a past surgical history that includes hx hernia repair (1/30/08); hx hysterectomy (2000); pr hand/finger surgery unlisted (Right, 1990'S); pr total knee arthroplasty (Left, 2009); and hx knee replacement (Right, 2011). Social History/Living Environment:     Pt lives in one story home with 4 steps to enter with unilateral handrail. Pt lives alone.   Prior Level of Function/Work/Activity:  Retired   Dominant Side:         RIGHT  Current Medications:    Current Outpatient Prescriptions:     amLODIPine (NORVASC) 5 mg tablet, Take 5 mg by mouth daily. , Disp: , Rfl:     ciprofloxacin HCl (CIPRO) 500 mg tablet, Take  by mouth two (2) times a day., Disp: , Rfl:     neomycin-colist-hydrocortisone-thonzonium (CORTISPORIN-TC) otic suspension, by Otic route four (4) times daily. , Disp: , Rfl:     Azelastine (ASTEPRO) 0.15 % (205.5 mcg) nasal spray, 2 Sprays by Both Nostrils route as needed. , Disp: , Rfl:     fluticasone (FLONASE ALLERGY RELIEF) 50 mcg/actuation nasal spray, 2 Sprays by Both Nostrils route as needed for Rhinitis., Disp: , Rfl:     budesonide (PULMICORT) 0.5 mg/2 mL nbsp, 500 mcg by Nebulization route two (2) times a day., Disp: , Rfl:     formoterol (PERFOROMIST) 20 mcg/2 mL nebu neb solution, 20 mcg by Nebulization route two (2) times a day., Disp: , Rfl:     Lactobacillus acidoph-L.bulgar (RUMA ASSIST) 100 million cell pwpk, Take  by mouth., Disp: , Rfl:     Biotin 2,500 mcg cap, Take  by mouth., Disp: , Rfl:     cranberry extract 450 mg tab tablet, Take 450 mg by mouth., Disp: , Rfl:     OTHER, Vein and leg support--- 1 tab po qd, Disp: , Rfl:     estradiol (ESTRACE) 0.01 % (0.1 mg/gram) vaginal cream, Insert 1 g into vagina daily. , Disp: , Rfl:     neomycin-polymyxin-hydrocortisone, buffered, (PEDIOTIC) 3.5-10,000-1 mg/mL-unit/mL-% otic suspension, Administer 2 Drops into each ear daily. , Disp: , Rfl:     traMADol (ULTRAM) 50 mg tablet, Take 50 mg by mouth every six (6) hours as needed for Pain., Disp: , Rfl:     HYDROmorphone (DILAUDID) 2 mg tablet, Take 1 Tab by mouth every four (4) hours as needed. Max Daily Amount: 12 mg., Disp: 40 Tab, Rfl: 0    SITagliptin (JANUVIA) 100 mg tablet, Take 100 mg by mouth daily. , Disp: , Rfl:     lidocaine-kinesiology tape 5 % kit, by Apply Externally route., Disp: , Rfl:     OTHER, three (3) times daily.  Lidocaine patch 5%, Disp: , Rfl:     oxybutynin chloride XL (DITROPAN XL) 5 mg CR tablet, Take 5 mg by mouth daily. , Disp: , Rfl:     polyethylene glycol (MIRALAX) 17 gram packet, Take 17 g by mouth as needed. , Disp: , Rfl:     cpap machine kit, by Does Not Apply route. 9 cm H2O, Disp: , Rfl:     FLUTICASONE/SALMETEROL (ADVAIR DISKUS IN), Take 2 Puffs by inhalation two (2) times daily as needed. , Disp: , Rfl:     celecoxib (CELEBREX) 200 mg capsule, Take 200 mg by mouth daily. 1 to 2 a day. , Disp: , Rfl:     PREGABALIN (LYRICA PO), Take 150 mg by mouth two (2) times a day., Disp: , Rfl:     montelukast (SINGULAIR) 10 mg tablet, Take 10 mg by mouth daily. , Disp: , Rfl:     NEOMY SULF/POLYMYX B SULF/HC (CORTISPORIN OT), by Otic route as needed. 1 to 2 drops in each ear as needed. , Disp: , Rfl:     MULTIVITAMIN WITH MINERALS (MULTIVITAMIN & MINERAL FORMULA PO), Take 1 Tab by mouth daily. Multivitamin with D with Calcium , Disp: , Rfl:     metaxalone (SKELAXIN) 800 mg tablet, Take 1 Tab by mouth three (3) times daily as needed for Pain. (Patient taking differently: Take 800 mg by mouth two (2) times a day.), Disp: 1 Tab, Rfl: 0    OMEPRAZOLE PO, 20 mg daily. Pt. Instructed to take morning of surgery per anesthesiologist.  , Disp: , Rfl:     CYANOCOBALAMIN (VITAMIN B-12 PO), Take 500 mg by mouth daily. , Disp: , Rfl:     LISINOPRIL-HYDROCHLOROTHIAZIDE 10-12.5 mg per tablet, take 1 Tab by mouth daily. , Disp: , Rfl:     LASIX 20 mg Tab, Take 20 mg by mouth daily as needed. , Disp: , Rfl:     GLYBURIDE-METFORMIN 2.5-500 mg per tablet, take 1 Tab by mouth two (2) times a day., Disp: , Rfl:     SYNTHROID 100 mcg Tab, Take 100 mcg by mouth daily. Pt. Instructed to take morning of surgery per anesthesiologist.  , Disp: , Rfl:     PATANOL 0.1 % Drop, 2 Drops by Both Eyes route as needed for Allergies. , Disp: , Rfl:     AMBIEN 10 mg Tab, Take 10 mg by mouth nightly., Disp: , Rfl:     LIBRAX, WITH CLINIDIUM, 2.5-5 mg Cap, Take 1 Cap by mouth four (4) times daily as needed. , Disp: , Rfl:    Date Last Reviewed: 4/17/2018   EXAMINATION:   Observation/Orthostatic Postural Assessment:          Morbid obesity  Palpation:          Pitting edema in B ankle  Circumferential measurements:  30 cm R/L (above malleolus)  ROM:  BUE/ cervical WNL for patient                     Date:  04/04/18 Date:   Date:     Trunk ROM 25 % limitation                          LE ROM Left Right       Hip Flexion [facility]       Hip Abduction [facility]       Straight Leg Raise (SLR) N/T N/T                Knee Flexion 125 125       Knee Extension 0 0                Ankle Dorsiflexion [facility] WFL         Strength:     Date:  04/04/18 Date:   Date:     LE MMT Left Right Left Right Left Right   Hip Flex (L1/L2) 4/5 4-/5       Hip Abd (glut med) 4/5 4/5       Hip Ext 4-/5 4-/5       Quad    ( L3/4) 4/5 4-/5       Hamstring 4/5 4-/5       Anterior Tib (L4/L5)         Gastroc (S1/2)         EHL (L5)                             Special Tests:   deferred  Neurological Screen:        Sensation: Intact for light touch  Functional Mobility:         Gait/Ambulation:  Amb without A.D with increased lateral wt shift BLE lumbering gait pattern        Transfers:  Sit to stand to sit with use of BUE for support        Bed Mobility:  independent  Balance:          Single Leg Stance:  Unable to assume single leg stance. Good static standing balance   Body Structures Involved:  1. Nerves  2. Bones  3. Joints  4. Muscles  5. Ligaments Body Functions Affected:  1. Sensory/Pain  2. Neuromusculoskeletal  3. Movement Related Activities and Participation Affected:  1. General Tasks and Demands  2. Mobility  3. Self Care  4. Domestic Life  5. Community, Social and Civic Life   CLINICAL DECISION MAKING:   Outcome Measure:    Tool Used: Lower Extremity Functional Scale (LEFS)  Score:  Initial: 40/80 Most Recent: X/80 (Date: -- )   Interpretation of Score: 20 questions each scored on a 5 point scale with 0 representing \"extreme difficulty or unable to perform\" and 4 representing \"no difficulty\". The lower the score, the greater the functional disability. 80/80 represents no disability. Minimal detectable change is 9 points. Score 80 79-63 62-48 47-32 31-16 15-1 0   Modifier CH CI CJ CK CL CM CN     ? Mobility - Walking and Moving Around:     - CURRENT STATUS: CK - 40%-59% impaired, limited or restricted    - GOAL STATUS: CJ - 20%-39% impaired, limited or restricted    - D/C STATUS:  ---------------To be determined---------------    Medical Necessity:   · Patient is expected to demonstrate progress in strength, range of motion and balance to increase independence with functional mobility with decreased pain. Reason for Services/Other Comments:  · Patient continues to require modification of therapeutic interventions to increase complexity of exercises. TREATMENT:   (In addition to Assessment/Re-Assessment sessions the following treatments were rendered)    Therapeutic Exercise: (see flow sheet below for minutes) :  Exercises per grid below to improve mobility, strength and balance. Required minimal visual and verbal cues to promote proper body alignment and promote proper body mechanics. Progressed resistance and range as indicated. Aquatic Therapy (see flow sheet below for minutes): Aquatic treatment performed per flow grid for Decreased muscle strength, Decreased static/dynamic balance and reactive control, Decreased range of motion, Decreased activity endurance and Ease of movement. Cues provided for technique. Assistance by therapist provided for progression of exercises/ activities. Patient has difficulty with low back/ R hip pain.      Date: 04/04/18  (EVAL) 4/10/18 4/16/18 4/17/18    Modalities:                                Manual Therapy:                                Aquatic Exercise:  1.5#/55 minutes 1.5# 60 min 2.5# 50 min    Gait F/B/S/M  x6L F  x4L B/S/M x4L ea x4L    Heel/Toe walk        3-way  x15 BLE x15 BLE x15 BLE    PF/DF  x15 x15     Hamstring Curls  x15 BLE x4L x4L    Hip Flexion with knee ext. (rockette)  next  x15 BLE    Squats    next x15     SLR march  x4L x4L x4L    Lunges        Hip circles        Hip horizontal abduction/adduction        Core:flex/ext UE in mini squat      x15 alternating LE stance c open paddles    Core:abd/add UE in modified tandem stance        Deep well bike  3 minutes 3min 3 min    Deep well jumping nav  1 minute 2 min     Deep well scissors  1 minute 2 min     Deep well:  traction  5 minutes 8 min 8 min            Hamstring Stretch  2 H 30 BLE 2h30 2H30 BLE    Step Lunge        Piriformis stretch        PF Stretch        Balance: Single leg Stance        Balance: Tandem                          Therapeutic Exercise:                                                        F=forward  B=Backward  S=sidesteps  M=marches    H=hold    Manual: (see flow sheet above for minutes)   Modalities: (see flow sheet above for minutes)     HEP: Plan to instruct in HEP as therapy progresses  Treatment/Session Assessment: Patient arrived 20 mins late for PT session today, stating morning are not her happy time. Continued aquatic exercises, increasing ankle weights to 2.5#, with good tolerance. Patient moved slowly through lap exercises with decreased dee and noted muscle fatigue. Patient performed exercise in approximately 80% buoyancy, performing marching lap exercises without handrail assist. Added core exercises to increase patient core/trunk strength in low impact environment. Plan to continue in aquatic setting incorporating more challenging exercises as treatements progress. · Pain/ Symptoms: Initial:   0/10 pt reports no pain    Patient reports feeling mostly tired today. Patient states she is going to have to get back injections again, reporting she goes 1-2x a year about every 8 months due to LB pain. Patient states she can tell her legs are getting stronger.  Post Session:  0/10  ·   Compliance with Program/Exercises: Will assess as treatment progresses. · Recommendations/Intent for next treatment session: \"Next visit will focus on advancements to more challenging activities\". Plan to continue advancing aquatics at next visit.          Total Treatment Duration:  PT Patient Time In/Time Out  Time In: 1120  Time Out: 1210     Nelli Witt PTA

## 2018-04-18 ENCOUNTER — APPOINTMENT (OUTPATIENT)
Dept: PHYSICAL THERAPY | Age: 73
End: 2018-04-18
Payer: MEDICARE

## 2018-04-20 ENCOUNTER — HOSPITAL ENCOUNTER (OUTPATIENT)
Dept: PHYSICAL THERAPY | Age: 73
Discharge: HOME | End: 2018-04-20
Payer: MEDICARE

## 2018-04-20 ENCOUNTER — APPOINTMENT (OUTPATIENT)
Dept: PHYSICAL THERAPY | Age: 73
End: 2018-04-20
Payer: MEDICARE

## 2018-04-20 PROCEDURE — 97113 AQUATIC THERAPY/EXERCISES: CPT

## 2018-04-20 NOTE — PROGRESS NOTES
Olga Mcgowan  : 1945 [id number] [facility],[address]  [address] [place] [address].  [phone]   [phone] -       OUTPATIENT PHYSICAL THERAPY:Daily Note 2018        ICD-10: Treatment Diagnosis: Pain in right hip (M25.551)  Pain in right knee (M25.561)  Pain in left knee (M25.562)  Difficulty in walking, not elsewhere classified (R26.2)  Precautions/Allergies:   Codeine; Erythromycin; and Tetracycline   Fall Risk Score: 1 (? 5 = High Risk)  MD Orders: Evaluate and Treat: strengthening exercises/ aquatic therapy MEDICAL/REFERRING DIAGNOSIS:  Hip pain [M25.559]   DATE OF ONSET: 10/2017  REFERRING PHYSICIAN: Rubina Rosenthal., *  RETURN PHYSICIAN APPOINTMENT: late 2018     INITIAL ASSESSMENT:  Ms. Jina Arceo (pt) is a 71year old WF seen for physical therapy per MD orders with complaint of R hip pain, low back pain and B knee pain with long history of back pain and arthritis. Pt evaluated for outpatient physical therapy today with the following deficits: low back, right hip and bilateral knee pain, decreased strength in B hip. Pt would benefit from physical therapy to address the above deficits in order to return to increased independence with functional mobility with decreased pain. Pt would benefit from initially working in aquatic setting to off load spine, bilateral lower extremities while addressing goals. As patient progresses, plan to transition to gravity challenging, land based exercises/ activities. Plan of care and goals reviewed with patient who verbalizes agreement. PROBLEM LIST (Impacting functional limitations):  1. Decreased Strength  2. Decreased ADL/Functional Activities  3. Decreased Ambulation Ability/Technique  4. Decreased Balance  5. Increased Pain  6. Decreased Activity Tolerance  7. Decreased [place] with Home Exercise Program INTERVENTIONS PLANNED:  1. Balance Exercise  2. Gait Training  3.  Home Exercise Program (HEP)  4. Manual Therapy  5. Neuromuscular Re-education/Strengthening  6. Range of Motion (ROM)  7. Therapeutic Activites  8. Therapeutic Exercise/Strengthening  9. modalities   10. aquatics    TREATMENT PLAN:  Effective Dates: 4/4/2018 TO 7/3/2018 (90 days). Frequency/Duration: 2-3 times a week for 90 Days    GOALS: (Goals have been discussed and agreed upon with patient.)  Short-Term Functional Goals: Time Frame: 2 weeks  Pt will demonstrate independence/ compliance with home exercise program for management of symptoms (aquatic). Pt will demonstrate improved Lower Extremity Functional Scale by 3-4 points for improved functional mobility. Pt will tolerate 35 to 40 minutes of aquatic therapy with pain of 2/10. Discharge Goals: Time Frame: 8 weeks  Pt will demonstrate improved manual muscle test score by 1/2 to 1  grade for R hip complex to participate in heavy housework activities and yard work. Pt will ambulate >2000ft with normal gait pattern with even stance time/ step length for bilateral lower extremities for community entry. Pt will report Lower Extremity Functional Scale score of 50/80 for improved functional mobility in home/ community setting. 4.  Pt will demonstrate independence/ compliance with home exercise program for management of symptoms (land). Rehabilitation Potential For Stated Goals: Good                 HISTORY:   History of Present Injury/Illness (Reason for Referral):  Pt is 72 y/o WF seen for PT per MD orders following L DEYSI 10/2017 with multiple complications/ illness over the winter with noted weakness and debility. Pt has complicated hx of arthritis, chronic low back pain as well as spinal stenosis. Pt has sx hx of B TKA (2009, 2011) along with recent hip replacement. Pt reports her goal is to get stronger so she can do yard work and housework as well as get into community to visit with friends.     Past Medical History/Comorbidities:   Ms. Alejandrina Romero  has a past medical history of Arthritis; Asthma; Chronic obstructive asthma, unspecified; Chronic pain; DDD (degenerative disc disease); GERD (gastroesophageal reflux disease); Hypertension; Hypothyroidism; HYPOTHYROIDISM; Incontinence of urine; Morbid obesity ([address] Neuropathy; ILYA (obstructive sleep apnea) (9/16/2009); Osteoarthritis of left knee (9/16/2009); Osteoarthritis of right knee (3/2/2011); S/P total knee replacement using cement (9/16/2009); Sleep apnea; Spastic colon; Status post right hip replacement (10/9/2017); and Type II or unspecified type diabetes mellitus without mention of complication, not stated as uncontrolled. She also has no past medical history of Adverse effect of anesthesia; Aneurysm ([address] Arrhythmia; Autoimmune disease ([address] CAD (coronary artery disease); Cancer ([address] Chronic kidney disease; Chronic obstructive pulmonary disease ([address] Coagulation disorder ([address] Difficult intubation; Endocarditis; Heart failure ([address] Ill-defined condition; Liver disease; Malignant hyperthermia due to anesthesia; Nausea & vomiting; Nicotine vapor product user; Non-nicotine vapor product user; Pseudocholinesterase deficiency; Psychiatric disorder; PUD (peptic ulcer disease); Rheumatic fever; Seizures ([address] Stroke [facility]); or Thromboembolus ([address] Ms. Sonja Alvarenga  has a past surgical history that includes hx hernia repair (1/30/08); hx hysterectomy (2000); pr hand/finger surgery unlisted (Right, 1990'S); pr total knee arthroplasty (Left, 2009); and hx knee replacement (Right, 2011). Social History/Living Environment:     Pt lives in one story home with 4 steps to enter with unilateral handrail. Pt lives alone. Prior Level of Function/Work/Activity:  Retired   Dominant Side:         RIGHT  Current Medications:    Current Outpatient Prescriptions:     amLODIPine (NORVASC) 5 mg tablet, Take 5 mg by mouth daily. , Disp: , Rfl:     ciprofloxacin HCl (CIPRO) 500 mg tablet, Take  by mouth two (2) times a day. , Disp: , Rfl:     neomycin-colist-hydrocortisone-thonzonium (CORTISPORIN-TC) otic suspension, by Otic route four (4) times daily. , Disp: , Rfl:     Azelastine (ASTEPRO) 0.15 % (205.5 mcg) nasal spray, 2 Sprays by Both Nostrils route as needed. , Disp: , Rfl:     fluticasone (FLONASE ALLERGY RELIEF) 50 mcg/actuation nasal spray, 2 Sprays by Both Nostrils route as needed for Rhinitis., Disp: , Rfl:     budesonide (PULMICORT) 0.5 mg/2 mL nbsp, 500 mcg by Nebulization route two (2) times a day., Disp: , Rfl:     formoterol (PERFOROMIST) 20 mcg/2 mL nebu neb solution, 20 mcg by Nebulization route two (2) times a day., Disp: , Rfl:     Lactobacillus acidoph-L.bulgar (RUMA ASSIST) 100 million cell pwpk, Take  by mouth., Disp: , Rfl:     Biotin 2,500 mcg cap, Take  by mouth., Disp: , Rfl:     cranberry extract 450 mg tab tablet, Take 450 mg by mouth., Disp: , Rfl:     OTHER, Vein and leg support--- 1 tab po qd, Disp: , Rfl:     estradiol (ESTRACE) 0.01 % (0.1 mg/gram) vaginal cream, Insert 1 g into vagina daily. , Disp: , Rfl:     neomycin-polymyxin-hydrocortisone, buffered, (PEDIOTIC) 3.5-10,000-1 mg/mL-unit/mL-% otic suspension, Administer 2 Drops into each ear daily. , Disp: , Rfl:     traMADol (ULTRAM) 50 mg tablet, Take 50 mg by mouth every six (6) hours as needed for Pain., Disp: , Rfl:     HYDROmorphone (DILAUDID) 2 mg tablet, Take 1 Tab by mouth every four (4) hours as needed. Max Daily Amount: 12 mg., Disp: 40 Tab, Rfl: 0    SITagliptin (JANUVIA) 100 mg tablet, Take 100 mg by mouth daily. , Disp: , Rfl:     lidocaine-kinesiology tape 5 % kit, by Apply Externally route., Disp: , Rfl:     OTHER, three (3) times daily. Lidocaine patch 5%, Disp: , Rfl:     oxybutynin chloride XL (DITROPAN XL) 5 mg CR tablet, Take 5 mg by mouth daily. , Disp: , Rfl:     polyethylene glycol (MIRALAX) 17 gram packet, Take 17 g by mouth as needed. , Disp: , Rfl:     cpap machine kit, by Does Not Apply route.  9 cm H2O, Disp: , Rfl:     FLUTICASONE/SALMETEROL (ADVAIR DISKUS IN), Take 2 Puffs by inhalation two (2) times daily as needed. , Disp: , Rfl:     celecoxib (CELEBREX) 200 mg capsule, Take 200 mg by mouth daily. 1 to 2 a day. , Disp: , Rfl:     PREGABALIN (LYRICA PO), Take 150 mg by mouth two (2) times a day., Disp: , Rfl:     montelukast (SINGULAIR) 10 mg tablet, Take 10 mg by mouth daily. , Disp: , Rfl:     NEOMY SULF/POLYMYX B SULF/HC (CORTISPORIN OT), by Otic route as needed. 1 to 2 drops in each ear as needed. , Disp: , Rfl:     MULTIVITAMIN WITH MINERALS (MULTIVITAMIN & MINERAL FORMULA PO), Take 1 Tab by mouth daily. Multivitamin with D with Calcium , Disp: , Rfl:     metaxalone (SKELAXIN) 800 mg tablet, Take 1 Tab by mouth three (3) times daily as needed for Pain. (Patient taking differently: Take 800 mg by mouth two (2) times a day.), Disp: 1 Tab, Rfl: 0    OMEPRAZOLE PO, 20 mg daily. Pt. Instructed to take morning of surgery per anesthesiologist.  , Disp: , Rfl:     CYANOCOBALAMIN (VITAMIN B-12 PO), Take 500 mg by mouth daily. , Disp: , Rfl:     LISINOPRIL-HYDROCHLOROTHIAZIDE 10-12.5 mg per tablet, take 1 Tab by mouth daily. , Disp: , Rfl:     LASIX 20 mg Tab, Take 20 mg by mouth daily as needed. , Disp: , Rfl:     GLYBURIDE-METFORMIN 2.5-500 mg per tablet, take 1 Tab by mouth two (2) times a day., Disp: , Rfl:     SYNTHROID 100 mcg Tab, Take 100 mcg by mouth daily. Pt. Instructed to take morning of surgery per anesthesiologist.  , Disp: , Rfl:     PATANOL 0.1 % Drop, 2 Drops by Both Eyes route as needed for Allergies. , Disp: , Rfl:     AMBIEN 10 mg Tab, Take 10 mg by mouth nightly., Disp: , Rfl:     LIBRAX, WITH CLINIDIUM, 2.5-5 mg Cap, Take 1 Cap by mouth four (4) times daily as needed. , Disp: , Rfl:    Date Last Reviewed:  4/20/2018   EXAMINATION:   Observation/Orthostatic Postural Assessment:          Morbid obesity  Palpation:          Pitting edema in B ankle  Circumferential measurements:  30 cm R/L (above malleolus)  ROM:  BUE/ cervical WNL for patient                     Date:  04/04/18 Date:   Date:     Trunk ROM 25 % limitation                          LE ROM Left Right       Hip Flexion [facility]       Hip Abduction [facility]       Straight Leg Raise (SLR) N/T N/T                Knee Flexion 125 125       Knee Extension 0 0                Ankle Dorsiflexion [facility] WFL         Strength:     Date:  04/04/18 Date:   Date:     LE MMT Left Right Left Right Left Right   Hip Flex (L1/L2) 4/5 4-/5       Hip Abd (glut med) 4/5 4/5       Hip Ext 4-/5 4-/5       Quad    ( L3/4) 4/5 4-/5       Hamstring 4/5 4-/5       Anterior Tib (L4/L5)         Gastroc (S1/2)         EHL (L5)                             Special Tests:   deferred  Neurological Screen:        Sensation: Intact for light touch  Functional Mobility:         Gait/Ambulation:  Amb without A.D with increased lateral wt shift BLE lumbering gait pattern        Transfers:  Sit to stand to sit with use of BUE for support        Bed Mobility:  independent  Balance:          Single Leg Stance:  Unable to assume single leg stance. Good static standing balance   Body Structures Involved:  1. Nerves  2. Bones  3. Joints  4. Muscles  5. Ligaments Body Functions Affected:  1. Sensory/Pain  2. Neuromusculoskeletal  3. Movement Related Activities and Participation Affected:  1. General Tasks and Demands  2. Mobility  3. Self Care  4. Domestic Life  5. Community, Social and Civic Life   CLINICAL DECISION MAKING:   Outcome Measure: Tool Used: Lower Extremity Functional Scale (LEFS)  Score:  Initial: 40/80 Most Recent: X/80 (Date: -- )   Interpretation of Score: 20 questions each scored on a 5 point scale with 0 representing \"extreme difficulty or unable to perform\" and 4 representing \"no difficulty\". The lower the score, the greater the functional disability. 80/80 represents no disability. Minimal detectable change is 9 points.   Score 80 79-63 62-48 47-32 31-16 15-1 0   Modifier CH CI CJ CK CL CM CN     ? Mobility - Walking and Moving Around:     - CURRENT STATUS: CK - 40%-59% impaired, limited or restricted    - GOAL STATUS: CJ - 20%-39% impaired, limited or restricted    - D/C STATUS:  ---------------To be determined---------------    Medical Necessity:   · Patient is expected to demonstrate progress in strength, range of motion and balance to increase independence with functional mobility with decreased pain. Reason for Services/Other Comments:  · Patient continues to require modification of therapeutic interventions to increase complexity of exercises. TREATMENT:   (In addition to Assessment/Re-Assessment sessions the following treatments were rendered)    Therapeutic Exercise: (see flow sheet below for minutes) :  Exercises per grid below to improve mobility, strength and balance. Required minimal visual and verbal cues to promote proper body alignment and promote proper body mechanics. Progressed resistance and range as indicated. Aquatic Therapy (see flow sheet below for minutes): Aquatic treatment performed per flow grid for Decreased muscle strength, Decreased static/dynamic balance and reactive control, Decreased range of motion, Decreased activity endurance and Ease of movement. Cues provided for technique. Assistance by therapist provided for progression of exercises/ activities. Patient has difficulty with low back/ R hip pain. Date: 04/04/18  (EVAL) 4/10/18 4/16/18 4/17/18 04/20/18    Modalities:                                    Manual Therapy:                                    Aquatic Exercise:  1.5#/55 minutes 1.5# 60 min 2.5# 50 min 2.5# 55 min    Gait F/B/S/M  x6L F  x4L B/S/M x4L ea x4L X4L Next visit open paddles   Heel/Toe walk         3-way  x15 BLE x15 BLE x15 BLE     PF/DF  x15 x15  x20    Hamstring Curls  x15 BLE x4L x4L x2L    Hip Flexion with knee ext.   (rockette)  next  x15 BLE x2L    Squats    next x15  x20    SLR march  x4L x4L x4L x4L    Lunges         Hip circles         Hip horizontal abduction/adduction     x20 BLE    Core:flex/ext UE in mini squat      x15 alternating LE stance c open paddles     Core:abd/add UE in modified tandem stance         Deep well bike  3 minutes 3min 3 min 3 min    Deep well jumping nav  1 minute 2 min  1 min    Deep well scissors  1 minute 2 min  1 min    Deep well:  traction  5 minutes 8 min 8 min 8 min             Hamstring Stretch  2 H 30 BLE 2h30 2H30 BLE 2H30 BLE    Step Lunge         Piriformis stretch         PF Stretch         Balance: Single leg Stance         Balance: Tandem                             Therapeutic Exercise:                                                               F=forward  B=Backward  S=sidesteps  M=marches    H=hold    Manual: (see flow sheet above for minutes)   Modalities: (see flow sheet above for minutes)     HEP: Plan to instruct in HEP as therapy progresses  Treatment/Session Assessment: Continued aquatic exercises, maintaining ankle weights at 2.5#. Patient performed exercise in approximately 80% buoyancy, without handrail demonstrating increased dynamic balance with lap exercises and with increased dee. Added exercise (seen above) to continue progressing patient BLE/trunk muscle strength/stability. Plan to continue in aquatic setting, adding UE resistance next visit with lap exercises. · Pain/ Symptoms: Initial:   2/10 LB and cervical pain    Patient stated she was proud to be able to get on her bathing suit today by raising her R leg standing on her LLE where she normally was not able to do that. Patient also reported she is able to get into her car easier than she could prior to therapy. Post Session:  2/10     Patient states she is going to be sore but no pain increase reported. ·   Compliance with Program/Exercises: Will assess as treatment progresses. Recommendations/Intent for next treatment session:  \"Next visit will focus on advancements to more challenging activities\". Plan to continue in aquatic setting, adding UE resistance next visit with lap exercises.       Total Treatment Duration:  PT Patient Time In/Time Out  Time In: 1500  Time Out: 1600     Earlean Nageotte, PTA

## 2018-04-23 ENCOUNTER — APPOINTMENT (OUTPATIENT)
Dept: PHYSICAL THERAPY | Age: 73
End: 2018-04-23
Payer: MEDICARE

## 2018-04-24 ENCOUNTER — HOSPITAL ENCOUNTER (OUTPATIENT)
Dept: PHYSICAL THERAPY | Age: 73
Discharge: HOME | End: 2018-04-24
Payer: MEDICARE

## 2018-04-25 ENCOUNTER — APPOINTMENT (OUTPATIENT)
Dept: PHYSICAL THERAPY | Age: 73
End: 2018-04-25
Payer: MEDICARE

## 2018-04-26 ENCOUNTER — HOSPITAL ENCOUNTER (OUTPATIENT)
Dept: PHYSICAL THERAPY | Age: 73
Discharge: HOME | End: 2018-04-26
Payer: MEDICARE

## 2018-04-26 PROCEDURE — 97113 AQUATIC THERAPY/EXERCISES: CPT

## 2018-04-26 NOTE — PROGRESS NOTES
Timi Mcgowan  : 1945 [address]  [address] [address].  [phone]   [phone] -       OUTPATIENT PHYSICAL THERAPY:Daily Note 2018        ICD-10: Treatment Diagnosis: Pain in right hip (M25.551)  Pain in right knee (M25.561)  Pain in left knee (M25.562)  Difficulty in walking, not elsewhere classified (R26.2)  Precautions/Allergies:   Codeine; Erythromycin; and Tetracycline   Fall Risk Score: 1 (? 5 = High Risk)  MD Orders: Evaluate and Treat: strengthening exercises/ aquatic therapy MEDICAL/REFERRING DIAGNOSIS:  Hip pain [M25.559]   DATE OF ONSET: 10/2017  REFERRING PHYSICIAN: Juhi Ulloa., *  RETURN PHYSICIAN APPOINTMENT: late 2018     INITIAL ASSESSMENT:  Ms. Alejandrina Romero (pt) is a 71year old WF seen for physical therapy per MD orders with complaint of R hip pain, low back pain and B knee pain with long history of back pain and arthritis. Pt evaluated for outpatient physical therapy today with the following deficits: low back, right hip and bilateral knee pain, decreased strength in B hip. Pt would benefit from physical therapy to address the above deficits in order to return to increased independence with functional mobility with decreased pain. Pt would benefit from initially working in aquatic setting to off load spine, bilateral lower extremities while addressing goals. As patient progresses, plan to transition to gravity challenging, land based exercises/ activities. Plan of care and goals reviewed with patient who verbalizes agreement. PROBLEM LIST (Impacting functional limitations):  1. Decreased Strength  2. Decreased ADL/Functional Activities  3. Decreased Ambulation Ability/Technique  4. Decreased Balance  5. Increased Pain  6. Decreased Activity Tolerance  7. Decreased [place] with Home Exercise Program INTERVENTIONS PLANNED:  1. Balance Exercise  2. Gait Training  3.  Home Exercise Program (HEP)  4. Manual Therapy  5. Neuromuscular Re-education/Strengthening  6. Range of Motion (ROM)  7. Therapeutic Activites  8. Therapeutic Exercise/Strengthening  9. modalities   10. aquatics    TREATMENT PLAN:  Effective Dates: 4/4/2018 TO 7/3/2018 (90 days). Frequency/Duration: 2-3 times a week for 90 Days    GOALS: (Goals have been discussed and agreed upon with patient.)  Short-Term Functional Goals: Time Frame: 2 weeks  Pt will demonstrate independence/ compliance with home exercise program for management of symptoms (aquatic). Pt will demonstrate improved Lower Extremity Functional Scale by 3-4 points for improved functional mobility. Pt will tolerate 35 to 40 minutes of aquatic therapy with pain of 2/10. Discharge Goals: Time Frame: 8 weeks  Pt will demonstrate improved manual muscle test score by 1/2 to 1  grade for R hip complex to participate in heavy housework activities and yard work. Pt will ambulate >2000ft with normal gait pattern with even stance time/ step length for bilateral lower extremities for community entry. Pt will report Lower Extremity Functional Scale score of 50/80 for improved functional mobility in home/ community setting. 4.  Pt will demonstrate independence/ compliance with home exercise program for management of symptoms (land). Rehabilitation Potential For Stated Goals: Good                 HISTORY:   History of Present Injury/Illness (Reason for Referral):  Pt is 70 y/o WF seen for PT per MD orders following L DEYSI 10/2017 with multiple complications/ illness over the winter with noted weakness and debility. Pt has complicated hx of arthritis, chronic low back pain as well as spinal stenosis. Pt has sx hx of B TKA (2009, 2011) along with recent hip replacement. Pt reports her goal is to get stronger so she can do yard work and housework as well as get into community to visit with friends.     Past Medical History/Comorbidities:   Ms. Iveth Avelar  has a past medical history of Arthritis; Asthma; Chronic obstructive asthma, unspecified; Chronic pain; DDD (degenerative disc disease); GERD (gastroesophageal reflux disease); Hypertension; Hypothyroidism; HYPOTHYROIDISM; Incontinence of urine; Morbid obesity ([address] Neuropathy; ILYA (obstructive sleep apnea) (9/16/2009); Osteoarthritis of left knee (9/16/2009); Osteoarthritis of right knee (3/2/2011); S/P total knee replacement using cement (9/16/2009); Sleep apnea; Spastic colon; Status post right hip replacement (10/9/2017); and Type II or unspecified type diabetes mellitus without mention of complication, not stated as uncontrolled. She also has no past medical history of Adverse effect of anesthesia; Aneurysm ([address] Arrhythmia; Autoimmune disease ([address] CAD (coronary artery disease); Cancer ([address] Chronic kidney disease; Chronic obstructive pulmonary disease ([address] Coagulation disorder ([address] Difficult intubation; Endocarditis; Heart failure ([address] Ill-defined condition; Liver disease; Malignant hyperthermia due to anesthesia; Nausea & vomiting; Nicotine vapor product user; Non-nicotine vapor product user; Pseudocholinesterase deficiency; Psychiatric disorder; PUD (peptic ulcer disease); Rheumatic fever; Seizures ([address] Stroke [facility]); or Thromboembolus ([address] Ms. Ephraim Saleh  has a past surgical history that includes hx hernia repair (1/30/08); hx hysterectomy (2000); pr hand/finger surgery unlisted (Right, 1990'S); pr total knee arthroplasty (Left, 2009); and hx knee replacement (Right, 2011). Social History/Living Environment:     Pt lives in one story home with 4 steps to enter with unilateral handrail. Pt lives alone. Prior Level of Function/Work/Activity:  Retired   Dominant Side:         RIGHT  Current Medications:    Current Outpatient Prescriptions:     amLODIPine (NORVASC) 5 mg tablet, Take 5 mg by mouth daily. , Disp: , Rfl:     ciprofloxacin HCl (CIPRO) 500 mg tablet, Take  by mouth two (2) times a day. , Disp: , Rfl:     neomycin-colist-hydrocortisone-thonzonium (CORTISPORIN-TC) otic suspension, by Otic route four (4) times daily. , Disp: , Rfl:     Azelastine (ASTEPRO) 0.15 % (205.5 mcg) nasal spray, 2 Sprays by Both Nostrils route as needed. , Disp: , Rfl:     fluticasone (FLONASE ALLERGY RELIEF) 50 mcg/actuation nasal spray, 2 Sprays by Both Nostrils route as needed for Rhinitis., Disp: , Rfl:     budesonide (PULMICORT) 0.5 mg/2 mL nbsp, 500 mcg by Nebulization route two (2) times a day., Disp: , Rfl:     formoterol (PERFOROMIST) 20 mcg/2 mL nebu neb solution, 20 mcg by Nebulization route two (2) times a day., Disp: , Rfl:     Lactobacillus acidoph-L.bulgar (RUMA ASSIST) 100 million cell pwpk, Take  by mouth., Disp: , Rfl:     Biotin 2,500 mcg cap, Take  by mouth., Disp: , Rfl:     cranberry extract 450 mg tab tablet, Take 450 mg by mouth., Disp: , Rfl:     OTHER, Vein and leg support--- 1 tab po qd, Disp: , Rfl:     estradiol (ESTRACE) 0.01 % (0.1 mg/gram) vaginal cream, Insert 1 g into vagina daily. , Disp: , Rfl:     neomycin-polymyxin-hydrocortisone, buffered, (PEDIOTIC) 3.5-10,000-1 mg/mL-unit/mL-% otic suspension, Administer 2 Drops into each ear daily. , Disp: , Rfl:     traMADol (ULTRAM) 50 mg tablet, Take 50 mg by mouth every six (6) hours as needed for Pain., Disp: , Rfl:     HYDROmorphone (DILAUDID) 2 mg tablet, Take 1 Tab by mouth every four (4) hours as needed. Max Daily Amount: 12 mg., Disp: 40 Tab, Rfl: 0    SITagliptin (JANUVIA) 100 mg tablet, Take 100 mg by mouth daily. , Disp: , Rfl:     lidocaine-kinesiology tape 5 % kit, by Apply Externally route., Disp: , Rfl:     OTHER, three (3) times daily. Lidocaine patch 5%, Disp: , Rfl:     oxybutynin chloride XL (DITROPAN XL) 5 mg CR tablet, Take 5 mg by mouth daily. , Disp: , Rfl:     polyethylene glycol (MIRALAX) 17 gram packet, Take 17 g by mouth as needed. , Disp: , Rfl:     cpap machine kit, by Does Not Apply route.  9 cm H2O, Disp: , Rfl:     FLUTICASONE/SALMETEROL (ADVAIR DISKUS IN), Take 2 Puffs by inhalation two (2) times daily as needed. , Disp: , Rfl:     celecoxib (CELEBREX) 200 mg capsule, Take 200 mg by mouth daily. 1 to 2 a day. , Disp: , Rfl:     PREGABALIN (LYRICA PO), Take 150 mg by mouth two (2) times a day., Disp: , Rfl:     montelukast (SINGULAIR) 10 mg tablet, Take 10 mg by mouth daily. , Disp: , Rfl:     NEOMY SULF/POLYMYX B SULF/HC (CORTISPORIN OT), by Otic route as needed. 1 to 2 drops in each ear as needed. , Disp: , Rfl:     MULTIVITAMIN WITH MINERALS (MULTIVITAMIN & MINERAL FORMULA PO), Take 1 Tab by mouth daily. Multivitamin with D with Calcium , Disp: , Rfl:     metaxalone (SKELAXIN) 800 mg tablet, Take 1 Tab by mouth three (3) times daily as needed for Pain. (Patient taking differently: Take 800 mg by mouth two (2) times a day.), Disp: 1 Tab, Rfl: 0    OMEPRAZOLE PO, 20 mg daily. Pt. Instructed to take morning of surgery per anesthesiologist.  , Disp: , Rfl:     CYANOCOBALAMIN (VITAMIN B-12 PO), Take 500 mg by mouth daily. , Disp: , Rfl:     LISINOPRIL-HYDROCHLOROTHIAZIDE 10-12.5 mg per tablet, take 1 Tab by mouth daily. , Disp: , Rfl:     LASIX 20 mg Tab, Take 20 mg by mouth daily as needed. , Disp: , Rfl:     GLYBURIDE-METFORMIN 2.5-500 mg per tablet, take 1 Tab by mouth two (2) times a day., Disp: , Rfl:     SYNTHROID 100 mcg Tab, Take 100 mcg by mouth daily. Pt. Instructed to take morning of surgery per anesthesiologist.  , Disp: , Rfl:     PATANOL 0.1 % Drop, 2 Drops by Both Eyes route as needed for Allergies. , Disp: , Rfl:     AMBIEN 10 mg Tab, Take 10 mg by mouth nightly., Disp: , Rfl:     LIBRAX, WITH CLINIDIUM, 2.5-5 mg Cap, Take 1 Cap by mouth four (4) times daily as needed. , Disp: , Rfl:    Date Last Reviewed:  4/26/2018   EXAMINATION:   Observation/Orthostatic Postural Assessment:          Morbid obesity  Palpation:          Pitting edema in B ankle  Circumferential measurements:  30 cm R/L (above malleolus)  ROM:  BUE/ cervical WNL for patient                     Date:  04/04/18 Date:   Date:     Trunk ROM 25 % limitation                          LE ROM Left Right       Hip Flexion [facility]       Hip Abduction [facility]       Straight Leg Raise (SLR) N/T N/T                Knee Flexion 125 125       Knee Extension 0 0                Ankle Dorsiflexion [facility] WFL         Strength:     Date:  04/04/18 Date:   Date:     LE MMT Left Right Left Right Left Right   Hip Flex (L1/L2) 4/5 4-/5       Hip Abd (glut med) 4/5 4/5       Hip Ext 4-/5 4-/5       Quad    ( L3/4) 4/5 4-/5       Hamstring 4/5 4-/5       Anterior Tib (L4/L5)         Gastroc (S1/2)         EHL (L5)                             Special Tests:   deferred  Neurological Screen:        Sensation: Intact for light touch  Functional Mobility:         Gait/Ambulation:  Amb without A.D with increased lateral wt shift BLE lumbering gait pattern        Transfers:  Sit to stand to sit with use of BUE for support        Bed Mobility:  independent  Balance:          Single Leg Stance:  Unable to assume single leg stance. Good static standing balance   Body Structures Involved:  1. Nerves  2. Bones  3. Joints  4. Muscles  5. Ligaments Body Functions Affected:  1. Sensory/Pain  2. Neuromusculoskeletal  3. Movement Related Activities and Participation Affected:  1. General Tasks and Demands  2. Mobility  3. Self Care  4. Domestic Life  5. Community, Social and Civic Life   CLINICAL DECISION MAKING:   Outcome Measure: Tool Used: Lower Extremity Functional Scale (LEFS)  Score:  Initial: 40/80 Most Recent: X/80 (Date: -- )   Interpretation of Score: 20 questions each scored on a 5 point scale with 0 representing \"extreme difficulty or unable to perform\" and 4 representing \"no difficulty\". The lower the score, the greater the functional disability. 80/80 represents no disability. Minimal detectable change is 9 points.   Score 80 79-63 62-48 47-32 31-16 15-1 0   Modifier CH CI CJ CK CL CM CN     ? Mobility - Walking and Moving Around:     - CURRENT STATUS: CK - 40%-59% impaired, limited or restricted    - GOAL STATUS: CJ - 20%-39% impaired, limited or restricted    - D/C STATUS:  ---------------To be determined---------------    Medical Necessity:   · Patient is expected to demonstrate progress in strength, range of motion and balance to increase independence with functional mobility with decreased pain. Reason for Services/Other Comments:  · Patient continues to require modification of therapeutic interventions to increase complexity of exercises. TREATMENT:   (In addition to Assessment/Re-Assessment sessions the following treatments were rendered)    Therapeutic Exercise: (see flow sheet below for minutes) :  Exercises per grid below to improve mobility, strength and balance. Required minimal visual and verbal cues to promote proper body alignment and promote proper body mechanics. Progressed resistance and range as indicated. Aquatic Therapy (see flow sheet below for minutes): Aquatic treatment performed per flow grid for Decreased muscle strength, Decreased static/dynamic balance and reactive control, Decreased range of motion, Decreased activity endurance and Ease of movement. Cues provided for technique. Assistance by therapist provided for progression of exercises/ activities. Patient has difficulty with low back/ R hip pain. Date: 04/04/18  (CATAAL) 4/10/18 4/16/18 4/17/18 04/20/18 04/26/18   Modalities:                                    Manual Therapy:                                    Aquatic Exercise:  1.5#/55 minutes 1.5# 60 min 2.5# 50 min 2.5# 55 min 2.5# 55 min   Gait F/B/S/M  x6L F  x4L B/S/M x4L ea x4L X4L X4L c open paddles   Heel/Toe walk         3-way  x15 BLE x15 BLE x15 BLE     PF/DF  x15 x15  x20 x20   Hamstring Curls  x15 BLE x4L x4L x2L x20 BLE   Hip Flexion with knee ext.   (rockette)  next  x15 BLE x2L x2L   Squats    next x15 x20    SLR march  x4L x4L x4L x4L X4L   Lunges         Hip circles         Hip horizontal abduction/adduction     x20 BLE x20 BLE   Core:flex/ext UE in mini squat      x15 alternating LE stance c open paddles  x15 c open paddles   Core: horz. abd/add UE in modified tandem stance      x15 c open paddles   Deep well bike  3 minutes 3min 3 min 3 min 3 min   Deep well jumping nav  1 minute 2 min  1 min    Deep well scissors  1 minute 2 min  1 min 1 min   Deep well:  traction  5 minutes 8 min 8 min 8 min 8 min            Hamstring Stretch  2 H 30 BLE 2h30 2H30 BLE 2H30 BLE    Step Lunge         Piriformis stretch         PF Stretch         Balance: Single leg Stance         Balance: Tandem                             Therapeutic Exercise:                                                               F=forward  B=Backward  S=sidesteps  M=marches    H=hold    Manual: (see flow sheet above for minutes)   Modalities: (see flow sheet above for minutes)     HEP: Plan to instruct in HEP as therapy progresses  Treatment/Session Assessment: Patient mixed up appointment times and arrived at 11:00 for 1:30 appointment, therapist was able to see patient due to cancellation. Continued aquatic exercises, maintaining ankle weights at 2.5#. Patient performed exercise in approximately 80% buoyancy, adding open paddles to lap exercises in order to challenge patient's core strength. Patient demonstrated unsteady gait pattern with wide YOLANDA in order to maintain balance. Added core exercise seen above with good tolerance. Patient appeared fatigued but reported no increase in pain. Plan to continue in aquatic setting next visit. · Pain/ Symptoms: Initial:   3/10 LB and cervical pain    Patient reports she is having a rough day and plans to go home and rest following PT. Post Session:  1-2/10    ·   Compliance with Program/Exercises: Will assess as treatment progresses. Recommendations/Intent for next treatment session:  \"Next visit will focus on advancements to more challenging activities\". Plan to continue in aquatic setting next visit.   Total Treatment Duration:  PT Patient Time In/Time Out  Time In: 1100  Time Out: 1200     Edgar Hamilton PTA

## 2018-04-27 ENCOUNTER — HOSPITAL ENCOUNTER (OUTPATIENT)
Dept: PHYSICAL THERAPY | Age: 73
Discharge: HOME | End: 2018-04-27
Payer: MEDICARE

## 2018-04-27 ENCOUNTER — APPOINTMENT (OUTPATIENT)
Dept: PHYSICAL THERAPY | Age: 73
End: 2018-04-27
Payer: MEDICARE

## 2018-04-27 PROCEDURE — 97113 AQUATIC THERAPY/EXERCISES: CPT

## 2018-04-27 PROCEDURE — G8978 MOBILITY CURRENT STATUS: HCPCS

## 2018-04-27 PROCEDURE — G8979 MOBILITY GOAL STATUS: HCPCS

## 2018-04-27 NOTE — PROGRESS NOTES
Vivi Mcgowan  : 1945 [id number] [facility],[address]  [address] [place] [address].  [phone]   [phone] -       OUTPATIENT PHYSICAL THERAPY:Daily Note and Progress Report 2018        ICD-10: Treatment Diagnosis: Pain in right hip (M25.551)  Pain in right knee (M25.561)  Pain in left knee (M25.562)  Difficulty in walking, not elsewhere classified (R26.2)  Precautions/Allergies:   Codeine; Erythromycin; and Tetracycline   Fall Risk Score: 1 (? 5 = High Risk)  MD Orders: Evaluate and Treat: strengthening exercises/ aquatic therapy MEDICAL/REFERRING DIAGNOSIS:  Hip pain [M25.559]   DATE OF ONSET: 10/2017  REFERRING PHYSICIAN: Gerald Hook., *  RETURN PHYSICIAN APPOINTMENT: late 2018     INITIAL ASSESSMENT:  Ms. Piedad Dejesus (pt) is a 71year old WF seen for physical therapy per MD orders with complaint of R hip pain, low back pain and B knee pain with long history of back pain and arthritis. Pt evaluated for outpatient physical therapy today with the following deficits: low back, right hip and bilateral knee pain, decreased strength in B hip. Pt would benefit from physical therapy to address the above deficits in order to return to increased independence with functional mobility with decreased pain. Pt would benefit from initially working in aquatic setting to off load spine, bilateral lower extremities while addressing goals. As patient progresses, plan to transition to gravity challenging, land based exercises/ activities. Plan of care and goals reviewed with patient who verbalizes agreement. PROBLEM LIST (Impacting functional limitations):  1. Decreased Strength  2. Decreased ADL/Functional Activities  3. Decreased Ambulation Ability/Technique  4. Decreased Balance  5. Increased Pain  6. Decreased Activity Tolerance  7. Decreased Phillips with Home Exercise Program INTERVENTIONS PLANNED:  1. Balance Exercise  2. Gait Training  3.  Home Exercise Program (HEP)  4. Manual Therapy  5. Neuromuscular Re-education/Strengthening  6. Range of Motion (ROM)  7. Therapeutic Activites  8. Therapeutic Exercise/Strengthening  9. modalities   10. aquatics    TREATMENT PLAN:  Effective Dates: 4/4/2018 TO 7/3/2018 (90 days). Frequency/Duration: 2-3 times a week for 90 Days    GOALS: (Goals have been discussed and agreed upon with patient.)  Short-Term Functional Goals: Time Frame: 2 weeks  Pt will demonstrate independence/ compliance with home exercise program for management of symptoms (aquatic). (met 04/27/18)  Pt will demonstrate improved Lower Extremity Functional Scale by 3-4 points for improved functional mobility. (in progress 04/27/18)  Pt will tolerate 35 to 40 minutes of aquatic therapy with pain of 2/10. (met 04/27/18)      Discharge Goals: Time Frame: 8 weeks  Pt will demonstrate improved manual muscle test score by 1/2 to 1  grade for R hip complex to participate in heavy housework activities and yard work. (in progress 04/27/18)  Pt will ambulate >2000ft with normal gait pattern with even stance time/ step length for bilateral lower extremities for community entry.  (in progress 04/27/18)  Pt will report Lower Extremity Functional Scale score of 50/80 for improved functional mobility in home/ community setting. (in progress 04/27/18)  4. Pt will demonstrate independence/ compliance with home exercise program for management of symptoms (land). (in progress 04/27/18)    Rehabilitation Potential For Stated Goals: Good                 HISTORY:   History of Present Injury/Illness (Reason for Referral):  Pt is 70 y/o WF seen for PT per MD orders following L DEYSI 10/2017 with multiple complications/ illness over the winter with noted weakness and debility. Pt has complicated hx of arthritis, chronic low back pain as well as spinal stenosis. Pt has sx hx of B TKA (2009, 2011) along with recent hip replacement.   Pt reports her goal is to get stronger so she can do yard work and housework as well as get into community to visit with friends. Past Medical History/Comorbidities:   Ms. Kate Chandler  has a past medical history of Arthritis; Asthma; Chronic obstructive asthma, unspecified; Chronic pain; DDD (degenerative disc disease); GERD (gastroesophageal reflux disease); Hypertension; Hypothyroidism; HYPOTHYROIDISM; Incontinence of urine; Morbid obesity ([address] Neuropathy; ILYA (obstructive sleep apnea) (9/16/2009); Osteoarthritis of left knee (9/16/2009); Osteoarthritis of right knee (3/2/2011); S/P total knee replacement using cement (9/16/2009); Sleep apnea; Spastic colon; Status post right hip replacement (10/9/2017); and Type II or unspecified type diabetes mellitus without mention of complication, not stated as uncontrolled. She also has no past medical history of Adverse effect of anesthesia; Aneurysm ([address] Arrhythmia; Autoimmune disease ([address] CAD (coronary artery disease); Cancer ([address] Chronic kidney disease; Chronic obstructive pulmonary disease ([address] Coagulation disorder ([address] Difficult intubation; Endocarditis; Heart failure ([address] Ill-defined condition; Liver disease; Malignant hyperthermia due to anesthesia; Nausea & vomiting; Nicotine vapor product user; Non-nicotine vapor product user; Pseudocholinesterase deficiency; Psychiatric disorder; PUD (peptic ulcer disease); Rheumatic fever; Seizures ([address] Stroke [facility]); or Thromboembolus ([address] Ms. Kate Chandler  has a past surgical history that includes hx hernia repair (1/30/08); hx hysterectomy (2000); pr hand/finger surgery unlisted (Right, 1990'S); pr total knee arthroplasty (Left, 2009); and hx knee replacement (Right, 2011). Social History/Living Environment:     Pt lives in one story home with 4 steps to enter with unilateral handrail. Pt lives alone.   Prior Level of Function/Work/Activity:  Retired   Dominant Side:         RIGHT  Current Medications:    Current Outpatient Prescriptions:    amLODIPine (NORVASC) 5 mg tablet, Take 5 mg by mouth daily. , Disp: , Rfl:     ciprofloxacin HCl (CIPRO) 500 mg tablet, Take  by mouth two (2) times a day., Disp: , Rfl:     neomycin-colist-hydrocortisone-thonzonium (CORTISPORIN-TC) otic suspension, by Otic route four (4) times daily. , Disp: , Rfl:     Azelastine (ASTEPRO) 0.15 % (205.5 mcg) nasal spray, 2 Sprays by Both Nostrils route as needed. , Disp: , Rfl:     fluticasone (FLONASE ALLERGY RELIEF) 50 mcg/actuation nasal spray, 2 Sprays by Both Nostrils route as needed for Rhinitis., Disp: , Rfl:     budesonide (PULMICORT) 0.5 mg/2 mL nbsp, 500 mcg by Nebulization route two (2) times a day., Disp: , Rfl:     formoterol (PERFOROMIST) 20 mcg/2 mL nebu neb solution, 20 mcg by Nebulization route two (2) times a day., Disp: , Rfl:     Lactobacillus acidoph-L.bulgar (RUMA ASSIST) 100 million cell pwpk, Take  by mouth., Disp: , Rfl:     Biotin 2,500 mcg cap, Take  by mouth., Disp: , Rfl:     cranberry extract 450 mg tab tablet, Take 450 mg by mouth., Disp: , Rfl:     OTHER, Vein and leg support--- 1 tab po qd, Disp: , Rfl:     estradiol (ESTRACE) 0.01 % (0.1 mg/gram) vaginal cream, Insert 1 g into vagina daily. , Disp: , Rfl:     neomycin-polymyxin-hydrocortisone, buffered, (PEDIOTIC) 3.5-10,000-1 mg/mL-unit/mL-% otic suspension, Administer 2 Drops into each ear daily. , Disp: , Rfl:     traMADol (ULTRAM) 50 mg tablet, Take 50 mg by mouth every six (6) hours as needed for Pain., Disp: , Rfl:     HYDROmorphone (DILAUDID) 2 mg tablet, Take 1 Tab by mouth every four (4) hours as needed. Max Daily Amount: 12 mg., Disp: 40 Tab, Rfl: 0    SITagliptin (JANUVIA) 100 mg tablet, Take 100 mg by mouth daily. , Disp: , Rfl:     lidocaine-kinesiology tape 5 % kit, by Apply Externally route., Disp: , Rfl:     OTHER, three (3) times daily. Lidocaine patch 5%, Disp: , Rfl:     oxybutynin chloride XL (DITROPAN XL) 5 mg CR tablet, Take 5 mg by mouth daily. , Disp: , Rfl:    polyethylene glycol (MIRALAX) 17 gram packet, Take 17 g by mouth as needed. , Disp: , Rfl:     cpap machine kit, by Does Not Apply route. 9 cm H2O, Disp: , Rfl:     FLUTICASONE/SALMETEROL (ADVAIR DISKUS IN), Take 2 Puffs by inhalation two (2) times daily as needed. , Disp: , Rfl:     celecoxib (CELEBREX) 200 mg capsule, Take 200 mg by mouth daily. 1 to 2 a day. , Disp: , Rfl:     PREGABALIN (LYRICA PO), Take 150 mg by mouth two (2) times a day., Disp: , Rfl:     montelukast (SINGULAIR) 10 mg tablet, Take 10 mg by mouth daily. , Disp: , Rfl:     NEOMY SULF/POLYMYX B SULF/HC (CORTISPORIN OT), by Otic route as needed. 1 to 2 drops in each ear as needed. , Disp: , Rfl:     MULTIVITAMIN WITH MINERALS (MULTIVITAMIN & MINERAL FORMULA PO), Take 1 Tab by mouth daily. Multivitamin with D with Calcium , Disp: , Rfl:     metaxalone (SKELAXIN) 800 mg tablet, Take 1 Tab by mouth three (3) times daily as needed for Pain. (Patient taking differently: Take 800 mg by mouth two (2) times a day.), Disp: 1 Tab, Rfl: 0    OMEPRAZOLE PO, 20 mg daily. Pt. Instructed to take morning of surgery per anesthesiologist.  , Disp: , Rfl:     CYANOCOBALAMIN (VITAMIN B-12 PO), Take 500 mg by mouth daily. , Disp: , Rfl:     LISINOPRIL-HYDROCHLOROTHIAZIDE 10-12.5 mg per tablet, take 1 Tab by mouth daily. , Disp: , Rfl:     LASIX 20 mg Tab, Take 20 mg by mouth daily as needed. , Disp: , Rfl:     GLYBURIDE-METFORMIN 2.5-500 mg per tablet, take 1 Tab by mouth two (2) times a day., Disp: , Rfl:     SYNTHROID 100 mcg Tab, Take 100 mcg by mouth daily. Pt. Instructed to take morning of surgery per anesthesiologist.  , Disp: , Rfl:     PATANOL 0.1 % Drop, 2 Drops by Both Eyes route as needed for Allergies. , Disp: , Rfl:     AMBIEN 10 mg Tab, Take 10 mg by mouth nightly., Disp: , Rfl:     LIBRAX, WITH CLINIDIUM, 2.5-5 mg Cap, Take 1 Cap by mouth four (4) times daily as needed. , Disp: , Rfl:    Date Last Reviewed:  4/27/2018   EXAMINATION: Observation/Orthostatic Postural Assessment:          Morbid obesity  Palpation:          Pitting edema in B ankle  Circumferential measurements:  30 cm R/L (above malleolus)  ROM:  BUE/ cervical WNL for patient                     Date:  04/04/18 Date:  04/27/18  Grossly WFL Date:     Trunk ROM 25 % limitation                          LE ROM Left Right       Hip Flexion [facility]       Hip Abduction [facility]       Straight Leg Raise (SLR) N/T N/T                Knee Flexion 125 125       Knee Extension 0 0                Ankle Dorsiflexion [facility] WFL         Strength:     Date:  04/04/18 Date:  04/27/18  Grossly 4- to 4/5 Date:     LE MMT Left Right Left Right Left Right   Hip Flex (L1/L2) 4/5 4-/5  -4/5     Hip Abd (glut med) 4/5 4/5       Hip Ext 4-/5 4-/5  -4/5     Quad    ( L3/4) 4/5 4-/5  4/5     Hamstring 4/5 4-/5  4/5     Anterior Tib (L4/L5)         Gastroc (S1/2)         EHL (L5)                             Special Tests:   deferred  Neurological Screen:        Sensation: Intact for light touch  Functional Mobility:         Gait/Ambulation:  Amb without A.D with increased lateral wt shift BLE lumbering gait pattern        Transfers:  Sit to stand to sit with use of BUE for support        Bed Mobility:  independent  Balance:          Single Leg Stance:  Unable to assume single leg stance. Good static standing balance   Body Structures Involved:  1. Nerves  2. Bones  3. Joints  4. Muscles  5. Ligaments Body Functions Affected:  1. Sensory/Pain  2. Neuromusculoskeletal  3. Movement Related Activities and Participation Affected:  1. General Tasks and Demands  2. Mobility  3. Self Care  4. Domestic Life  5. Community, Social and Civic Life   CLINICAL DECISION MAKING:   Outcome Measure:    Tool Used: Lower Extremity Functional Scale (LEFS)  Score:  Initial: 40/80 Most Recent: 42/80 (Date: 04/27/18 )   Interpretation of Score: 20 questions each scored on a 5 point scale with 0 representing \"extreme difficulty or unable to perform\" and 4 representing \"no difficulty\". The lower the score, the greater the functional disability. 80/80 represents no disability. Minimal detectable change is 9 points. Score 80 79-63 62-48 47-32 31-16 15-1 0   Modifier CH CI CJ CK CL CM CN     ? Mobility - Walking and Moving Around:     - CURRENT STATUS: CK - 40%-59% impaired, limited or restricted    - GOAL STATUS: CJ - 20%-39% impaired, limited or restricted    - D/C STATUS:  ---------------To be determined---------------    Medical Necessity:   · Patient is expected to demonstrate progress in strength, range of motion and balance to increase independence with functional mobility with decreased pain. Reason for Services/Other Comments:  · Patient continues to require modification of therapeutic interventions to increase complexity of exercises. TREATMENT:   (In addition to Assessment/Re-Assessment sessions the following treatments were rendered)    Therapeutic Exercise: (see flow sheet below for minutes) :  Exercises per grid below to improve mobility, strength and balance. Required minimal visual and verbal cues to promote proper body alignment and promote proper body mechanics. Progressed resistance and range as indicated. Aquatic Therapy (see flow sheet below for minutes): Aquatic treatment performed per flow grid for Decreased muscle strength, Decreased static/dynamic balance and reactive control, Decreased range of motion, Decreased activity endurance and Ease of movement. Cues provided for technique. Assistance by therapist provided for progression of exercises/ activities. Patient has difficulty with low back/ R hip pain.      Date: 04/04/18  (EVAL) 4/10/18 4/16/18 4/17/18 04/20/18 04/26/18 04/27/18     Modalities:                                                Manual Therapy:                                                Aquatic Exercise:  1.5#/55 minutes 1.5# 60 min 2.5# 50 min 2.5# 55 min 2.5# 55 min 2.5# 60 min     Gait F/B/S/M  x6L F  x4L B/S/M x4L ea x4L X4L X4L c open paddles X4L with open paddles     Heel/Toe walk            3-way  x15 BLE x15 BLE x15 BLE   X15 BLE     PF/DF  x15 x15  x20 x20 x20     Hamstring Curls  x15 BLE x4L x4L x2L x20 BLE x2L     Hip Flexion with knee ext. (rockette)  next  x15 BLE x2L x2L x2L     Squats    next x15  x20  X15 pull downs with open paddles     SLR march  x4L x4L x4L x4L X4L x4L     Lunges            Hip circles            Hip horizontal abduction/adduction     x20 BLE x20 BLE X20 BLE     Core:flex/ext UE in mini squat      x15 alternating LE stance c open paddles  x15 c open paddles      Core: horz. abd/add UE in modified tandem stance      x15 c open paddles X15 with open paddles     Deep well bike  3 minutes 3min 3 min 3 min 3 min 3 min     Deep well jumping nav  1 minute 2 min  1 min  1 min     Deep well scissors  1 minute 2 min  1 min 1 min 1 min     Deep well:  traction  5 minutes 8 min 8 min 8 min 8 min 5 min                 Hamstring Stretch  2 H 30 BLE 2h30 2H30 BLE 2H30 BLE  2H30 BLE     Step Lunge            Piriformis stretch            PF Stretch            Balance: Single leg Stance            Balance: Tandem                                      Therapeutic Exercise:                                                                                    F=forward  B=Backward  S=sidesteps  M=marches    H=hold    Manual: (see flow sheet above for minutes)   Modalities: (see flow sheet above for minutes)     HEP: Plan to instruct in HEP as therapy progresses  Treatment/Session Assessment: Patient arrived 10 minutes late with increased soreness from yesterdays work out. Pt is slowly progressing- may be related to decreased consistency with HEP per pt report. Progress note written today with noted some STG met however slower progress than expected likely due to compliance- emphasis on continuing HEP from [facility] rehab which pt reports has copy of and will start doing.   Plan to continue in low impact aquatics to address STG and also LTG. · Pain/ Symptoms: Initial:   3/10  B shoulder pain from work out yesterday. Post Session:  1/10    ·   Compliance with Program/Exercises: Pt reports not consistently  Recommendations/Intent for next treatment session: \"Next visit will focus on advancements to more challenging activities\". Plan to continue in aquatic setting next visit.   Total Treatment Duration:  PT Patient Time In/Time Out  Time In: 1110  Time Out: [address], PT

## 2018-04-30 ENCOUNTER — APPOINTMENT (OUTPATIENT)
Dept: PHYSICAL THERAPY | Age: 73
End: 2018-04-30
Payer: MEDICARE

## 2018-04-30 ENCOUNTER — HOSPITAL ENCOUNTER (OUTPATIENT)
Dept: PHYSICAL THERAPY | Age: 73
Discharge: HOME | End: 2018-04-30
Payer: MEDICARE

## 2018-04-30 PROCEDURE — 97113 AQUATIC THERAPY/EXERCISES: CPT

## 2018-04-30 NOTE — PROGRESS NOTES
Bailey Mcgowan  : 1945 [id number] [facility],[address]  [facility] [address]  [phone]   [phone] -       OUTPATIENT PHYSICAL THERAPY:Daily Note 2018        ICD-10: Treatment Diagnosis: Pain in right hip (M25.551)  Pain in right knee (M25.561)  Pain in left knee (M25.562)  Difficulty in walking, not elsewhere classified (R26.2)  Precautions/Allergies:   Codeine; Erythromycin; and Tetracycline   Fall Risk Score: 1 (? 5 = High Risk)  MD Orders: Evaluate and Treat: strengthening exercises/ aquatic therapy MEDICAL/REFERRING DIAGNOSIS:  Hip pain [M25.559]   DATE OF ONSET: 10/2017  REFERRING PHYSICIAN: Jayda Stone., *  RETURN PHYSICIAN APPOINTMENT: late 2018     INITIAL ASSESSMENT:  Ms. Thao Crooks (pt) is a 71year old WF seen for physical therapy per MD orders with complaint of R hip pain, low back pain and B knee pain with long history of back pain and arthritis. Pt evaluated for outpatient physical therapy today with the following deficits: low back, right hip and bilateral knee pain, decreased strength in B hip. Pt would benefit from physical therapy to address the above deficits in order to return to increased independence with functional mobility with decreased pain. Pt would benefit from initially working in aquatic setting to off load spine, bilateral lower extremities while addressing goals. As patient progresses, plan to transition to gravity challenging, land based exercises/ activities. Plan of care and goals reviewed with patient who verbalizes agreement. PROBLEM LIST (Impacting functional limitations):  1. Decreased Strength  2. Decreased ADL/Functional Activities  3. Decreased Ambulation Ability/Technique  4. Decreased Balance  5. Increased Pain  6. Decreased Activity Tolerance  7. Decreased [place] with Home Exercise Program INTERVENTIONS PLANNED:  1. Balance Exercise  2. Gait Training  3.  Home Exercise Program (HEP)  4. Manual Therapy  5. Neuromuscular Re-education/Strengthening  6. Range of Motion (ROM)  7. Therapeutic Activites  8. Therapeutic Exercise/Strengthening  9. modalities   10. aquatics    TREATMENT PLAN:  Effective Dates: 4/4/2018 TO 7/3/2018 (90 days). Frequency/Duration: 2-3 times a week for 90 Days    GOALS: (Goals have been discussed and agreed upon with patient.)  Short-Term Functional Goals: Time Frame: 2 weeks  Pt will demonstrate independence/ compliance with home exercise program for management of symptoms (aquatic). (met 04/27/18)  Pt will demonstrate improved Lower Extremity Functional Scale by 3-4 points for improved functional mobility. (in progress 04/27/18)  Pt will tolerate 35 to 40 minutes of aquatic therapy with pain of 2/10. (met 04/27/18)      Discharge Goals: Time Frame: 8 weeks  Pt will demonstrate improved manual muscle test score by 1/2 to 1  grade for R hip complex to participate in heavy housework activities and yard work. (in progress 04/27/18)  Pt will ambulate >2000ft with normal gait pattern with even stance time/ step length for bilateral lower extremities for community entry.  (in progress 04/27/18)  Pt will report Lower Extremity Functional Scale score of 50/80 for improved functional mobility in home/ community setting. (in progress 04/27/18)  4. Pt will demonstrate independence/ compliance with home exercise program for management of symptoms (land). (in progress 04/27/18)    Rehabilitation Potential For Stated Goals: Good                 HISTORY:   History of Present Injury/Illness (Reason for Referral):  Pt is 70 y/o WF seen for PT per MD orders following L DEYSI 10/2017 with multiple complications/ illness over the winter with noted weakness and debility. Pt has complicated hx of arthritis, chronic low back pain as well as spinal stenosis. Pt has sx hx of B TKA (2009, 2011) along with recent hip replacement.   Pt reports her goal is to get stronger so she can do yard work and housework as well as get into community to visit with friends. Past Medical History/Comorbidities:   Ms. Corazon Cruz  has a past medical history of Arthritis; Asthma; Chronic obstructive asthma, unspecified; Chronic pain; DDD (degenerative disc disease); GERD (gastroesophageal reflux disease); Hypertension; Hypothyroidism; HYPOTHYROIDISM; Incontinence of urine; Morbid obesity ([address] Neuropathy; ILYA (obstructive sleep apnea) (9/16/2009); Osteoarthritis of left knee (9/16/2009); Osteoarthritis of right knee (3/2/2011); S/P total knee replacement using cement (9/16/2009); Sleep apnea; Spastic colon; Status post right hip replacement (10/9/2017); and Type II or unspecified type diabetes mellitus without mention of complication, not stated as uncontrolled. She also has no past medical history of Adverse effect of anesthesia; Aneurysm ([address] Arrhythmia; Autoimmune disease ([address] CAD (coronary artery disease); Cancer ([address] Chronic kidney disease; Chronic obstructive pulmonary disease ([address] Coagulation disorder ([address] Difficult intubation; Endocarditis; Heart failure ([address] Ill-defined condition; Liver disease; Malignant hyperthermia due to anesthesia; Nausea & vomiting; Nicotine vapor product user; Non-nicotine vapor product user; Pseudocholinesterase deficiency; Psychiatric disorder; PUD (peptic ulcer disease); Rheumatic fever; Seizures ([address] Stroke [facility]); or Thromboembolus ([address] Ms. Corazon Cruz  has a past surgical history that includes hx hernia repair (1/30/08); hx hysterectomy (2000); pr hand/finger surgery unlisted (Right, 1990'S); pr total knee arthroplasty (Left, 2009); and hx knee replacement (Right, 2011). Social History/Living Environment:     Pt lives in one story home with 4 steps to enter with unilateral handrail. Pt lives alone.   Prior Level of Function/Work/Activity:  Retired   Dominant Side:         RIGHT  Current Medications:    Current Outpatient Prescriptions:     amLODIPine (NORVASC) 5 mg tablet, Take 5 mg by mouth daily. , Disp: , Rfl:     ciprofloxacin HCl (CIPRO) 500 mg tablet, Take  by mouth two (2) times a day., Disp: , Rfl:     neomycin-colist-hydrocortisone-thonzonium (CORTISPORIN-TC) otic suspension, by Otic route four (4) times daily. , Disp: , Rfl:     Azelastine (ASTEPRO) 0.15 % (205.5 mcg) nasal spray, 2 Sprays by Both Nostrils route as needed. , Disp: , Rfl:     fluticasone (FLONASE ALLERGY RELIEF) 50 mcg/actuation nasal spray, 2 Sprays by Both Nostrils route as needed for Rhinitis., Disp: , Rfl:     budesonide (PULMICORT) 0.5 mg/2 mL nbsp, 500 mcg by Nebulization route two (2) times a day., Disp: , Rfl:     formoterol (PERFOROMIST) 20 mcg/2 mL nebu neb solution, 20 mcg by Nebulization route two (2) times a day., Disp: , Rfl:     Lactobacillus acidoph-L.bulgar (RUMA ASSIST) 100 million cell pwpk, Take  by mouth., Disp: , Rfl:     Biotin 2,500 mcg cap, Take  by mouth., Disp: , Rfl:     cranberry extract 450 mg tab tablet, Take 450 mg by mouth., Disp: , Rfl:     OTHER, Vein and leg support--- 1 tab po qd, Disp: , Rfl:     estradiol (ESTRACE) 0.01 % (0.1 mg/gram) vaginal cream, Insert 1 g into vagina daily. , Disp: , Rfl:     neomycin-polymyxin-hydrocortisone, buffered, (PEDIOTIC) 3.5-10,000-1 mg/mL-unit/mL-% otic suspension, Administer 2 Drops into each ear daily. , Disp: , Rfl:     traMADol (ULTRAM) 50 mg tablet, Take 50 mg by mouth every six (6) hours as needed for Pain., Disp: , Rfl:     HYDROmorphone (DILAUDID) 2 mg tablet, Take 1 Tab by mouth every four (4) hours as needed. Max Daily Amount: 12 mg., Disp: 40 Tab, Rfl: 0    SITagliptin (JANUVIA) 100 mg tablet, Take 100 mg by mouth daily. , Disp: , Rfl:     lidocaine-kinesiology tape 5 % kit, by Apply Externally route., Disp: , Rfl:     OTHER, three (3) times daily. Lidocaine patch 5%, Disp: , Rfl:     oxybutynin chloride XL (DITROPAN XL) 5 mg CR tablet, Take 5 mg by mouth daily. , Disp: , Rfl:    polyethylene glycol (MIRALAX) 17 gram packet, Take 17 g by mouth as needed. , Disp: , Rfl:     cpap machine kit, by Does Not Apply route. 9 cm H2O, Disp: , Rfl:     FLUTICASONE/SALMETEROL (ADVAIR DISKUS IN), Take 2 Puffs by inhalation two (2) times daily as needed. , Disp: , Rfl:     celecoxib (CELEBREX) 200 mg capsule, Take 200 mg by mouth daily. 1 to 2 a day. , Disp: , Rfl:     PREGABALIN (LYRICA PO), Take 150 mg by mouth two (2) times a day., Disp: , Rfl:     montelukast (SINGULAIR) 10 mg tablet, Take 10 mg by mouth daily. , Disp: , Rfl:     NEOMY SULF/POLYMYX B SULF/HC (CORTISPORIN OT), by Otic route as needed. 1 to 2 drops in each ear as needed. , Disp: , Rfl:     MULTIVITAMIN WITH MINERALS (MULTIVITAMIN & MINERAL FORMULA PO), Take 1 Tab by mouth daily. Multivitamin with D with Calcium , Disp: , Rfl:     metaxalone (SKELAXIN) 800 mg tablet, Take 1 Tab by mouth three (3) times daily as needed for Pain. (Patient taking differently: Take 800 mg by mouth two (2) times a day.), Disp: 1 Tab, Rfl: 0    OMEPRAZOLE PO, 20 mg daily. Pt. Instructed to take morning of surgery per anesthesiologist.  , Disp: , Rfl:     CYANOCOBALAMIN (VITAMIN B-12 PO), Take 500 mg by mouth daily. , Disp: , Rfl:     LISINOPRIL-HYDROCHLOROTHIAZIDE 10-12.5 mg per tablet, take 1 Tab by mouth daily. , Disp: , Rfl:     LASIX 20 mg Tab, Take 20 mg by mouth daily as needed. , Disp: , Rfl:     GLYBURIDE-METFORMIN 2.5-500 mg per tablet, take 1 Tab by mouth two (2) times a day., Disp: , Rfl:     SYNTHROID 100 mcg Tab, Take 100 mcg by mouth daily. Pt. Instructed to take morning of surgery per anesthesiologist.  , Disp: , Rfl:     PATANOL 0.1 % Drop, 2 Drops by Both Eyes route as needed for Allergies. , Disp: , Rfl:     AMBIEN 10 mg Tab, Take 10 mg by mouth nightly., Disp: , Rfl:     LIBRAX, WITH CLINIDIUM, 2.5-5 mg Cap, Take 1 Cap by mouth four (4) times daily as needed. , Disp: , Rfl:    Date Last Reviewed:  4/30/2018   EXAMINATION: Observation/Orthostatic Postural Assessment:          Morbid obesity  Palpation:          Pitting edema in B ankle  Circumferential measurements:  30 cm R/L (above malleolus)  ROM:  BUE/ cervical WNL for patient                     Date:  04/04/18 Date:  04/27/18  Grossly WFL Date:     Trunk ROM 25 % limitation                          LE ROM Left Right       Hip Flexion [facility]       Hip Abduction [facility]       Straight Leg Raise (SLR) N/T N/T                Knee Flexion 125 125       Knee Extension 0 0                Ankle Dorsiflexion [facility] WFL         Strength:     Date:  04/04/18 Date:  04/27/18  Grossly 4- to 4/5 Date:     LE MMT Left Right Left Right Left Right   Hip Flex (L1/L2) 4/5 4-/5  -4/5     Hip Abd (glut med) 4/5 4/5       Hip Ext 4-/5 4-/5  -4/5     Quad    ( L3/4) 4/5 4-/5  4/5     Hamstring 4/5 4-/5  4/5     Anterior Tib (L4/L5)         Gastroc (S1/2)         EHL (L5)                             Special Tests:   deferred  Neurological Screen:        Sensation: Intact for light touch  Functional Mobility:         Gait/Ambulation:  Amb without A.D with increased lateral wt shift BLE lumbering gait pattern        Transfers:  Sit to stand to sit with use of BUE for support        Bed Mobility:  independent  Balance:          Single Leg Stance:  Unable to assume single leg stance. Good static standing balance   Body Structures Involved:  1. Nerves  2. Bones  3. Joints  4. Muscles  5. Ligaments Body Functions Affected:  1. Sensory/Pain  2. Neuromusculoskeletal  3. Movement Related Activities and Participation Affected:  1. General Tasks and Demands  2. Mobility  3. Self Care  4. Domestic Life  5. Community, Social and Civic Life   CLINICAL DECISION MAKING:   Outcome Measure:    Tool Used: Lower Extremity Functional Scale (LEFS)  Score:  Initial: 40/80 Most Recent: 42/80 (Date: 04/27/18 )   Interpretation of Score: 20 questions each scored on a 5 point scale with 0 representing \"extreme difficulty or unable to perform\" and 4 representing \"no difficulty\". The lower the score, the greater the functional disability. 80/80 represents no disability. Minimal detectable change is 9 points. Score 80 79-63 62-48 47-32 31-16 15-1 0   Modifier CH CI CJ CK CL CM CN     ? Mobility - Walking and Moving Around:     - CURRENT STATUS: CK - 40%-59% impaired, limited or restricted    - GOAL STATUS: CJ - 20%-39% impaired, limited or restricted    - D/C STATUS:  ---------------To be determined---------------    Medical Necessity:   · Patient is expected to demonstrate progress in strength, range of motion and balance to increase independence with functional mobility with decreased pain. Reason for Services/Other Comments:  · Patient continues to require modification of therapeutic interventions to increase complexity of exercises. TREATMENT:   (In addition to Assessment/Re-Assessment sessions the following treatments were rendered)    Therapeutic Exercise: (see flow sheet below for minutes) :  Exercises per grid below to improve mobility, strength and balance. Required minimal visual and verbal cues to promote proper body alignment and promote proper body mechanics. Progressed resistance and range as indicated. Aquatic Therapy (see flow sheet below for minutes): Aquatic treatment performed per flow grid for Decreased muscle strength, Decreased static/dynamic balance and reactive control, Decreased range of motion, Decreased activity endurance and Ease of movement. Cues provided for technique. Assistance by therapist provided for progression of exercises/ activities. Patient has difficulty with low back/ R hip pain.      Date: 04/04/18  (EVAL) 4/10/18 4/16/18 4/17/18 04/20/18 04/26/18 04/27/18 04/30/18    Modalities:                                                Manual Therapy:                                                Aquatic Exercise:  1.5#/55 minutes 1.5# 60 min 2.5# 50 min 2.5# 55 min 2.5# 55 min 2.5# 60 min 2.5# 60 min    Gait F/B/S/M  x6L F  x4L B/S/M x4L ea x4L X4L X4L c open paddles X4L with open paddles X4L c open paddles    Heel/Toe walk            3-way  x15 BLE x15 BLE x15 BLE   X15 BLE     PF/DF  x15 x15  x20 x20 x20     Hamstring Curls  x15 BLE x4L x4L x2L x20 BLE x2L x4L    Hip Flexion with knee ext. (rockette)  next  x15 BLE x2L x2L x2L x4L    Squats    next x15  x20  X15 pull downs with open paddles     SLR march  x4L x4L x4L x4L X4L x4L x4L    Lunges            Hip circles            Hip horizontal abduction/adduction     x20 BLE x20 BLE X20 BLE     Core:flex/ext UE in mini squat      x15 alternating LE stance c open paddles  x15 c open paddles  x20 c closed paddles    Core: horz. abd/add UE in modified tandem stance      x15 c open paddles X15 with open paddles x20 c closed paddles    Deep well bike  3 minutes 3min 3 min 3 min 3 min 3 min 3 min    Deep well jumping nav  1 minute 2 min  1 min  1 min 1 min    Deep well scissors  1 minute 2 min  1 min 1 min 1 min 1 min    Deep well:  traction  5 minutes 8 min 8 min 8 min 8 min 5 min 8 min                Hamstring Stretch  2 H 30 BLE 2h30 2H30 BLE 2H30 BLE  2H30 BLE 2H30 BLE    Step Lunge            Piriformis stretch            PF Stretch            Balance: Single leg Stance            Balance: Tandem        H30 BLE no LOB                              Therapeutic Exercise:                                                                                    F=forward  B=Backward  S=sidesteps  M=marches    H=hold    Manual: (see flow sheet above for minutes)   Modalities: (see flow sheet above for minutes)     HEP: Plan to instruct in HEP as therapy progresses  Treatment/Session Assessment: Patient arrived 10 minutes late, continued in aquatic setting, focusing on increasing patient's BLE/core strength. Increased reps with more challenging exercises and added tandem balance exercise to challenge patient's abdominal/core strength.  Plan to continue working in aquatic setting assessing patient's compliance with HEP and adding more challenging exercises per patient tolerance. · Pain/ Symptoms: Initial:   0/10  Patient reports no pain prior to PT today     Patient states she did not do any exercises over the weekend due to pain increase with land activities. Post Session:  0/10 following deep well traction   ·   Compliance with Program/Exercises: Pt reports not consistently  Recommendations/Intent for next treatment session: \"Next visit will focus on advancements to more challenging activities\". Plan to continue in aquatic setting next visit.   Total Treatment Duration:  PT Patient Time In/Time Out  Time In: 1340  Time Out: 1440     Ramona Jimenez, PTA

## 2018-05-01 ENCOUNTER — HOSPITAL ENCOUNTER (OUTPATIENT)
Dept: PHYSICAL THERAPY | Age: 73
Discharge: HOME | End: 2018-05-01
Payer: MEDICARE

## 2018-05-01 PROCEDURE — 97113 AQUATIC THERAPY/EXERCISES: CPT

## 2018-05-02 ENCOUNTER — APPOINTMENT (RX ONLY)
Dept: URBAN - METROPOLITAN AREA CLINIC 23 | Facility: CLINIC | Age: 73
Setting detail: DERMATOLOGY
End: 2018-05-02

## 2018-05-02 ENCOUNTER — APPOINTMENT (OUTPATIENT)
Dept: PHYSICAL THERAPY | Age: 73
End: 2018-05-02
Payer: MEDICARE

## 2018-05-02 DIAGNOSIS — L82.0 INFLAMED SEBORRHEIC KERATOSIS: ICD-10-CM

## 2018-05-02 DIAGNOSIS — L85.8 OTHER SPECIFIED EPIDERMAL THICKENING: ICD-10-CM

## 2018-05-02 DIAGNOSIS — D22 MELANOCYTIC NEVI: ICD-10-CM

## 2018-05-02 DIAGNOSIS — D485 NEOPLASM OF UNCERTAIN BEHAVIOR OF SKIN: ICD-10-CM

## 2018-05-02 DIAGNOSIS — L82.1 OTHER SEBORRHEIC KERATOSIS: ICD-10-CM

## 2018-05-02 PROBLEM — D23.62 OTHER BENIGN NEOPLASM OF SKIN OF LEFT UPPER LIMB, INCLUDING SHOULDER: Status: ACTIVE | Noted: 2018-05-02

## 2018-05-02 PROBLEM — D48.5 NEOPLASM OF UNCERTAIN BEHAVIOR OF SKIN: Status: ACTIVE | Noted: 2018-05-02

## 2018-05-02 PROBLEM — D22.4 MELANOCYTIC NEVI OF SCALP AND NECK: Status: ACTIVE | Noted: 2018-05-02

## 2018-05-02 PROCEDURE — ? DEFER

## 2018-05-02 PROCEDURE — ? COUNSELING

## 2018-05-02 PROCEDURE — 99203 OFFICE O/P NEW LOW 30 MIN: CPT

## 2018-05-02 PROCEDURE — ? PRESCRIPTION

## 2018-05-02 RX ORDER — UREA 40 G/100G
CREAM TOPICAL
Qty: 3 | Refills: 3 | Status: ERX | COMMUNITY
Start: 2018-05-02

## 2018-05-02 RX ADMIN — UREA: 40 CREAM TOPICAL at 00:00

## 2018-05-02 ASSESSMENT — LOCATION DETAILED DESCRIPTION DERM
LOCATION DETAILED: LEFT SUPERIOR PARIETAL SCALP
LOCATION DETAILED: RIGHT INFRAMAMMARY CREASE (OUTER QUADRANT)
LOCATION DETAILED: RIGHT HYPOTHENAR EMINENCE
LOCATION DETAILED: RIGHT ANTERIOR PROXIMAL THIGH
LOCATION DETAILED: LEFT ANTERIOR PROXIMAL THIGH
LOCATION DETAILED: LEFT HYPOTHENAR EMINENCE
LOCATION DETAILED: TIP OF RIGHT 3RD TOE
LOCATION DETAILED: RIGHT ANTERIOR LATERAL DISTAL UPPER ARM
LOCATION DETAILED: TIP OF LEFT 3RD TOE
LOCATION DETAILED: RIGHT RADIAL DORSAL HAND
LOCATION DETAILED: EPIGASTRIC SKIN
LOCATION DETAILED: LEFT INFERIOR MEDIAL UPPER BACK

## 2018-05-02 ASSESSMENT — LOCATION SIMPLE DESCRIPTION DERM
LOCATION SIMPLE: LEFT UPPER BACK
LOCATION SIMPLE: PLANTAR SURFACE OF RIGHT 3RD TOE
LOCATION SIMPLE: LEFT HAND
LOCATION SIMPLE: ABDOMEN
LOCATION SIMPLE: RIGHT HAND
LOCATION SIMPLE: RIGHT UPPER ARM
LOCATION SIMPLE: SCALP
LOCATION SIMPLE: PLANTAR SURFACE OF LEFT 3RD TOE
LOCATION SIMPLE: RIGHT THIGH
LOCATION SIMPLE: LEFT THIGH
LOCATION SIMPLE: RIGHT BREAST

## 2018-05-02 ASSESSMENT — LOCATION ZONE DERM
LOCATION ZONE: ARM
LOCATION ZONE: HAND
LOCATION ZONE: SCALP
LOCATION ZONE: TOE
LOCATION ZONE: LEG
LOCATION ZONE: TRUNK

## 2018-05-02 NOTE — PROCEDURE: DEFER
Detail Level: Detailed
Instructions (Optional): Mole is itching
Procedure To Be Performed At Next Visit: Cryotherapy
Procedure To Be Performed At Next Visit: Shave Removal

## 2018-05-03 ENCOUNTER — RX ONLY (OUTPATIENT)
Age: 73
Setting detail: RX ONLY
End: 2018-05-03

## 2018-05-03 ENCOUNTER — HOSPITAL ENCOUNTER (OUTPATIENT)
Dept: PHYSICAL THERAPY | Age: 73
Discharge: HOME | End: 2018-05-03
Payer: MEDICARE

## 2018-05-03 PROCEDURE — 97113 AQUATIC THERAPY/EXERCISES: CPT

## 2018-05-03 RX ORDER — UREA 40 G/100G
CREAM TOPICAL
Qty: 3 | Refills: 3 | Status: ERX

## 2018-05-03 NOTE — PROGRESS NOTES
Kelsey Mcgowan  : 1945 [id number] [facility],[address]  [address] [place] [address].  [phone]   [phone] -       OUTPATIENT PHYSICAL THERAPY:Daily Note 5/3/2018        ICD-10: Treatment Diagnosis: Pain in right hip (M25.551)  Pain in right knee (M25.561)  Pain in left knee (M25.562)  Difficulty in walking, not elsewhere classified (R26.2)  Precautions/Allergies:   Codeine; Erythromycin; and Tetracycline   Fall Risk Score: 1 (? 5 = High Risk)  MD Orders: Evaluate and Treat: strengthening exercises/ aquatic therapy MEDICAL/REFERRING DIAGNOSIS:  Hip pain [M25.559]   DATE OF ONSET: 10/2017  REFERRING PHYSICIAN: Cristine Dey., *  RETURN PHYSICIAN APPOINTMENT: late 2018     INITIAL ASSESSMENT:  Ms. Joy Kaiser (pt) is a 71year old WF seen for physical therapy per MD orders with complaint of R hip pain, low back pain and B knee pain with long history of back pain and arthritis. Pt evaluated for outpatient physical therapy today with the following deficits: low back, right hip and bilateral knee pain, decreased strength in B hip. Pt would benefit from physical therapy to address the above deficits in order to return to increased independence with functional mobility with decreased pain. Pt would benefit from initially working in aquatic setting to off load spine, bilateral lower extremities while addressing goals. As patient progresses, plan to transition to gravity challenging, land based exercises/ activities. Plan of care and goals reviewed with patient who verbalizes agreement. PROBLEM LIST (Impacting functional limitations):  1. Decreased Strength  2. Decreased ADL/Functional Activities  3. Decreased Ambulation Ability/Technique  4. Decreased Balance  5. Increased Pain  6. Decreased Activity Tolerance  7. Decreased [place] with Home Exercise Program INTERVENTIONS PLANNED:  1. Balance Exercise  2. Gait Training  3.  Home Exercise Program (HEP)  4. Manual Therapy  5. Neuromuscular Re-education/Strengthening  6. Range of Motion (ROM)  7. Therapeutic Activites  8. Therapeutic Exercise/Strengthening  9. modalities   10. aquatics    TREATMENT PLAN:  Effective Dates: 4/4/2018 TO 7/3/2018 (90 days). Frequency/Duration: 2-3 times a week for 90 Days    GOALS: (Goals have been discussed and agreed upon with patient.)  Short-Term Functional Goals: Time Frame: 2 weeks  Pt will demonstrate independence/ compliance with home exercise program for management of symptoms (aquatic). (met 04/27/18)  Pt will demonstrate improved Lower Extremity Functional Scale by 3-4 points for improved functional mobility. (in progress 04/27/18)  Pt will tolerate 35 to 40 minutes of aquatic therapy with pain of 2/10. (met 04/27/18)      Discharge Goals: Time Frame: 8 weeks  Pt will demonstrate improved manual muscle test score by 1/2 to 1  grade for R hip complex to participate in heavy housework activities and yard work. (in progress 04/27/18)  Pt will ambulate >2000ft with normal gait pattern with even stance time/ step length for bilateral lower extremities for community entry.  (in progress 04/27/18)  Pt will report Lower Extremity Functional Scale score of 50/80 for improved functional mobility in home/ community setting. (in progress 04/27/18)  4. Pt will demonstrate independence/ compliance with home exercise program for management of symptoms (land). (in progress 04/27/18)    Rehabilitation Potential For Stated Goals: Good                 HISTORY:   History of Present Injury/Illness (Reason for Referral):  Pt is 70 y/o WF seen for PT per MD orders following L DEYSI 10/2017 with multiple complications/ illness over the winter with noted weakness and debility. Pt has complicated hx of arthritis, chronic low back pain as well as spinal stenosis. Pt has sx hx of B TKA (2009, 2011) along with recent hip replacement.   Pt reports her goal is to get stronger so she can do yard work and housework as well as get into community to visit with friends. Past Medical History/Comorbidities:   Ms. Iveth Avelar  has a past medical history of Arthritis; Asthma; Chronic obstructive asthma, unspecified; Chronic pain; DDD (degenerative disc disease); GERD (gastroesophageal reflux disease); Hypertension; Hypothyroidism; HYPOTHYROIDISM; Incontinence of urine; Morbid obesity ([address] Neuropathy; ILYA (obstructive sleep apnea) (9/16/2009); Osteoarthritis of left knee (9/16/2009); Osteoarthritis of right knee (3/2/2011); S/P total knee replacement using cement (9/16/2009); Sleep apnea; Spastic colon; Status post right hip replacement (10/9/2017); and Type II or unspecified type diabetes mellitus without mention of complication, not stated as uncontrolled. She also has no past medical history of Adverse effect of anesthesia; Aneurysm ([address] Arrhythmia; Autoimmune disease ([address] CAD (coronary artery disease); Cancer ([address] Chronic kidney disease; Chronic obstructive pulmonary disease ([address] Coagulation disorder ([address] Difficult intubation; Endocarditis; Heart failure ([address] Ill-defined condition; Liver disease; Malignant hyperthermia due to anesthesia; Nausea & vomiting; Nicotine vapor product user; Non-nicotine vapor product user; Pseudocholinesterase deficiency; Psychiatric disorder; PUD (peptic ulcer disease); Rheumatic fever; Seizures ([address] Stroke [facility]); or Thromboembolus ([address] Ms. Iveth Avelar  has a past surgical history that includes hx hernia repair (1/30/08); hx hysterectomy (2000); pr hand/finger surgery unlisted (Right, 1990'S); pr total knee arthroplasty (Left, 2009); and hx knee replacement (Right, 2011). Social History/Living Environment:     Pt lives in one story home with 4 steps to enter with unilateral handrail. Pt lives alone.   Prior Level of Function/Work/Activity:  Retired   Dominant Side:         RIGHT  Current Medications:    Current Outpatient Prescriptions:     amLODIPine (NORVASC) 5 mg tablet, Take 5 mg by mouth daily. , Disp: , Rfl:     ciprofloxacin HCl (CIPRO) 500 mg tablet, Take  by mouth two (2) times a day., Disp: , Rfl:     neomycin-colist-hydrocortisone-thonzonium (CORTISPORIN-TC) otic suspension, by Otic route four (4) times daily. , Disp: , Rfl:     Azelastine (ASTEPRO) 0.15 % (205.5 mcg) nasal spray, 2 Sprays by Both Nostrils route as needed. , Disp: , Rfl:     fluticasone (FLONASE ALLERGY RELIEF) 50 mcg/actuation nasal spray, 2 Sprays by Both Nostrils route as needed for Rhinitis., Disp: , Rfl:     budesonide (PULMICORT) 0.5 mg/2 mL nbsp, 500 mcg by Nebulization route two (2) times a day., Disp: , Rfl:     formoterol (PERFOROMIST) 20 mcg/2 mL nebu neb solution, 20 mcg by Nebulization route two (2) times a day., Disp: , Rfl:     Lactobacillus acidoph-L.bulgar (RUMA ASSIST) 100 million cell pwpk, Take  by mouth., Disp: , Rfl:     Biotin 2,500 mcg cap, Take  by mouth., Disp: , Rfl:     cranberry extract 450 mg tab tablet, Take 450 mg by mouth., Disp: , Rfl:     OTHER, Vein and leg support--- 1 tab po qd, Disp: , Rfl:     estradiol (ESTRACE) 0.01 % (0.1 mg/gram) vaginal cream, Insert 1 g into vagina daily. , Disp: , Rfl:     neomycin-polymyxin-hydrocortisone, buffered, (PEDIOTIC) 3.5-10,000-1 mg/mL-unit/mL-% otic suspension, Administer 2 Drops into each ear daily. , Disp: , Rfl:     traMADol (ULTRAM) 50 mg tablet, Take 50 mg by mouth every six (6) hours as needed for Pain., Disp: , Rfl:     HYDROmorphone (DILAUDID) 2 mg tablet, Take 1 Tab by mouth every four (4) hours as needed. Max Daily Amount: 12 mg., Disp: 40 Tab, Rfl: 0    SITagliptin (JANUVIA) 100 mg tablet, Take 100 mg by mouth daily. , Disp: , Rfl:     lidocaine-kinesiology tape 5 % kit, by Apply Externally route., Disp: , Rfl:     OTHER, three (3) times daily. Lidocaine patch 5%, Disp: , Rfl:     oxybutynin chloride XL (DITROPAN XL) 5 mg CR tablet, Take 5 mg by mouth daily. , Disp: , Rfl:    polyethylene glycol (MIRALAX) 17 gram packet, Take 17 g by mouth as needed. , Disp: , Rfl:     cpap machine kit, by Does Not Apply route. 9 cm H2O, Disp: , Rfl:     FLUTICASONE/SALMETEROL (ADVAIR DISKUS IN), Take 2 Puffs by inhalation two (2) times daily as needed. , Disp: , Rfl:     celecoxib (CELEBREX) 200 mg capsule, Take 200 mg by mouth daily. 1 to 2 a day. , Disp: , Rfl:     PREGABALIN (LYRICA PO), Take 150 mg by mouth two (2) times a day., Disp: , Rfl:     montelukast (SINGULAIR) 10 mg tablet, Take 10 mg by mouth daily. , Disp: , Rfl:     NEOMY SULF/POLYMYX B SULF/HC (CORTISPORIN OT), by Otic route as needed. 1 to 2 drops in each ear as needed. , Disp: , Rfl:     MULTIVITAMIN WITH MINERALS (MULTIVITAMIN & MINERAL FORMULA PO), Take 1 Tab by mouth daily. Multivitamin with D with Calcium , Disp: , Rfl:     metaxalone (SKELAXIN) 800 mg tablet, Take 1 Tab by mouth three (3) times daily as needed for Pain. (Patient taking differently: Take 800 mg by mouth two (2) times a day.), Disp: 1 Tab, Rfl: 0    OMEPRAZOLE PO, 20 mg daily. Pt. Instructed to take morning of surgery per anesthesiologist.  , Disp: , Rfl:     CYANOCOBALAMIN (VITAMIN B-12 PO), Take 500 mg by mouth daily. , Disp: , Rfl:     LISINOPRIL-HYDROCHLOROTHIAZIDE 10-12.5 mg per tablet, take 1 Tab by mouth daily. , Disp: , Rfl:     LASIX 20 mg Tab, Take 20 mg by mouth daily as needed. , Disp: , Rfl:     GLYBURIDE-METFORMIN 2.5-500 mg per tablet, take 1 Tab by mouth two (2) times a day., Disp: , Rfl:     SYNTHROID 100 mcg Tab, Take 100 mcg by mouth daily. Pt. Instructed to take morning of surgery per anesthesiologist.  , Disp: , Rfl:     PATANOL 0.1 % Drop, 2 Drops by Both Eyes route as needed for Allergies. , Disp: , Rfl:     AMBIEN 10 mg Tab, Take 10 mg by mouth nightly., Disp: , Rfl:     LIBRAX, WITH CLINIDIUM, 2.5-5 mg Cap, Take 1 Cap by mouth four (4) times daily as needed. , Disp: , Rfl:    Date Last Reviewed:  5/3/2018   EXAMINATION: Observation/Orthostatic Postural Assessment:          Morbid obesity  Palpation:          Pitting edema in B ankle  Circumferential measurements:  30 cm R/L (above malleolus)  ROM:  BUE/ cervical WNL for patient                     Date:  04/04/18 Date:  04/27/18  Grossly WFL Date:     Trunk ROM 25 % limitation                          LE ROM Left Right       Hip Flexion [facility]       Hip Abduction [facility]       Straight Leg Raise (SLR) N/T N/T                Knee Flexion 125 125       Knee Extension 0 0                Ankle Dorsiflexion [facility] WFL         Strength:     Date:  04/04/18 Date:  04/27/18  Grossly 4- to 4/5 Date:     LE MMT Left Right Left Right Left Right   Hip Flex (L1/L2) 4/5 4-/5  -4/5     Hip Abd (glut med) 4/5 4/5       Hip Ext 4-/5 4-/5  -4/5     Quad    ( L3/4) 4/5 4-/5  4/5     Hamstring 4/5 4-/5  4/5     Anterior Tib (L4/L5)         Gastroc (S1/2)         EHL (L5)                             Special Tests:   deferred  Neurological Screen:        Sensation: Intact for light touch  Functional Mobility:         Gait/Ambulation:  Amb without A.D with increased lateral wt shift BLE lumbering gait pattern        Transfers:  Sit to stand to sit with use of BUE for support        Bed Mobility:  independent  Balance:          Single Leg Stance:  Unable to assume single leg stance. Good static standing balance   Body Structures Involved:  1. Nerves  2. Bones  3. Joints  4. Muscles  5. Ligaments Body Functions Affected:  1. Sensory/Pain  2. Neuromusculoskeletal  3. Movement Related Activities and Participation Affected:  1. General Tasks and Demands  2. Mobility  3. Self Care  4. Domestic Life  5. Community, Social and Civic Life   CLINICAL DECISION MAKING:   Outcome Measure:    Tool Used: Lower Extremity Functional Scale (LEFS)  Score:  Initial: 40/80 Most Recent: 42/80 (Date: 04/27/18 )   Interpretation of Score: 20 questions each scored on a 5 point scale with 0 representing \"extreme difficulty or unable to perform\" and 4 representing \"no difficulty\". The lower the score, the greater the functional disability. 80/80 represents no disability. Minimal detectable change is 9 points. Score 80 79-63 62-48 47-32 31-16 15-1 0   Modifier CH CI CJ CK CL CM CN     ? Mobility - Walking and Moving Around:     - CURRENT STATUS: CK - 40%-59% impaired, limited or restricted    - GOAL STATUS: CJ - 20%-39% impaired, limited or restricted    - D/C STATUS:  ---------------To be determined---------------    Medical Necessity:   · Patient is expected to demonstrate progress in strength, range of motion and balance to increase independence with functional mobility with decreased pain. Reason for Services/Other Comments:  · Patient continues to require modification of therapeutic interventions to increase complexity of exercises. TREATMENT:   (In addition to Assessment/Re-Assessment sessions the following treatments were rendered)    Therapeutic Exercise: (see flow sheet below for minutes) :  Exercises per grid below to improve mobility, strength and balance. Required minimal visual and verbal cues to promote proper body alignment and promote proper body mechanics. Progressed resistance and range as indicated. Aquatic Therapy (see flow sheet below for minutes): Aquatic treatment performed per flow grid for Decreased muscle strength, Decreased static/dynamic balance and reactive control, Decreased range of motion, Decreased activity endurance and Ease of movement. Cues provided for technique. Assistance by therapist provided for progression of exercises/ activities. Patient has difficulty with low back/ R hip pain.      Date: 04/04/18  (EVAL) 4/10/18 4/16/18 4/17/18 04/20/18 04/26/18 04/27/18 04/30/18 5/1/18 5/3/18   Modalities:                                                    Manual Therapy:                                                    Aquatic Exercise:  1.5#/55 minutes 1.5# 60 min 2.5# 50 min 2.5# 55 min 2.5# 55 min 2.5# 60 min 2.5# 60 min 3.75# 60 min 3.75# 55 min   Gait F/B/S/M  x6L F  x4L B/S/M x4L ea x4L X4L X4L c open paddles X4L with open paddles X4L c open paddles x4L c open paddles x4L c closed paddles F/B    X2L c closed paddles S/M   Heel/Toe walk             3-way  x15 BLE x15 BLE x15 BLE   X15 BLE   x20 BLE   PF/DF  x15 x15  x20 x20 x20      Hamstring Curls  x15 BLE x4L x4L x2L x20 BLE x2L x4L x4L    Hip Flexion with knee ext.   (rockromulo)  next  x15 BLE x2L x2L x2L x4L x4L x4L   Squats    next x15  x20  X15 pull downs with open paddles      SLR march  x4L x4L x4L x4L X4L x4L x4L x4L c open paddles x2L c closed paddles   Lunges         x10 BLE x15 BLE   Hip circles             Hip horizontal abduction/adduction     x20 BLE x20 BLE X20 BLE   x20 clamshell   Core: tray         x4L push/pull x4L push/pull    x2L R/L   Core:flex/ext UE in mini squat      x15 alternating LE stance c open paddles  x15 c open paddles  x20 c closed paddles     Core: horz. abd/add UE in modified tandem stance      x15 c open paddles X15 with open paddles x20 c closed paddles x20 c closed paddles    Deep well bike  3 minutes 3min 3 min 3 min 3 min 3 min 3 min 3 min 3 min   Deep well jumping nav  1 minute 2 min  1 min  1 min 1 min 1 min 1 min   Deep well scissors  1 minute 2 min  1 min 1 min 1 min 1 min 1 min 1 min   Deep well:  traction  5 minutes 8 min 8 min 8 min 8 min 5 min 8 min 8 min 8 min                Hamstring Stretch  2 H 30 BLE 2h30 2H30 BLE 2H30 BLE  2H30 BLE 2H30 BLE 2H30 BLE 2H30 BLE   Step Lunge             Piriformis stretch             PF Stretch             Balance: Single leg Stance          H30 BLE   Balance: Tandem        H30 BLE no LOB                                 Therapeutic Exercise:                                                                                           F=forward  B=Backward  S=sidesteps  M=marches    H=hold    Manual: (see flow sheet above for minutes)   Modalities: (see flow sheet above for minutes)     HEP: Plan to instruct in HEP as therapy progresses  Treatment/Session Assessment: Patient arrived 10 minutes late for PT session again today, discussed with patient's the importance of being on time for appointments to get most benefit from PT. Megan Garza Continued in aquatic exercises adding closed paddles to lap exercises and continuing to challenge patient core strength/stability. Plan to continue in pool setting next visit. · Pain/ Symptoms: Initial:   0/10  Patient reports no pain prior to PT today     Patient states she is feeling ok today but feels very tired. Post Session:  0/10 following deep well traction   ·   Compliance with Program/Exercises: Pt reports not consistently  Recommendations/Intent for next treatment session: \"Next visit will focus on advancements to more challenging activities\". Plan to continue in aquatic setting next visit.   Total Treatment Duration:  PT Patient Time In/Time Out  Time In: 1340  Time Out: 1435     Mae Vivas, PTA

## 2018-05-07 ENCOUNTER — HOSPITAL ENCOUNTER (OUTPATIENT)
Dept: PHYSICAL THERAPY | Age: 73
Discharge: HOME | End: 2018-05-07
Payer: MEDICARE

## 2018-05-07 PROCEDURE — 97113 AQUATIC THERAPY/EXERCISES: CPT

## 2018-05-07 NOTE — PROGRESS NOTES
José Chilel [facility]  : 1945 [address] [facility],[address]  [facility] [address]  [phone]   [phone] -       OUTPATIENT PHYSICAL THERAPY:Daily Note 2018        ICD-10: Treatment Diagnosis: Pain in right hip (M25.551)  Pain in right knee (M25.561)  Pain in left knee (M25.562)  Difficulty in walking, not elsewhere classified (R26.2)  Precautions/Allergies:   Codeine; Erythromycin; and Tetracycline   Fall Risk Score: 1 (? 5 = High Risk)  MD Orders: Evaluate and Treat: strengthening exercises/ aquatic therapy MEDICAL/REFERRING DIAGNOSIS:  Hip pain [M25.559]   DATE OF ONSET: 10/2017  REFERRING PHYSICIAN: Irma Sharma., *  RETURN PHYSICIAN APPOINTMENT: late 2018     INITIAL ASSESSMENT:  Ms. Ananth Pickett (pt) is a 71year old WF seen for physical therapy per MD orders with complaint of R hip pain, low back pain and B knee pain with long history of back pain and arthritis. Pt evaluated for outpatient physical therapy today with the following deficits: low back, right hip and bilateral knee pain, decreased strength in B hip. Pt would benefit from physical therapy to address the above deficits in order to return to increased independence with functional mobility with decreased pain. Pt would benefit from initially working in aquatic setting to off load spine, bilateral lower extremities while addressing goals. As patient progresses, plan to transition to gravity challenging, land based exercises/ activities. Plan of care and goals reviewed with patient who verbalizes agreement. PROBLEM LIST (Impacting functional limitations):  1. Decreased Strength  2. Decreased ADL/Functional Activities  3. Decreased Ambulation Ability/Technique  4. Decreased Balance  5. Increased Pain  6. Decreased Activity Tolerance  7. Decreased [place] with Home Exercise Program INTERVENTIONS PLANNED:  1. Balance Exercise  2. Gait Training  3.  Home Exercise Program (HEP)  4. Manual Therapy  5. Neuromuscular Re-education/Strengthening  6. Range of Motion (ROM)  7. Therapeutic Activites  8. Therapeutic Exercise/Strengthening  9. modalities   10. aquatics    TREATMENT PLAN:  Effective Dates: 4/4/2018 TO 7/3/2018 (90 days). Frequency/Duration: 2-3 times a week for 90 Days    GOALS: (Goals have been discussed and agreed upon with patient.)  Short-Term Functional Goals: Time Frame: 2 weeks  Pt will demonstrate independence/ compliance with home exercise program for management of symptoms (aquatic). (met 04/27/18)  Pt will demonstrate improved Lower Extremity Functional Scale by 3-4 points for improved functional mobility. (in progress 04/27/18)  Pt will tolerate 35 to 40 minutes of aquatic therapy with pain of 2/10. (met 04/27/18)      Discharge Goals: Time Frame: 8 weeks  Pt will demonstrate improved manual muscle test score by 1/2 to 1  grade for R hip complex to participate in heavy housework activities and yard work. (in progress 04/27/18)  Pt will ambulate >2000ft with normal gait pattern with even stance time/ step length for bilateral lower extremities for community entry.  (in progress 04/27/18)  Pt will report Lower Extremity Functional Scale score of 50/80 for improved functional mobility in home/ community setting. (in progress 04/27/18)  4. Pt will demonstrate independence/ compliance with home exercise program for management of symptoms (land). (in progress 04/27/18)    Rehabilitation Potential For Stated Goals: Good                 HISTORY:   History of Present Injury/Illness (Reason for Referral):  Pt is 70 y/o WF seen for PT per MD orders following L DEYSI 10/2017 with multiple complications/ illness over the winter with noted weakness and debility. Pt has complicated hx of arthritis, chronic low back pain as well as spinal stenosis. Pt has sx hx of B TKA (2009, 2011) along with recent hip replacement.   Pt reports her goal is to get stronger so she can do yard work and housework as well as get into community to visit with friends. Past Medical History/Comorbidities:   Ms. Marisol Aquino  has a past medical history of Arthritis; Asthma; Chronic obstructive asthma, unspecified; Chronic pain; DDD (degenerative disc disease); GERD (gastroesophageal reflux disease); Hypertension; Hypothyroidism; HYPOTHYROIDISM; Incontinence of urine; Morbid obesity ([address] Neuropathy; ILYA (obstructive sleep apnea) (9/16/2009); Osteoarthritis of left knee (9/16/2009); Osteoarthritis of right knee (3/2/2011); S/P total knee replacement using cement (9/16/2009); Sleep apnea; Spastic colon; Status post right hip replacement (10/9/2017); and Type II or unspecified type diabetes mellitus without mention of complication, not stated as uncontrolled. She also has no past medical history of Adverse effect of anesthesia; Aneurysm ([address] Arrhythmia; Autoimmune disease ([address] CAD (coronary artery disease); Cancer ([address] Chronic kidney disease; Chronic obstructive pulmonary disease ([address] Coagulation disorder ([address] Difficult intubation; Endocarditis; Heart failure ([address] Ill-defined condition; Liver disease; Malignant hyperthermia due to anesthesia; Nausea & vomiting; Nicotine vapor product user; Non-nicotine vapor product user; Pseudocholinesterase deficiency; Psychiatric disorder; PUD (peptic ulcer disease); Rheumatic fever; Seizures ([address] Stroke [facility]); or Thromboembolus ([address] Ms. Marisol Aquino  has a past surgical history that includes hx hernia repair (1/30/08); hx hysterectomy (2000); pr hand/finger surgery unlisted (Right, 1990'S); pr total knee arthroplasty (Left, 2009); and hx knee replacement (Right, 2011). Social History/Living Environment:     Pt lives in one story home with 4 steps to enter with unilateral handrail. Pt lives alone.   Prior Level of Function/Work/Activity:  Retired   Dominant Side:         RIGHT  Current Medications:    Current Outpatient Prescriptions:     amLODIPine (NORVASC) 5 mg tablet, Take 5 mg by mouth daily. , Disp: , Rfl:     ciprofloxacin HCl (CIPRO) 500 mg tablet, Take  by mouth two (2) times a day., Disp: , Rfl:     neomycin-colist-hydrocortisone-thonzonium (CORTISPORIN-TC) otic suspension, by Otic route four (4) times daily. , Disp: , Rfl:     Azelastine (ASTEPRO) 0.15 % (205.5 mcg) nasal spray, 2 Sprays by Both Nostrils route as needed. , Disp: , Rfl:     fluticasone (FLONASE ALLERGY RELIEF) 50 mcg/actuation nasal spray, 2 Sprays by Both Nostrils route as needed for Rhinitis., Disp: , Rfl:     budesonide (PULMICORT) 0.5 mg/2 mL nbsp, 500 mcg by Nebulization route two (2) times a day., Disp: , Rfl:     formoterol (PERFOROMIST) 20 mcg/2 mL nebu neb solution, 20 mcg by Nebulization route two (2) times a day., Disp: , Rfl:     Lactobacillus acidoph-L.bulgar (RUMA ASSIST) 100 million cell pwpk, Take  by mouth., Disp: , Rfl:     Biotin 2,500 mcg cap, Take  by mouth., Disp: , Rfl:     cranberry extract 450 mg tab tablet, Take 450 mg by mouth., Disp: , Rfl:     OTHER, Vein and leg support--- 1 tab po qd, Disp: , Rfl:     estradiol (ESTRACE) 0.01 % (0.1 mg/gram) vaginal cream, Insert 1 g into vagina daily. , Disp: , Rfl:     neomycin-polymyxin-hydrocortisone, buffered, (PEDIOTIC) 3.5-10,000-1 mg/mL-unit/mL-% otic suspension, Administer 2 Drops into each ear daily. , Disp: , Rfl:     traMADol (ULTRAM) 50 mg tablet, Take 50 mg by mouth every six (6) hours as needed for Pain., Disp: , Rfl:     HYDROmorphone (DILAUDID) 2 mg tablet, Take 1 Tab by mouth every four (4) hours as needed. Max Daily Amount: 12 mg., Disp: 40 Tab, Rfl: 0    SITagliptin (JANUVIA) 100 mg tablet, Take 100 mg by mouth daily. , Disp: , Rfl:     lidocaine-kinesiology tape 5 % kit, by Apply Externally route., Disp: , Rfl:     OTHER, three (3) times daily. Lidocaine patch 5%, Disp: , Rfl:     oxybutynin chloride XL (DITROPAN XL) 5 mg CR tablet, Take 5 mg by mouth daily. , Disp: , Rfl:    polyethylene glycol (MIRALAX) 17 gram packet, Take 17 g by mouth as needed. , Disp: , Rfl:     cpap machine kit, by Does Not Apply route. 9 cm H2O, Disp: , Rfl:     FLUTICASONE/SALMETEROL (ADVAIR DISKUS IN), Take 2 Puffs by inhalation two (2) times daily as needed. , Disp: , Rfl:     celecoxib (CELEBREX) 200 mg capsule, Take 200 mg by mouth daily. 1 to 2 a day. , Disp: , Rfl:     PREGABALIN (LYRICA PO), Take 150 mg by mouth two (2) times a day., Disp: , Rfl:     montelukast (SINGULAIR) 10 mg tablet, Take 10 mg by mouth daily. , Disp: , Rfl:     NEOMY SULF/POLYMYX B SULF/HC (CORTISPORIN OT), by Otic route as needed. 1 to 2 drops in each ear as needed. , Disp: , Rfl:     MULTIVITAMIN WITH MINERALS (MULTIVITAMIN & MINERAL FORMULA PO), Take 1 Tab by mouth daily. Multivitamin with D with Calcium , Disp: , Rfl:     metaxalone (SKELAXIN) 800 mg tablet, Take 1 Tab by mouth three (3) times daily as needed for Pain. (Patient taking differently: Take 800 mg by mouth two (2) times a day.), Disp: 1 Tab, Rfl: 0    OMEPRAZOLE PO, 20 mg daily. Pt. Instructed to take morning of surgery per anesthesiologist.  , Disp: , Rfl:     CYANOCOBALAMIN (VITAMIN B-12 PO), Take 500 mg by mouth daily. , Disp: , Rfl:     LISINOPRIL-HYDROCHLOROTHIAZIDE 10-12.5 mg per tablet, take 1 Tab by mouth daily. , Disp: , Rfl:     LASIX 20 mg Tab, Take 20 mg by mouth daily as needed. , Disp: , Rfl:     GLYBURIDE-METFORMIN 2.5-500 mg per tablet, take 1 Tab by mouth two (2) times a day., Disp: , Rfl:     SYNTHROID 100 mcg Tab, Take 100 mcg by mouth daily. Pt. Instructed to take morning of surgery per anesthesiologist.  , Disp: , Rfl:     PATANOL 0.1 % Drop, 2 Drops by Both Eyes route as needed for Allergies. , Disp: , Rfl:     AMBIEN 10 mg Tab, Take 10 mg by mouth nightly., Disp: , Rfl:     LIBRAX, WITH CLINIDIUM, 2.5-5 mg Cap, Take 1 Cap by mouth four (4) times daily as needed. , Disp: , Rfl:    Date Last Reviewed:  5/7/2018   EXAMINATION: Observation/Orthostatic Postural Assessment:          Morbid obesity  Palpation:          Pitting edema in B ankle  Circumferential measurements:  30 cm R/L (above malleolus)  ROM:  BUE/ cervical WNL for patient                     Date:  04/04/18 Date:  04/27/18  Grossly WFL Date:     Trunk ROM 25 % limitation                          LE ROM Left Right       Hip Flexion [facility]       Hip Abduction [facility]       Straight Leg Raise (SLR) N/T N/T                Knee Flexion 125 125       Knee Extension 0 0                Ankle Dorsiflexion [facility] WFL         Strength:     Date:  04/04/18 Date:  04/27/18  Grossly 4- to 4/5 Date:     LE MMT Left Right Left Right Left Right   Hip Flex (L1/L2) 4/5 4-/5  -4/5     Hip Abd (glut med) 4/5 4/5       Hip Ext 4-/5 4-/5  -4/5     Quad    ( L3/4) 4/5 4-/5  4/5     Hamstring 4/5 4-/5  4/5     Anterior Tib (L4/L5)         Gastroc (S1/2)         EHL (L5)                             Special Tests:   deferred  Neurological Screen:        Sensation: Intact for light touch  Functional Mobility:         Gait/Ambulation:  Amb without A.D with increased lateral wt shift BLE lumbering gait pattern        Transfers:  Sit to stand to sit with use of BUE for support        Bed Mobility:  independent  Balance:          Single Leg Stance:  Unable to assume single leg stance. Good static standing balance   Body Structures Involved:  1. Nerves  2. Bones  3. Joints  4. Muscles  5. Ligaments Body Functions Affected:  1. Sensory/Pain  2. Neuromusculoskeletal  3. Movement Related Activities and Participation Affected:  1. General Tasks and Demands  2. Mobility  3. Self Care  4. Domestic Life  5. Community, Social and Civic Life   CLINICAL DECISION MAKING:   Outcome Measure:    Tool Used: Lower Extremity Functional Scale (LEFS)  Score:  Initial: 40/80 Most Recent: 42/80 (Date: 04/27/18 )   Interpretation of Score: 20 questions each scored on a 5 point scale with 0 representing \"extreme difficulty or unable to perform\" and 4 representing \"no difficulty\". The lower the score, the greater the functional disability. 80/80 represents no disability. Minimal detectable change is 9 points. Score 80 79-63 62-48 47-32 31-16 15-1 0   Modifier CH CI CJ CK CL CM CN     ? Mobility - Walking and Moving Around:     - CURRENT STATUS: CK - 40%-59% impaired, limited or restricted    - GOAL STATUS: CJ - 20%-39% impaired, limited or restricted    - D/C STATUS:  ---------------To be determined---------------    Medical Necessity:   · Patient is expected to demonstrate progress in strength, range of motion and balance to increase independence with functional mobility with decreased pain. Reason for Services/Other Comments:  · Patient continues to require modification of therapeutic interventions to increase complexity of exercises. TREATMENT:   (In addition to Assessment/Re-Assessment sessions the following treatments were rendered)    Therapeutic Exercise: (see flow sheet below for minutes) :  Exercises per grid below to improve mobility, strength and balance. Required minimal visual and verbal cues to promote proper body alignment and promote proper body mechanics. Progressed resistance and range as indicated. Aquatic Therapy (see flow sheet below for minutes): Aquatic treatment performed per flow grid for Decreased muscle strength, Decreased static/dynamic balance and reactive control, Decreased range of motion, Decreased activity endurance and Ease of movement. Cues provided for technique. Assistance by therapist provided for progression of exercises/ activities. Patient has difficulty with low back/ R hip pain.      Date: 04/04/18  (BRENDA) 4/10/18 4/16/18 4/17/18 04/20/18 04/26/18 04/27/18 04/30/18 5/1/18 5/3/18 5/7/18   Modalities:                                                        Manual Therapy:                                                        Aquatic Exercise:  1.5#/55 minutes 1.5# 60 min 2.5# 50 min 2.5# 55 min 2.5# 55 min 2.5# 60 min 2.5# 60 min 3.75# 60 min 3.75# 55 min 3.75# 60 min   Gait F/B/S/M  x6L F  x4L B/S/M x4L ea x4L X4L X4L c open paddles X4L with open paddles X4L c open paddles x4L c open paddles x4L c closed paddles F/B    X2L c closed paddles S/M x4L c closed paddles   Heel/Toe walk              3-way  x15 BLE x15 BLE x15 BLE   X15 BLE   x20 BLE    PF/DF  x15 x15  x20 x20 x20    x10 BLE  x10 single leg   Hamstring Curls  x15 BLE x4L x4L x2L x20 BLE x2L x4L x4L     Hip Flexion with knee ext.   ([facility])  next  x15 BLE x2L x2L x2L x4L x4L x4L x20 BLE   Squats    next x15  x20  X15 pull downs with open paddles    x20 c helmets lat pull down   SLR march  x4L x4L x4L x4L X4L x4L x4L x4L c open paddles x2L c closed paddles x4L c closed paddles   Lunges         x10 BLE x15 BLE x2L   Hip circles              Hip horizontal abduction/adduction     x20 BLE x20 BLE X20 BLE   x20 clamshell    Core: tray         x4L push/pull x4L push/pull    x2L R/L x4L R/L   Core:flex/ext UE in mini squat      x15 alternating LE stance c open paddles  x15 c open paddles  x20 c closed paddles      Core: horz. abd/add UE in modified tandem stance      x15 c open paddles X15 with open paddles x20 c closed paddles x20 c closed paddles  x20 c helmets   Deep well bike  3 minutes 3min 3 min 3 min 3 min 3 min 3 min 3 min 3 min 3 min   Deep well jumping nav  1 minute 2 min  1 min  1 min 1 min 1 min 1 min 1 min   Deep well scissors  1 minute 2 min  1 min 1 min 1 min 1 min 1 min 1 min 1 min   Deep well:  traction  5 minutes 8 min 8 min 8 min 8 min 5 min 8 min 8 min 8 min 8 min                 Hamstring Stretch  2 H 30 BLE 2h30 2H30 BLE 2H30 BLE  2H30 BLE 2H30 BLE 2H30 BLE 2H30 BLE 2H30 BLE   Step Lunge              Piriformis stretch              PF Stretch              Balance: Single leg Stance          H30 BLE    Balance: Tandem        H30 BLE no LOB                                    Therapeutic Exercise: F=forward  B=Backward  S=sidesteps  M=marches    H=hold    Manual: (see flow sheet above for minutes)   Modalities: (see flow sheet above for minutes)     HEP: Plan to instruct in HEP as therapy progresses    Treatment/Session Assessment: Patient on time for PT today, continued in pool setting with closed paddles. Added lunges at 3 ft handrail with good tolerance. Patient tolerated session well needing minimal verbal/visual cues for technique. Plan to work in pool setting on Wednesday 5/9/18, then transition patient to land 5/14/18; alternating land and water that week. · Pain/ Symptoms: Initial:   4-5/10  LB    Patient reports LB \"achy\" giving it a 4-5/10 pain. Post Session:  0/10 following deep well traction   ·   Compliance with Program/Exercises: Pt reports not consistently  Recommendations/Intent for treatment session: \"Next visit will focus on advancements to more challenging activities\". Plan to work in pool setting on Wednesday 5/9/18, then transition patient to land 5/14/18; alternating land and water that week.   Total Treatment Duration:  PT Patient Time In/Time Out  Time In: 1330  Time Out: 618 [facility]

## 2018-05-09 ENCOUNTER — HOSPITAL ENCOUNTER (OUTPATIENT)
Dept: PHYSICAL THERAPY | Age: 73
Discharge: HOME | End: 2018-05-09
Payer: MEDICARE

## 2018-05-09 PROCEDURE — 97113 AQUATIC THERAPY/EXERCISES: CPT

## 2018-05-09 NOTE — PROGRESS NOTES
Bulmaro Mgcowan  : [id number] [facility],[address]  [facility] [address]  [phone]   [phone] -       OUTPATIENT PHYSICAL THERAPY:Daily Note 2018        ICD-10: Treatment Diagnosis: Pain in right hip (M25.551)  Pain in right knee (M25.561)  Pain in left knee (M25.562)  Difficulty in walking, not elsewhere classified (R26.2)  Precautions/Allergies:   Codeine; Erythromycin; and Tetracycline   Fall Risk Score: 1 (? 5 = High Risk)  MD Orders: Evaluate and Treat: strengthening exercises/ aquatic therapy MEDICAL/REFERRING DIAGNOSIS:  Hip pain [M25.559]   DATE OF ONSET: 10/2017  REFERRING PHYSICIAN: Uzma Gill., *  RETURN PHYSICIAN APPOINTMENT: late 2018     INITIAL ASSESSMENT:  Ms. Nehal Vo (pt) is a 71year old WF seen for physical therapy per MD orders with complaint of R hip pain, low back pain and B knee pain with long history of back pain and arthritis. Pt evaluated for outpatient physical therapy today with the following deficits: low back, right hip and bilateral knee pain, decreased strength in B hip. Pt would benefit from physical therapy to address the above deficits in order to return to increased independence with functional mobility with decreased pain. Pt would benefit from initially working in aquatic setting to off load spine, bilateral lower extremities while addressing goals. As patient progresses, plan to transition to gravity challenging, land based exercises/ activities. Plan of care and goals reviewed with patient who verbalizes agreement. PROBLEM LIST (Impacting functional limitations):  1. Decreased Strength  2. Decreased ADL/Functional Activities  3. Decreased Ambulation Ability/Technique  4. Decreased Balance  5. Increased Pain  6. Decreased Activity Tolerance  7. Decreased [place] with Home Exercise Program INTERVENTIONS PLANNED:  1. Balance Exercise  2. Gait Training  3.  Home Exercise Program (HEP)  4. Manual Therapy  5. Neuromuscular Re-education/Strengthening  6. Range of Motion (ROM)  7. Therapeutic Activites  8. Therapeutic Exercise/Strengthening  9. modalities   10. aquatics    TREATMENT PLAN:  Effective Dates: 4/4/2018 TO 7/3/2018 (90 days). Frequency/Duration: 2-3 times a week for 90 Days    GOALS: (Goals have been discussed and agreed upon with patient.)  Short-Term Functional Goals: Time Frame: 2 weeks  Pt will demonstrate independence/ compliance with home exercise program for management of symptoms (aquatic). (met 04/27/18)  Pt will demonstrate improved Lower Extremity Functional Scale by 3-4 points for improved functional mobility. (in progress 04/27/18)  Pt will tolerate 35 to 40 minutes of aquatic therapy with pain of 2/10. (met 04/27/18)      Discharge Goals: Time Frame: 8 weeks  Pt will demonstrate improved manual muscle test score by 1/2 to 1  grade for R hip complex to participate in heavy housework activities and yard work. (in progress 04/27/18)  Pt will ambulate >2000ft with normal gait pattern with even stance time/ step length for bilateral lower extremities for community entry.  (in progress 04/27/18)  Pt will report Lower Extremity Functional Scale score of 50/80 for improved functional mobility in home/ community setting. (in progress 04/27/18)  4. Pt will demonstrate independence/ compliance with home exercise program for management of symptoms (land). (in progress 04/27/18)    Rehabilitation Potential For Stated Goals: Good                 HISTORY:   History of Present Injury/Illness (Reason for Referral):  Pt is 70 y/o WF seen for PT per MD orders following L DEYSI 10/2017 with multiple complications/ illness over the winter with noted weakness and debility. Pt has complicated hx of arthritis, chronic low back pain as well as spinal stenosis. Pt has sx hx of B TKA (2009, 2011) along with recent hip replacement.   Pt reports her goal is to get stronger so she can do yard work and housework as well as get into community to visit with friends. Past Medical History/Comorbidities:   Ms. Ephraim Saleh  has a past medical history of Arthritis; Asthma; Chronic obstructive asthma, unspecified; Chronic pain; DDD (degenerative disc disease); GERD (gastroesophageal reflux disease); Hypertension; Hypothyroidism; HYPOTHYROIDISM; Incontinence of urine; Morbid obesity ([address] Neuropathy; ILYA (obstructive sleep apnea) (9/16/2009); Osteoarthritis of left knee (9/16/2009); Osteoarthritis of right knee (3/2/2011); S/P total knee replacement using cement (9/16/2009); Sleep apnea; Spastic colon; Status post right hip replacement (10/9/2017); and Type II or unspecified type diabetes mellitus without mention of complication, not stated as uncontrolled. She also has no past medical history of Adverse effect of anesthesia; Aneurysm ([address] Arrhythmia; Autoimmune disease ([address] CAD (coronary artery disease); Cancer ([address] Chronic kidney disease; Chronic obstructive pulmonary disease ([address] Coagulation disorder ([address] Difficult intubation; Endocarditis; Heart failure ([address] Ill-defined condition; Liver disease; Malignant hyperthermia due to anesthesia; Nausea & vomiting; Nicotine vapor product user; Non-nicotine vapor product user; Pseudocholinesterase deficiency; Psychiatric disorder; PUD (peptic ulcer disease); Rheumatic fever; Seizures ([address] Stroke [facility]); or Thromboembolus ([address] Ms. Ephraim Saleh  has a past surgical history that includes hx hernia repair (1/30/08); hx hysterectomy (2000); pr hand/finger surgery unlisted (Right, 1990'S); pr total knee arthroplasty (Left, 2009); and hx knee replacement (Right, 2011). Social History/Living Environment:     Pt lives in one story home with 4 steps to enter with unilateral handrail. Pt lives alone.   Prior Level of Function/Work/Activity:  Retired   Dominant Side:         RIGHT  Current Medications:    Current Outpatient Prescriptions:     amLODIPine (NORVASC) 5 mg tablet, Take 5 mg by mouth daily. , Disp: , Rfl:     ciprofloxacin HCl (CIPRO) 500 mg tablet, Take  by mouth two (2) times a day., Disp: , Rfl:     neomycin-colist-hydrocortisone-thonzonium (CORTISPORIN-TC) otic suspension, by Otic route four (4) times daily. , Disp: , Rfl:     Azelastine (ASTEPRO) 0.15 % (205.5 mcg) nasal spray, 2 Sprays by Both Nostrils route as needed. , Disp: , Rfl:     fluticasone (FLONASE ALLERGY RELIEF) 50 mcg/actuation nasal spray, 2 Sprays by Both Nostrils route as needed for Rhinitis., Disp: , Rfl:     budesonide (PULMICORT) 0.5 mg/2 mL nbsp, 500 mcg by Nebulization route two (2) times a day., Disp: , Rfl:     formoterol (PERFOROMIST) 20 mcg/2 mL nebu neb solution, 20 mcg by Nebulization route two (2) times a day., Disp: , Rfl:     Lactobacillus acidoph-L.bulgar (RUAM ASSIST) 100 million cell pwpk, Take  by mouth., Disp: , Rfl:     Biotin 2,500 mcg cap, Take  by mouth., Disp: , Rfl:     cranberry extract 450 mg tab tablet, Take 450 mg by mouth., Disp: , Rfl:     OTHER, Vein and leg support--- 1 tab po qd, Disp: , Rfl:     estradiol (ESTRACE) 0.01 % (0.1 mg/gram) vaginal cream, Insert 1 g into vagina daily. , Disp: , Rfl:     neomycin-polymyxin-hydrocortisone, buffered, (PEDIOTIC) 3.5-10,000-1 mg/mL-unit/mL-% otic suspension, Administer 2 Drops into each ear daily. , Disp: , Rfl:     traMADol (ULTRAM) 50 mg tablet, Take 50 mg by mouth every six (6) hours as needed for Pain., Disp: , Rfl:     HYDROmorphone (DILAUDID) 2 mg tablet, Take 1 Tab by mouth every four (4) hours as needed. Max Daily Amount: 12 mg., Disp: 40 Tab, Rfl: 0    SITagliptin (JANUVIA) 100 mg tablet, Take 100 mg by mouth daily. , Disp: , Rfl:     lidocaine-kinesiology tape 5 % kit, by Apply Externally route., Disp: , Rfl:     OTHER, three (3) times daily. Lidocaine patch 5%, Disp: , Rfl:     oxybutynin chloride XL (DITROPAN XL) 5 mg CR tablet, Take 5 mg by mouth daily. , Disp: , Rfl:    polyethylene glycol (MIRALAX) 17 gram packet, Take 17 g by mouth as needed. , Disp: , Rfl:     cpap machine kit, by Does Not Apply route. 9 cm H2O, Disp: , Rfl:     FLUTICASONE/SALMETEROL (ADVAIR DISKUS IN), Take 2 Puffs by inhalation two (2) times daily as needed. , Disp: , Rfl:     celecoxib (CELEBREX) 200 mg capsule, Take 200 mg by mouth daily. 1 to 2 a day. , Disp: , Rfl:     PREGABALIN (LYRICA PO), Take 150 mg by mouth two (2) times a day., Disp: , Rfl:     montelukast (SINGULAIR) 10 mg tablet, Take 10 mg by mouth daily. , Disp: , Rfl:     NEOMY SULF/POLYMYX B SULF/HC (CORTISPORIN OT), by Otic route as needed. 1 to 2 drops in each ear as needed. , Disp: , Rfl:     MULTIVITAMIN WITH MINERALS (MULTIVITAMIN & MINERAL FORMULA PO), Take 1 Tab by mouth daily. Multivitamin with D with Calcium , Disp: , Rfl:     metaxalone (SKELAXIN) 800 mg tablet, Take 1 Tab by mouth three (3) times daily as needed for Pain. (Patient taking differently: Take 800 mg by mouth two (2) times a day.), Disp: 1 Tab, Rfl: 0    OMEPRAZOLE PO, 20 mg daily. Pt. Instructed to take morning of surgery per anesthesiologist.  , Disp: , Rfl:     CYANOCOBALAMIN (VITAMIN B-12 PO), Take 500 mg by mouth daily. , Disp: , Rfl:     LISINOPRIL-HYDROCHLOROTHIAZIDE 10-12.5 mg per tablet, take 1 Tab by mouth daily. , Disp: , Rfl:     LASIX 20 mg Tab, Take 20 mg by mouth daily as needed. , Disp: , Rfl:     GLYBURIDE-METFORMIN 2.5-500 mg per tablet, take 1 Tab by mouth two (2) times a day., Disp: , Rfl:     SYNTHROID 100 mcg Tab, Take 100 mcg by mouth daily. Pt. Instructed to take morning of surgery per anesthesiologist.  , Disp: , Rfl:     PATANOL 0.1 % Drop, 2 Drops by Both Eyes route as needed for Allergies. , Disp: , Rfl:     AMBIEN 10 mg Tab, Take 10 mg by mouth nightly., Disp: , Rfl:     LIBRAX, WITH CLINIDIUM, 2.5-5 mg Cap, Take 1 Cap by mouth four (4) times daily as needed. , Disp: , Rfl:    Date Last Reviewed:  5/9/2018   EXAMINATION: Observation/Orthostatic Postural Assessment:          Morbid obesity  Palpation:          Pitting edema in B ankle  Circumferential measurements:  30 cm R/L (above malleolus)  ROM:  BUE/ cervical WNL for patient                     Date:  04/04/18 Date:  04/27/18  Grossly WFL Date:     Trunk ROM 25 % limitation                          LE ROM Left Right       Hip Flexion [facility]       Hip Abduction [facility]       Straight Leg Raise (SLR) N/T N/T                Knee Flexion 125 125       Knee Extension 0 0                Ankle Dorsiflexion [facility] WFL         Strength:     Date:  04/04/18 Date:  04/27/18  Grossly 4- to 4/5 Date:     LE MMT Left Right Left Right Left Right   Hip Flex (L1/L2) 4/5 4-/5  -4/5     Hip Abd (glut med) 4/5 4/5       Hip Ext 4-/5 4-/5  -4/5     Quad    ( L3/4) 4/5 4-/5  4/5     Hamstring 4/5 4-/5  4/5     Anterior Tib (L4/L5)         Gastroc (S1/2)         EHL (L5)                             Special Tests:   deferred  Neurological Screen:        Sensation: Intact for light touch  Functional Mobility:         Gait/Ambulation:  Amb without A.D with increased lateral wt shift BLE lumbering gait pattern        Transfers:  Sit to stand to sit with use of BUE for support        Bed Mobility:  independent  Balance:          Single Leg Stance:  Unable to assume single leg stance. Good static standing balance   Body Structures Involved:  1. Nerves  2. Bones  3. Joints  4. Muscles  5. Ligaments Body Functions Affected:  1. Sensory/Pain  2. Neuromusculoskeletal  3. Movement Related Activities and Participation Affected:  1. General Tasks and Demands  2. Mobility  3. Self Care  4. Domestic Life  5. Community, Social and Civic Life   CLINICAL DECISION MAKING:   Outcome Measure:    Tool Used: Lower Extremity Functional Scale (LEFS)  Score:  Initial: 40/80 Most Recent: 42/80 (Date: 04/27/18 )   Interpretation of Score: 20 questions each scored on a 5 point scale with 0 representing \"extreme difficulty or unable to perform\" and 4 representing \"no difficulty\". The lower the score, the greater the functional disability. 80/80 represents no disability. Minimal detectable change is 9 points. Score 80 79-63 62-48 47-32 31-16 15-1 0   Modifier CH CI CJ CK CL CM CN     ? Mobility - Walking and Moving Around:     - CURRENT STATUS: CK - 40%-59% impaired, limited or restricted    - GOAL STATUS: CJ - 20%-39% impaired, limited or restricted    - D/C STATUS:  ---------------To be determined---------------    Medical Necessity:   · Patient is expected to demonstrate progress in strength, range of motion and balance to increase independence with functional mobility with decreased pain. Reason for Services/Other Comments:  · Patient continues to require modification of therapeutic interventions to increase complexity of exercises. TREATMENT:   (In addition to Assessment/Re-Assessment sessions the following treatments were rendered)    Therapeutic Exercise: (see flow sheet below for minutes) :  Exercises per grid below to improve mobility, strength and balance. Required minimal visual and verbal cues to promote proper body alignment and promote proper body mechanics. Progressed resistance and range as indicated. Aquatic Therapy (see flow sheet below for minutes): Aquatic treatment performed per flow grid for Decreased muscle strength, Decreased static/dynamic balance and reactive control, Decreased range of motion, Decreased activity endurance and Ease of movement. Cues provided for technique. Assistance by therapist provided for progression of exercises/ activities. Patient has difficulty with low back/ R hip pain.      Date: 04/04/18  (EVAL) 4/10/18 4/16/18 4/17/18 04/20/18 04/26/18 04/27/18 04/30/18 5/1/18 5/3/18 5/7/18 5/9/18   Modalities:                                                            Manual Therapy:                                                            Aquatic Exercise:  1.5#/55 minutes 1.5# 60 min 2.5# 50 min 2.5# 55 min 2.5# 55 min 2.5# 60 min 2.5# 60 min 3.75# 60 min 3.75# 55 min 3.75# 60 min 3.75# 60 min   Gait F/B/S/M  x6L F  x4L B/S/M x4L ea x4L X4L X4L c open paddles X4L with open paddles X4L c open paddles x4L c open paddles x4L c closed paddles F/B    X2L c closed paddles S/M x4L c closed paddles x4L c helmets   Heel/Toe walk            x2L   3-way  x15 BLE x15 BLE x15 BLE   X15 BLE   x20 BLE     PF/DF  x15 x15  x20 x20 x20    x10 BLE  x10 single leg x15 BLE    X15 single leg   Hamstring Curls  x15 BLE x4L x4L x2L x20 BLE x2L x4L x4L      Hip Flexion with knee ext.   (rockette)  next  x15 BLE x2L x2L x2L x4L x4L x4L x20 BLE x20 BLE   Squats    next x15  x20  X15 pull downs with open paddles    x20 c helmets lat pull down x20 c helmets c lat pull down   SLR march  x4L x4L x4L x4L X4L x4L x4L x4L c open paddles x2L c closed paddles x4L c closed paddles x4L c helmets   Lunges         x10 BLE x15 BLE x2L x4L   Hip circles               Hip horizontal abduction/adduction     x20 BLE x20 BLE X20 BLE   x20 clamshell     Core: row            x20 c helmets   Core: tray         x4L push/pull x4L push/pull    x2L R/L x4L R/L    Core:flex/ext UE in mini squat      x15 alternating LE stance c open paddles  x15 c open paddles  x20 c closed paddles       Core: horz. abd/add UE in modified tandem stance      x15 c open paddles X15 with open paddles x20 c closed paddles x20 c closed paddles  x20 c helmets x20 c helmets   Deep well bike  3 minutes 3min 3 min 3 min 3 min 3 min 3 min 3 min 3 min 3 min 3 min   Deep well jumping nav  1 minute 2 min  1 min  1 min 1 min 1 min 1 min 1 min 1 min   Deep well scissors  1 minute 2 min  1 min 1 min 1 min 1 min 1 min 1 min 1 min 1 min   Deep well:  traction  5 minutes 8 min 8 min 8 min 8 min 5 min 8 min 8 min 8 min 8 min 8 min                  Hamstring Stretch  2 H 30 BLE 2h30 2H30 BLE 2H30 BLE  2H30 BLE 2H30 BLE 2H30 BLE 2H30 BLE 2H30 BLE 2H30 BLE   Step Lunge Piriformis stretch               PF Stretch               Balance: Single leg Stance          H30 BLE     Balance: Tandem        H30 BLE no LOB                                       Therapeutic Exercise:                                                                                                         F=forward  B=Backward  S=sidesteps  M=marches    H=hold    Manual: (see flow sheet above for minutes)   Modalities: (see flow sheet above for minutes)     HEP: Plan to instruct in HEP as therapy progresses    Treatment/Session Assessment: Continued aquatic exercises today, seen per flowchart. Increased resistance with lap exercises, with patient demonstrating decreased single support due to decreased muscle strength. Patient needed cues for correct technique demonstrating partial recall of new exercises from last session. Plan to transition patient to land next week; alternating land and water that week. · Pain/ Symptoms: Initial:   4-5/10  LB    Patient stated most of her pain is in her LB today, rating it about 4-5/10. Post Session:  0/10 following deep well traction   ·   Compliance with Program/Exercises: Pt reports not consistently  Recommendations/Intent for treatment session: \"Next visit will focus on advancements to more challenging activities\". Plan to transition patient to land next week; alternating land and water that week.   Total Treatment Duration:  PT Patient Time In/Time Out  Time In: 1330  Time Out: 618 [facility]

## 2018-05-11 ENCOUNTER — HOSPITAL ENCOUNTER (OUTPATIENT)
Dept: PHYSICAL THERAPY | Age: 73
Discharge: HOME | End: 2018-05-11
Payer: MEDICARE

## 2018-05-11 PROCEDURE — 97113 AQUATIC THERAPY/EXERCISES: CPT

## 2018-05-11 NOTE — PROGRESS NOTES
Bobbi Mcgowan  : 1945 [address]  [address] [address].  [phone]   [phone] -       OUTPATIENT PHYSICAL THERAPY:Daily Note 2018        ICD-10: Treatment Diagnosis: Pain in right hip (M25.551)  Pain in right knee (M25.561)  Pain in left knee (M25.562)  Difficulty in walking, not elsewhere classified (R26.2)  Precautions/Allergies:   Codeine; Erythromycin; and Tetracycline   Fall Risk Score: 1 (? 5 = High Risk)  MD Orders: Evaluate and Treat: strengthening exercises/ aquatic therapy MEDICAL/REFERRING DIAGNOSIS:  Hip pain [M25.559]   DATE OF ONSET: 10/2017  REFERRING PHYSICIAN: Marybeth Echeverria., *  RETURN PHYSICIAN APPOINTMENT: late 2018     INITIAL ASSESSMENT:  Ms. Serenity Remy (pt) is a 71year old WF seen for physical therapy per MD orders with complaint of R hip pain, low back pain and B knee pain with long history of back pain and arthritis. Pt evaluated for outpatient physical therapy today with the following deficits: low back, right hip and bilateral knee pain, decreased strength in B hip. Pt would benefit from physical therapy to address the above deficits in order to return to increased independence with functional mobility with decreased pain. Pt would benefit from initially working in aquatic setting to off load spine, bilateral lower extremities while addressing goals. As patient progresses, plan to transition to gravity challenging, land based exercises/ activities. Plan of care and goals reviewed with patient who verbalizes agreement. PROBLEM LIST (Impacting functional limitations):  1. Decreased Strength  2. Decreased ADL/Functional Activities  3. Decreased Ambulation Ability/Technique  4. Decreased Balance  5. Increased Pain  6. Decreased Activity Tolerance  7. Decreased Wilkes with Home Exercise Program INTERVENTIONS PLANNED:  1. Balance Exercise  2. Gait Training  3.  Home Exercise Program (HEP)  4. Manual Therapy  5. Neuromuscular Re-education/Strengthening  6. Range of Motion (ROM)  7. Therapeutic Activites  8. Therapeutic Exercise/Strengthening  9. modalities   10. aquatics    TREATMENT PLAN:  Effective Dates: 4/4/2018 TO 7/3/2018 (90 days). Frequency/Duration: 2-3 times a week for 90 Days    GOALS: (Goals have been discussed and agreed upon with patient.)  Short-Term Functional Goals: Time Frame: 2 weeks  Pt will demonstrate independence/ compliance with home exercise program for management of symptoms (aquatic). (met 04/27/18)  Pt will demonstrate improved Lower Extremity Functional Scale by 3-4 points for improved functional mobility. (in progress 04/27/18)  Pt will tolerate 35 to 40 minutes of aquatic therapy with pain of 2/10. (met 04/27/18)      Discharge Goals: Time Frame: 8 weeks  Pt will demonstrate improved manual muscle test score by 1/2 to 1  grade for R hip complex to participate in heavy housework activities and yard work. (in progress 04/27/18)  Pt will ambulate >2000ft with normal gait pattern with even stance time/ step length for bilateral lower extremities for community entry.  (in progress 04/27/18)  Pt will report Lower Extremity Functional Scale score of 50/80 for improved functional mobility in home/ community setting. (in progress 04/27/18)  4. Pt will demonstrate independence/ compliance with home exercise program for management of symptoms (land). (in progress 04/27/18)    Rehabilitation Potential For Stated Goals: Good                 HISTORY:   History of Present Injury/Illness (Reason for Referral):  Pt is 72 y/o WF seen for PT per MD orders following R DEYSI 10/2017 with multiple complications/ illness over the winter with noted weakness and debility. Pt has complicated hx of arthritis, chronic low back pain as well as spinal stenosis. Pt has sx hx of B TKA (2009, 2011) along with recent hip replacement.   Pt reports her goal is to get stronger so she can do yard work and housework as well as get into community to visit with friends. Past Medical History/Comorbidities:   Ms. Linwood Campbell  has a past medical history of Arthritis; Asthma; Chronic obstructive asthma, unspecified; Chronic pain; DDD (degenerative disc disease); GERD (gastroesophageal reflux disease); Hypertension; Hypothyroidism; HYPOTHYROIDISM; Incontinence of urine; Morbid obesity ([address] Neuropathy; ILYA (obstructive sleep apnea) (9/16/2009); Osteoarthritis of left knee (9/16/2009); Osteoarthritis of right knee (3/2/2011); S/P total knee replacement using cement (9/16/2009); Sleep apnea; Spastic colon; Status post right hip replacement (10/9/2017); and Type II or unspecified type diabetes mellitus without mention of complication, not stated as uncontrolled. She also has no past medical history of Adverse effect of anesthesia; Aneurysm ([address] Arrhythmia; Autoimmune disease ([address] CAD (coronary artery disease); Cancer ([address] Chronic kidney disease; Chronic obstructive pulmonary disease ([address] Coagulation disorder ([address] Difficult intubation; Endocarditis; Heart failure ([address] Ill-defined condition; Liver disease; Malignant hyperthermia due to anesthesia; Nausea & vomiting; Nicotine vapor product user; Non-nicotine vapor product user; Pseudocholinesterase deficiency; Psychiatric disorder; PUD (peptic ulcer disease); Rheumatic fever; Seizures ([address] Stroke [facility]); or Thromboembolus ([address] Ms. Linwood Campbell  has a past surgical history that includes hx hernia repair (1/30/08); hx hysterectomy (2000); pr hand/finger surgery unlisted (Right, 1990'S); pr total knee arthroplasty (Left, 2009); and hx knee replacement (Right, 2011). Social History/Living Environment:     Pt lives in one story home with 4 steps to enter with unilateral handrail. Pt lives alone.   Prior Level of Function/Work/Activity:  Retired   Dominant Side:         RIGHT  Current Medications:    Current Outpatient Prescriptions:     amLODIPine (NORVASC) 5 mg tablet, Take 5 mg by mouth daily. , Disp: , Rfl:     ciprofloxacin HCl (CIPRO) 500 mg tablet, Take  by mouth two (2) times a day., Disp: , Rfl:     neomycin-colist-hydrocortisone-thonzonium (CORTISPORIN-TC) otic suspension, by Otic route four (4) times daily. , Disp: , Rfl:     Azelastine (ASTEPRO) 0.15 % (205.5 mcg) nasal spray, 2 Sprays by Both Nostrils route as needed. , Disp: , Rfl:     fluticasone (FLONASE ALLERGY RELIEF) 50 mcg/actuation nasal spray, 2 Sprays by Both Nostrils route as needed for Rhinitis., Disp: , Rfl:     budesonide (PULMICORT) 0.5 mg/2 mL nbsp, 500 mcg by Nebulization route two (2) times a day., Disp: , Rfl:     formoterol (PERFOROMIST) 20 mcg/2 mL nebu neb solution, 20 mcg by Nebulization route two (2) times a day., Disp: , Rfl:     Lactobacillus acidoph-L.bulgar (RUMA ASSIST) 100 million cell pwpk, Take  by mouth., Disp: , Rfl:     Biotin 2,500 mcg cap, Take  by mouth., Disp: , Rfl:     cranberry extract 450 mg tab tablet, Take 450 mg by mouth., Disp: , Rfl:     OTHER, Vein and leg support--- 1 tab po qd, Disp: , Rfl:     estradiol (ESTRACE) 0.01 % (0.1 mg/gram) vaginal cream, Insert 1 g into vagina daily. , Disp: , Rfl:     neomycin-polymyxin-hydrocortisone, buffered, (PEDIOTIC) 3.5-10,000-1 mg/mL-unit/mL-% otic suspension, Administer 2 Drops into each ear daily. , Disp: , Rfl:     traMADol (ULTRAM) 50 mg tablet, Take 50 mg by mouth every six (6) hours as needed for Pain., Disp: , Rfl:     HYDROmorphone (DILAUDID) 2 mg tablet, Take 1 Tab by mouth every four (4) hours as needed. Max Daily Amount: 12 mg., Disp: 40 Tab, Rfl: 0    SITagliptin (JANUVIA) 100 mg tablet, Take 100 mg by mouth daily. , Disp: , Rfl:     lidocaine-kinesiology tape 5 % kit, by Apply Externally route., Disp: , Rfl:     OTHER, three (3) times daily. Lidocaine patch 5%, Disp: , Rfl:     oxybutynin chloride XL (DITROPAN XL) 5 mg CR tablet, Take 5 mg by mouth daily. , Disp: , Rfl:    polyethylene glycol (MIRALAX) 17 gram packet, Take 17 g by mouth as needed. , Disp: , Rfl:     cpap machine kit, by Does Not Apply route. 9 cm H2O, Disp: , Rfl:     FLUTICASONE/SALMETEROL (ADVAIR DISKUS IN), Take 2 Puffs by inhalation two (2) times daily as needed. , Disp: , Rfl:     celecoxib (CELEBREX) 200 mg capsule, Take 200 mg by mouth daily. 1 to 2 a day. , Disp: , Rfl:     PREGABALIN (LYRICA PO), Take 150 mg by mouth two (2) times a day., Disp: , Rfl:     montelukast (SINGULAIR) 10 mg tablet, Take 10 mg by mouth daily. , Disp: , Rfl:     NEOMY SULF/POLYMYX B SULF/HC (CORTISPORIN OT), by Otic route as needed. 1 to 2 drops in each ear as needed. , Disp: , Rfl:     MULTIVITAMIN WITH MINERALS (MULTIVITAMIN & MINERAL FORMULA PO), Take 1 Tab by mouth daily. Multivitamin with D with Calcium , Disp: , Rfl:     metaxalone (SKELAXIN) 800 mg tablet, Take 1 Tab by mouth three (3) times daily as needed for Pain. (Patient taking differently: Take 800 mg by mouth two (2) times a day.), Disp: 1 Tab, Rfl: 0    OMEPRAZOLE PO, 20 mg daily. Pt. Instructed to take morning of surgery per anesthesiologist.  , Disp: , Rfl:     CYANOCOBALAMIN (VITAMIN B-12 PO), Take 500 mg by mouth daily. , Disp: , Rfl:     LISINOPRIL-HYDROCHLOROTHIAZIDE 10-12.5 mg per tablet, take 1 Tab by mouth daily. , Disp: , Rfl:     LASIX 20 mg Tab, Take 20 mg by mouth daily as needed. , Disp: , Rfl:     GLYBURIDE-METFORMIN 2.5-500 mg per tablet, take 1 Tab by mouth two (2) times a day., Disp: , Rfl:     SYNTHROID 100 mcg Tab, Take 100 mcg by mouth daily. Pt. Instructed to take morning of surgery per anesthesiologist.  , Disp: , Rfl:     PATANOL 0.1 % Drop, 2 Drops by Both Eyes route as needed for Allergies. , Disp: , Rfl:     AMBIEN 10 mg Tab, Take 10 mg by mouth nightly., Disp: , Rfl:     LIBRAX, WITH CLINIDIUM, 2.5-5 mg Cap, Take 1 Cap by mouth four (4) times daily as needed. , Disp: , Rfl:    Date Last Reviewed:  5/11/2018   EXAMINATION: Observation/Orthostatic Postural Assessment:          Morbid obesity  Palpation:          Pitting edema in B ankle  Circumferential measurements:  30 cm R/L (above malleolus)  ROM:  BUE/ cervical WNL for patient                     Date:  04/04/18 Date:  04/27/18  Grossly WFL Date:     Trunk ROM 25 % limitation                          LE ROM Left Right       Hip Flexion [facility]       Hip Abduction [facility]       Straight Leg Raise (SLR) N/T N/T                Knee Flexion 125 125       Knee Extension 0 0                Ankle Dorsiflexion [facility] WFL         Strength:     Date:  04/04/18 Date:  04/27/18  Grossly 4- to 4/5 Date:     LE MMT Left Right Left Right Left Right   Hip Flex (L1/L2) 4/5 4-/5  -4/5     Hip Abd (glut med) 4/5 4/5       Hip Ext 4-/5 4-/5  -4/5     Quad    ( L3/4) 4/5 4-/5  4/5     Hamstring 4/5 4-/5  4/5     Anterior Tib (L4/L5)         Gastroc (S1/2)         EHL (L5)                             Special Tests:   deferred  Neurological Screen:        Sensation: Intact for light touch  Functional Mobility:         Gait/Ambulation:  Amb without A.D with increased lateral wt shift BLE lumbering gait pattern        Transfers:  Sit to stand to sit with use of BUE for support        Bed Mobility:  independent  Balance:          Single Leg Stance:  Unable to assume single leg stance. Good static standing balance   Body Structures Involved:  1. Nerves  2. Bones  3. Joints  4. Muscles  5. Ligaments Body Functions Affected:  1. Sensory/Pain  2. Neuromusculoskeletal  3. Movement Related Activities and Participation Affected:  1. General Tasks and Demands  2. Mobility  3. Self Care  4. Domestic Life  5. Community, Social and Civic Life   CLINICAL DECISION MAKING:   Outcome Measure:    Tool Used: Lower Extremity Functional Scale (LEFS)  Score:  Initial: 40/80 Most Recent: 42/80 (Date: 04/27/18 )   Interpretation of Score: 20 questions each scored on a 5 point scale with 0 representing \"extreme difficulty or unable to perform\" and 4 representing \"no difficulty\". The lower the score, the greater the functional disability. 80/80 represents no disability. Minimal detectable change is 9 points. Score 80 79-63 62-48 47-32 31-16 15-1 0   Modifier CH CI CJ CK CL CM CN     ? Mobility - Walking and Moving Around:     - CURRENT STATUS: CK - 40%-59% impaired, limited or restricted    - GOAL STATUS: CJ - 20%-39% impaired, limited or restricted    - D/C STATUS:  ---------------To be determined---------------    Medical Necessity:   · Patient is expected to demonstrate progress in strength, range of motion and balance to increase independence with functional mobility with decreased pain. Reason for Services/Other Comments:  · Patient continues to require modification of therapeutic interventions to increase complexity of exercises. TREATMENT:   (In addition to Assessment/Re-Assessment sessions the following treatments were rendered)    Therapeutic Exercise: (see flow sheet below for minutes) :  Exercises per grid below to improve mobility, strength and balance. Required minimal visual and verbal cues to promote proper body alignment and promote proper body mechanics. Progressed resistance and range as indicated. Aquatic Therapy (see flow sheet below for minutes): Aquatic treatment performed per flow grid for Decreased muscle strength, Decreased static/dynamic balance and reactive control, Decreased range of motion, Decreased activity endurance and Ease of movement. Cues provided for technique. Assistance by therapist provided for progression of exercises/ activities. Patient has difficulty with low back/ R hip pain.      Date: 4/16/18 4/17/18 04/20/18 04/26/18 04/27/18 04/30/18 5/1/18 5/3/18 5/7/18 5/9/18 5/11/18   Modalities:                                                        Manual Therapy:                                                        Aquatic Exercise: 1.5# 60 min 2.5# 50 min 2.5# 55 min 2.5# 55 min 2.5# 60 min 2.5# 60 min 3.75# 60 min 3.75# 55 min 3.75# 60 min 3.75# 60 min 3.75# 60 min   Gait F/B/S/M x4L ea x4L X4L X4L c open paddles X4L with open paddles X4L c open paddles x4L c open paddles x4L c closed paddles F/B    X2L c closed paddles S/M x4L c closed paddles x4L c helmets same   Heel/Toe walk          x2L    3-way x15 BLE x15 BLE   X15 BLE   x20 BLE      PF/DF x15  x20 x20 x20    x10 BLE  x10 single leg x15 BLE    X15 single leg x20 BLE    x15 single leg   Hamstring Curls x4L x4L x2L x20 BLE x2L x4L x4L       Hip Flexion with knee ext.   ([facility])  x15 BLE x2L x2L x2L x4L x4L x4L x20 BLE x20 BLE x20 BLE   Squats   x15  x20  X15 pull downs with open paddles    x20 c helmets lat pull down x20 c helmets c lat pull down x20 c helmets c lat pull down   SLR march x4L x4L x4L X4L x4L x4L x4L c open paddles x2L c closed paddles x4L c closed paddles x4L c helmets x4L   Lunges       x10 BLE x15 BLE x2L x4L x4L   Hip circles              Hip horizontal abduction/adduction   x20 BLE x20 BLE X20 BLE   x20 clamshell      Core: row          x20 c helmets x20 c hlemets   Core: tray       x4L push/pull x4L push/pull    x2L R/L x4L R/L     Core:flex/ext UE in mini squat    x15 alternating LE stance c open paddles  x15 c open paddles  x20 c closed paddles     x15 c helmets alternating wide tandem stance   Core: horz. abd/add UE in modified tandem stance    x15 c open paddles X15 with open paddles x20 c closed paddles x20 c closed paddles  x20 c helmets x20 c helmets x20 c helmets in mini squat   Deep well bike 3min 3 min 3 min 3 min 3 min 3 min 3 min 3 min 3 min 3 min 2 min   Deep well jumping nav 2 min  1 min  1 min 1 min 1 min 1 min 1 min 1 min 2 min   Deep well scissors 2 min  1 min 1 min 1 min 1 min 1 min 1 min 1 min 1 min 2 min   Deep well:  traction 8 min 8 min 8 min 8 min 5 min 8 min 8 min 8 min 8 min 8 min 8 min                 Hamstring Stretch 2h30 2H30 BLE 2H30 BLE  2H30 BLE 2H30 BLE 2H30 BLE 2H30 BLE 2H30 BLE 2H30 BLE    Step Lunge              Piriformis stretch              PF Stretch              Balance: Single leg Stance        H30 BLE      Balance: Tandem      H30 BLE no LOB                                      Therapeutic Exercise:                                                                                                  F=forward  B=Backward  S=sidesteps  M=marches    H=hold    Manual: (see flow sheet above for minutes)   Modalities: (see flow sheet above for minutes)     HEP: Plan to instruct in HEP as therapy progresses    Treatment/Session Assessment: Continued aquatic exercises today, seen per flowchart, in approximately 60% buoyancy . Patient began to move in zig zag during lateral side stepping exercise due to decreased core strength/stability. Patient appeared fatigued with exercises today; moving slowly through session. Plan to transition patient to land next visit; alternating land and water that week. · Pain/ Symptoms: Initial:   1/10  LB    Patient states her LB has felt better since last visit following deep well exercises where she began to twist (lumbar area) from side to side loosening up something. Patient states she saw Dr. Dona Hudson yesterday and he told her she had some arthritis in her L hip but she doesn't want to have another surgery. Post Session:  0/10 following deep well traction   ·   Compliance with Program/Exercises: Pt reports not consistent  Recommendations/Intent for treatment session: \"Next visit will focus on advancements to more challenging activities\". Plan to transition patient to land next visit; alternating land and water that week.   Total Treatment Duration:  PT Patient Time In/Time Out  Time In: 1330  Time Out: [address] Alberto Navarrete, PTA

## 2018-05-14 ENCOUNTER — HOSPITAL ENCOUNTER (OUTPATIENT)
Dept: PHYSICAL THERAPY | Age: 73
Discharge: HOME | End: 2018-05-14
Payer: MEDICARE

## 2018-05-14 PROCEDURE — G8978 MOBILITY CURRENT STATUS: HCPCS

## 2018-05-14 PROCEDURE — G8979 MOBILITY GOAL STATUS: HCPCS

## 2018-05-14 PROCEDURE — 97110 THERAPEUTIC EXERCISES: CPT

## 2018-05-14 NOTE — PROGRESS NOTES
John Rosales [facility]  : 1945 [id number] [facility],[address]  [address] [place] [address].  [phone]   [phone] -       OUTPATIENT PHYSICAL THERAPY:Daily Note and Progress Report 2018        ICD-10: Treatment Diagnosis: Pain in right hip (M25.551)  Pain in right knee (M25.561)  Pain in left knee (M25.562)  Difficulty in walking, not elsewhere classified (R26.2)  Precautions/Allergies:   Codeine; Erythromycin; and Tetracycline   Fall Risk Score: 1 (? 5 = High Risk)  MD Orders: Evaluate and Treat: strengthening exercises/ aquatic therapy MEDICAL/REFERRING DIAGNOSIS:  Hip pain [M25.559]   DATE OF ONSET: 10/2017  REFERRING PHYSICIAN: Mohan Rubio., *6  RETURN PHYSICIAN APPOINTMENT: late 2018     INITIAL ASSESSMENT:  Ms. Francine Sewell (pt) is a 71year old WF seen for physical therapy per MD orders with complaint of R hip pain, low back pain and B knee pain with long history of back pain and arthritis. Pt evaluated for outpatient physical therapy today with the following deficits: low back, right hip and bilateral knee pain, decreased strength in B hip. Pt would benefit from physical therapy to address the above deficits in order to return to increased independence with functional mobility with decreased pain. Pt would benefit from initially working in aquatic setting to off load spine, bilateral lower extremities while addressing goals. As patient progresses, plan to transition to gravity challenging, land based exercises/ activities. Plan of care and goals reviewed with patient who verbalizes agreement. PROBLEM LIST (Impacting functional limitations):  1. Decreased Strength  2. Decreased ADL/Functional Activities  3. Decreased Ambulation Ability/Technique  4. Decreased Balance  5. Increased Pain  6. Decreased Activity Tolerance  7. Decreased [place] with Home Exercise Program INTERVENTIONS PLANNED:  1. Balance Exercise  2. Gait Training  3.  Home Exercise Program (HEP)  4. Manual Therapy  5. Neuromuscular Re-education/Strengthening  6. Range of Motion (ROM)  7. Therapeutic Activites  8. Therapeutic Exercise/Strengthening  9. modalities   10. aquatics    TREATMENT PLAN:  Effective Dates: 4/4/2018 TO 7/3/2018 (90 days). Frequency/Duration: 2-3 times a week for 90 Days    GOALS: (Goals have been discussed and agreed upon with patient.)  Short-Term Functional Goals: Time Frame: 2 weeks  Pt will demonstrate independence/ compliance with home exercise program for management of symptoms (aquatic). (met 04/27/18)  Pt will demonstrate improved Lower Extremity Functional Scale by 3-4 points for improved functional mobility. (met 5/14/18)  Pt will tolerate 35 to 40 minutes of aquatic therapy with pain of 2/10. (met 04/27/18)      Discharge Goals: Time Frame: 8 weeks  Pt will demonstrate improved manual muscle test score by 1/2 to 1  grade for R hip complex to participate in heavy housework activities and yard work. (in progress 05/14/18)  Pt will ambulate >2000ft with normal gait pattern with even stance time/ step length for bilateral lower extremities for community entry.  (in progress 05/14/18)  Pt will report Lower Extremity Functional Scale score of 50/80 for improved functional mobility in home/ community setting. (in progress 04/27/18)  4. Pt will demonstrate independence/ compliance with home exercise program for management of symptoms (land). (met 5/14/18)    Rehabilitation Potential For Stated Goals: Good                 HISTORY:   History of Present Injury/Illness (Reason for Referral):  Pt is 72 y/o WF seen for PT per MD orders following R DEYSI 10/2017 with multiple complications/ illness over the winter with noted weakness and debility. Pt has complicated hx of arthritis, chronic low back pain as well as spinal stenosis. Pt has sx hx of B TKA (2009, 2011) along with recent hip replacement.   Pt reports her goal is to get stronger so she can do yard work and housework as well as get into community to visit with friends. Past Medical History/Comorbidities:   Ms. Ananth Pickett  has a past medical history of Arthritis; Asthma; Chronic obstructive asthma, unspecified; Chronic pain; DDD (degenerative disc disease); GERD (gastroesophageal reflux disease); Hypertension; Hypothyroidism; HYPOTHYROIDISM; Incontinence of urine; Morbid obesity ([address] Neuropathy; ILYA (obstructive sleep apnea) (9/16/2009); Osteoarthritis of left knee (9/16/2009); Osteoarthritis of right knee (3/2/2011); S/P total knee replacement using cement (9/16/2009); Sleep apnea; Spastic colon; Status post right hip replacement (10/9/2017); and Type II or unspecified type diabetes mellitus without mention of complication, not stated as uncontrolled. She also has no past medical history of Adverse effect of anesthesia; Aneurysm ([address] Arrhythmia; Autoimmune disease ([address] CAD (coronary artery disease); Cancer ([address] Chronic kidney disease; Chronic obstructive pulmonary disease ([address] Coagulation disorder ([address] Difficult intubation; Endocarditis; Heart failure ([address] Ill-defined condition; Liver disease; Malignant hyperthermia due to anesthesia; Nausea & vomiting; Nicotine vapor product user; Non-nicotine vapor product user; Pseudocholinesterase deficiency; Psychiatric disorder; PUD (peptic ulcer disease); Rheumatic fever; Seizures ([address] Stroke [facility]); or Thromboembolus ([address] Ms. Ananth Pickett  has a past surgical history that includes hx hernia repair (1/30/08); hx hysterectomy (2000); pr hand/finger surgery unlisted (Right, 1990'S); pr total knee arthroplasty (Left, 2009); and hx knee replacement (Right, 2011). Social History/Living Environment:     Pt lives in one story home with 4 steps to enter with unilateral handrail. Pt lives alone.   Prior Level of Function/Work/Activity:  Retired   Dominant Side:         RIGHT  Current Medications:    Current Outpatient Prescriptions:     amLODIPine (NORVASC) 5 mg tablet, Take 5 mg by mouth daily. , Disp: , Rfl:     ciprofloxacin HCl (CIPRO) 500 mg tablet, Take  by mouth two (2) times a day., Disp: , Rfl:     neomycin-colist-hydrocortisone-thonzonium (CORTISPORIN-TC) otic suspension, by Otic route four (4) times daily. , Disp: , Rfl:     Azelastine (ASTEPRO) 0.15 % (205.5 mcg) nasal spray, 2 Sprays by Both Nostrils route as needed. , Disp: , Rfl:     fluticasone (FLONASE ALLERGY RELIEF) 50 mcg/actuation nasal spray, 2 Sprays by Both Nostrils route as needed for Rhinitis., Disp: , Rfl:     budesonide (PULMICORT) 0.5 mg/2 mL nbsp, 500 mcg by Nebulization route two (2) times a day., Disp: , Rfl:     formoterol (PERFOROMIST) 20 mcg/2 mL nebu neb solution, 20 mcg by Nebulization route two (2) times a day., Disp: , Rfl:     Lactobacillus acidoph-L.bulgar (RUMA ASSIST) 100 million cell pwpk, Take  by mouth., Disp: , Rfl:     Biotin 2,500 mcg cap, Take  by mouth., Disp: , Rfl:     cranberry extract 450 mg tab tablet, Take 450 mg by mouth., Disp: , Rfl:     OTHER, Vein and leg support--- 1 tab po qd, Disp: , Rfl:     estradiol (ESTRACE) 0.01 % (0.1 mg/gram) vaginal cream, Insert 1 g into vagina daily. , Disp: , Rfl:     neomycin-polymyxin-hydrocortisone, buffered, (PEDIOTIC) 3.5-10,000-1 mg/mL-unit/mL-% otic suspension, Administer 2 Drops into each ear daily. , Disp: , Rfl:     traMADol (ULTRAM) 50 mg tablet, Take 50 mg by mouth every six (6) hours as needed for Pain., Disp: , Rfl:     HYDROmorphone (DILAUDID) 2 mg tablet, Take 1 Tab by mouth every four (4) hours as needed. Max Daily Amount: 12 mg., Disp: 40 Tab, Rfl: 0    SITagliptin (JANUVIA) 100 mg tablet, Take 100 mg by mouth daily. , Disp: , Rfl:     lidocaine-kinesiology tape 5 % kit, by Apply Externally route., Disp: , Rfl:     OTHER, three (3) times daily. Lidocaine patch 5%, Disp: , Rfl:     oxybutynin chloride XL (DITROPAN XL) 5 mg CR tablet, Take 5 mg by mouth daily. , Disp: , Rfl:    polyethylene glycol (MIRALAX) 17 gram packet, Take 17 g by mouth as needed. , Disp: , Rfl:     cpap machine kit, by Does Not Apply route. 9 cm H2O, Disp: , Rfl:     FLUTICASONE/SALMETEROL (ADVAIR DISKUS IN), Take 2 Puffs by inhalation two (2) times daily as needed. , Disp: , Rfl:     celecoxib (CELEBREX) 200 mg capsule, Take 200 mg by mouth daily. 1 to 2 a day. , Disp: , Rfl:     PREGABALIN (LYRICA PO), Take 150 mg by mouth two (2) times a day., Disp: , Rfl:     montelukast (SINGULAIR) 10 mg tablet, Take 10 mg by mouth daily. , Disp: , Rfl:     NEOMY SULF/POLYMYX B SULF/HC (CORTISPORIN OT), by Otic route as needed. 1 to 2 drops in each ear as needed. , Disp: , Rfl:     MULTIVITAMIN WITH MINERALS (MULTIVITAMIN & MINERAL FORMULA PO), Take 1 Tab by mouth daily. Multivitamin with D with Calcium , Disp: , Rfl:     metaxalone (SKELAXIN) 800 mg tablet, Take 1 Tab by mouth three (3) times daily as needed for Pain. (Patient taking differently: Take 800 mg by mouth two (2) times a day.), Disp: 1 Tab, Rfl: 0    OMEPRAZOLE PO, 20 mg daily. Pt. Instructed to take morning of surgery per anesthesiologist.  , Disp: , Rfl:     CYANOCOBALAMIN (VITAMIN B-12 PO), Take 500 mg by mouth daily. , Disp: , Rfl:     LISINOPRIL-HYDROCHLOROTHIAZIDE 10-12.5 mg per tablet, take 1 Tab by mouth daily. , Disp: , Rfl:     LASIX 20 mg Tab, Take 20 mg by mouth daily as needed. , Disp: , Rfl:     GLYBURIDE-METFORMIN 2.5-500 mg per tablet, take 1 Tab by mouth two (2) times a day., Disp: , Rfl:     SYNTHROID 100 mcg Tab, Take 100 mcg by mouth daily. Pt. Instructed to take morning of surgery per anesthesiologist.  , Disp: , Rfl:     PATANOL 0.1 % Drop, 2 Drops by Both Eyes route as needed for Allergies. , Disp: , Rfl:     AMBIEN 10 mg Tab, Take 10 mg by mouth nightly., Disp: , Rfl:     LIBRAX, WITH CLINIDIUM, 2.5-5 mg Cap, Take 1 Cap by mouth four (4) times daily as needed. , Disp: , Rfl:    Date Last Reviewed:  5/14/2018   EXAMINATION: Observation/Orthostatic Postural Assessment:          Morbid obesity  Palpation:          Pitting edema in B ankle  Circumferential measurements:  30 cm R/L (above malleolus)  ROM:  BUE/ cervical WNL for patient                     Date:  04/04/18 Date:  04/27/18  Grossly WFL Date:  5/14/18  same   Trunk ROM 25 % limitation                          LE ROM Left Right       Hip Flexion [facility]       Hip Abduction [facility]       Straight Leg Raise (SLR) N/T N/T                Knee Flexion 125 125       Knee Extension 0 0                Ankle Dorsiflexion [facility] WFL         Strength:     Date:  04/04/18 Date:  04/27/18  Grossly 4- to 4/5 Date:  5/14/18   LE MMT Left Right Left Right Left Right   Hip Flex (L1/L2) 4/5 4-/5  -4/5 4/5 4/5   Hip Abd (glut med) 4/5 4/5   4/5 4/5   Hip Ext 4-/5 4-/5  -4/5     Quad    ( L3/4) 4/5 4-/5  4/5 4+/5 4+/5   Hamstring 4/5 4-/5  4/5 4+/5 4+/5   Anterior Tib (L4/L5)         Gastroc (S1/2)         EHL (L5)                             Special Tests:   deferred  Neurological Screen:        Sensation: Intact for light touch  Functional Mobility:         Gait/Ambulation:  Amb without A.D with increased lateral wt shift BLE lumbering gait pattern        Transfers:  Sit to stand to sit with use of BUE for support        Bed Mobility:  independent  Balance:          Single Leg Stance:  Unable to assume single leg stance. Good static standing balance   Body Structures Involved:  1. Nerves  2. Bones  3. Joints  4. Muscles  5. Ligaments Body Functions Affected:  1. Sensory/Pain  2. Neuromusculoskeletal  3. Movement Related Activities and Participation Affected:  1. General Tasks and Demands  2. Mobility  3. Self Care  4. Domestic Life  5. Community, Social and Civic Life   CLINICAL DECISION MAKING:   Outcome Measure:    Tool Used: Lower Extremity Functional Scale (LEFS)  Score:  Initial: 40/80 Recent: 42/80 (Date: 04/27/18 ) Most Recent: 46/80 (Date: 5/14/18)   Interpretation of Score: 20 questions each scored on a 5 point scale with 0 representing \"extreme difficulty or unable to perform\" and 4 representing \"no difficulty\". The lower the score, the greater the functional disability. 80/80 represents no disability. Minimal detectable change is 9 points. Score 80 79-63 62-48 47-32 31-16 15-1 0   Modifier CH CI CJ CK CL CM CN     ? Mobility - Walking and Moving Around:     - CURRENT STATUS: CK - 40%-59% impaired, limited or restricted    - GOAL STATUS: CJ - 20%-39% impaired, limited or restricted    - D/C STATUS:  ---------------To be determined---------------    Medical Necessity:   · Patient is expected to demonstrate progress in strength, range of motion and balance to increase independence with functional mobility with decreased pain. Reason for Services/Other Comments:  · Patient continues to require modification of therapeutic interventions to increase complexity of exercises. TREATMENT:   (In addition to Assessment/Re-Assessment sessions the following treatments were rendered)    Therapeutic Exercise: (see flow sheet below for minutes) :  Exercises per grid below to improve mobility, strength and balance. Required minimal visual and verbal cues to promote proper body alignment and promote proper body mechanics. Progressed resistance and range as indicated. Aquatic Therapy (see flow sheet below for minutes): Aquatic treatment performed per flow grid for Decreased muscle strength, Decreased static/dynamic balance and reactive control, Decreased range of motion, Decreased activity endurance and Ease of movement. Cues provided for technique. Assistance by therapist provided for progression of exercises/ activities. Patient has difficulty with low back/ R hip pain.      Date: 04/26/18 04/27/18 04/30/18 5/1/18 5/3/18 5/7/18 5/9/18 5/11/18 5/14/18   Modalities:                                                Manual Therapy:                                                Aquatic Exercise: 2.5# 55 min 2.5# 60 min 2.5# 60 min 3.75# 60 min 3.75# 55 min 3.75# 60 min 3.75# 60 min 3.75# 60 min    Gait F/B/S/M X4L c open paddles X4L with open paddles X4L c open paddles x4L c open paddles x4L c closed paddles F/B    X2L c closed paddles S/M x4L c closed paddles x4L c helmets same    Heel/Toe walk       x2L     3-way  X15 BLE   x20 BLE       PF/DF x20 x20    x10 BLE  x10 single leg x15 BLE    X15 single leg x20 BLE    x15 single leg    Hamstring Curls x20 BLE x2L x4L x4L        Hip Flexion with knee ext.   (rockette) x2L x2L x4L x4L x4L x20 BLE x20 BLE x20 BLE    Squats    X15 pull downs with open paddles    x20 c helmets lat pull down x20 c helmets c lat pull down x20 c helmets c lat pull down    SLR march X4L x4L x4L x4L c open paddles x2L c closed paddles x4L c closed paddles x4L c helmets x4L    Lunges    x10 BLE x15 BLE x2L x4L x4L    Hip circles            Hip horizontal abduction/adduction x20 BLE X20 BLE   x20 clamshell       Core: row       x20 c helmets x20 c hlemets    Core: tray    x4L push/pull x4L push/pull    x2L R/L x4L R/L      Core:flex/ext UE in mini squat   x15 c open paddles  x20 c closed paddles     x15 c helmets alternating wide tandem stance    Core: horz. abd/add UE in modified tandem stance x15 c open paddles X15 with open paddles x20 c closed paddles x20 c closed paddles  x20 c helmets x20 c helmets x20 c helmets in mini squat    Deep well bike 3 min 3 min 3 min 3 min 3 min 3 min 3 min 2 min    Deep well jumping nav  1 min 1 min 1 min 1 min 1 min 1 min 2 min    Deep well scissors 1 min 1 min 1 min 1 min 1 min 1 min 1 min 2 min    Deep well:  traction 8 min 5 min 8 min 8 min 8 min 8 min 8 min 8 min                Hamstring Stretch  2H30 BLE 2H30 BLE 2H30 BLE 2H30 BLE 2H30 BLE 2H30 BLE     Step Lunge            Piriformis stretch            PF Stretch            Balance: Single leg Stance     H30 BLE       Balance: Tandem   H30 BLE no LOB Therapeutic Exercise:         45 min   Hamstring stretch supine         2H30 BLE HEP   SLR         2x10 BLE HEP   Pelvic tilt            Seated lateral step over         x10 BLE   Hip abd            Clamshell         Hoolying c green TB x15 HEP   Hip add         x15 H5 HEP   bridge            Heel toe raises            Hamstring curls            Hip extension            Sit to stand         x10   Squat            Lung            Step up            Forward weight shifts         X15 BLE at handrail HEP   Lateral weight shifts         With ipsilateral arm reach x15 BLE at handrail HEP   Slantboard                                                F=forward  B=Backward  S=sidesteps  M=marches    H=hold    Manual: (see flow sheet above for minutes)   Modalities: (see flow sheet above for minutes)     HEP: Plan to instruct in HEP as therapy progresses. Land HEP pictorial and written instructions 5/14/18. Treatment/Session Assessment: Initiated land exercises today, seen per flowchart, giving patient land HEP; reviewed exercises with patient demonstrating good understanding for proper technique. Patient demonstrated decreased strength in R iliopsoas/hip flexors as seen by her inability to complete SLR on R side through full range, needing 2 min rest break between sets. Plan to alternate land and water 1-2 weeks, progressing patient to land primarily and towards LTGs. · Pain/ Symptoms: Initial:   3/10  LB    Patient states she did a lot f grass cutting yesterday increasing pain, and reports she is not feeling rested after sleeping. Post Session:  1/10 LB pain following land   ·   Compliance with Program/Exercises: Pt reports not consistent  Recommendations/Intent for treatment session: \"Next visit will focus on advancements to more challenging activities\". Plan to alternate land and water 1-2 weeks, progressing patient towards LTGs.   Total Treatment Duration:  PT Patient Time In/Time Out  Time In: 1330  Time Out: [facility]

## 2018-05-15 ENCOUNTER — APPOINTMENT (OUTPATIENT)
Dept: PHYSICAL THERAPY | Age: 73
End: 2018-05-15
Payer: MEDICARE

## 2018-05-17 ENCOUNTER — HOSPITAL ENCOUNTER (OUTPATIENT)
Dept: PHYSICAL THERAPY | Age: 73
Discharge: HOME | End: 2018-05-17
Payer: MEDICARE

## 2018-05-17 PROCEDURE — 97113 AQUATIC THERAPY/EXERCISES: CPT

## 2018-05-17 NOTE — PROGRESS NOTES
[facility]  : 1945 [address]  [address] [place] [address].  [phone]   [phone] -       OUTPATIENT PHYSICAL THERAPY:Daily Note 2018        ICD-10: Treatment Diagnosis: Pain in right hip (M25.551)  Pain in right knee (M25.561)  Pain in left knee (M25.562)  Difficulty in walking, not elsewhere classified (R26.2)  Precautions/Allergies:   Codeine; Erythromycin; and Tetracycline   Fall Risk Score: 1 (? 5 = High Risk)  MD Orders: Evaluate and Treat: strengthening exercises/ aquatic therapy MEDICAL/REFERRING DIAGNOSIS:  Hip pain [M25.559]   DATE OF ONSET: 10/2017  REFERRING PHYSICIAN: Evert Vivas., *6  RETURN PHYSICIAN APPOINTMENT: late 2018     INITIAL ASSESSMENT:  Ms. Itz Cisneros (pt) is a 71year old WF seen for physical therapy per MD orders with complaint of R hip pain, low back pain and B knee pain with long history of back pain and arthritis. Pt evaluated for outpatient physical therapy today with the following deficits: low back, right hip and bilateral knee pain, decreased strength in B hip. Pt would benefit from physical therapy to address the above deficits in order to return to increased independence with functional mobility with decreased pain. Pt would benefit from initially working in aquatic setting to off load spine, bilateral lower extremities while addressing goals. As patient progresses, plan to transition to gravity challenging, land based exercises/ activities. Plan of care and goals reviewed with patient who verbalizes agreement. PROBLEM LIST (Impacting functional limitations):  1. Decreased Strength  2. Decreased ADL/Functional Activities  3. Decreased Ambulation Ability/Technique  4. Decreased Balance  5. Increased Pain  6. Decreased Activity Tolerance  7. Decreased [place] with Home Exercise Program INTERVENTIONS PLANNED:  1. Balance Exercise  2. Gait Training  3.  Home Exercise Program (HEP)  4. Manual Therapy  5. Neuromuscular Re-education/Strengthening  6. Range of Motion (ROM)  7. Therapeutic Activites  8. Therapeutic Exercise/Strengthening  9. modalities   10. aquatics    TREATMENT PLAN:  Effective Dates: 4/4/2018 TO 7/3/2018 (90 days). Frequency/Duration: 2-3 times a week for 90 Days    GOALS: (Goals have been discussed and agreed upon with patient.)  Short-Term Functional Goals: Time Frame: 2 weeks  Pt will demonstrate independence/ compliance with home exercise program for management of symptoms (aquatic). (met 04/27/18)  Pt will demonstrate improved Lower Extremity Functional Scale by 3-4 points for improved functional mobility. (met 5/14/18)  Pt will tolerate 35 to 40 minutes of aquatic therapy with pain of 2/10. (met 04/27/18)      Discharge Goals: Time Frame: 8 weeks  Pt will demonstrate improved manual muscle test score by 1/2 to 1  grade for R hip complex to participate in heavy housework activities and yard work. (in progress 05/14/18)  Pt will ambulate >2000ft with normal gait pattern with even stance time/ step length for bilateral lower extremities for community entry.  (in progress 05/14/18)  Pt will report Lower Extremity Functional Scale score of 50/80 for improved functional mobility in home/ community setting. (in progress 04/27/18)  4. Pt will demonstrate independence/ compliance with home exercise program for management of symptoms (land). (met 5/14/18)    Rehabilitation Potential For Stated Goals: Good                 HISTORY:   History of Present Injury/Illness (Reason for Referral):  Pt is 72 y/o WF seen for PT per MD orders following R DEYSI 10/2017 with multiple complications/ illness over the winter with noted weakness and debility. Pt has complicated hx of arthritis, chronic low back pain as well as spinal stenosis. Pt has sx hx of B TKA (2009, 2011) along with recent hip replacement.   Pt reports her goal is to get stronger so she can do yard work and housework as well as get into community to visit with friends. Past Medical History/Comorbidities:   Ms. Willy Kern  has a past medical history of Arthritis; Asthma; Chronic obstructive asthma, unspecified; Chronic pain; DDD (degenerative disc disease); GERD (gastroesophageal reflux disease); Hypertension; Hypothyroidism; HYPOTHYROIDISM; Incontinence of urine; Morbid obesity ([address] Neuropathy; ILYA (obstructive sleep apnea) (9/16/2009); Osteoarthritis of left knee (9/16/2009); Osteoarthritis of right knee (3/2/2011); S/P total knee replacement using cement (9/16/2009); Sleep apnea; Spastic colon; Status post right hip replacement (10/9/2017); and Type II or unspecified type diabetes mellitus without mention of complication, not stated as uncontrolled. She also has no past medical history of Adverse effect of anesthesia; Aneurysm ([address] Arrhythmia; Autoimmune disease ([address] CAD (coronary artery disease); Cancer ([address] Chronic kidney disease; Chronic obstructive pulmonary disease ([address] Coagulation disorder ([address] Difficult intubation; Endocarditis; Heart failure ([address] Ill-defined condition; Liver disease; Malignant hyperthermia due to anesthesia; Nausea & vomiting; Nicotine vapor product user; Non-nicotine vapor product user; Pseudocholinesterase deficiency; Psychiatric disorder; PUD (peptic ulcer disease); Rheumatic fever; Seizures ([address] Stroke [facility]); or Thromboembolus ([address] Ms. Willy Kern  has a past surgical history that includes hx hernia repair (1/30/08); hx hysterectomy (2000); pr hand/finger surgery unlisted (Right, 1990'S); pr total knee arthroplasty (Left, 2009); and hx knee replacement (Right, 2011). Social History/Living Environment:     Pt lives in one story home with 4 steps to enter with unilateral handrail. Pt lives alone.   Prior Level of Function/Work/Activity:  Retired   Dominant Side:         RIGHT  Current Medications:    Current Outpatient Prescriptions:     amLODIPine (NORVASC) 5 mg tablet, Take 5 mg by mouth daily. , Disp: , Rfl:     ciprofloxacin HCl (CIPRO) 500 mg tablet, Take  by mouth two (2) times a day., Disp: , Rfl:     neomycin-colist-hydrocortisone-thonzonium (CORTISPORIN-TC) otic suspension, by Otic route four (4) times daily. , Disp: , Rfl:     Azelastine (ASTEPRO) 0.15 % (205.5 mcg) nasal spray, 2 Sprays by Both Nostrils route as needed. , Disp: , Rfl:     fluticasone (FLONASE ALLERGY RELIEF) 50 mcg/actuation nasal spray, 2 Sprays by Both Nostrils route as needed for Rhinitis., Disp: , Rfl:     budesonide (PULMICORT) 0.5 mg/2 mL nbsp, 500 mcg by Nebulization route two (2) times a day., Disp: , Rfl:     formoterol (PERFOROMIST) 20 mcg/2 mL nebu neb solution, 20 mcg by Nebulization route two (2) times a day., Disp: , Rfl:     Lactobacillus acidoph-L.bulgar (RUMA ASSIST) 100 million cell pwpk, Take  by mouth., Disp: , Rfl:     Biotin 2,500 mcg cap, Take  by mouth., Disp: , Rfl:     cranberry extract 450 mg tab tablet, Take 450 mg by mouth., Disp: , Rfl:     OTHER, Vein and leg support--- 1 tab po qd, Disp: , Rfl:     estradiol (ESTRACE) 0.01 % (0.1 mg/gram) vaginal cream, Insert 1 g into vagina daily. , Disp: , Rfl:     neomycin-polymyxin-hydrocortisone, buffered, (PEDIOTIC) 3.5-10,000-1 mg/mL-unit/mL-% otic suspension, Administer 2 Drops into each ear daily. , Disp: , Rfl:     traMADol (ULTRAM) 50 mg tablet, Take 50 mg by mouth every six (6) hours as needed for Pain., Disp: , Rfl:     HYDROmorphone (DILAUDID) 2 mg tablet, Take 1 Tab by mouth every four (4) hours as needed. Max Daily Amount: 12 mg., Disp: 40 Tab, Rfl: 0    SITagliptin (JANUVIA) 100 mg tablet, Take 100 mg by mouth daily. , Disp: , Rfl:     lidocaine-kinesiology tape 5 % kit, by Apply Externally route., Disp: , Rfl:     OTHER, three (3) times daily. Lidocaine patch 5%, Disp: , Rfl:     oxybutynin chloride XL (DITROPAN XL) 5 mg CR tablet, Take 5 mg by mouth daily. , Disp: , Rfl:     polyethylene glycol (MIRALAX) 17 gram packet, Take 17 g by mouth as needed. , Disp: , Rfl:     cpap machine kit, by Does Not Apply route. 9 cm H2O, Disp: , Rfl:     FLUTICASONE/SALMETEROL (ADVAIR DISKUS IN), Take 2 Puffs by inhalation two (2) times daily as needed. , Disp: , Rfl:     celecoxib (CELEBREX) 200 mg capsule, Take 200 mg by mouth daily. 1 to 2 a day. , Disp: , Rfl:     PREGABALIN (LYRICA PO), Take 150 mg by mouth two (2) times a day., Disp: , Rfl:     montelukast (SINGULAIR) 10 mg tablet, Take 10 mg by mouth daily. , Disp: , Rfl:     NEOMY SULF/POLYMYX B SULF/HC (CORTISPORIN OT), by Otic route as needed. 1 to 2 drops in each ear as needed. , Disp: , Rfl:     MULTIVITAMIN WITH MINERALS (MULTIVITAMIN & MINERAL FORMULA PO), Take 1 Tab by mouth daily. Multivitamin with D with Calcium , Disp: , Rfl:     metaxalone (SKELAXIN) 800 mg tablet, Take 1 Tab by mouth three (3) times daily as needed for Pain. (Patient taking differently: Take 800 mg by mouth two (2) times a day.), Disp: 1 Tab, Rfl: 0    OMEPRAZOLE PO, 20 mg daily. Pt. Instructed to take morning of surgery per anesthesiologist.  , Disp: , Rfl:     CYANOCOBALAMIN (VITAMIN B-12 PO), Take 500 mg by mouth daily. , Disp: , Rfl:     LISINOPRIL-HYDROCHLOROTHIAZIDE 10-12.5 mg per tablet, take 1 Tab by mouth daily. , Disp: , Rfl:     LASIX 20 mg Tab, Take 20 mg by mouth daily as needed. , Disp: , Rfl:     GLYBURIDE-METFORMIN 2.5-500 mg per tablet, take 1 Tab by mouth two (2) times a day., Disp: , Rfl:     SYNTHROID 100 mcg Tab, Take 100 mcg by mouth daily. Pt. Instructed to take morning of surgery per anesthesiologist.  , Disp: , Rfl:     PATANOL 0.1 % Drop, 2 Drops by Both Eyes route as needed for Allergies. , Disp: , Rfl:     AMBIEN 10 mg Tab, Take 10 mg by mouth nightly., Disp: , Rfl:     LIBRAX, WITH CLINIDIUM, 2.5-5 mg Cap, Take 1 Cap by mouth four (4) times daily as needed. , Disp: , Rfl:    Date Last Reviewed:  5/17/2018   EXAMINATION:   Observation/Orthostatic Postural Assessment:          Morbid obesity  Palpation:          Pitting edema in B ankle  Circumferential measurements:  30 cm R/L (above malleolus)  ROM:  BUE/ cervical WNL for patient                     Date:  04/04/18 Date:  04/27/18  Grossly WFL Date:  5/14/18  same   Trunk ROM 25 % limitation                          LE ROM Left Right       Hip Flexion [facility]       Hip Abduction [facility]       Straight Leg Raise (SLR) N/T N/T                Knee Flexion 125 125       Knee Extension 0 0                Ankle Dorsiflexion [facility] WFL         Strength:     Date:  04/04/18 Date:  04/27/18  Grossly 4- to 4/5 Date:  5/14/18   LE MMT Left Right Left Right Left Right   Hip Flex (L1/L2) 4/5 4-/5  -4/5 4/5 4/5   Hip Abd (glut med) 4/5 4/5   4/5 4/5   Hip Ext 4-/5 4-/5  -4/5     Quad    ( L3/4) 4/5 4-/5  4/5 4+/5 4+/5   Hamstring 4/5 4-/5  4/5 4+/5 4+/5   Anterior Tib (L4/L5)         Gastroc (S1/2)         EHL (L5)                             Special Tests:   deferred  Neurological Screen:        Sensation: Intact for light touch  Functional Mobility:         Gait/Ambulation:  Amb without A.D with increased lateral wt shift BLE lumbering gait pattern        Transfers:  Sit to stand to sit with use of BUE for support        Bed Mobility:  independent  Balance:          Single Leg Stance:  Unable to assume single leg stance. Good static standing balance   Body Structures Involved:  1. Nerves  2. Bones  3. Joints  4. Muscles  5. Ligaments Body Functions Affected:  1. Sensory/Pain  2. Neuromusculoskeletal  3. Movement Related Activities and Participation Affected:  1. General Tasks and Demands  2. Mobility  3. Self Care  4. Domestic Life  5. Community, Social and Civic Life   CLINICAL DECISION MAKING:   Outcome Measure:    Tool Used: Lower Extremity Functional Scale (LEFS)  Score:  Initial: 40/80 Recent: 42/80 (Date: 04/27/18 ) Most Recent: 46/80 (Date: 5/14/18)   Interpretation of Score: 20 questions each scored on a 5 point scale with 0 representing \"extreme difficulty or unable to perform\" and 4 representing \"no difficulty\". The lower the score, the greater the functional disability. 80/80 represents no disability. Minimal detectable change is 9 points. Score 80 79-63 62-48 47-32 31-16 15-1 0   Modifier CH CI CJ CK CL CM CN     ? Mobility - Walking and Moving Around:     - CURRENT STATUS: CK - 40%-59% impaired, limited or restricted    - GOAL STATUS: CJ - 20%-39% impaired, limited or restricted    - D/C STATUS:  ---------------To be determined---------------    Medical Necessity:   · Patient is expected to demonstrate progress in strength, range of motion and balance to increase independence with functional mobility with decreased pain. Reason for Services/Other Comments:  · Patient continues to require modification of therapeutic interventions to increase complexity of exercises. TREATMENT:   (In addition to Assessment/Re-Assessment sessions the following treatments were rendered)    Therapeutic Exercise: (see flow sheet below for minutes) :  Exercises per grid below to improve mobility, strength and balance. Required minimal visual and verbal cues to promote proper body alignment and promote proper body mechanics. Progressed resistance and range as indicated. Aquatic Therapy (see flow sheet below for minutes): Aquatic treatment performed per flow grid for Decreased muscle strength, Decreased static/dynamic balance and reactive control, Decreased range of motion, Decreased activity endurance and Ease of movement. Cues provided for technique. Assistance by therapist provided for progression of exercises/ activities. Patient has difficulty with low back/ R hip pain.      Date: 04/26/18 04/27/18 04/30/18 5/1/18 5/3/18 5/7/18 5/9/18 5/11/18 5/14/18 5/17/18   Modalities:                                                    Manual Therapy:                                                    Aquatic Exercise: 2.5# 55 min 2.5# 60 min 2.5# 60 min 3.75# 60 min 3.75# 55 min 3.75# 60 min 3.75# 60 min 3.75# 60 min  3.75# 60 min   Gait F/B/S/M X4L c open paddles X4L with open paddles X4L c open paddles x4L c open paddles x4L c closed paddles F/B    X2L c closed paddles S/M x4L c closed paddles x4L c helmets same  X4L c helmets - side squat   Heel/Toe walk       x2L      3-way  X15 BLE   x20 BLE        PF/DF x20 x20    x10 BLE  x10 single leg x15 BLE    X15 single leg x20 BLE    x15 single leg  x20 BLE  x15 single   Hamstring Curls x20 BLE x2L x4L x4L         Hip Flexion with knee ext.   (zully) x2L x2L x4L x4L x4L x20 BLE x20 BLE x20 BLE  x20 BLE   Squats    X15 pull downs with open paddles    x20 c helmets lat pull down x20 c helmets c lat pull down x20 c helmets c lat pull down  x20 c helmets   SLR march X4L x4L x4L x4L c open paddles x2L c closed paddles x4L c closed paddles x4L c helmets x4L  x4L c helmets   Lunges    x10 BLE x15 BLE x2L x4L x4L  x4L   Hip circles             Hip horizontal abduction/adduction x20 BLE X20 BLE   x20 clamshell        Core: row       x20 c helmets x20 c hlemets  x20 c helmets   Core: tray    x4L push/pull x4L push/pull    x2L R/L x4L R/L       Core:flex/ext UE in mini squat   x15 c open paddles  x20 c closed paddles     x15 c helmets alternating wide tandem stance  x20 c helemts   Core: horz. abd/add UE in modified tandem stance x15 c open paddles X15 with open paddles x20 c closed paddles x20 c closed paddles  x20 c helmets x20 c helmets x20 c helmets in mini squat  x20 c helmets   Deep well bike 3 min 3 min 3 min 3 min 3 min 3 min 3 min 2 min  2 min   Deep well jumping nav  1 min 1 min 1 min 1 min 1 min 1 min 2 min  2 min   Deep well scissors 1 min 1 min 1 min 1 min 1 min 1 min 1 min 2 min  2 min   Deep well:  traction 8 min 5 min 8 min 8 min 8 min 8 min 8 min 8 min  8 min                Hamstring Stretch  2H30 BLE 2H30 BLE 2H30 BLE 2H30 BLE 2H30 BLE 2H30 BLE   2H30 BLE   Step Lunge             Piriformis stretch             PF Stretch             Balance: Single leg Stance     H30 BLE     H60 BLE   Balance: Tandem   H30 BLE no LOB       x2L                               Therapeutic Exercise:         45 min    Hamstring stretch supine         2H30 BLE HEP    SLR         2x10 BLE HEP    Pelvic tilt             Seated lateral step over         x10 BLE    Hip abd             Clamshell         Hoolying c green TB x15 HEP    Hip add         x15 H5 HEP    bridge             Heel toe raises             Hamstring curls             Hip extension             Sit to stand         x10    Squat             Lung             Step up             Forward weight shifts         X15 BLE at handrail HEP    Lateral weight shifts         With ipsilateral arm reach x15 BLE at handrail HEP    Slantboard                                                    F=forward  B=Backward  S=sidesteps  M=marches    H=hold    Manual: (see flow sheet above for minutes)   Modalities: (see flow sheet above for minutes)     HEP: Plan to instruct in HEP as therapy progresses. Land HEP pictorial and written instructions 5/14/18. Treatment/Session Assessment: Returned to pool setting today for exercises, due to patient reporting soreness since last session. Focused on core and trunk strengthening giving patient minimal cue for correct technique. Plan to alternate land and water 1 more weeks, progressing patient to land primarily and towards LTGs. · Pain/ Symptoms: Initial:   5/10  LB    Patient reports feeling \"achy\" today, but blames it on the weather. Post Session:  2-3/10 LB pain following aquatics   ·   Compliance with Program/Exercises: Pt reports not consistent  Recommendations/Intent for treatment session: \"Next visit will focus on advancements to more challenging activities\". Plan to alternate land and water 1 more weeks, progressing patient towards LTGs.   Total Treatment Duration:  PT Patient Time In/Time Out  Time In: 1335  Time Out: [address], [facility]

## 2018-05-21 ENCOUNTER — HOSPITAL ENCOUNTER (OUTPATIENT)
Dept: PHYSICAL THERAPY | Age: 73
Discharge: HOME | End: 2018-05-21
Payer: MEDICARE

## 2018-05-21 PROCEDURE — 97110 THERAPEUTIC EXERCISES: CPT

## 2018-05-21 NOTE — PROGRESS NOTES
Timi Mcgowan  : 1945 [address] [facility],[address]  [address] [place] [address].  [phone]   [phone] -       OUTPATIENT PHYSICAL THERAPY:Daily Note 2018        ICD-10: Treatment Diagnosis: Pain in right hip (M25.551)  Pain in right knee (M25.561)  Pain in left knee (M25.562)  Difficulty in walking, not elsewhere classified (R26.2)  Precautions/Allergies:   Codeine; Erythromycin; and Tetracycline   Fall Risk Score: 1 (? 5 = High Risk)  MD Orders: Evaluate and Treat: strengthening exercises/ aquatic therapy MEDICAL/REFERRING DIAGNOSIS:  Hip pain [M25.559]   DATE OF ONSET: 10/2017  REFERRING PHYSICIAN: Juhi Ulloa., *6  RETURN PHYSICIAN APPOINTMENT: late 2018     INITIAL ASSESSMENT:  Ms. Alejandrina Romero (pt) is a 71year old WF seen for physical therapy per MD orders with complaint of R hip pain, low back pain and B knee pain with long history of back pain and arthritis. Pt evaluated for outpatient physical therapy today with the following deficits: low back, right hip and bilateral knee pain, decreased strength in B hip. Pt would benefit from physical therapy to address the above deficits in order to return to increased independence with functional mobility with decreased pain. Pt would benefit from initially working in aquatic setting to off load spine, bilateral lower extremities while addressing goals. As patient progresses, plan to transition to gravity challenging, land based exercises/ activities. Plan of care and goals reviewed with patient who verbalizes agreement. PROBLEM LIST (Impacting functional limitations):  1. Decreased Strength  2. Decreased ADL/Functional Activities  3. Decreased Ambulation Ability/Technique  4. Decreased Balance  5. Increased Pain  6. Decreased Activity Tolerance  7. Decreased [place] with Home Exercise Program INTERVENTIONS PLANNED:  1. Balance Exercise  2. Gait Training  3.  Home Exercise Program (HEP)  4. Manual Therapy  5. Neuromuscular Re-education/Strengthening  6. Range of Motion (ROM)  7. Therapeutic Activites  8. Therapeutic Exercise/Strengthening  9. modalities   10. aquatics    TREATMENT PLAN:  Effective Dates: 4/4/2018 TO 7/3/2018 (90 days). Frequency/Duration: 2-3 times a week for 90 Days    GOALS: (Goals have been discussed and agreed upon with patient.)  Short-Term Functional Goals: Time Frame: 2 weeks  Pt will demonstrate independence/ compliance with home exercise program for management of symptoms (aquatic). (met 04/27/18)  Pt will demonstrate improved Lower Extremity Functional Scale by 3-4 points for improved functional mobility. (met 5/14/18)  Pt will tolerate 35 to 40 minutes of aquatic therapy with pain of 2/10. (met 04/27/18)      Discharge Goals: Time Frame: 8 weeks  Pt will demonstrate improved manual muscle test score by 1/2 to 1  grade for R hip complex to participate in heavy housework activities and yard work. (in progress 05/14/18)  Pt will ambulate >2000ft with normal gait pattern with even stance time/ step length for bilateral lower extremities for community entry.  (in progress 05/14/18)  Pt will report Lower Extremity Functional Scale score of 50/80 for improved functional mobility in home/ community setting. (in progress 04/27/18)  4. Pt will demonstrate independence/ compliance with home exercise program for management of symptoms (land). (met 5/14/18)    Rehabilitation Potential For Stated Goals: Good                 HISTORY:   History of Present Injury/Illness (Reason for Referral):  Pt is 70 y/o WF seen for PT per MD orders following R DEYSI 10/2017 with multiple complications/ illness over the winter with noted weakness and debility. Pt has complicated hx of arthritis, chronic low back pain as well as spinal stenosis. Pt has sx hx of B TKA (2009, 2011) along with recent hip replacement.   Pt reports her goal is to get stronger so she can do yard work and housework as well as get into community to visit with friends. Past Medical History/Comorbidities:   Ms. Oh Merrill  has a past medical history of Arthritis; Asthma; Chronic obstructive asthma, unspecified; Chronic pain; DDD (degenerative disc disease); GERD (gastroesophageal reflux disease); Hypertension; Hypothyroidism; HYPOTHYROIDISM; Incontinence of urine; Morbid obesity ([address] Neuropathy; ILYA (obstructive sleep apnea) (9/16/2009); Osteoarthritis of left knee (9/16/2009); Osteoarthritis of right knee (3/2/2011); S/P total knee replacement using cement (9/16/2009); Sleep apnea; Spastic colon; Status post right hip replacement (10/9/2017); and Type II or unspecified type diabetes mellitus without mention of complication, not stated as uncontrolled. She also has no past medical history of Adverse effect of anesthesia; Aneurysm ([address] Arrhythmia; Autoimmune disease ([address] CAD (coronary artery disease); Cancer ([address] Chronic kidney disease; Chronic obstructive pulmonary disease ([address] Coagulation disorder ([address] Difficult intubation; Endocarditis; Heart failure ([address] Ill-defined condition; Liver disease; Malignant hyperthermia due to anesthesia; Nausea & vomiting; Nicotine vapor product user; Non-nicotine vapor product user; Pseudocholinesterase deficiency; Psychiatric disorder; PUD (peptic ulcer disease); Rheumatic fever; Seizures ([address] Stroke [facility]); or Thromboembolus ([address] Ms. Oh Merrill  has a past surgical history that includes hx hernia repair (1/30/08); hx hysterectomy (2000); pr hand/finger surgery unlisted (Right, 1990'S); pr total knee arthroplasty (Left, 2009); and hx knee replacement (Right, 2011). Social History/Living Environment:     Pt lives in one story home with 4 steps to enter with unilateral handrail. Pt lives alone.   Prior Level of Function/Work/Activity:  Retired   Dominant Side:         RIGHT  Current Medications:    Current Outpatient Prescriptions:     amLODIPine (NORVASC) 5 mg tablet, Take 5 mg by mouth daily. , Disp: , Rfl:     ciprofloxacin HCl (CIPRO) 500 mg tablet, Take  by mouth two (2) times a day., Disp: , Rfl:     neomycin-colist-hydrocortisone-thonzonium (CORTISPORIN-TC) otic suspension, by Otic route four (4) times daily. , Disp: , Rfl:     Azelastine (ASTEPRO) 0.15 % (205.5 mcg) nasal spray, 2 Sprays by Both Nostrils route as needed. , Disp: , Rfl:     fluticasone (FLONASE ALLERGY RELIEF) 50 mcg/actuation nasal spray, 2 Sprays by Both Nostrils route as needed for Rhinitis., Disp: , Rfl:     budesonide (PULMICORT) 0.5 mg/2 mL nbsp, 500 mcg by Nebulization route two (2) times a day., Disp: , Rfl:     formoterol (PERFOROMIST) 20 mcg/2 mL nebu neb solution, 20 mcg by Nebulization route two (2) times a day., Disp: , Rfl:     Lactobacillus acidoph-L.bulgar (RUMA ASSIST) 100 million cell pwpk, Take  by mouth., Disp: , Rfl:     Biotin 2,500 mcg cap, Take  by mouth., Disp: , Rfl:     cranberry extract 450 mg tab tablet, Take 450 mg by mouth., Disp: , Rfl:     OTHER, Vein and leg support--- 1 tab po qd, Disp: , Rfl:     estradiol (ESTRACE) 0.01 % (0.1 mg/gram) vaginal cream, Insert 1 g into vagina daily. , Disp: , Rfl:     neomycin-polymyxin-hydrocortisone, buffered, (PEDIOTIC) 3.5-10,000-1 mg/mL-unit/mL-% otic suspension, Administer 2 Drops into each ear daily. , Disp: , Rfl:     traMADol (ULTRAM) 50 mg tablet, Take 50 mg by mouth every six (6) hours as needed for Pain., Disp: , Rfl:     HYDROmorphone (DILAUDID) 2 mg tablet, Take 1 Tab by mouth every four (4) hours as needed. Max Daily Amount: 12 mg., Disp: 40 Tab, Rfl: 0    SITagliptin (JANUVIA) 100 mg tablet, Take 100 mg by mouth daily. , Disp: , Rfl:     lidocaine-kinesiology tape 5 % kit, by Apply Externally route., Disp: , Rfl:     OTHER, three (3) times daily. Lidocaine patch 5%, Disp: , Rfl:     oxybutynin chloride XL (DITROPAN XL) 5 mg CR tablet, Take 5 mg by mouth daily. , Disp: , Rfl:     polyethylene glycol (MIRALAX) 17 gram packet, Take 17 g by mouth as needed. , Disp: , Rfl:     cpap machine kit, by Does Not Apply route. 9 cm H2O, Disp: , Rfl:     FLUTICASONE/SALMETEROL (ADVAIR DISKUS IN), Take 2 Puffs by inhalation two (2) times daily as needed. , Disp: , Rfl:     celecoxib (CELEBREX) 200 mg capsule, Take 200 mg by mouth daily. 1 to 2 a day. , Disp: , Rfl:     PREGABALIN (LYRICA PO), Take 150 mg by mouth two (2) times a day., Disp: , Rfl:     montelukast (SINGULAIR) 10 mg tablet, Take 10 mg by mouth daily. , Disp: , Rfl:     NEOMY SULF/POLYMYX B SULF/HC (CORTISPORIN OT), by Otic route as needed. 1 to 2 drops in each ear as needed. , Disp: , Rfl:     MULTIVITAMIN WITH MINERALS (MULTIVITAMIN & MINERAL FORMULA PO), Take 1 Tab by mouth daily. Multivitamin with D with Calcium , Disp: , Rfl:     metaxalone (SKELAXIN) 800 mg tablet, Take 1 Tab by mouth three (3) times daily as needed for Pain. (Patient taking differently: Take 800 mg by mouth two (2) times a day.), Disp: 1 Tab, Rfl: 0    OMEPRAZOLE PO, 20 mg daily. Pt. Instructed to take morning of surgery per anesthesiologist.  , Disp: , Rfl:     CYANOCOBALAMIN (VITAMIN B-12 PO), Take 500 mg by mouth daily. , Disp: , Rfl:     LISINOPRIL-HYDROCHLOROTHIAZIDE 10-12.5 mg per tablet, take 1 Tab by mouth daily. , Disp: , Rfl:     LASIX 20 mg Tab, Take 20 mg by mouth daily as needed. , Disp: , Rfl:     GLYBURIDE-METFORMIN 2.5-500 mg per tablet, take 1 Tab by mouth two (2) times a day., Disp: , Rfl:     SYNTHROID 100 mcg Tab, Take 100 mcg by mouth daily. Pt. Instructed to take morning of surgery per anesthesiologist.  , Disp: , Rfl:     PATANOL 0.1 % Drop, 2 Drops by Both Eyes route as needed for Allergies. , Disp: , Rfl:     AMBIEN 10 mg Tab, Take 10 mg by mouth nightly., Disp: , Rfl:     LIBRAX, WITH CLINIDIUM, 2.5-5 mg Cap, Take 1 Cap by mouth four (4) times daily as needed. , Disp: , Rfl:    Date Last Reviewed:  5/21/2018   EXAMINATION:   Observation/Orthostatic Postural Assessment:          Morbid obesity  Palpation:          Pitting edema in B ankle  Circumferential measurements:  30 cm R/L (above malleolus)  ROM:  BUE/ cervical WNL for patient                     Date:  04/04/18 Date:  04/27/18  Grossly WFL Date:  5/14/18  same   Trunk ROM 25 % limitation                          LE ROM Left Right       Hip Flexion [facility]       Hip Abduction [facility]       Straight Leg Raise (SLR) N/T N/T                Knee Flexion 125 125       Knee Extension 0 0                Ankle Dorsiflexion [facility] WFL         Strength:     Date:  04/04/18 Date:  04/27/18  Grossly 4- to 4/5 Date:  5/14/18   LE MMT Left Right Left Right Left Right   Hip Flex (L1/L2) 4/5 4-/5  -4/5 4/5 4/5   Hip Abd (glut med) 4/5 4/5   4/5 4/5   Hip Ext 4-/5 4-/5  -4/5     Quad    ( L3/4) 4/5 4-/5  4/5 4+/5 4+/5   Hamstring 4/5 4-/5  4/5 4+/5 4+/5   Anterior Tib (L4/L5)         Gastroc (S1/2)         EHL (L5)                             Special Tests:   deferred  Neurological Screen:        Sensation: Intact for light touch  Functional Mobility:         Gait/Ambulation:  Amb without A.D with increased lateral wt shift BLE lumbering gait pattern        Transfers:  Sit to stand to sit with use of BUE for support        Bed Mobility:  independent  Balance:          Single Leg Stance:  Unable to assume single leg stance. Good static standing balance   Body Structures Involved:  1. Nerves  2. Bones  3. Joints  4. Muscles  5. Ligaments Body Functions Affected:  1. Sensory/Pain  2. Neuromusculoskeletal  3. Movement Related Activities and Participation Affected:  1. General Tasks and Demands  2. Mobility  3. Self Care  4. Domestic Life  5. Community, Social and Civic Life   CLINICAL DECISION MAKING:   Outcome Measure:    Tool Used: Lower Extremity Functional Scale (LEFS)  Score:  Initial: 40/80 Recent: 42/80 (Date: 04/27/18 ) Most Recent: 46/80 (Date: 5/14/18)   Interpretation of Score: 20 questions each scored on a 5 point scale with 0 representing \"extreme difficulty or unable to perform\" and 4 representing \"no difficulty\". The lower the score, the greater the functional disability. 80/80 represents no disability. Minimal detectable change is 9 points. Score 80 79-63 62-48 47-32 31-16 15-1 0   Modifier CH CI CJ CK CL CM CN     ? Mobility - Walking and Moving Around:     - CURRENT STATUS: CK - 40%-59% impaired, limited or restricted    - GOAL STATUS: CJ - 20%-39% impaired, limited or restricted    - D/C STATUS:  ---------------To be determined---------------    Medical Necessity:   · Patient is expected to demonstrate progress in strength, range of motion and balance to increase independence with functional mobility with decreased pain. Reason for Services/Other Comments:  · Patient continues to require modification of therapeutic interventions to increase complexity of exercises. TREATMENT:   (In addition to Assessment/Re-Assessment sessions the following treatments were rendered)    Therapeutic Exercise: (see flow sheet below for minutes) :  Exercises per grid below to improve mobility, strength and balance. Required minimal visual and verbal cues to promote proper body alignment and promote proper body mechanics. Progressed resistance and range as indicated. Aquatic Therapy (see flow sheet below for minutes): Aquatic treatment performed per flow grid for Decreased muscle strength, Decreased static/dynamic balance and reactive control, Decreased range of motion, Decreased activity endurance and Ease of movement. Cues provided for technique. Assistance by therapist provided for progression of exercises/ activities. Patient has difficulty with low back/ R hip pain.      Date 04/26/18 04/27/18 04/30/18 5/1/18 5/3/18 5/7/18 5/9/18 5/11/18 5/14/18 5/17/18 5/21/18   Modalities:                                                        Manual Therapy:                                                        Aquatic Exercise: 2.5# 55 min 2.5# 60 min 2.5# 60 min 3.75# 60 min 3.75# 55 min 3.75# 60 min 3.75# 60 min 3.75# 60 min  3.75# 60 min    Gait F/B/S/M X4L c open paddles X4L with open paddles X4L c open paddles x4L c open paddles x4L c closed paddles F/B    X2L c closed paddles S/M x4L c closed paddles x4L c helmets same  X4L c helmets - side squat    Heel/Toe walk       x2L       3-way  X15 BLE   x20 BLE         PF/DF x20 x20    x10 BLE  x10 single leg x15 BLE    X15 single leg x20 BLE    x15 single leg  x20 BLE  x15 single    Hamstring Curls x20 BLE x2L x4L x4L          Hip Flexion with knee ext.   (zully) x2L x2L x4L x4L x4L x20 BLE x20 BLE x20 BLE  x20 BLE    Squats    X15 pull downs with open paddles    x20 c helmets lat pull down x20 c helmets c lat pull down x20 c helmets c lat pull down  x20 c helmets    SLR march X4L x4L x4L x4L c open paddles x2L c closed paddles x4L c closed paddles x4L c helmets x4L  x4L c helmets    Lunges    x10 BLE x15 BLE x2L x4L x4L  x4L    Hip circles              Hip horizontal abduction/adduction x20 BLE X20 BLE   x20 clamshell         Core: row       x20 c helmets x20 c hlemets  x20 c helmets    Core: tray    x4L push/pull x4L push/pull    x2L R/L x4L R/L        Core:flex/ext UE in mini squat   x15 c open paddles  x20 c closed paddles     x15 c helmets alternating wide tandem stance  x20 c helemts    Core: horz. abd/add UE in modified tandem stance x15 c open paddles X15 with open paddles x20 c closed paddles x20 c closed paddles  x20 c helmets x20 c helmets x20 c helmets in mini squat  x20 c helmets    Deep well bike 3 min 3 min 3 min 3 min 3 min 3 min 3 min 2 min  2 min    Deep well jumping nav  1 min 1 min 1 min 1 min 1 min 1 min 2 min  2 min    Deep well scissors 1 min 1 min 1 min 1 min 1 min 1 min 1 min 2 min  2 min    Deep well:  traction 8 min 5 min 8 min 8 min 8 min 8 min 8 min 8 min  8 min                  Hamstring Stretch  2H30 BLE 2H30 BLE 2H30 BLE 2H30 BLE 2H30 BLE 2H30 BLE   2H30 BLE    Step Lunge              Piriformis stretch              PF Stretch              Balance: Single leg Stance     H30 BLE     H60 BLE    Balance: Tandem   H30 BLE no LOB       x2L                                  Therapeutic Exercise:         45 min  40 min   Hamstring stretch supine         2H30 BLE HEP     SKTC           Supine 2H20   SLR         2x10 BLE HEP  2x8 LLE 2#  2x5 RLE 2#   Pelvic tilt           x15 H5   Seated lateral step over         x10 BLE  2x10 BLE   Hip abd           Standing x10 BLE   Clamshell         Hooklying c green TB x15 HEP     Hip add         x15 H5 HEP     bridge           x15   Heel toe raises           x15   LAQ           2x10 2#   Hamstring curls           2x10 green TB BLE   Hip extension              Sit to stand         x10     Squat           Mini squat x10   Lung              Step up              Forward weight shifts         X15 BLE at handrail HEP  x15 BLE   Lateral weight shifts         With ipsilateral arm reach x15 BLE at handrail HEP  x15 BLE   Slantboard                                                        F=forward  B=Backward  S=sidesteps  M=marches    H=hold    Manual: (see flow sheet above for minutes)   Modalities: (see flow sheet above for minutes)     HEP: Plan to instruct in HEP as therapy progresses. Land HEP pictorial and written instructions 5/14/18. Treatment/Session Assessment: Patient arrived 10 mins late for her appointment today. Began session with land exercises today (seen above), demonstrating decreased B hip flexor strength with L muscle strength >R. Patient has hyperextension in B knees seen in LAQ exercise, due to increased elasticity in B hamstrings. Patient tolerated land exercises well today with minimal discomfort. Plan to work on land next visit, then transition to land. · Pain/ Symptoms: Initial:   2-3/10  LB    Patient reports she is feeling good today.  Post Session:  1-2/10 LB pain following land   ·   Compliance with Program/Exercises: Pt reports not consistent  Recommendations/Intent for treatment session: \"Next visit will focus on advancements to more challenging activities\". Plan to alternate land and water 1 more visit, progressing patient towards LTGs.   Total Treatment Duration:  PT Patient Time In/Time Out  Time In: 1340  Time Out: [address] 1334, PTA

## 2018-05-23 ENCOUNTER — HOSPITAL ENCOUNTER (OUTPATIENT)
Dept: PHYSICAL THERAPY | Age: 73
Discharge: HOME | End: 2018-05-23
Payer: MEDICARE

## 2018-05-23 PROCEDURE — 97113 AQUATIC THERAPY/EXERCISES: CPT

## 2018-05-23 NOTE — PROGRESS NOTES
Erwin Espinosa Nahm  : 1945 [address] [facility],[address]  [address] [place] [address].  [phone]   [phone] -       OUTPATIENT PHYSICAL THERAPY:Daily Note 2018        ICD-10: Treatment Diagnosis: Pain in right hip (M25.551)  Pain in right knee (M25.561)  Pain in left knee (M25.562)  Difficulty in walking, not elsewhere classified (R26.2)  Precautions/Allergies:   Codeine; Erythromycin; and Tetracycline   Fall Risk Score: 1 (? 5 = High Risk)  MD Orders: Evaluate and Treat: strengthening exercises/ aquatic therapy MEDICAL/REFERRING DIAGNOSIS:  Hip pain [M25.559]   DATE OF ONSET: 10/2017  REFERRING PHYSICIAN: Chandra Elise, *6  RETURN PHYSICIAN APPOINTMENT: late 2018     INITIAL ASSESSMENT:  Ms. Sonja Alvarenga (pt) is a 71year old WF seen for physical therapy per MD orders with complaint of R hip pain, low back pain and B knee pain with long history of back pain and arthritis. Pt evaluated for outpatient physical therapy today with the following deficits: low back, right hip and bilateral knee pain, decreased strength in B hip. Pt would benefit from physical therapy to address the above deficits in order to return to increased independence with functional mobility with decreased pain. Pt would benefit from initially working in aquatic setting to off load spine, bilateral lower extremities while addressing goals. As patient progresses, plan to transition to gravity challenging, land based exercises/ activities. Plan of care and goals reviewed with patient who verbalizes agreement. PROBLEM LIST (Impacting functional limitations):  1. Decreased Strength  2. Decreased ADL/Functional Activities  3. Decreased Ambulation Ability/Technique  4. Decreased Balance  5. Increased Pain  6. Decreased Activity Tolerance  7. Decreased [place] with Home Exercise Program INTERVENTIONS PLANNED:  1. Balance Exercise  2. Gait Training  3.  Home Exercise Program (HEP)  4. Manual Therapy  5. Neuromuscular Re-education/Strengthening  6. Range of Motion (ROM)  7. Therapeutic Activites  8. Therapeutic Exercise/Strengthening  9. modalities   10. aquatics    TREATMENT PLAN:  Effective Dates: 4/4/2018 TO 7/3/2018 (90 days). Frequency/Duration: 2-3 times a week for 90 Days    GOALS: (Goals have been discussed and agreed upon with patient.)  Short-Term Functional Goals: Time Frame: 2 weeks  Pt will demonstrate independence/ compliance with home exercise program for management of symptoms (aquatic). (met 04/27/18)  Pt will demonstrate improved Lower Extremity Functional Scale by 3-4 points for improved functional mobility. (met 5/14/18)  Pt will tolerate 35 to 40 minutes of aquatic therapy with pain of 2/10. (met 04/27/18)      Discharge Goals: Time Frame: 8 weeks  Pt will demonstrate improved manual muscle test score by 1/2 to 1  grade for R hip complex to participate in heavy housework activities and yard work. (in progress 05/14/18)  Pt will ambulate >2000ft with normal gait pattern with even stance time/ step length for bilateral lower extremities for community entry.  (in progress 05/14/18)  Pt will report Lower Extremity Functional Scale score of 50/80 for improved functional mobility in home/ community setting. (in progress 04/27/18)  4. Pt will demonstrate independence/ compliance with home exercise program for management of symptoms (land). (met 5/14/18)    Rehabilitation Potential For Stated Goals: Good                 HISTORY:   History of Present Injury/Illness (Reason for Referral):  Pt is 70 y/o WF seen for PT per MD orders following R DEYSI 10/2017 with multiple complications/ illness over the winter with noted weakness and debility. Pt has complicated hx of arthritis, chronic low back pain as well as spinal stenosis. Pt has sx hx of B TKA (2009, 2011) along with recent hip replacement.   Pt reports her goal is to get stronger so she can do yard work and housework as well as get into community to visit with friends. Past Medical History/Comorbidities:   Ms. Itz Cisneros  has a past medical history of Arthritis; Asthma; Chronic obstructive asthma, unspecified; Chronic pain; DDD (degenerative disc disease); GERD (gastroesophageal reflux disease); Hypertension; Hypothyroidism; HYPOTHYROIDISM; Incontinence of urine; Morbid obesity ([address] Neuropathy; ILYA (obstructive sleep apnea) (9/16/2009); Osteoarthritis of left knee (9/16/2009); Osteoarthritis of right knee (3/2/2011); S/P total knee replacement using cement (9/16/2009); Sleep apnea; Spastic colon; Status post right hip replacement (10/9/2017); and Type II or unspecified type diabetes mellitus without mention of complication, not stated as uncontrolled. She also has no past medical history of Adverse effect of anesthesia; Aneurysm ([address] Arrhythmia; Autoimmune disease ([address] CAD (coronary artery disease); Cancer ([address] Chronic kidney disease; Chronic obstructive pulmonary disease ([address] Coagulation disorder ([address] Difficult intubation; Endocarditis; Heart failure ([address] Ill-defined condition; Liver disease; Malignant hyperthermia due to anesthesia; Nausea & vomiting; Nicotine vapor product user; Non-nicotine vapor product user; Pseudocholinesterase deficiency; Psychiatric disorder; PUD (peptic ulcer disease); Rheumatic fever; Seizures ([address] Stroke [facility]); or Thromboembolus ([address] Ms. Itz Cisneros  has a past surgical history that includes hx hernia repair (1/30/08); hx hysterectomy (2000); pr hand/finger surgery unlisted (Right, 1990'S); pr total knee arthroplasty (Left, 2009); and hx knee replacement (Right, 2011). Social History/Living Environment:     Pt lives in one story home with 4 steps to enter with unilateral handrail. Pt lives alone.   Prior Level of Function/Work/Activity:  Retired   Dominant Side:         RIGHT  Current Medications:    Current Outpatient Prescriptions:     amLODIPine (NORVASC) 5 mg tablet, Take 5 mg by mouth daily. , Disp: , Rfl:     ciprofloxacin HCl (CIPRO) 500 mg tablet, Take  by mouth two (2) times a day., Disp: , Rfl:     neomycin-colist-hydrocortisone-thonzonium (CORTISPORIN-TC) otic suspension, by Otic route four (4) times daily. , Disp: , Rfl:     Azelastine (ASTEPRO) 0.15 % (205.5 mcg) nasal spray, 2 Sprays by Both Nostrils route as needed. , Disp: , Rfl:     fluticasone (FLONASE ALLERGY RELIEF) 50 mcg/actuation nasal spray, 2 Sprays by Both Nostrils route as needed for Rhinitis., Disp: , Rfl:     budesonide (PULMICORT) 0.5 mg/2 mL nbsp, 500 mcg by Nebulization route two (2) times a day., Disp: , Rfl:     formoterol (PERFOROMIST) 20 mcg/2 mL nebu neb solution, 20 mcg by Nebulization route two (2) times a day., Disp: , Rfl:     Lactobacillus acidoph-L.bulgar (RUMA ASSIST) 100 million cell pwpk, Take  by mouth., Disp: , Rfl:     Biotin 2,500 mcg cap, Take  by mouth., Disp: , Rfl:     cranberry extract 450 mg tab tablet, Take 450 mg by mouth., Disp: , Rfl:     OTHER, Vein and leg support--- 1 tab po qd, Disp: , Rfl:     estradiol (ESTRACE) 0.01 % (0.1 mg/gram) vaginal cream, Insert 1 g into vagina daily. , Disp: , Rfl:     neomycin-polymyxin-hydrocortisone, buffered, (PEDIOTIC) 3.5-10,000-1 mg/mL-unit/mL-% otic suspension, Administer 2 Drops into each ear daily. , Disp: , Rfl:     traMADol (ULTRAM) 50 mg tablet, Take 50 mg by mouth every six (6) hours as needed for Pain., Disp: , Rfl:     HYDROmorphone (DILAUDID) 2 mg tablet, Take 1 Tab by mouth every four (4) hours as needed. Max Daily Amount: 12 mg., Disp: 40 Tab, Rfl: 0    SITagliptin (JANUVIA) 100 mg tablet, Take 100 mg by mouth daily. , Disp: , Rfl:     lidocaine-kinesiology tape 5 % kit, by Apply Externally route., Disp: , Rfl:     OTHER, three (3) times daily. Lidocaine patch 5%, Disp: , Rfl:     oxybutynin chloride XL (DITROPAN XL) 5 mg CR tablet, Take 5 mg by mouth daily. , Disp: , Rfl:     polyethylene glycol (MIRALAX) 17 gram packet, Take 17 g by mouth as needed. , Disp: , Rfl:     cpap machine kit, by Does Not Apply route. 9 cm H2O, Disp: , Rfl:     FLUTICASONE/SALMETEROL (ADVAIR DISKUS IN), Take 2 Puffs by inhalation two (2) times daily as needed. , Disp: , Rfl:     celecoxib (CELEBREX) 200 mg capsule, Take 200 mg by mouth daily. 1 to 2 a day. , Disp: , Rfl:     PREGABALIN (LYRICA PO), Take 150 mg by mouth two (2) times a day., Disp: , Rfl:     montelukast (SINGULAIR) 10 mg tablet, Take 10 mg by mouth daily. , Disp: , Rfl:     NEOMY SULF/POLYMYX B SULF/HC (CORTISPORIN OT), by Otic route as needed. 1 to 2 drops in each ear as needed. , Disp: , Rfl:     MULTIVITAMIN WITH MINERALS (MULTIVITAMIN & MINERAL FORMULA PO), Take 1 Tab by mouth daily. Multivitamin with D with Calcium , Disp: , Rfl:     metaxalone (SKELAXIN) 800 mg tablet, Take 1 Tab by mouth three (3) times daily as needed for Pain. (Patient taking differently: Take 800 mg by mouth two (2) times a day.), Disp: 1 Tab, Rfl: 0    OMEPRAZOLE PO, 20 mg daily. Pt. Instructed to take morning of surgery per anesthesiologist.  , Disp: , Rfl:     CYANOCOBALAMIN (VITAMIN B-12 PO), Take 500 mg by mouth daily. , Disp: , Rfl:     LISINOPRIL-HYDROCHLOROTHIAZIDE 10-12.5 mg per tablet, take 1 Tab by mouth daily. , Disp: , Rfl:     LASIX 20 mg Tab, Take 20 mg by mouth daily as needed. , Disp: , Rfl:     GLYBURIDE-METFORMIN 2.5-500 mg per tablet, take 1 Tab by mouth two (2) times a day., Disp: , Rfl:     SYNTHROID 100 mcg Tab, Take 100 mcg by mouth daily. Pt. Instructed to take morning of surgery per anesthesiologist.  , Disp: , Rfl:     PATANOL 0.1 % Drop, 2 Drops by Both Eyes route as needed for Allergies. , Disp: , Rfl:     AMBIEN 10 mg Tab, Take 10 mg by mouth nightly., Disp: , Rfl:     LIBRAX, WITH CLINIDIUM, 2.5-5 mg Cap, Take 1 Cap by mouth four (4) times daily as needed. , Disp: , Rfl:    Date Last Reviewed:  5/23/2018   EXAMINATION:   Observation/Orthostatic Postural Assessment:          Morbid obesity  Palpation:          Pitting edema in B ankle  Circumferential measurements:  30 cm R/L (above malleolus)  ROM:  BUE/ cervical WNL for patient                     Date:  04/04/18 Date:  04/27/18  Grossly WFL Date:  5/14/18  same   Trunk ROM 25 % limitation                          LE ROM Left Right       Hip Flexion [facility]       Hip Abduction [facility]       Straight Leg Raise (SLR) N/T N/T                Knee Flexion 125 125       Knee Extension 0 0                Ankle Dorsiflexion [facility] WFL         Strength:     Date:  04/04/18 Date:  04/27/18  Grossly 4- to 4/5 Date:  5/14/18   LE MMT Left Right Left Right Left Right   Hip Flex (L1/L2) 4/5 4-/5  -4/5 4/5 4/5   Hip Abd (glut med) 4/5 4/5   4/5 4/5   Hip Ext 4-/5 4-/5  -4/5     Quad    ( L3/4) 4/5 4-/5  4/5 4+/5 4+/5   Hamstring 4/5 4-/5  4/5 4+/5 4+/5   Anterior Tib (L4/L5)         Gastroc (S1/2)         EHL (L5)                             Special Tests:   deferred  Neurological Screen:        Sensation: Intact for light touch  Functional Mobility:         Gait/Ambulation:  Amb without A.D with increased lateral wt shift BLE lumbering gait pattern        Transfers:  Sit to stand to sit with use of BUE for support        Bed Mobility:  independent  Balance:          Single Leg Stance:  Unable to assume single leg stance. Good static standing balance   Body Structures Involved:  1. Nerves  2. Bones  3. Joints  4. Muscles  5. Ligaments Body Functions Affected:  1. Sensory/Pain  2. Neuromusculoskeletal  3. Movement Related Activities and Participation Affected:  1. General Tasks and Demands  2. Mobility  3. Self Care  4. Domestic Life  5. Community, Social and Civic Life   CLINICAL DECISION MAKING:   Outcome Measure:    Tool Used: Lower Extremity Functional Scale (LEFS)  Score:  Initial: 40/80 Recent: 42/80 (Date: 04/27/18 ) Most Recent: 46/80 (Date: 5/14/18)   Interpretation of Score: 20 questions each scored on a 5 point scale with 0 representing \"extreme difficulty or unable to perform\" and 4 representing \"no difficulty\". The lower the score, the greater the functional disability. 80/80 represents no disability. Minimal detectable change is 9 points. Score 80 79-63 62-48 47-32 31-16 15-1 0   Modifier CH CI CJ CK CL CM CN     ? Mobility - Walking and Moving Around:     - CURRENT STATUS: CK - 40%-59% impaired, limited or restricted    - GOAL STATUS: CJ - 20%-39% impaired, limited or restricted    - D/C STATUS:  ---------------To be determined---------------    Medical Necessity:   · Patient is expected to demonstrate progress in strength, range of motion and balance to increase independence with functional mobility with decreased pain. Reason for Services/Other Comments:  · Patient continues to require modification of therapeutic interventions to increase complexity of exercises. TREATMENT:   (In addition to Assessment/Re-Assessment sessions the following treatments were rendered)    Therapeutic Exercise: (see flow sheet below for minutes) :  Exercises per grid below to improve mobility, strength and balance. Required minimal visual and verbal cues to promote proper body alignment and promote proper body mechanics. Progressed resistance and range as indicated. Aquatic Therapy (see flow sheet below for minutes): Aquatic treatment performed per flow grid for Decreased muscle strength, Decreased static/dynamic balance and reactive control, Decreased range of motion, Decreased activity endurance and Ease of movement. Cues provided for technique. Assistance by therapist provided for progression of exercises/ activities. Patient has difficulty with low back/ R hip pain.      Date 5/1/18 5/3/18 5/7/18 5/9/18 5/11/18 5/14/18 5/17/18 5/21/18 5/23/18   Modalities:                                                Manual Therapy:                                                Aquatic Exercise: 3.75# 60 min 3.75# 55 min 3.75# 60 min 3.75# 60 min 3.75# 60 min  3.75# 60 min  3.75# 60 min   Gait F/B/S/M x4L c open paddles x4L c closed paddles F/B    X2L c closed paddles S/M x4L c closed paddles x4L c helmets same  X4L c helmets - side squat  x4L c helmets - side squat   Heel/Toe walk    x2L        3-way  x20 BLE          PF/DF   x10 BLE  x10 single leg x15 BLE    X15 single leg x20 BLE    x15 single leg  x20 BLE  x15 single  x10 BLE  x15 single leg   Hamstring Curls x4L           Hip Flexion with knee ext.   (rockette) x4L x4L x20 BLE x20 BLE x20 BLE  x20 BLE  x20 BLE   Squats     x20 c helmets lat pull down x20 c helmets c lat pull down x20 c helmets c lat pull down  x20 c helmets  x20 c helmet lat pull down    x15 single leg   SLR march x4L c open paddles x2L c closed paddles x4L c closed paddles x4L c helmets x4L  x4L c helmets  x4L c helmets   Lunges x10 BLE x15 BLE x2L x4L x4L  x4L  x4L   Hip circles            Hip horizontal abduction/adduction  x20 clamshell          Core: row    x20 c helmets x20 c hlemets  x20 c helmets  x20 c helmets   Core: tray x4L push/pull x4L push/pull    x2L R/L x4L R/L         Core:flex/ext UE in mini squat       x15 c helmets alternating wide tandem stance  x20 c helemts     Core: horz. abd/add UE in modified tandem stance x20 c closed paddles  x20 c helmets x20 c helmets x20 c helmets in mini squat  x20 c helmets  x20 c helmets   Deep well bike 3 min 3 min 3 min 3 min 2 min  2 min  2 min   Deep well jumping nav 1 min 1 min 1 min 1 min 2 min  2 min  2 min   Deep well scissors 1 min 1 min 1 min 1 min 2 min  2 min  2 min   Deep well:  traction 8 min 8 min 8 min 8 min 8 min  8 min  8 min               Hamstring Stretch 2H30 BLE 2H30 BLE 2H30 BLE 2H30 BLE   2H30 BLE  2H30 BLE   Step Lunge            Piriformis stretch            PF Stretch            Balance: Single leg Stance  H30 BLE     H60 BLE     Balance: Tandem       x2L  x4L                             Therapeutic Exercise:      45 min  40 min    Hamstring stretch supine      2H30 BLE HEP      SKTC        Supine 2H20    SLR      2x10 BLE HEP  2x8 LLE 2#  2x5 RLE 2#    Pelvic tilt        x15 H5    Seated lateral step over      x10 BLE  2x10 BLE    Hip abd        Standing x10 BLE    Clamshell      Hooklying c green TB x15 HEP      Hip add      x15 H5 HEP      bridge        x15    Heel toe raises        x15    LAQ        2x10 2#    Hamstring curls        2x10 green TB BLE    Hip extension            Sit to stand      x10      Squat        Mini squat x10    Lung            Step up            Forward weight shifts      X15 BLE at handrail HEP  x15 BLE    Lateral weight shifts      With ipsilateral arm reach x15 BLE at handrail HEP  x15 BLE    Slantboard                                                F=forward  B=Backward  S=sidesteps  M=marches    H=hold    Manual: (see flow sheet above for minutes)   Modalities: (see flow sheet above for minutes)     HEP: Plan to instruct in HEP as therapy progresses. Land HEP pictorial and written instructions 5/14/18. Treatment/Session Assessment: Performed aquatic exercises today (seen per flowchart), focusing on core/trunk strengthening and flexibility. Continued challenging patient's balance, with good tolerance. Patient needed visual and verbal cues for correct body mechanics. Discussed working on land 2x a week starting next visit, pt was reluctant to not be in pool due to her preference for aquatic exercises, explained to patient the importance of land exercise to increase patient's safety at home and in the community, patient agreed to begin just land exercises next week with discharge pending 1-2 more weeks. · Pain/ Symptoms: Initial:   0/10  LB    Patient reports she has no pain today. Patient states she has an easier time getting in and out of her car and is able to put one leg in and then the other from a standing position, where use to she had to sit down and then slide her legs over.  Post Session:  0/10 LB pain following aquatics   ·   Compliance with Program/Exercises: Pt reports not consistent  Recommendations/Intent for treatment session: \"Next visit will focus on advancements to more challenging activities\". Plan to transition patient to land next week with discharge pending.    Total Treatment Duration:  PT Patient Time In/Time Out  Time In: 1330  Time Out: 1440     Nelli Witt, PTA

## 2018-05-25 ENCOUNTER — APPOINTMENT (OUTPATIENT)
Dept: PHYSICAL THERAPY | Age: 73
End: 2018-05-25
Payer: MEDICARE

## 2018-05-29 ENCOUNTER — HOSPITAL ENCOUNTER (OUTPATIENT)
Dept: PHYSICAL THERAPY | Age: 73
Discharge: HOME | End: 2018-05-29
Payer: MEDICARE

## 2018-05-31 ENCOUNTER — HOSPITAL ENCOUNTER (OUTPATIENT)
Dept: PHYSICAL THERAPY | Age: 73
Discharge: HOME | End: 2018-05-31
Payer: MEDICARE

## 2018-05-31 PROCEDURE — 97110 THERAPEUTIC EXERCISES: CPT
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
## 2018-06-01 ENCOUNTER — APPOINTMENT (OUTPATIENT)
Dept: PHYSICAL THERAPY | Age: 73
End: 2018-06-01
Payer: MEDICARE

## 2018-06-05 ENCOUNTER — HOSPITAL ENCOUNTER (OUTPATIENT)
Dept: PHYSICAL THERAPY | Age: 73
Discharge: HOME | End: 2018-06-05
Payer: MEDICARE

## 2018-06-05 PROCEDURE — 97110 THERAPEUTIC EXERCISES: CPT

## 2018-06-05 NOTE — PROGRESS NOTES
Marcelo Min [facility]  : 1945 [address] [facility]  [address] [place] [address].  [phone]   [phone] -       OUTPATIENT PHYSICAL THERAPY:Daily Note 2018        ICD-10: Treatment Diagnosis: Pain in right hip (M25.551)  Pain in right knee (M25.561)  Pain in left knee (M25.562)  Difficulty in walking, not elsewhere classified (R26.2)  Precautions/Allergies:   Codeine; Erythromycin; and Tetracycline   Fall Risk Score: 1 (? 5 = High Risk)  MD Orders: Evaluate and Treat: strengthening exercises/ aquatic therapy MEDICAL/REFERRING DIAGNOSIS:  Hip pain [M25.559]   DATE OF ONSET: 10/2017  REFERRING PHYSICIAN: Michael Kathleen., *6  RETURN PHYSICIAN APPOINTMENT: late 2018     INITIAL ASSESSMENT:  Ms. Collette Ross (pt) is a 71year old WF seen for physical therapy per MD orders with complaint of R hip pain, low back pain and B knee pain with long history of back pain and arthritis. Pt evaluated for outpatient physical therapy today with the following deficits: low back, right hip and bilateral knee pain, decreased strength in B hip. Pt would benefit from physical therapy to address the above deficits in order to return to increased independence with functional mobility with decreased pain. Pt would benefit from initially working in aquatic setting to off load spine, bilateral lower extremities while addressing goals. As patient progresses, plan to transition to gravity challenging, land based exercises/ activities. Plan of care and goals reviewed with patient who verbalizes agreement. PROBLEM LIST (Impacting functional limitations):  1. Decreased Strength  2. Decreased ADL/Functional Activities  3. Decreased Ambulation Ability/Technique  4. Decreased Balance  5. Increased Pain  6. Decreased Activity Tolerance  7. Decreased [place] with Home Exercise Program INTERVENTIONS PLANNED:  1. Balance Exercise  2. Gait Training  3.  Home Exercise Program (HEP)  4. Manual Therapy  5. Neuromuscular Re-education/Strengthening  6. Range of Motion (ROM)  7. Therapeutic Activites  8. Therapeutic Exercise/Strengthening  9. modalities   10. aquatics    TREATMENT PLAN:  Effective Dates: 4/4/2018 TO 7/3/2018 (90 days). Frequency/Duration: 2-3 times a week for 90 Days    GOALS: (Goals have been discussed and agreed upon with patient.)  Short-Term Functional Goals: Time Frame: 2 weeks  Pt will demonstrate independence/ compliance with home exercise program for management of symptoms (aquatic). (met 04/27/18)  Pt will demonstrate improved Lower Extremity Functional Scale by 3-4 points for improved functional mobility. (met 5/14/18)  Pt will tolerate 35 to 40 minutes of aquatic therapy with pain of 2/10. (met 04/27/18)      Discharge Goals: Time Frame: 8 weeks  Pt will demonstrate improved manual muscle test score by 1/2 to 1  grade for R hip complex to participate in heavy housework activities and yard work. (in progress 05/14/18)  Pt will ambulate >2000ft with normal gait pattern with even stance time/ step length for bilateral lower extremities for community entry.  (in progress 05/14/18)  Pt will report Lower Extremity Functional Scale score of 50/80 for improved functional mobility in home/ community setting. (in progress 04/27/18)  4. Pt will demonstrate independence/ compliance with home exercise program for management of symptoms (land). (met 5/14/18)    Rehabilitation Potential For Stated Goals: Good                 HISTORY:   History of Present Injury/Illness (Reason for Referral):  Pt is 72 y/o WF seen for PT per MD orders following R DEYSI 10/2017 with multiple complications/ illness over the winter with noted weakness and debility. Pt has complicated hx of arthritis, chronic low back pain as well as spinal stenosis. Pt has sx hx of B TKA (2009, 2011) along with recent hip replacement.   Pt reports her goal is to get stronger so she can do yard work and housework as well as get into community to visit with friends. Past Medical History/Comorbidities:   Ms. Joy Kaiser  has a past medical history of Arthritis; Asthma; Chronic obstructive asthma, unspecified; Chronic pain; DDD (degenerative disc disease); GERD (gastroesophageal reflux disease); Hypertension; Hypothyroidism; HYPOTHYROIDISM; Incontinence of urine; Morbid obesity ([address] Neuropathy; ILYA (obstructive sleep apnea) (9/16/2009); Osteoarthritis of left knee (9/16/2009); Osteoarthritis of right knee (3/2/2011); S/P total knee replacement using cement (9/16/2009); Sleep apnea; Spastic colon; Status post right hip replacement (10/9/2017); and Type II or unspecified type diabetes mellitus without mention of complication, not stated as uncontrolled. She also has no past medical history of Adverse effect of anesthesia; Aneurysm ([address] Arrhythmia; Autoimmune disease ([address] CAD (coronary artery disease); Cancer ([address] Chronic kidney disease; Chronic obstructive pulmonary disease ([address] Coagulation disorder ([address] Difficult intubation; Endocarditis; Heart failure ([address] Ill-defined condition; Liver disease; Malignant hyperthermia due to anesthesia; Nausea & vomiting; Nicotine vapor product user; Non-nicotine vapor product user; Pseudocholinesterase deficiency; Psychiatric disorder; PUD (peptic ulcer disease); Rheumatic fever; Seizures ([address] Stroke [facility]); or Thromboembolus ([address] Ms. Joy Kaiser  has a past surgical history that includes hx hernia repair (1/30/08); hx hysterectomy (2000); pr hand/finger surgery unlisted (Right, 1990'S); pr total knee arthroplasty (Left, 2009); and hx knee replacement (Right, 2011). Social History/Living Environment:     Pt lives in one story home with 4 steps to enter with unilateral handrail. Pt lives alone.   Prior Level of Function/Work/Activity:  Retired   Dominant Side:         RIGHT  Current Medications:    Current Outpatient Prescriptions:     amLODIPine (NORVASC) 5 mg tablet, Take 5 mg by mouth daily. , Disp: , Rfl:     ciprofloxacin HCl (CIPRO) 500 mg tablet, Take  by mouth two (2) times a day., Disp: , Rfl:     neomycin-colist-hydrocortisone-thonzonium (CORTISPORIN-TC) otic suspension, by Otic route four (4) times daily. , Disp: , Rfl:     Azelastine (ASTEPRO) 0.15 % (205.5 mcg) nasal spray, 2 Sprays by Both Nostrils route as needed. , Disp: , Rfl:     fluticasone (FLONASE ALLERGY RELIEF) 50 mcg/actuation nasal spray, 2 Sprays by Both Nostrils route as needed for Rhinitis., Disp: , Rfl:     budesonide (PULMICORT) 0.5 mg/2 mL nbsp, 500 mcg by Nebulization route two (2) times a day., Disp: , Rfl:     formoterol (PERFOROMIST) 20 mcg/2 mL nebu neb solution, 20 mcg by Nebulization route two (2) times a day., Disp: , Rfl:     Lactobacillus acidoph-L.bulgar (RUMA ASSIST) 100 million cell pwpk, Take  by mouth., Disp: , Rfl:     Biotin 2,500 mcg cap, Take  by mouth., Disp: , Rfl:     cranberry extract 450 mg tab tablet, Take 450 mg by mouth., Disp: , Rfl:     OTHER, Vein and leg support--- 1 tab po qd, Disp: , Rfl:     estradiol (ESTRACE) 0.01 % (0.1 mg/gram) vaginal cream, Insert 1 g into vagina daily. , Disp: , Rfl:     neomycin-polymyxin-hydrocortisone, buffered, (PEDIOTIC) 3.5-10,000-1 mg/mL-unit/mL-% otic suspension, Administer 2 Drops into each ear daily. , Disp: , Rfl:     traMADol (ULTRAM) 50 mg tablet, Take 50 mg by mouth every six (6) hours as needed for Pain., Disp: , Rfl:     HYDROmorphone (DILAUDID) 2 mg tablet, Take 1 Tab by mouth every four (4) hours as needed. Max Daily Amount: 12 mg., Disp: 40 Tab, Rfl: 0    SITagliptin (JANUVIA) 100 mg tablet, Take 100 mg by mouth daily. , Disp: , Rfl:     lidocaine-kinesiology tape 5 % kit, by Apply Externally route., Disp: , Rfl:     OTHER, three (3) times daily. Lidocaine patch 5%, Disp: , Rfl:     oxybutynin chloride XL (DITROPAN XL) 5 mg CR tablet, Take 5 mg by mouth daily. , Disp: , Rfl:     polyethylene glycol (MIRALAX) 17 gram packet, Take 17 g by mouth as needed. , Disp: , Rfl:     cpap machine kit, by Does Not Apply route. 9 cm H2O, Disp: , Rfl:     FLUTICASONE/SALMETEROL (ADVAIR DISKUS IN), Take 2 Puffs by inhalation two (2) times daily as needed. , Disp: , Rfl:     celecoxib (CELEBREX) 200 mg capsule, Take 200 mg by mouth daily. 1 to 2 a day. , Disp: , Rfl:     PREGABALIN (LYRICA PO), Take 150 mg by mouth two (2) times a day., Disp: , Rfl:     montelukast (SINGULAIR) 10 mg tablet, Take 10 mg by mouth daily. , Disp: , Rfl:     NEOMY SULF/POLYMYX B SULF/HC (CORTISPORIN OT), by Otic route as needed. 1 to 2 drops in each ear as needed. , Disp: , Rfl:     MULTIVITAMIN WITH MINERALS (MULTIVITAMIN & MINERAL FORMULA PO), Take 1 Tab by mouth daily. Multivitamin with D with Calcium , Disp: , Rfl:     metaxalone (SKELAXIN) 800 mg tablet, Take 1 Tab by mouth three (3) times daily as needed for Pain. (Patient taking differently: Take 800 mg by mouth two (2) times a day.), Disp: 1 Tab, Rfl: 0    OMEPRAZOLE PO, 20 mg daily. Pt. Instructed to take morning of surgery per anesthesiologist.  , Disp: , Rfl:     CYANOCOBALAMIN (VITAMIN B-12 PO), Take 500 mg by mouth daily. , Disp: , Rfl:     LISINOPRIL-HYDROCHLOROTHIAZIDE 10-12.5 mg per tablet, take 1 Tab by mouth daily. , Disp: , Rfl:     LASIX 20 mg Tab, Take 20 mg by mouth daily as needed. , Disp: , Rfl:     GLYBURIDE-METFORMIN 2.5-500 mg per tablet, take 1 Tab by mouth two (2) times a day., Disp: , Rfl:     SYNTHROID 100 mcg Tab, Take 100 mcg by mouth daily. Pt. Instructed to take morning of surgery per anesthesiologist.  , Disp: , Rfl:     PATANOL 0.1 % Drop, 2 Drops by Both Eyes route as needed for Allergies. , Disp: , Rfl:     AMBIEN 10 mg Tab, Take 10 mg by mouth nightly., Disp: , Rfl:     LIBRAX, WITH CLINIDIUM, 2.5-5 mg Cap, Take 1 Cap by mouth four (4) times daily as needed. , Disp: , Rfl:    Date Last Reviewed:  6/5/2018   EXAMINATION:   Observation/Orthostatic Postural Assessment:          Morbid obesity  Palpation:          Pitting edema in B ankle  Circumferential measurements:  30 cm R/L (above malleolus)  ROM:  BUE/ cervical WNL for patient                     Date:  04/04/18 Date:  04/27/18  Grossly WFL Date:  5/14/18  same   Trunk ROM 25 % limitation                          LE ROM Left Right       Hip Flexion [facility]       Hip Abduction [facility]       Straight Leg Raise (SLR) N/T N/T                Knee Flexion 125 125       Knee Extension 0 0                Ankle Dorsiflexion [facility] WFL         Strength:     Date:  04/04/18 Date:  04/27/18  Grossly 4- to 4/5 Date:  5/14/18   LE MMT Left Right Left Right Left Right   Hip Flex (L1/L2) 4/5 4-/5  -4/5 4/5 4/5   Hip Abd (glut med) 4/5 4/5   4/5 4/5   Hip Ext 4-/5 4-/5  -4/5     Quad    ( L3/4) 4/5 4-/5  4/5 4+/5 4+/5   Hamstring 4/5 4-/5  4/5 4+/5 4+/5   Anterior Tib (L4/L5)         Gastroc (S1/2)         EHL (L5)                             Special Tests:   deferred  Neurological Screen:        Sensation: Intact for light touch  Functional Mobility:         Gait/Ambulation:  Amb without A.D with increased lateral wt shift BLE lumbering gait pattern        Transfers:  Sit to stand to sit with use of BUE for support        Bed Mobility:  independent  Balance:          Single Leg Stance:  Unable to assume single leg stance. Good static standing balance   Body Structures Involved:  1. Nerves  2. Bones  3. Joints  4. Muscles  5. Ligaments Body Functions Affected:  1. Sensory/Pain  2. Neuromusculoskeletal  3. Movement Related Activities and Participation Affected:  1. General Tasks and Demands  2. Mobility  3. Self Care  4. Domestic Life  5. Community, Social and Civic Life   CLINICAL DECISION MAKING:   Outcome Measure:    Tool Used: Lower Extremity Functional Scale (LEFS)  Score:  Initial: 40/80 Recent: 42/80 (Date: 04/27/18 ) Most Recent: 46/80 (Date: 5/14/18)   Interpretation of Score: 20 questions each scored on a 5 point scale with 0 representing \"extreme difficulty or unable to perform\" and 4 representing \"no difficulty\". The lower the score, the greater the functional disability. 80/80 represents no disability. Minimal detectable change is 9 points. Score 80 79-63 62-48 47-32 31-16 15-1 0   Modifier CH CI CJ CK CL CM CN     ? Mobility - Walking and Moving Around:     - CURRENT STATUS: CK - 40%-59% impaired, limited or restricted    - GOAL STATUS: CJ - 20%-39% impaired, limited or restricted    - D/C STATUS:  ---------------To be determined---------------    Medical Necessity:   · Patient is expected to demonstrate progress in strength, range of motion and balance to increase independence with functional mobility with decreased pain. Reason for Services/Other Comments:  · Patient continues to require modification of therapeutic interventions to increase complexity of exercises. TREATMENT:   (In addition to Assessment/Re-Assessment sessions the following treatments were rendered)    Therapeutic Exercise: (see flow sheet below for minutes) :  Exercises per grid below to improve mobility, strength and balance. Required minimal visual and verbal cues to promote proper body alignment and promote proper body mechanics. Progressed resistance and range as indicated. Aquatic Therapy (see flow sheet below for minutes): Aquatic treatment performed per flow grid for Decreased muscle strength, Decreased static/dynamic balance and reactive control, Decreased range of motion, Decreased activity endurance and Ease of movement. Cues provided for technique. Assistance by therapist provided for progression of exercises/ activities. Patient has difficulty with low back/ R hip pain.      Date 5/1/18 5/3/18 5/7/18 5/9/18 5/11/18 5/14/18 5/17/18 5/21/18 5/23/18 5/31/18 6/5/18   Modalities:                                                        Manual Therapy:                                                        Aquatic Exercise: 3.75# 60 min 3.75# 55 min 3.75# 60 min 3.75# 60 min 3.75# 60 min  3.75# 60 min  3.75# 60 min     Gait F/B/S/M x4L c open paddles x4L c closed paddles F/B    X2L c closed paddles S/M x4L c closed paddles x4L c helmets same  X4L c helmets - side squat  x4L c helmets - side squat     Heel/Toe walk    x2L          3-way  x20 BLE            PF/DF   x10 BLE  x10 single leg x15 BLE    X15 single leg x20 BLE    x15 single leg  x20 BLE  x15 single  x10 BLE  x15 single leg     Hamstring Curls x4L             Hip Flexion with knee ext.   (rockromulo) x4L x4L x20 BLE x20 BLE x20 BLE  x20 BLE  x20 BLE     Squats     x20 c helmets lat pull down x20 c helmets c lat pull down x20 c helmets c lat pull down  x20 c helmets  x20 c helmet lat pull down    x15 single leg     SLR march x4L c open paddles x2L c closed paddles x4L c closed paddles x4L c helmets x4L  x4L c helmets  x4L c helmets     Lunges x10 BLE x15 BLE x2L x4L x4L  x4L  x4L     Hip circles              Hip horizontal abduction/adduction  x20 clamshell            Core: row    x20 c helmets x20 c hlemets  x20 c helmets  x20 c helmets     Core: tray x4L push/pull x4L push/pull    x2L R/L x4L R/L           Core:flex/ext UE in mini squat       x15 c helmets alternating wide tandem stance  x20 c helemts       Core: horz. abd/add UE in modified tandem stance x20 c closed paddles  x20 c helmets x20 c helmets x20 c helmets in mini squat  x20 c helmets  x20 c helmets     Deep well bike 3 min 3 min 3 min 3 min 2 min  2 min  2 min     Deep well jumping nav 1 min 1 min 1 min 1 min 2 min  2 min  2 min     Deep well scissors 1 min 1 min 1 min 1 min 2 min  2 min  2 min     Deep well:  traction 8 min 8 min 8 min 8 min 8 min  8 min  8 min                   Hamstring Stretch 2H30 BLE 2H30 BLE 2H30 BLE 2H30 BLE   2H30 BLE  2H30 BLE     Step Lunge              Piriformis stretch              PF Stretch              Balance: Single leg Stance  H30 BLE     H60 BLE       Balance: Tandem       x2L  x4L Therapeutic Exercise:      45 min  40 min  30 min 45 min   Hamstring stretch supine      2H30 BLE HEP        SKTC        Supine 2H20   Supine 2H20   SLR      2x10 BLE HEP  2x8 LLE 2#  2x5 RLE 2#  2x8 RLE 2#  2x10 LLE 2# 2x10 BLE 2# min assist c RLE   Pelvic tilt        x15 H5      Seated lateral step over      x10 BLE  2x10 BLE      Hip abd        Standing x10 BLE   Standing 2x10 2#   Clamshell      Hooklying c green TB x15 HEP    Seated green TB x20    Hip add      x15 H5 HEP    Seated yellow ball squeeze x20H5    bridge        x15  2x15 x15    x15 H5 c ball squeeze   Heel toe raises        x15      LAQ        2x10 2#  2x10 3# (increase to 4# next visit) 2x15 4#   Hamstring curls        2x10 green TB BLE  Standing 2x10 2# Standing 2x10 2#   Hip extension              Sit to stand      x10        Squat        Mini squat x10   x15   Lung           Mini lung x10 BLE   Step up              Forward weight shifts      X15 BLE at handrail HEP  x15 BLE      Lateral weight shifts      With ipsilateral arm reach x15 BLE at handrail HEP  x15 BLE      Slantboard              Side stepping          20ft R/L 20ft R/L   Diagonal stepping          20ft x2    Balance: Airex          Lateral weight shift x10    March 1 min Step up x10 BLE SBA/CGA    Tandem H30 BLE SBA   F=forward  B=Backward  S=sidesteps  M=marches    H=hold    Manual: (see flow sheet above for minutes)   Modalities: (see flow sheet above for minutes)     HEP: Plan to instruct in HEP as therapy progresses. Land HEP pictorial and written instructions 5/14/18. Treatment/Session Assessment: Continued with land exercises per flowchart, Patient needs extra pillows for head during supine exercises. Patient performed 10 reps of SLR but needed minimal assistance from therapist for last 3 reps on RLE due to weakness in R iliopsoas. Patient demonstrated weak B glut med with standing hip abd exercise.  Patient needed minimal verbal and tactile cues for correct body mechanics. Plan to continue on land exercises next week with discharge pending. · Pain/ Symptoms: Initial:   0/10  LB    Patient reports no pain in LB or hips today    Patient reports the exercises she is doing are really helping her. Post Session:  0/10 LB pain following land exercises    Patient reports fatigue following PT but no pain. ·   Compliance with Program/Exercises: Pt reports not consistent  Recommendations/Intent for treatment session: \"Next visit will focus on advancements to more challenging activities\". Plan to continue on land exercises next week with discharge pending.    Total Treatment Duration:  PT Patient Time In/Time Out  Time In: 1330  Time Out: 1011 [facility]

## 2018-06-07 ENCOUNTER — HOSPITAL ENCOUNTER (OUTPATIENT)
Dept: PHYSICAL THERAPY | Age: 73
Discharge: HOME | End: 2018-06-07
Payer: MEDICARE

## 2018-06-12 ENCOUNTER — HOSPITAL ENCOUNTER (OUTPATIENT)
Dept: PHYSICAL THERAPY | Age: 73
Discharge: HOME | End: 2018-06-12
Payer: MEDICARE

## 2018-06-14 ENCOUNTER — HOSPITAL ENCOUNTER (OUTPATIENT)
Dept: PHYSICAL THERAPY | Age: 73
Discharge: HOME | End: 2018-06-14
Payer: MEDICARE

## 2018-06-14 NOTE — PROGRESS NOTES
Patient cancelled appointment on 6/14/18 due to having an allergy infection. Patient plan to reschedule visit next week.

## 2018-06-28 ENCOUNTER — APPOINTMENT (RX ONLY)
Dept: URBAN - METROPOLITAN AREA CLINIC 23 | Facility: CLINIC | Age: 73
Setting detail: DERMATOLOGY
End: 2018-06-28

## 2018-06-28 DIAGNOSIS — D22 MELANOCYTIC NEVI: ICD-10-CM

## 2018-06-28 DIAGNOSIS — D485 NEOPLASM OF UNCERTAIN BEHAVIOR OF SKIN: ICD-10-CM

## 2018-06-28 DIAGNOSIS — L82.0 INFLAMED SEBORRHEIC KERATOSIS: ICD-10-CM

## 2018-06-28 PROBLEM — J30.1 ALLERGIC RHINITIS DUE TO POLLEN: Status: ACTIVE | Noted: 2018-06-28

## 2018-06-28 PROBLEM — D48.5 NEOPLASM OF UNCERTAIN BEHAVIOR OF SKIN: Status: ACTIVE | Noted: 2018-06-28

## 2018-06-28 PROBLEM — H91.90 UNSPECIFIED HEARING LOSS, UNSPECIFIED EAR: Status: ACTIVE | Noted: 2018-06-28

## 2018-06-28 PROBLEM — D22.4 MELANOCYTIC NEVI OF SCALP AND NECK: Status: ACTIVE | Noted: 2018-06-28

## 2018-06-28 PROCEDURE — 17110 DESTRUCTION B9 LES UP TO 14: CPT

## 2018-06-28 PROCEDURE — 11305 SHAVE SKIN LESION 0.5 CM/<: CPT | Mod: 59

## 2018-06-28 PROCEDURE — ? SHAVE REMOVAL

## 2018-06-28 PROCEDURE — 11100: CPT | Mod: 59

## 2018-06-28 PROCEDURE — ? LIQUID NITROGEN

## 2018-06-28 PROCEDURE — ? BIOPSY BY SHAVE METHOD

## 2018-06-28 PROCEDURE — ? COUNSELING

## 2018-06-28 ASSESSMENT — LOCATION ZONE DERM
LOCATION ZONE: TRUNK
LOCATION ZONE: SCALP
LOCATION ZONE: ARM
LOCATION ZONE: LEG

## 2018-06-28 ASSESSMENT — LOCATION DETAILED DESCRIPTION DERM
LOCATION DETAILED: RIGHT INFRAMAMMARY CREASE (INNER QUADRANT)
LOCATION DETAILED: SUBXIPHOID
LOCATION DETAILED: LEFT SUPERIOR PARIETAL SCALP
LOCATION DETAILED: LEFT INFRAMAMMARY CREASE (OUTER QUADRANT)
LOCATION DETAILED: RIGHT ANTERIOR LATERAL DISTAL UPPER ARM
LOCATION DETAILED: LEFT ANTERIOR PROXIMAL THIGH

## 2018-06-28 ASSESSMENT — LOCATION SIMPLE DESCRIPTION DERM
LOCATION SIMPLE: SCALP
LOCATION SIMPLE: LEFT THIGH
LOCATION SIMPLE: RIGHT BREAST
LOCATION SIMPLE: ABDOMEN
LOCATION SIMPLE: LEFT BREAST
LOCATION SIMPLE: RIGHT UPPER ARM

## 2018-06-28 NOTE — PROCEDURE: BIOPSY BY SHAVE METHOD
Was A Bandage Applied: Yes
Bill For Surgical Tray: no
Biopsy Method: double edge Personna blade
Dressing: bandage
Wound Care: Vaseline
Billing Type: Third-Party Bill
Anesthesia Type: 1% lidocaine without epinephrine and a 1:10 solution of 8.4% sodium bicarbonate
Accession #: PC
X Size Of Lesion In Cm: 0
Electrodesiccation Text: The wound bed was treated with electrodesiccation after the biopsy was performed.
Post-care instructions were reviewed in detail and written instructions are provided. Patient is to keep the biopsy site dry overnight, and then apply bacitracin twice daily until healed. Patient may apply hydrogen peroxide soaks to remove any crusting.
Anesthesia Volume In Cc: 0.5
Biopsy Type: H and E
Silver Nitrate Text: The wound bed was treated with silver nitrate after the biopsy was performed.
Type Of Destruction Used: Curettage
Consent: Written consent was obtained and risks were reviewed including but not limited to scarring, infection, bleeding, scabbing, incomplete removal, and allergy to anesthesia.
Detail Level: Detailed
Electrodesiccation And Curettage Text: The wound bed was treated with electrodesiccation and curettage after the biopsy was performed.
Notification Instructions: Patient will be notified of biopsy results. However, patient instructed to call the office if not contacted within 2 weeks.
Cryotherapy Text: The wound bed was treated with cryotherapy after the biopsy was performed.
Hemostasis: Aluminum Chloride
Depth Of Biopsy: dermis

## 2018-06-28 NOTE — PROCEDURE: LIQUID NITROGEN
Consent: The patient's verbal consent was obtained including but not limited to risks of crusting, scabbing, blistering, scarring, darker or lighter pigmentary change, recurrence, incomplete removal and infection.
Detail Level: Detailed
Render Post-Care Instructions In Note?: no
Number Of Freeze-Thaw Cycles: 1 freeze-thaw cycle
Medical Necessity Clause: Treatment was medically necessary because the spot was
Post-Care Instructions: I reviewed with the patient in detail post-care instructions. Patient is to wear sunprotection, and avoid picking at any of the treated lesions. Pt may apply Vaseline to crusted or scabbing areas.
Medical Necessity Information: It is in your best interest to select a reason for this procedure from the list below. All of these items fulfill various CMS LCD requirements except the new and changing color options.

## 2018-06-28 NOTE — PROCEDURE: SHAVE REMOVAL
Biopsy Method: double edge Personna blade
Render Post-Care Instructions In Note?: no
Accession #: PC
Post-Care Instructions: I reviewed with the patient in detail post-care instructions. Patient is to keep the biopsy site dry overnight, and then apply bacitracin twice daily until healed. Patient may apply hydrogen peroxide soaks to remove any crusting.
X Size Of Lesion In Cm (Optional): 0
Medical Necessity Clause: This procedure was medically necessary because the lesion is
Path Notes (To The Dermatopathologist): Please check margins.
Medical Necessity Information: It is in your best interest to select a reason for this procedure from the list below. All of these items fulfill various CMS LCD requirements except the new and changing color options.
Detail Level: Detailed
Was A Bandage Applied: Yes
Wound Care: Petrolatum
Size Of Margin In Cm (Margins Are Not Added To Billing Dimensions): -
Size Of Lesion In Cm (Required): 0.3
Hemostasis: Aluminum Chloride
Notification Instructions: Patient will be notified of biopsy results. However, patient instructed to call the office if not contacted within 2 weeks.
Billing Type: Third-Party Bill
Anesthesia Type: 1% lidocaine without epinephrine and a 1:10 solution of 8.4% sodium bicarbonate
Anesthesia Volume In Cc: 0.5
Consent was obtained from the patient. The risks and benefits to therapy were discussed in detail. Specifically, the risks of infection, scarring, bleeding, prolonged wound healing, incomplete removal, allergy to anesthesia, nerve injury and recurrence were addressed. Prior to the procedure, the treatment site was clearly identified and confirmed by the patient. All components of Universal Protocol/PAUSE Rule completed.

## 2018-07-06 NOTE — PROGRESS NOTES
Tasha Mcgowan  : 1945 [id number] [facility],[address]  [facility] [address]  [phone]   [phone] -       OUTPATIENT PHYSICAL THERAPY:Daily Note and Discontinuation Summary 2018        ICD-10: Treatment Diagnosis: Pain in right hip (M25.551)  Pain in right knee (M25.561)  Pain in left knee (M25.562)  Difficulty in walking, not elsewhere classified (R26.2)  Precautions/Allergies:   Codeine; Erythromycin; and Tetracycline   Fall Risk Score: 1 (? 5 = High Risk)  MD Orders: Evaluate and Treat: strengthening exercises/ aquatic therapy MEDICAL/REFERRING DIAGNOSIS:  Hip pain [M25.559]   DATE OF ONSET: 10/2017  REFERRING PHYSICIAN: Pawel Monday., *6  RETURN PHYSICIAN APPOINTMENT: late 2018     INITIAL ASSESSMENT:  Ms. Kate Chandler (pt) is a 71year old WF seen for physical therapy per MD orders with complaint of R hip pain, low back pain and B knee pain with long history of back pain and arthritis. Pt evaluated for outpatient physical therapy today with the following deficits: low back, right hip and bilateral knee pain, decreased strength in B hip. Pt would benefit from physical therapy to address the above deficits in order to return to increased independence with functional mobility with decreased pain. Pt would benefit from initially working in aquatic setting to off load spine, bilateral lower extremities while addressing goals. As patient progresses, plan to transition to gravity challenging, land based exercises/ activities. Plan of care and goals reviewed with patient who verbalizes agreement. PROBLEM LIST (Impacting functional limitations):  1. Decreased Strength  2. Decreased ADL/Functional Activities  3. Decreased Ambulation Ability/Technique  4. Decreased Balance  5. Increased Pain  6. Decreased Activity Tolerance  7. Decreased Sharp with Home Exercise Program INTERVENTIONS PLANNED:  1. Balance Exercise  2.  Gait Training  3. Home Exercise Program (HEP)  4. Manual Therapy  5. Neuromuscular Re-education/Strengthening  6. Range of Motion (ROM)  7. Therapeutic Activites  8. Therapeutic Exercise/Strengthening  9. modalities   10. aquatics    TREATMENT PLAN:  Effective Dates: 4/4/2018 TO 7/3/2018 (90 days). Frequency/Duration: 2-3 times a week for 90 Days    GOALS: (Goals have been discussed and agreed upon with patient.)  Short-Term Functional Goals: Time Frame: 2 weeks  Pt will demonstrate independence/ compliance with home exercise program for management of symptoms (aquatic). (met 04/27/18)  Pt will demonstrate improved Lower Extremity Functional Scale by 3-4 points for improved functional mobility. (met 5/14/18)  Pt will tolerate 35 to 40 minutes of aquatic therapy with pain of 2/10. (met 04/27/18)      Discharge Goals: Time Frame: 8 weeks  Pt will demonstrate improved manual muscle test score by 1/2 to 1  grade for R hip complex to participate in heavy housework activities and yard work. (in progress 05/14/18)  Pt will ambulate >2000ft with normal gait pattern with even stance time/ step length for bilateral lower extremities for community entry.  (in progress 05/14/18)  Pt will report Lower Extremity Functional Scale score of 50/80 for improved functional mobility in home/ community setting. (in progress 04/27/18)  4. Pt will demonstrate independence/ compliance with home exercise program for management of symptoms (land). (met 5/14/18)    Rehabilitation Potential For Stated Goals: Good                 HISTORY:   History of Present Injury/Illness (Reason for Referral):  Pt is 72 y/o WF seen for PT per MD orders following R DEYSI 10/2017 with multiple complications/ illness over the winter with noted weakness and debility. Pt has complicated hx of arthritis, chronic low back pain as well as spinal stenosis. Pt has sx hx of B TKA (2009, 2011) along with recent hip replacement.   Pt reports her goal is to get stronger so she can do yard work and housework as well as get into community to visit with friends. Past Medical History/Comorbidities:   Ms. Collette Ross  has a past medical history of Arthritis; Asthma; Chronic obstructive asthma, unspecified; Chronic pain; DDD (degenerative disc disease); GERD (gastroesophageal reflux disease); Hypertension; Hypothyroidism; HYPOTHYROIDISM; Incontinence of urine; Morbid obesity ([address] Neuropathy; ILYA (obstructive sleep apnea) (9/16/2009); Osteoarthritis of left knee (9/16/2009); Osteoarthritis of right knee (3/2/2011); S/P total knee replacement using cement (9/16/2009); Sleep apnea; Spastic colon; Status post right hip replacement (10/9/2017); and Type II or unspecified type diabetes mellitus without mention of complication, not stated as uncontrolled. She also has no past medical history of Adverse effect of anesthesia; Aneurysm ([address] Arrhythmia; Autoimmune disease ([address] CAD (coronary artery disease); Cancer ([address] Chronic kidney disease; Chronic obstructive pulmonary disease ([address] Coagulation disorder ([address] Difficult intubation; Endocarditis; Heart failure ([address] Ill-defined condition; Liver disease; Malignant hyperthermia due to anesthesia; Nausea & vomiting; Nicotine vapor product user; Non-nicotine vapor product user; Pseudocholinesterase deficiency; Psychiatric disorder; PUD (peptic ulcer disease); Rheumatic fever; Seizures ([address] Stroke [facility]); or Thromboembolus ([address] Ms. Collette Ross  has a past surgical history that includes hx hernia repair (1/30/08); hx hysterectomy (2000); pr hand/finger surgery unlisted (Right, 1990'S); pr total knee arthroplasty (Left, 2009); and hx knee replacement (Right, 2011). Social History/Living Environment:     Pt lives in one story home with 4 steps to enter with unilateral handrail. Pt lives alone.   Prior Level of Function/Work/Activity:  Retired   Dominant Side:         RIGHT  Current Medications:    Current Outpatient Prescriptions:    amLODIPine (NORVASC) 5 mg tablet, Take 5 mg by mouth daily. , Disp: , Rfl:     ciprofloxacin HCl (CIPRO) 500 mg tablet, Take  by mouth two (2) times a day., Disp: , Rfl:     neomycin-colist-hydrocortisone-thonzonium (CORTISPORIN-TC) otic suspension, by Otic route four (4) times daily. , Disp: , Rfl:     Azelastine (ASTEPRO) 0.15 % (205.5 mcg) nasal spray, 2 Sprays by Both Nostrils route as needed. , Disp: , Rfl:     fluticasone (FLONASE ALLERGY RELIEF) 50 mcg/actuation nasal spray, 2 Sprays by Both Nostrils route as needed for Rhinitis., Disp: , Rfl:     budesonide (PULMICORT) 0.5 mg/2 mL nbsp, 500 mcg by Nebulization route two (2) times a day., Disp: , Rfl:     formoterol (PERFOROMIST) 20 mcg/2 mL nebu neb solution, 20 mcg by Nebulization route two (2) times a day., Disp: , Rfl:     Lactobacillus acidoph-L.bulgar (RUMA ASSIST) 100 million cell pwpk, Take  by mouth., Disp: , Rfl:     Biotin 2,500 mcg cap, Take  by mouth., Disp: , Rfl:     cranberry extract 450 mg tab tablet, Take 450 mg by mouth., Disp: , Rfl:     OTHER, Vein and leg support--- 1 tab po qd, Disp: , Rfl:     estradiol (ESTRACE) 0.01 % (0.1 mg/gram) vaginal cream, Insert 1 g into vagina daily. , Disp: , Rfl:     neomycin-polymyxin-hydrocortisone, buffered, (PEDIOTIC) 3.5-10,000-1 mg/mL-unit/mL-% otic suspension, Administer 2 Drops into each ear daily. , Disp: , Rfl:     traMADol (ULTRAM) 50 mg tablet, Take 50 mg by mouth every six (6) hours as needed for Pain., Disp: , Rfl:     HYDROmorphone (DILAUDID) 2 mg tablet, Take 1 Tab by mouth every four (4) hours as needed. Max Daily Amount: 12 mg., Disp: 40 Tab, Rfl: 0    SITagliptin (JANUVIA) 100 mg tablet, Take 100 mg by mouth daily. , Disp: , Rfl:     lidocaine-kinesiology tape 5 % kit, by Apply Externally route., Disp: , Rfl:     OTHER, three (3) times daily. Lidocaine patch 5%, Disp: , Rfl:     oxybutynin chloride XL (DITROPAN XL) 5 mg CR tablet, Take 5 mg by mouth daily. , Disp: , Rfl:    polyethylene glycol (MIRALAX) 17 gram packet, Take 17 g by mouth as needed. , Disp: , Rfl:     cpap machine kit, by Does Not Apply route. 9 cm H2O, Disp: , Rfl:     FLUTICASONE/SALMETEROL (ADVAIR DISKUS IN), Take 2 Puffs by inhalation two (2) times daily as needed. , Disp: , Rfl:     celecoxib (CELEBREX) 200 mg capsule, Take 200 mg by mouth daily. 1 to 2 a day. , Disp: , Rfl:     PREGABALIN (LYRICA PO), Take 150 mg by mouth two (2) times a day., Disp: , Rfl:     montelukast (SINGULAIR) 10 mg tablet, Take 10 mg by mouth daily. , Disp: , Rfl:     NEOMY SULF/POLYMYX B SULF/HC (CORTISPORIN OT), by Otic route as needed. 1 to 2 drops in each ear as needed. , Disp: , Rfl:     MULTIVITAMIN WITH MINERALS (MULTIVITAMIN & MINERAL FORMULA PO), Take 1 Tab by mouth daily. Multivitamin with D with Calcium , Disp: , Rfl:     metaxalone (SKELAXIN) 800 mg tablet, Take 1 Tab by mouth three (3) times daily as needed for Pain. (Patient taking differently: Take 800 mg by mouth two (2) times a day.), Disp: 1 Tab, Rfl: 0    OMEPRAZOLE PO, 20 mg daily. Pt. Instructed to take morning of surgery per anesthesiologist.  , Disp: , Rfl:     CYANOCOBALAMIN (VITAMIN B-12 PO), Take 500 mg by mouth daily. , Disp: , Rfl:     LISINOPRIL-HYDROCHLOROTHIAZIDE 10-12.5 mg per tablet, take 1 Tab by mouth daily. , Disp: , Rfl:     LASIX 20 mg Tab, Take 20 mg by mouth daily as needed. , Disp: , Rfl:     GLYBURIDE-METFORMIN 2.5-500 mg per tablet, take 1 Tab by mouth two (2) times a day., Disp: , Rfl:     SYNTHROID 100 mcg Tab, Take 100 mcg by mouth daily. Pt. Instructed to take morning of surgery per anesthesiologist.  , Disp: , Rfl:     PATANOL 0.1 % Drop, 2 Drops by Both Eyes route as needed for Allergies. , Disp: , Rfl:     AMBIEN 10 mg Tab, Take 10 mg by mouth nightly., Disp: , Rfl:     LIBRAX, WITH CLINIDIUM, 2.5-5 mg Cap, Take 1 Cap by mouth four (4) times daily as needed. , Disp: , Rfl:    Date Last Reviewed:  6/5/2018   EXAMINATION: Observation/Orthostatic Postural Assessment:          Morbid obesity  Palpation:          Pitting edema in B ankle  Circumferential measurements:  30 cm R/L (above malleolus)  ROM:  BUE/ cervical WNL for patient                     Date:  04/04/18 Date:  04/27/18  Grossly WFL Date:  5/14/18  same   Trunk ROM 25 % limitation                          LE ROM Left Right       Hip Flexion [facility]       Hip Abduction [facility]       Straight Leg Raise (SLR) N/T N/T                Knee Flexion 125 125       Knee Extension 0 0                Ankle Dorsiflexion [facility] WFL         Strength:     Date:  04/04/18 Date:  04/27/18  Grossly 4- to 4/5 Date:  5/14/18   LE MMT Left Right Left Right Left Right   Hip Flex (L1/L2) 4/5 4-/5  -4/5 4/5 4/5   Hip Abd (glut med) 4/5 4/5   4/5 4/5   Hip Ext 4-/5 4-/5  -4/5     Quad    ( L3/4) 4/5 4-/5  4/5 4+/5 4+/5   Hamstring 4/5 4-/5  4/5 4+/5 4+/5   Anterior Tib (L4/L5)         Gastroc (S1/2)         EHL (L5)                             Special Tests:   deferred  Neurological Screen:        Sensation: Intact for light touch  Functional Mobility:         Gait/Ambulation:  Amb without A.D with increased lateral wt shift BLE lumbering gait pattern        Transfers:  Sit to stand to sit with use of BUE for support        Bed Mobility:  independent  Balance:          Single Leg Stance:  Unable to assume single leg stance. Good static standing balance   Body Structures Involved:  1. Nerves  2. Bones  3. Joints  4. Muscles  5. Ligaments Body Functions Affected:  1. Sensory/Pain  2. Neuromusculoskeletal  3. Movement Related Activities and Participation Affected:  1. General Tasks and Demands  2. Mobility  3. Self Care  4. Domestic Life  5. Community, Social and Civic Life   CLINICAL DECISION MAKING:   Outcome Measure:    Tool Used: Lower Extremity Functional Scale (LEFS)  Score:  Initial: 40/80 Recent: 42/80 (Date: 04/27/18 ) Most Recent: 46/80 (Date: 5/14/18)   Interpretation of Score: 20 questions each scored on a 5 point scale with 0 representing \"extreme difficulty or unable to perform\" and 4 representing \"no difficulty\". The lower the score, the greater the functional disability. 80/80 represents no disability. Minimal detectable change is 9 points. Score 80 79-63 62-48 47-32 31-16 15-1 0   Modifier CH CI CJ CK CL CM CN     ? Mobility - Walking and Moving Around:     - CURRENT STATUS: CK - 40%-59% impaired, limited or restricted    - GOAL STATUS: CJ - 20%-39% impaired, limited or restricted    - D/C STATUS:  ---------------To be determined---------------    Medical Necessity:   · Patient is expected to demonstrate progress in strength, range of motion and balance to increase independence with functional mobility with decreased pain. Reason for Services/Other Comments:  · Patient continues to require modification of therapeutic interventions to increase complexity of exercises. TREATMENT:   (In addition to Assessment/Re-Assessment sessions the following treatments were rendered)    Therapeutic Exercise: (see flow sheet below for minutes) :  Exercises per grid below to improve mobility, strength and balance. Required minimal visual and verbal cues to promote proper body alignment and promote proper body mechanics. Progressed resistance and range as indicated. Aquatic Therapy (see flow sheet below for minutes): Aquatic treatment performed per flow grid for Decreased muscle strength, Decreased static/dynamic balance and reactive control, Decreased range of motion, Decreased activity endurance and Ease of movement. Cues provided for technique. Assistance by therapist provided for progression of exercises/ activities. Patient has difficulty with low back/ R hip pain.      Date 5/1/18 5/3/18 5/7/18 5/9/18 5/11/18 5/14/18 5/17/18 5/21/18 5/23/18 5/31/18 6/5/18   Modalities:                                                        Manual Therapy: Aquatic Exercise: 3.75# 60 min 3.75# 55 min 3.75# 60 min 3.75# 60 min 3.75# 60 min  3.75# 60 min  3.75# 60 min     Gait F/B/S/M x4L c open paddles x4L c closed paddles F/B    X2L c closed paddles S/M x4L c closed paddles x4L c helmets same  X4L c helmets - side squat  x4L c helmets - side squat     Heel/Toe walk    x2L          3-way  x20 BLE            PF/DF   x10 BLE  x10 single leg x15 BLE    X15 single leg x20 BLE    x15 single leg  x20 BLE  x15 single  x10 BLE  x15 single leg     Hamstring Curls x4L             Hip Flexion with knee ext.   (rockette) x4L x4L x20 BLE x20 BLE x20 BLE  x20 BLE  x20 BLE     Squats     x20 c helmets lat pull down x20 c helmets c lat pull down x20 c helmets c lat pull down  x20 c helmets  x20 c helmet lat pull down    x15 single leg     SLR march x4L c open paddles x2L c closed paddles x4L c closed paddles x4L c helmets x4L  x4L c helmets  x4L c helmets     Lunges x10 BLE x15 BLE x2L x4L x4L  x4L  x4L     Hip circles              Hip horizontal abduction/adduction  x20 clamshell            Core: row    x20 c helmets x20 c hlemets  x20 c helmets  x20 c helmets     Core: tray x4L push/pull x4L push/pull    x2L R/L x4L R/L           Core:flex/ext UE in mini squat       x15 c helmets alternating wide tandem stance  x20 c helemts       Core: horz. abd/add UE in modified tandem stance x20 c closed paddles  x20 c helmets x20 c helmets x20 c helmets in mini squat  x20 c helmets  x20 c helmets     Deep well bike 3 min 3 min 3 min 3 min 2 min  2 min  2 min     Deep well jumping nav 1 min 1 min 1 min 1 min 2 min  2 min  2 min     Deep well scissors 1 min 1 min 1 min 1 min 2 min  2 min  2 min     Deep well:  traction 8 min 8 min 8 min 8 min 8 min  8 min  8 min                   Hamstring Stretch 2H30 BLE 2H30 BLE 2H30 BLE 2H30 BLE   2H30 BLE  2H30 BLE     Step Lunge              Piriformis stretch              PF Stretch              Balance: Single leg Stance  H30 BLE     H60 BLE Balance: Tandem       x2L  x4L                                   Therapeutic Exercise:      45 min  40 min  30 min 45 min   Hamstring stretch supine      2H30 BLE HEP        SKTC        Supine 2H20   Supine 2H20   SLR      2x10 BLE HEP  2x8 LLE 2#  2x5 RLE 2#  2x8 RLE 2#  2x10 LLE 2# 2x10 BLE 2# min assist c RLE   Pelvic tilt        x15 H5      Seated lateral step over      x10 BLE  2x10 BLE      Hip abd        Standing x10 BLE   Standing 2x10 2#   Clamshell      Hooklying c green TB x15 HEP    Seated green TB x20    Hip add      x15 H5 HEP    Seated yellow ball squeeze x20H5    bridge        x15  2x15 x15    x15 H5 c ball squeeze   Heel toe raises        x15      LAQ        2x10 2#  2x10 3# (increase to 4# next visit) 2x15 4#   Hamstring curls        2x10 green TB BLE  Standing 2x10 2# Standing 2x10 2#   Hip extension              Sit to stand      x10        Squat        Mini squat x10   x15   Lung           Mini lung x10 BLE   Step up              Forward weight shifts      X15 BLE at handrail HEP  x15 BLE      Lateral weight shifts      With ipsilateral arm reach x15 BLE at handrail HEP  x15 BLE      Slantboard              Side stepping          20ft R/L 20ft R/L   Diagonal stepping          20ft x2    Balance: Airex          Lateral weight shift x10    March 1 min Step up x10 BLE SBA/CGA    Tandem H30 BLE SBA   F=forward  B=Backward  S=sidesteps  M=marches    H=hold    Manual: (see flow sheet above for minutes)   Modalities: (see flow sheet above for minutes)     HEP: Plan to instruct in HEP as therapy progresses. Land HEP pictorial and written instructions 5/14/18. Discontinuation Summary:  Plan to discontinue physical therapy service due to illness. No further skilled therapy services at present time.           Tennille Tejeda, PT

## 2019-02-25 ENCOUNTER — HOSPITAL ENCOUNTER (OUTPATIENT)
Dept: MRI IMAGING | Age: 74
Discharge: HOME | End: 2019-02-25
Attending: PHYSICAL MEDICINE & REHABILITATION
Payer: MEDICARE

## 2019-02-25 DIAGNOSIS — M54.12 CERVICAL RADICULOPATHY: ICD-10-CM

## 2019-02-25 PROCEDURE — 72141 MRI NECK SPINE W/O DYE: CPT

## 2019-03-11 ENCOUNTER — HOSPITAL ENCOUNTER (OUTPATIENT)
Dept: LAB | Age: 74
Discharge: HOME | End: 2019-03-11

## 2019-03-11 PROCEDURE — 88305 TISSUE EXAM BY PATHOLOGIST: CPT

## 2019-07-10 ENCOUNTER — APPOINTMENT (RX ONLY)
Dept: URBAN - METROPOLITAN AREA CLINIC 23 | Facility: CLINIC | Age: 74
Setting detail: DERMATOLOGY
End: 2019-07-10

## 2019-07-10 DIAGNOSIS — D18.0 HEMANGIOMA: ICD-10-CM

## 2019-07-10 DIAGNOSIS — L82.1 OTHER SEBORRHEIC KERATOSIS: ICD-10-CM

## 2019-07-10 DIAGNOSIS — D22 MELANOCYTIC NEVI: ICD-10-CM

## 2019-07-10 DIAGNOSIS — L85.8 OTHER SPECIFIED EPIDERMAL THICKENING: ICD-10-CM

## 2019-07-10 DIAGNOSIS — Q819 OTHER SPECIFIED ANOMALIES OF SKIN: ICD-10-CM

## 2019-07-10 DIAGNOSIS — Q828 OTHER SPECIFIED ANOMALIES OF SKIN: ICD-10-CM

## 2019-07-10 DIAGNOSIS — Q826 OTHER SPECIFIED ANOMALIES OF SKIN: ICD-10-CM

## 2019-07-10 PROBLEM — D18.01 HEMANGIOMA OF SKIN AND SUBCUTANEOUS TISSUE: Status: ACTIVE | Noted: 2019-07-10

## 2019-07-10 PROBLEM — D22.61 MELANOCYTIC NEVI OF RIGHT UPPER LIMB, INCLUDING SHOULDER: Status: ACTIVE | Noted: 2019-07-10

## 2019-07-10 PROBLEM — E13.9 OTHER SPECIFIED DIABETES MELLITUS WITHOUT COMPLICATIONS: Status: ACTIVE | Noted: 2019-07-10

## 2019-07-10 PROBLEM — D23.62 OTHER BENIGN NEOPLASM OF SKIN OF LEFT UPPER LIMB, INCLUDING SHOULDER: Status: ACTIVE | Noted: 2019-07-10

## 2019-07-10 PROBLEM — D22.5 MELANOCYTIC NEVI OF TRUNK: Status: ACTIVE | Noted: 2019-07-10

## 2019-07-10 PROBLEM — Q82.8 OTHER SPECIFIED CONGENITAL MALFORMATIONS OF SKIN: Status: ACTIVE | Noted: 2019-07-10

## 2019-07-10 PROCEDURE — 11300 SHAVE SKIN LESION 0.5 CM/<: CPT

## 2019-07-10 PROCEDURE — ? SHAVE REMOVAL

## 2019-07-10 PROCEDURE — 99214 OFFICE O/P EST MOD 30 MIN: CPT | Mod: 25

## 2019-07-10 PROCEDURE — ? TREATMENT REGIMEN

## 2019-07-10 PROCEDURE — 11300 SHAVE SKIN LESION 0.5 CM/<: CPT | Mod: 76

## 2019-07-10 PROCEDURE — ? LIQUID NITROGEN (COSMETIC)

## 2019-07-10 PROCEDURE — ? OTHER

## 2019-07-10 PROCEDURE — ? COUNSELING

## 2019-07-10 ASSESSMENT — LOCATION SIMPLE DESCRIPTION DERM
LOCATION SIMPLE: RIGHT HAND
LOCATION SIMPLE: RIGHT FOREARM
LOCATION SIMPLE: RIGHT UPPER BACK
LOCATION SIMPLE: LEFT POSTERIOR UPPER ARM
LOCATION SIMPLE: RIGHT SHOULDER
LOCATION SIMPLE: RIGHT POSTERIOR UPPER ARM
LOCATION SIMPLE: LEFT FOREARM
LOCATION SIMPLE: GROIN
LOCATION SIMPLE: ABDOMEN

## 2019-07-10 ASSESSMENT — LOCATION DETAILED DESCRIPTION DERM
LOCATION DETAILED: RIGHT DORSAL SMALL METACARPOPHALANGEAL JOINT
LOCATION DETAILED: LEFT PROXIMAL DORSAL FOREARM
LOCATION DETAILED: RIGHT POSTERIOR SHOULDER
LOCATION DETAILED: LEFT PROXIMAL POSTERIOR UPPER ARM
LOCATION DETAILED: PERIUMBILICAL SKIN
LOCATION DETAILED: RIGHT PROXIMAL LATERAL POSTERIOR UPPER ARM
LOCATION DETAILED: RIGHT PROXIMAL DORSAL FOREARM
LOCATION DETAILED: LEFT SUPRAPUBIC SKIN
LOCATION DETAILED: RIGHT INFERIOR MEDIAL UPPER BACK

## 2019-07-10 ASSESSMENT — LOCATION ZONE DERM
LOCATION ZONE: TRUNK
LOCATION ZONE: HAND
LOCATION ZONE: ARM

## 2019-07-10 NOTE — PROCEDURE: OTHER
Note Text (......Xxx Chief Complaint.): This diagnosis correlates with the
Other (Free Text): Vascular
Detail Level: Detailed

## 2019-07-10 NOTE — PROCEDURE: SHAVE REMOVAL
Biopsy Method: double edge Personna blade
Consent was obtained from the patient. The risks and benefits to therapy were discussed in detail. Specifically, the risks of infection, scarring, bleeding, prolonged wound healing, incomplete removal, allergy to anesthesia, nerve injury and recurrence were addressed. Prior to the procedure, the treatment site was clearly identified and confirmed by the patient. All components of Universal Protocol/PAUSE Rule completed.
X Size Of Lesion In Cm (Optional): 0
Path Notes (To The Dermatopathologist): Please check margins.
Body Location Override (Optional - Billing Will Still Be Based On Selected Body Map Location If Applicable): Superior periumbilical skin
Size Of Lesion In Cm (Required): 0.5
Was A Bandage Applied: Yes
Billing Type: Third-Party Bill
Add Variable For Additional Medical Justification: No
Medical Necessity Clause: This procedure was medically necessary because the lesion is
Hemostasis: Aluminum Chloride
Post-Care Instructions: I reviewed with the patient in detail post-care instructions. Patient is to keep the biopsy site dry overnight, and then apply bacitracin twice daily until healed. Patient may apply hydrogen peroxide soaks to remove any crusting.
Body Location Override (Optional - Billing Will Still Be Based On Selected Body Map Location If Applicable): Inferior periumbilical skin
Anesthesia Type: 1% lidocaine without epinephrine and a 1:10 solution of 8.4% sodium bicarbonate
Anesthesia Type: 1% lidocaine with 1:100,000 epinephrine and a 1:10 solution of 8.4% sodium bicarbonate
Accession #: PC
Detail Level: Detailed
Wound Care: Petrolatum
Notification Instructions: Patient will be notified of biopsy results. However, patient instructed to call the office if not contacted within 2 weeks.
Medical Necessity Information: It is in your best interest to select a reason for this procedure from the list below. All of these items fulfill various CMS LCD requirements except the new and changing color options.
Size Of Lesion In Cm (Required): 0.4

## 2019-09-18 ENCOUNTER — HOSPITAL ENCOUNTER (OUTPATIENT)
Dept: DIABETES SERVICES | Age: 74
Discharge: HOME | End: 2019-09-18
Payer: MEDICARE

## 2019-09-18 PROCEDURE — G0108 DIAB MANAGE TRN  PER INDIV: HCPCS

## 2019-09-18 NOTE — PROGRESS NOTES
Came for diabetes educational assessment today. Provided basic information on carbohydrates, proteins and fats. Educational need/plan: Will attend 2 nutrition/2 diabetes sessions to address the following: diabetes disease process, nutritional management, physical activity, using medications, preventing complications, psychosocial adjustment, goal setting, problem solving, monitoring, behavior change strategies. Patient with difficulty walking due to pain in back and hips. Reports arthritis. She reviewed medications from external referral  and confirmed with a check luzma bu the medications she takes. She did not bring her medications or a list of medications with her. Reports her printer at home is not working at the moment and could not print her list of medications.

## 2019-10-01 ENCOUNTER — HOSPITAL ENCOUNTER (OUTPATIENT)
Dept: DIABETES SERVICES | Age: 74
Discharge: HOME | End: 2019-10-01
Payer: MEDICARE

## 2019-10-01 PROCEDURE — G0109 DIAB MANAGE TRN IND/GROUP: HCPCS

## 2019-10-02 ENCOUNTER — HOSPITAL ENCOUNTER (OUTPATIENT)
Dept: DIABETES SERVICES | Age: 74
Discharge: HOME | End: 2019-10-02
Payer: MEDICARE

## 2019-10-02 PROCEDURE — G0109 DIAB MANAGE TRN IND/GROUP: HCPCS

## 2019-10-30 ENCOUNTER — HOSPITAL ENCOUNTER (OUTPATIENT)
Dept: DIABETES SERVICES | Age: 74
Discharge: HOME | End: 2019-10-30
Payer: MEDICARE

## 2019-10-30 PROCEDURE — G0109 DIAB MANAGE TRN IND/GROUP: HCPCS

## 2019-10-30 NOTE — PROGRESS NOTES
Attended nutrition diabetes #2 group session today. Topics included: plate method for portion control; fiber and sodium guidelines; sugar substitutes; alcohol; eating out; recipe modification; label reading. Participant's nutrition goal: To lower A1C to stay off insulin she will use the grocery guide to buy food lower in sugar and saturated fat and limit carb to 30 gram or less with meals and re-evaluate in 3 months. Participant's nutrition support plan: use grocery shopping guide and read food labels. Barriers identified by participant: taste of food and eating earlier in the evening. Voiced/demonstrated understanding of material covered. Anticipated adherence is fair. Plan for follow up is: attend diabetes class.

## 2019-11-05 ENCOUNTER — HOSPITAL ENCOUNTER (OUTPATIENT)
Dept: DIABETES SERVICES | Age: 74
Discharge: HOME | End: 2019-11-05
Payer: MEDICARE

## 2019-11-05 PROCEDURE — G0109 DIAB MANAGE TRN IND/GROUP: HCPCS

## 2019-11-05 NOTE — PROGRESS NOTES
Participant attended Diabetes #2 session today. Topics included: Prevention/detection/treatment of chronic complications; sleep apnea; Developing strategies to promote health/change behavior/recommended screenings; Developing strategies to address psychosocial issues; Goal setting. Participants goal/support plan includes Diabetes Goal:  To lower Hgb A1C/ keep B/P in normal range. I will join gym to exercise regularly three times a week for thirty minutes at [facility] form now on and increase as tolerated. Diabetes Plan:  Get up early to exercise. . Problems/barriers may be:none anticipated   Plan for follow up/Recommendations: mail follow up survey at 6 months and one year.

## 2019-12-19 ENCOUNTER — HOSPITAL ENCOUNTER (OUTPATIENT)
Dept: DIABETES SERVICES | Age: 74
Discharge: HOME | End: 2019-12-19

## 2020-02-12 NOTE — PROGRESS NOTES
Cataract(s) are not yet visually significant to the patient. Recommend monitoring, and patient agrees with this plan. Patient request for pain medication. See MAR for medication administration.

## 2021-02-22 ENCOUNTER — APPOINTMENT (RX ONLY)
Dept: URBAN - METROPOLITAN AREA CLINIC 23 | Facility: CLINIC | Age: 76
Setting detail: DERMATOLOGY
End: 2021-02-22

## 2021-02-22 VITALS — TEMPERATURE: 97.8 F

## 2021-02-22 DIAGNOSIS — Z87.2 PERSONAL HISTORY OF DISEASES OF THE SKIN AND SUBCUTANEOUS TISSUE: ICD-10-CM

## 2021-02-22 DIAGNOSIS — L72.8 OTHER FOLLICULAR CYSTS OF THE SKIN AND SUBCUTANEOUS TISSUE: ICD-10-CM

## 2021-02-22 DIAGNOSIS — L91.0 HYPERTROPHIC SCAR: ICD-10-CM

## 2021-02-22 DIAGNOSIS — Z00.00 ENCOUNTER FOR GENERAL ADULT MEDICAL EXAMINATION WITHOUT ABNORMAL FINDINGS: ICD-10-CM

## 2021-02-22 DIAGNOSIS — L89 PRESSURE ULCER: ICD-10-CM | Status: WELL CONTROLLED

## 2021-02-22 PROBLEM — M12.9 ARTHROPATHY, UNSPECIFIED: Status: ACTIVE | Noted: 2021-02-22

## 2021-02-22 PROBLEM — L89.319 PRESSURE ULCER OF RIGHT BUTTOCK, UNSPECIFIED STAGE: Status: ACTIVE | Noted: 2021-02-22

## 2021-02-22 PROBLEM — Z71.1 PERSON WITH FEARED HEALTH COMPLAINT IN WHOM NO DIAGNOSIS IS MADE: Status: ACTIVE | Noted: 2021-02-22

## 2021-02-22 PROCEDURE — ? COUNSELING

## 2021-02-22 PROCEDURE — 99212 OFFICE O/P EST SF 10 MIN: CPT

## 2021-02-22 PROCEDURE — ? OTHER

## 2021-02-22 ASSESSMENT — LOCATION ZONE DERM
LOCATION ZONE: VULVA
LOCATION ZONE: SCALP
LOCATION ZONE: TRUNK

## 2021-02-22 ASSESSMENT — LOCATION SIMPLE DESCRIPTION DERM
LOCATION SIMPLE: ABDOMEN
LOCATION SIMPLE: VULVA
LOCATION SIMPLE: RIGHT BUTTOCK
LOCATION SIMPLE: SCALP

## 2021-02-22 ASSESSMENT — LOCATION DETAILED DESCRIPTION DERM
LOCATION DETAILED: PERIUMBILICAL SKIN
LOCATION DETAILED: RIGHT CENTRAL PARIETAL SCALP
LOCATION DETAILED: RIGHT BUTTOCK
LOCATION DETAILED: RIGHT LABIA MAJORA

## 2021-02-22 NOTE — PROCEDURE: OTHER
Other (Free Text): She was being managed by the wound center.
Render Risk Assessment In Note?: no
Detail Level: Simple
Note Text (......Xxx Chief Complaint.): This diagnosis correlates with the

## 2021-02-22 NOTE — HPI: EVALUATION OF SKIN LESION(S)
What Type Of Note Output Would You Prefer (Optional)?: Standard Output
Hpi Title: Evaluation of a Skin Lesion
How Severe Are Your Spot(S)?: moderate
Have Your Spot(S) Been Treated In The Past?: has not been treated
Additional History: Patient was being followed by a wound clinic.

## 2021-03-20 ENCOUNTER — HOSPITAL ENCOUNTER (INPATIENT)
Age: 76
LOS: 1 days | Discharge: HOME | DRG: 690 | End: 2021-03-23
Attending: EMERGENCY MEDICINE | Admitting: INTERNAL MEDICINE
Payer: MEDICARE

## 2021-03-20 ENCOUNTER — APPOINTMENT (OUTPATIENT)
Dept: GENERAL RADIOLOGY | Age: 76
DRG: 690 | End: 2021-03-20
Attending: EMERGENCY MEDICINE
Payer: MEDICARE

## 2021-03-20 DIAGNOSIS — N39.0 URINARY TRACT INFECTION WITHOUT HEMATURIA, SITE UNSPECIFIED: Primary | ICD-10-CM

## 2021-03-20 DIAGNOSIS — R53.1 WEAKNESS: ICD-10-CM

## 2021-03-20 PROBLEM — E11.65 UNCONTROLLED DIABETES MELLITUS WITH HYPERGLYCEMIA (HCC): Status: ACTIVE | Noted: 2021-03-20

## 2021-03-20 LAB
ALBUMIN SERPL-MCNC: 3.7 G/DL (ref 3.2–4.6)
ALBUMIN/GLOB SERPL: 1 {RATIO} (ref 1.2–3.5)
ALP SERPL-CCNC: 113 U/L (ref 50–130)
ALT SERPL-CCNC: 30 U/L (ref 12–65)
ANION GAP SERPL CALC-SCNC: 13 MMOL/L (ref 7–16)
AST SERPL-CCNC: 19 U/L (ref 15–37)
BACTERIA URNS QL MICRO: ABNORMAL /HPF
BASOPHILS # BLD: 0 K/UL (ref 0–0.2)
BASOPHILS NFR BLD: 0 % (ref 0–2)
BILIRUB SERPL-MCNC: 0.6 MG/DL (ref 0.2–1.1)
BUN SERPL-MCNC: 18 MG/DL (ref 8–23)
CALCIUM SERPL-MCNC: 9.6 MG/DL (ref 8.3–10.4)
CASTS URNS QL MICRO: 0 /LPF
CHLORIDE SERPL-SCNC: 97 MMOL/L (ref 98–107)
CO2 SERPL-SCNC: 28 MMOL/L (ref 21–32)
CREAT SERPL-MCNC: 1.08 MG/DL (ref 0.6–1)
DIFFERENTIAL METHOD BLD: ABNORMAL
EOSINOPHIL # BLD: 0 K/UL (ref 0–0.8)
EOSINOPHIL NFR BLD: 0 % (ref 0.5–7.8)
EPI CELLS #/AREA URNS HPF: ABNORMAL /HPF
ERYTHROCYTE [DISTWIDTH] IN BLOOD BY AUTOMATED COUNT: 13.2 % (ref 11.9–14.6)
EST. AVERAGE GLUCOSE BLD GHB EST-MCNC: 329 MG/DL
GLOBULIN SER CALC-MCNC: 3.8 G/DL (ref 2.3–3.5)
GLUCOSE BLD STRIP.AUTO-MCNC: 256 MG/DL (ref 65–100)
GLUCOSE BLD STRIP.AUTO-MCNC: 317 MG/DL (ref 65–100)
GLUCOSE SERPL-MCNC: 400 MG/DL (ref 65–100)
HBA1C MFR BLD: 13.1 % (ref 4.2–6.3)
HCT VFR BLD AUTO: 46.9 % (ref 35.8–46.3)
HGB BLD-MCNC: 15.5 G/DL (ref 11.7–15.4)
IMM GRANULOCYTES # BLD AUTO: 0 K/UL (ref 0–0.5)
IMM GRANULOCYTES NFR BLD AUTO: 0 % (ref 0–5)
LYMPHOCYTES # BLD: 1.7 K/UL (ref 0.5–4.6)
LYMPHOCYTES NFR BLD: 16 % (ref 13–44)
MCH RBC QN AUTO: 28.6 PG (ref 26.1–32.9)
MCHC RBC AUTO-ENTMCNC: 33 G/DL (ref 31.4–35)
MCV RBC AUTO: 86.5 FL (ref 79.6–97.8)
MONOCYTES # BLD: 0.6 K/UL (ref 0.1–1.3)
MONOCYTES NFR BLD: 5 % (ref 4–12)
NEUTS SEG # BLD: 8 K/UL (ref 1.7–8.2)
NEUTS SEG NFR BLD: 78 % (ref 43–78)
NRBC # BLD: 0 K/UL (ref 0–0.2)
PLATELET # BLD AUTO: 235 K/UL (ref 150–450)
PMV BLD AUTO: 11.7 FL (ref 9.4–12.3)
POTASSIUM SERPL-SCNC: 3.4 MMOL/L (ref 3.5–5.1)
PROT SERPL-MCNC: 7.5 G/DL (ref 6.3–8.2)
RBC # BLD AUTO: 5.42 M/UL (ref 4.05–5.2)
RBC #/AREA URNS HPF: ABNORMAL /HPF
SERVICE CMNT-IMP: ABNORMAL
SERVICE CMNT-IMP: ABNORMAL
SODIUM SERPL-SCNC: 138 MMOL/L (ref 136–145)
WBC # BLD AUTO: 10.3 K/UL (ref 4.3–11.1)
WBC URNS QL MICRO: ABNORMAL /HPF

## 2021-03-20 PROCEDURE — 94760 N-INVAS EAR/PLS OXIMETRY 1: CPT

## 2021-03-20 PROCEDURE — 96374 THER/PROPH/DIAG INJ IV PUSH: CPT

## 2021-03-20 PROCEDURE — 82962 GLUCOSE BLOOD TEST: CPT

## 2021-03-20 PROCEDURE — 96375 TX/PRO/DX INJ NEW DRUG ADDON: CPT

## 2021-03-20 PROCEDURE — 2709999900 HC NON-CHARGEABLE SUPPLY

## 2021-03-20 PROCEDURE — 74011636637 HC RX REV CODE- 636/637: Performed by: FAMILY MEDICINE

## 2021-03-20 PROCEDURE — 94660 CPAP INITIATION&MGMT: CPT

## 2021-03-20 PROCEDURE — 96372 THER/PROPH/DIAG INJ SC/IM: CPT

## 2021-03-20 PROCEDURE — 74011250636 HC RX REV CODE- 250/636: Performed by: HOSPITALIST

## 2021-03-20 PROCEDURE — 74011636637 HC RX REV CODE- 636/637: Performed by: EMERGENCY MEDICINE

## 2021-03-20 PROCEDURE — 83036 HEMOGLOBIN GLYCOSYLATED A1C: CPT

## 2021-03-20 PROCEDURE — 81015 MICROSCOPIC EXAM OF URINE: CPT

## 2021-03-20 PROCEDURE — 71046 X-RAY EXAM CHEST 2 VIEWS: CPT

## 2021-03-20 PROCEDURE — 74011000258 HC RX REV CODE- 258: Performed by: HOSPITALIST

## 2021-03-20 PROCEDURE — 74011250637 HC RX REV CODE- 250/637: Performed by: HOSPITALIST

## 2021-03-20 PROCEDURE — 81003 URINALYSIS AUTO W/O SCOPE: CPT

## 2021-03-20 PROCEDURE — 99284 EMERGENCY DEPT VISIT MOD MDM: CPT

## 2021-03-20 PROCEDURE — 99218 HC RM OBSERVATION: CPT

## 2021-03-20 PROCEDURE — 51701 INSERT BLADDER CATHETER: CPT

## 2021-03-20 PROCEDURE — 74011250636 HC RX REV CODE- 250/636: Performed by: EMERGENCY MEDICINE

## 2021-03-20 PROCEDURE — 80053 COMPREHEN METABOLIC PANEL: CPT

## 2021-03-20 PROCEDURE — 96361 HYDRATE IV INFUSION ADD-ON: CPT

## 2021-03-20 PROCEDURE — 77030013032 HC MSK BPAP/CPAP FISP -B

## 2021-03-20 PROCEDURE — 85025 COMPLETE CBC W/AUTO DIFF WBC: CPT

## 2021-03-20 RX ORDER — FLUTICASONE PROPIONATE 50 MCG
2 SPRAY, SUSPENSION (ML) NASAL
Status: DISCONTINUED | OUTPATIENT
Start: 2021-03-20 | End: 2021-03-23 | Stop reason: HOSPADM

## 2021-03-20 RX ORDER — POLYETHYLENE GLYCOL 3350 17 G/17G
17 POWDER, FOR SOLUTION ORAL DAILY PRN
Status: DISCONTINUED | OUTPATIENT
Start: 2021-03-20 | End: 2021-03-23 | Stop reason: HOSPADM

## 2021-03-20 RX ORDER — INSULIN LISPRO 100 [IU]/ML
INJECTION, SOLUTION INTRAVENOUS; SUBCUTANEOUS
Status: DISCONTINUED | OUTPATIENT
Start: 2021-03-20 | End: 2021-03-21

## 2021-03-20 RX ORDER — INSULIN LISPRO 100 [IU]/ML
INJECTION, SOLUTION INTRAVENOUS; SUBCUTANEOUS
Status: DISCONTINUED | OUTPATIENT
Start: 2021-03-21 | End: 2021-03-20

## 2021-03-20 RX ORDER — SODIUM CHLORIDE 0.9 % (FLUSH) 0.9 %
5-40 SYRINGE (ML) INJECTION EVERY 8 HOURS
Status: DISCONTINUED | OUTPATIENT
Start: 2021-03-20 | End: 2021-03-23 | Stop reason: HOSPADM

## 2021-03-20 RX ORDER — ENOXAPARIN SODIUM 100 MG/ML
40 INJECTION SUBCUTANEOUS EVERY 12 HOURS
Status: DISCONTINUED | OUTPATIENT
Start: 2021-03-20 | End: 2021-03-23 | Stop reason: HOSPADM

## 2021-03-20 RX ORDER — BUDESONIDE AND FORMOTEROL FUMARATE DIHYDRATE 160; 4.5 UG/1; UG/1
1 AEROSOL RESPIRATORY (INHALATION)
Status: DISCONTINUED | OUTPATIENT
Start: 2021-03-20 | End: 2021-03-20

## 2021-03-20 RX ORDER — DEXTROSE 50 % IN WATER (D50W) INTRAVENOUS SYRINGE
25-50 AS NEEDED
Status: DISCONTINUED | OUTPATIENT
Start: 2021-03-20 | End: 2021-03-23 | Stop reason: HOSPADM

## 2021-03-20 RX ORDER — ACETAMINOPHEN 650 MG/1
650 SUPPOSITORY RECTAL
Status: DISCONTINUED | OUTPATIENT
Start: 2021-03-20 | End: 2021-03-23 | Stop reason: HOSPADM

## 2021-03-20 RX ORDER — OXYBUTYNIN CHLORIDE 5 MG/1
5 TABLET, EXTENDED RELEASE ORAL DAILY
Status: DISCONTINUED | OUTPATIENT
Start: 2021-03-21 | End: 2021-03-23 | Stop reason: HOSPADM

## 2021-03-20 RX ORDER — CELECOXIB 200 MG/1
200 CAPSULE ORAL DAILY
Status: DISCONTINUED | OUTPATIENT
Start: 2021-03-21 | End: 2021-03-23 | Stop reason: HOSPADM

## 2021-03-20 RX ORDER — METFORMIN HYDROCHLORIDE 500 MG/1
1000 TABLET ORAL 2 TIMES DAILY WITH MEALS
Status: DISCONTINUED | OUTPATIENT
Start: 2021-03-20 | End: 2021-03-23 | Stop reason: HOSPADM

## 2021-03-20 RX ORDER — ONDANSETRON 2 MG/ML
4 INJECTION INTRAMUSCULAR; INTRAVENOUS
Status: DISCONTINUED | OUTPATIENT
Start: 2021-03-20 | End: 2021-03-23 | Stop reason: HOSPADM

## 2021-03-20 RX ORDER — HYDRALAZINE HYDROCHLORIDE 20 MG/ML
20 INJECTION INTRAMUSCULAR; INTRAVENOUS
Status: DISCONTINUED | OUTPATIENT
Start: 2021-03-20 | End: 2021-03-23 | Stop reason: HOSPADM

## 2021-03-20 RX ORDER — ACETAMINOPHEN 325 MG/1
650 TABLET ORAL
Status: DISCONTINUED | OUTPATIENT
Start: 2021-03-20 | End: 2021-03-23 | Stop reason: HOSPADM

## 2021-03-20 RX ORDER — MONTELUKAST SODIUM 10 MG/1
10 TABLET ORAL DAILY
Status: DISCONTINUED | OUTPATIENT
Start: 2021-03-21 | End: 2021-03-23 | Stop reason: HOSPADM

## 2021-03-20 RX ORDER — HYDRALAZINE HYDROCHLORIDE 20 MG/ML
20 INJECTION INTRAMUSCULAR; INTRAVENOUS ONCE
Status: DISCONTINUED | OUTPATIENT
Start: 2021-03-20 | End: 2021-03-20

## 2021-03-20 RX ORDER — LISINOPRIL 5 MG/1
10 TABLET ORAL DAILY
Status: DISCONTINUED | OUTPATIENT
Start: 2021-03-21 | End: 2021-03-23 | Stop reason: HOSPADM

## 2021-03-20 RX ORDER — AMLODIPINE BESYLATE 5 MG/1
5 TABLET ORAL DAILY
Status: DISCONTINUED | OUTPATIENT
Start: 2021-03-21 | End: 2021-03-23 | Stop reason: HOSPADM

## 2021-03-20 RX ORDER — PROMETHAZINE HYDROCHLORIDE 25 MG/1
12.5 TABLET ORAL
Status: DISCONTINUED | OUTPATIENT
Start: 2021-03-20 | End: 2021-03-23 | Stop reason: HOSPADM

## 2021-03-20 RX ORDER — PREGABALIN 150 MG/1
150 CAPSULE ORAL 2 TIMES DAILY
Status: DISCONTINUED | OUTPATIENT
Start: 2021-03-20 | End: 2021-03-23 | Stop reason: HOSPADM

## 2021-03-20 RX ORDER — SODIUM CHLORIDE 0.9 % (FLUSH) 0.9 %
5-40 SYRINGE (ML) INJECTION AS NEEDED
Status: DISCONTINUED | OUTPATIENT
Start: 2021-03-20 | End: 2021-03-23 | Stop reason: HOSPADM

## 2021-03-20 RX ORDER — TRAMADOL HYDROCHLORIDE 50 MG/1
50 TABLET ORAL
Status: DISCONTINUED | OUTPATIENT
Start: 2021-03-20 | End: 2021-03-23 | Stop reason: HOSPADM

## 2021-03-20 RX ORDER — ZOLPIDEM TARTRATE 5 MG/1
5 TABLET ORAL
Status: DISCONTINUED | OUTPATIENT
Start: 2021-03-20 | End: 2021-03-23 | Stop reason: HOSPADM

## 2021-03-20 RX ORDER — DEXTROSE 40 %
15 GEL (GRAM) ORAL AS NEEDED
Status: DISCONTINUED | OUTPATIENT
Start: 2021-03-20 | End: 2021-03-23 | Stop reason: HOSPADM

## 2021-03-20 RX ORDER — L. ACIDOPHILUS/L.BULGARICUS 100MM CELL
1 GRANULES IN PACKET (EA) ORAL DAILY
Status: DISCONTINUED | OUTPATIENT
Start: 2021-03-21 | End: 2021-03-23 | Stop reason: HOSPADM

## 2021-03-20 RX ORDER — LEVOTHYROXINE SODIUM 100 UG/1
100 TABLET ORAL
Status: DISCONTINUED | OUTPATIENT
Start: 2021-03-21 | End: 2021-03-23 | Stop reason: HOSPADM

## 2021-03-20 RX ADMIN — Medication 5 ML: at 22:00

## 2021-03-20 RX ADMIN — POTASSIUM CHLORIDE: 2 INJECTION, SOLUTION, CONCENTRATE INTRAVENOUS at 19:00

## 2021-03-20 RX ADMIN — INSULIN HUMAN 8 UNITS: 100 INJECTION, SOLUTION PARENTERAL at 14:30

## 2021-03-20 RX ADMIN — METFORMIN HYDROCHLORIDE 1000 MG: 500 TABLET ORAL at 18:45

## 2021-03-20 RX ADMIN — CEFTRIAXONE 1 G: 1 INJECTION, POWDER, FOR SOLUTION INTRAMUSCULAR; INTRAVENOUS at 18:45

## 2021-03-20 RX ADMIN — PREGABALIN 150 MG: 150 CAPSULE ORAL at 21:30

## 2021-03-20 RX ADMIN — ENOXAPARIN SODIUM 40 MG: 40 INJECTION, SOLUTION INTRAVENOUS; SUBCUTANEOUS at 21:30

## 2021-03-20 RX ADMIN — SODIUM CHLORIDE 1000 ML: 900 INJECTION, SOLUTION INTRAVENOUS at 14:31

## 2021-03-20 RX ADMIN — INSULIN LISPRO 4 UNITS: 100 INJECTION, SOLUTION INTRAVENOUS; SUBCUTANEOUS at 22:10

## 2021-03-20 RX ADMIN — ZOLPIDEM TARTRATE 5 MG: 5 TABLET ORAL at 22:16

## 2021-03-20 NOTE — ED NOTES
TRANSFER - OUT REPORT:    Verbal report given to [address] on [employer]  being transferred to 366 for routine progression of care       Report consisted of patients Situation, Background, Assessment and   Recommendations(SBAR). Information from the following report(s) ED Summary was reviewed with the receiving nurse. Lines:   Peripheral IV 03/20/21 Right Antecubital (Active)        Opportunity for questions and clarification was provided.       Patient transported with:   [employer]

## 2021-03-20 NOTE — PROGRESS NOTES
TRANSFER - IN REPORT:    Verbal report received from ELKE POWELL(name) on [employer]  being received from ER(unit) for routine progression of care      Report consisted of patients Situation, Background, Assessment and   Recommendations(SBAR). Information from the following report(s) SBAR, Kardex, ED Summary and Intake/Output was reviewed with the receiving nurse. Opportunity for questions and clarification was provided. Assessment completed upon patients arrival to unit and care assumed.

## 2021-03-20 NOTE — H&P
[facility]  History and Physical    Patient ID:  Divya Fernandes  female  1945  [id number]    Admission Date: 3/20/2021  Chief Complaint: Generalized weakness  Reason for Admission: Generalized weakness and fatigue, acute urinary tract infection, uncontrolled hyperglycemia electrolyte abnormality hypokalemia    ASSESSMENT & PLAN:  HPI: 62-year-old female with past medical history of diabetes mellitus type 2, hypertension, obesity and obstructive sleep apnea who had recently had her diabetic medications adjusted including her Metformin. she is on 1000 g of twice daily, in addition to Trulicity injections once a week. She has been having increased weakness malaise, and increased urinary frequency. Due to generally not feeling well she called EMS to her residence. In the emergency department a  Urinalysis showed 4+ bacteria, reflex culture collected. Also noted to have hyperglycemia with a serum blood glucose of 400, and hypokalemia. Due to her generalized weakness and fatigue, she believe that she cannot take care of herself at home and had no one to assist her, due to her frail state she potentially developed a right-sided pressure ulcer on the right buttock due to debility and deconditioning.       Acute UTI, hypokalemia, and generalized weakness  Start IV ceftriaxone, urinalysis reflex to culture  IV lactated Ringer's with potassium additive  Request physical therapy    Right pressure ulcer  Of the right buttock, present on admission    Diabetes mellitus type 2  Point-of-care glucose and insulin sliding scale  Consistent carbohydrate diet  Continue home meds antidiabetic medications including Metformin, glipizide    Obesity and obstructive sleep apnea  Request respiratory therapy assistance with CPAP placement  Resume home MDI or hospital formulary equivalent    Disposition: Observation  Diet: Consistent carbohydrate Lovenox subcutaneous  VTE ppx: Lovenox subcutaneous  GI ppx: Resume PPI prior to admission  CODE STATUS: Full  Surrogate decision-maker: Her Cousin         Allergies   Allergen Reactions    Codeine Other (comments)     dizziness    Erythromycin Rash    Tetracycline Rash       Prior to Admission Medications   Prescriptions Last Dose Informant Patient Reported? Taking? AMBIEN 10 mg Tab   Yes No   Sig: Take 10 mg by mouth nightly. Azelastine (ASTEPRO) 0.15 % (205.5 mcg) nasal spray   Yes No   Si Sprays by Both Nostrils route as needed. Biotin 2,500 mcg cap   Yes No   Sig: Take  by mouth. CYANOCOBALAMIN (VITAMIN B-12 PO)   Yes No   Sig: Take 500 mg by mouth daily. FLUTICASONE/SALMETEROL (ADVAIR DISKUS IN)   Yes No   Sig: Take 2 Puffs by inhalation two (2) times daily as needed. GLYBURIDE-METFORMIN 2.5-500 mg per tablet   Yes No   Sig: take 1 Tab by mouth two (2) times a day. HYDROmorphone (DILAUDID) 2 mg tablet   No No   Sig: Take 1 Tab by mouth every four (4) hours as needed. Max Daily Amount: 12 mg. LASIX 20 mg Tab   Yes No   Sig: Take 20 mg by mouth daily as needed. LIBRAX, WITH CLINIDIUM, 2.5-5 mg Cap   Yes No   Sig: Take 1 Cap by mouth four (4) times daily as needed. LISINOPRIL-HYDROCHLOROTHIAZIDE 10-12.5 mg per tablet   Yes No   Sig: take 1 Tab by mouth daily. Lactobacillus acidoph-L.bulgar (RUMA ASSIST) 100 million cell pwpk   Yes No   Sig: Take  by mouth. MULTIVITAMIN WITH MINERALS (MULTIVITAMIN & MINERAL FORMULA PO)   Yes No   Sig: Take 1 Tab by mouth daily. Multivitamin with D with Calcium    NEOMY SULF/POLYMYX B SULF/HC (CORTISPORIN OT)   Yes No   Sig: by Otic route as needed. 1 to 2 drops in each ear as needed. OMEPRAZOLE PO   Yes No   Si mg daily. Pt. Instructed to take morning of surgery per anesthesiologist.     OTHER   Yes No   Sig: three (3) times daily. Lidocaine patch 5%   OTHER   Yes No   Sig: Vein and leg support--- 1 tab po qd   PATANOL 0.1 % Drop   Yes No   Si Drops by Both Eyes route as needed for Allergies. PREGABALIN (LYRICA PO)   Yes No   Sig: Take 150 mg by mouth two (2) times a day. SITagliptin (JANUVIA) 100 mg tablet   Yes No   Sig: Take 100 mg by mouth daily. SYNTHROID 100 mcg Tab   Yes No   Sig: Take 100 mcg by mouth daily. Pt. Instructed to take morning of surgery per anesthesiologist.     amLODIPine (NORVASC) 5 mg tablet   Yes No   Sig: Take 5 mg by mouth daily. budesonide (PULMICORT) 0.5 mg/2 mL nbsp   Yes No   Si mcg by Nebulization route two (2) times a day. celecoxib (CELEBREX) 200 mg capsule   Yes No   Sig: Take 200 mg by mouth daily. 1 to 2 a day. ciprofloxacin HCl (CIPRO) 500 mg tablet   Yes No   Sig: Take  by mouth two (2) times a day. cpap machine kit   Yes No   Sig: by Does Not Apply route. 9 cm H2O   cranberry extract 450 mg tab tablet   Yes No   Sig: Take 450 mg by mouth.   estradiol (ESTRACE) 0.01 % (0.1 mg/gram) vaginal cream   Yes No   Sig: Insert 1 g into vagina daily. fluticasone (FLONASE ALLERGY RELIEF) 50 mcg/actuation nasal spray   Yes No   Si Sprays by Both Nostrils route as needed for Rhinitis. formoterol (PERFOROMIST) 20 mcg/2 mL nebu neb solution   Yes No   Si mcg by Nebulization route two (2) times a day. lidocaine-kinesiology tape 5 % kit   Yes No   Sig: by Apply Externally route. metaxalone (SKELAXIN) 800 mg tablet   No No   Sig: Take 1 Tab by mouth three (3) times daily as needed for Pain. Patient taking differently: Take 800 mg by mouth two (2) times a day. montelukast (SINGULAIR) 10 mg tablet   Yes No   Sig: Take 10 mg by mouth daily. neomycin-colist-hydrocortisone-thonzonium (CORTISPORIN-TC) otic suspension   Yes No   Sig: by Otic route four (4) times daily. neomycin-polymyxin-hydrocortisone, buffered, (PEDIOTIC) 3.5-10,000-1 mg/mL-unit/mL-% otic suspension   Yes No   Sig: Administer 2 Drops into each ear daily. oxybutynin chloride XL (DITROPAN XL) 5 mg CR tablet   Yes No   Sig: Take 5 mg by mouth daily.    polyethylene glycol (MIRALAX) 17 gram packet   Yes No   Sig: Take 17 g by mouth as needed. traMADol (ULTRAM) 50 mg tablet   Yes No   Sig: Take 50 mg by mouth every six (6) hours as needed for Pain.       Facility-Administered Medications: None       Past Medical History:   Diagnosis Date    Arthritis     Asthma     bronchio-asthma mostly seasonal    Chronic obstructive asthma, unspecified     Chronic pain     DDD, LOW BACK, SIATICA    DDD (degenerative disc disease)     WITH RADICULOPATHY    GERD (gastroesophageal reflux disease)     Hypertension     Hypothyroidism     HYPOTHYROIDISM     ON MEDS    Incontinence of urine     Morbid obesity ([address]     Neuropathy     ILYA (obstructive sleep apnea) 2009    Osteoarthritis of left knee 2009    Osteoarthritis of right knee 3/2/2011    S/P total knee replacement using cement 2009    Sleep apnea     HAS CPAP    Spastic colon     Status post right hip replacement 10/9/2017    Type II or unspecified type diabetes mellitus without mention of complication, not stated as uncontrolled     avg      Past Surgical History:   Procedure Laterality Date    HX HERNIA REPAIR      UMBILICAL WITH MESH    HX HYSTERECTOMY  2000    HX KNEE REPLACEMENT Right     [place] HAND/FINGER SURGERY UNLISTED Right     [place] TOTAL KNEE ARTHROPLASTY Left        Social History     Tobacco Use    Smoking status: Former Smoker     Packs/day: 1.00     Years: 15.00     Pack years: 15.00     Quit date: 1977     Years since quittin.2    Smokeless tobacco: Never Used    Tobacco comment: QUIT IN    Substance Use Topics    Alcohol use: Yes     Comment: pt states she will drink 1 or 2 drinks per month       FAMILY HISTORY:    Family History   Problem Relation Age of Onset    Cancer Mother     Diabetes Mother     Heart Disease Father     COPD Father     Cancer Father         lung    Diabetes Father     Cancer Brother     Diabetes Brother     Heart Disease Brother         CAD       REVIEW OF SYSTEMS:  A 14 point review of systems was taken and pertinent positive as per HPI.       PHYSICAL EXAM:    Visit Vitals  BP (!) 166/71   Pulse 88   Temp 97.7 °F (36.5 °C)   Resp 18   SpO2 95%       General: No acute distress, speaking in full sentences, no use of accessory muscles   HEENT: Pupils equal and reactive to light and accommodation, oropharynx is clear   Neck: Supple, no lymphadenopathy, no JVD   Lungs: Clear to auscultation bilaterally   Cardiovascular: Regular rate and rhythm with normal S1 and S2   Abdomen: Obese abdomen  Soft, nontender, nondistended, normoactive bowel sounds   Extremities: No cyanosis clubbing or edema   Neuro: Nonfocal, A&O x3   Psych: Normal mood and affect     Intake/Output last 3 shifts:        Labs:  CMP:   Lab Results   Component Value Date/Time     03/20/2021 01:06 PM    K 3.4 (L) 03/20/2021 01:06 PM    CL 97 (L) 03/20/2021 01:06 PM    CO2 28 03/20/2021 01:06 PM    AGAP 13 03/20/2021 01:06 PM     (H) 03/20/2021 01:06 PM    BUN 18 03/20/2021 01:06 PM    CREA 1.08 (H) 03/20/2021 01:06 PM    GFRAA >60 03/20/2021 01:06 PM    GFRNA 53 (L) 03/20/2021 01:06 PM    CA 9.6 03/20/2021 01:06 PM    ALB 3.7 03/20/2021 01:06 PM    TBILI 0.6 03/20/2021 01:06 PM    TP 7.5 03/20/2021 01:06 PM    GLOB 3.8 (H) 03/20/2021 01:06 PM    AGRAT 1.0 (L) 03/20/2021 01:06 PM    ALT 30 03/20/2021 01:06 PM         CBC:    Lab Results   Component Value Date/Time    WBC 10.3 03/20/2021 01:06 PM    HGB 15.5 (H) 03/20/2021 01:06 PM    HCT 46.9 (H) 03/20/2021 01:06 PM     03/20/2021 01:06 PM       Lab Results   Component Value Date/Time    INR 1.0 09/19/2017 10:35 AM    INR 0.9 03/05/2011 04:48 AM    INR 0.9 03/04/2011 04:45 AM    Prothrombin time 10.7 09/19/2017 10:35 AM    Prothrombin time 10.1 03/05/2011 04:48 AM    Prothrombin time 10.1 03/04/2011 04:45 AM       ABG:  No results found for: PH, PHI, PCO2, PCO2I, PO2, PO2I, HCO3, HCO3I, FIO2, FIO2I        No results found for: CPK, RCK1, RCK2, RCK3, RCK4, CKMB, CKNDX, CKND1, TROPT, TROIQ, BNPP, BNP      Imaging & Other Studies:    XR Results (maximum last 3): Results from [place] encounter on 03/20/21   XR CHEST PA LAT    Narrative EXAM: XR CHEST PA LAT    INDICATION: Weakness and fatigue    COMPARISON: None. FINDINGS: PA and lateral radiographs of the chest demonstrate clear lungs. The  cardiac and mediastinal contours and pulmonary vascularity are normal. The bones  and soft tissues are within normal limits. Impression Normal chest.   Results from Hospital Encounter encounter on 02/27/18   XR CHEST PA LAT    Narrative Chest X-ray    INDICATION:  Asthma, shortness of breath    PA and lateral views of the chest were obtained. FINDINGS: The lungs are clear. There are no infiltrates or effusions. The heart  size is normal.  There is stable mild cardiomegaly. Impression IMPRESSION: No acute findings in the chest     Results from Hospital Encounter encounter on 10/09/17   XR PELV AP ONLY    Narrative AP pelvis: 10/09/2017     HISTORY: Right hip arthroplasty    A single AP view of the pelvis was obtained centered over the hip joints with  exclusion of much of the iliac bones. An additional AP view of the right hip was  obtained. No prior studies are available for comparison. FINDINGS: The patient is status post right hip arthroplasty. Alignment is  grossly anatomic. No periprosthetic fracture is identified on this single AP  projection. Gas is present within the lateral soft tissues, not an unexpected  recent postoperative finding. Impression IMPRESSION: Status post right hip arthroplasty without complication. CT Results (maximum last 3):   Results from [place] encounter on 04/16/14   CT ABD PELV W CONT    Narrative CT ABDOMEN AND PELVIS WITH CONTRAST    HISTORY: 27-year-old female with left lower quadrant abdominal pain, history of  diabetes, irritable bowel syndrome, partial hysterectomy. COMPARISON: None available    TECHNIQUE: Oral and intravenous contrast administered. 125 cc of nonionic  contrast was injected, and axial helical CT images were obtained from above the  diaphragm through the pelvis. Coronal reformatted images were obtained at the  scanner console and made available for review. FINDINGS:    ABDOMEN:  Minimal dependent subsegmental atelectasis bilateral lung bases. Cholelithiasis without CT evidence of cholecystitis. Normal-appearing liver,  spleen, pancreas, bilateral adrenal glands. Small simple appearing left renal  lower pole cortical cyst measuring 1.5 CM in diameter. Subcentimeter right  renal cortical hypodensities are too small to characterize, but probably cysts. Mild calcific atherosclerosis of a normal caliber abdominal aorta. No  evidence of significant lymphadenopathy. Normal-appearing small bowel. No  evidence of intraperitoneal free air or free fluid. PELVIS:  Normal-appearing urinary bladder. Patient is status post hysterectomy. Bilateral adnexa are unremarkable. No evidence of pelvic free fluid. No  evidence of significant inguinal or pelvic sidewall lymphadenopathy. Sigmoid  diverticulosis. There is a focal area of apparent scarring of the mid sigmoid  colon into the left pelvic sidewall, with soft tissue density, a focal very  mild or early sigmoid diverticulitis cannot be entirely excluded. Normal-appearing appendix. Visualized osseous structures unremarkable. Impression IMPRESSION:    1. In the appropriate clinical setting, findings could represent a focal very  early or mild uncomplicated sigmoid diverticulitis. 2.  Other incidental findings as above. MRI Results (maximum last 3): Results from [place] encounter on 02/25/19   MRI CERV SPINE WO CONT    Narrative MRI CERVICAL SPINE WITHOUT CONTRAST. COMPARISON: May 2015.     HISTORY: Chronic neck pain.    TECHNIQUE: Sagittal T1 and T2 and STIR images, axial T2 and gradient-echo  images. FINDINGS: Cervical spinal cord signal intensity appears normal.  Spinal  alignment demonstrates straightening of the normal cervical lordosis. Included  posterior fossa structures unremarkable. Bone marrow signal intensity uniform. C2-3: No focal disc herniation or stenosis. Mild left foraminal narrowing. C3-4: Disc bulge osteophyte complex creates a significant central stenosis of 7  mm. There is some cord impingement with a triangular-shaped cord. Bilateral  foraminal stenoses. C4-5: No focal disc herniation or stenosis at this level. C5-6: Bulge osteophyte complex creates a central stenosis of 8 mm. This has  progressed since the prior exam. There is mild cord impingement. Lateral  foraminal stenosis bilaterally. C6-7: Disc bulge osteophyte complex creates a mild asymmetric stenosis, more on  the left. Lateral foramina unremarkable. C7-T1: No focal disc herniation or stenosis      Impression IMPRESSION: Progression of degenerative changes from prior exam.  1) C3-4:  the cord is mildly compressed with triangular configuration and a  central stenosis of 7 mm. Bilateral foraminal stenoses at this level. 2) C5-6: Central stenosis of 7 mm with mild cord impingement and bilateral  foraminal stenoses. 3) C6-7: Mild eccentric stenosis due to osteophytes towards the left. Results from [place] encounter on 03/27/17   MRI LUMB SPINE WO CONT    Narrative MRI LUMBAR SPINE WITHOUT CONTRAST. HISTORY: Chronic severe low back pain which radiates into both hips for years. M  47.817, M54.5, M48.06, M51.36.    COMPARISON: April 2015 and April 2008. TECHNIQUE:  Axial and sagittal, T1 and T2 and sagittal STIR images. FINDINGS:  The conus is at lower L1 level. Spinal alignment anatomic. Bone  marrow signal intensity is grossly unremarkable.  There are hemangiomas and mild  reactive endplate change. Included portion of the retroperitoneum unremarkable. The distal aorta is normal caliber. T12-L1: Bilateral hypertrophic facet arthropathy. No disc herniation or severe  is stenosis. L1-L2: Disc bulge osteophyte complex and bilateral hypertrophic facet  arthropathy creates a mild to moderate central stenosis. There are also moderate  foraminal stenoses. L2-L3: Reactive endplate change, disc bulge osteophyte complex. Bilateral  hypertrophic facet arthropathy. Moderate central and right foraminal stenosis. L3-L4: Bilateral hypertrophic facet arthropathy and diffuse annular bulge  creates moderate central and moderate bilateral foraminal stenoses. No focal  extruded disc fragment. L4-L5: Bilateral hypertrophic facet arthropathy and ligamentum flavum thickening  and diffuse annular bulge are present. This creates moderately severe bilateral  foraminal stenoses. L5-S1:  Bilateral facet arthropathy. Moderate foraminal stenoses. No focal disc  herniation or significant central stenosis. Impression IMPRESSION:   1) L1-2: Moderate central and and moderate bilateral foraminal stenoses. 2) L2-3: Moderate central and right foraminal stenosis. 3) L3-4: Moderate central and moderate bilateral foraminal stenoses. 4) L4-5: Moderately severe bilateral foraminal stenoses. 5) L5-S1: Moderate bilateral foraminal stenoses due to facet arthropathy. Results from Hospital Encounter encounter on 05/11/15   MRI CERV SPINE WO CONT    Narrative MRI cervical spine without contrast: 5/11/2015    HISTORY: Increasing neck pain x1 year, fall 8 years ago. Imaging sequences: Sagittal short TR/short TE, long TR/long TE and inversion  recovery images, axial gradient recall and 3D long TR/long TE images. Imaging  was performed on a 1.5 Tatum magnet. Comparison: None    FINDINGS: The examination is at least mildly compromised due to motion. There is  straightening of the cervical spine.  There is mild disc space narrowing at C5-6,  C6-7 and C7-T1. C2-3: There are advanced degenerative changes of the left facet joint without  causing significant neural foraminal narrowing. There is no central canal  stenosis. The right neural foramen is patent. C3-4: There is a disc-osteophyte complex. A thin rim of CSF surrounds the cord. There is mild central canal stenosis. There are relatively advanced degenerative  changes of the facet joints. The neural foramen are moderate to severely  narrowed. C4-5: A shallow disc-osteophyte complex effaces the ventral aspect of the thecal  sac without causing central canal stenosis. There are moderate degenerative  changes of the right facet joints with findings suggesting partial ankylosis. The right neural foramen is mildly narrowed. The left neural foramen is patent. C5-6: There is a disc-osteophyte complex causing mild central canal stenosis. There are mild degenerative changes of the facet joints. There is uncovertebral  joint spurring bilaterally. The neural foramen are severely narrowed    C6-7: The disc-osteophyte complex is asymmetric towards the left. There is mild  central canal stenosis. Uncovertebral joint spurring on the left causes mild  neural foraminal narrowing. Right neural foramen is patent. C7-T1: There is a disc/osteophyte complex without canal or neural foraminal  stenosis. T1-2: There are moderate degenerative changes of the left facet joint. There is  no central canal stenosis. The left neural foramen is mildly narrowed. The cervical cord appears grossly normal in signal intensity. There is an  incidental hemangioma at T3. Mild marrow edema appears to be associated with the  left C3-4 facet joint. The craniocervical junction is unremarkable. Impression Impression:  1. Mildly compromised examination due to motion.   2. Mild central canal stenosis and moderate to severe bilateral neural foraminal  narrowing at C3-4.  3. Mild central canal stenosis and severe bilateral neural foraminal narrowing  at C5-6.  4. Multilevel facet arthropathy, relatively advanced on the left at the  pedestrian bilaterally at C3-4. Nuclear Medicine Results (maximum last 3): No results found for this or any previous visit. US Results (maximum last 3):      DEXA Results (maximum last 3): No results found for this or any previous visit. MEGGAN Results (maximum last 3): No results found for this or any previous visit. IR Results (maximum last 3): No results found for this or any previous visit. VAS/US Results (maximum last 3): No results found for this or any previous visit. PET Results (maximum last 3): No results found for this or any previous visit.        EKG Results     None          Active Problems:  Patient Active Problem List    Diagnosis Date Noted    Acute UTI 03/20/2021    Uncontrolled diabetes mellitus with hyperglycemia ([facility] 03/20/2021    Status post right hip replacement 10/09/2017    Elevated WBC count 09/19/2017    Elevated serum creatinine 09/19/2017    Hypertension     Neuropathy     Morbid obesity with BMI of 45.0-49.9, adult ([facility] 03/03/2011    Osteoarthritis of right knee 03/02/2011    S/P total knee replacement using cement 03/02/2011    Osteoarthritis of left knee 09/16/2009    S/P total knee replacement using cement 09/16/2009    ILYA (obstructive sleep apnea) 09/16/2009    Asthma 09/16/2009    Type II or unspecified type diabetes mellitus without mention of complication, not stated as uncontrolled 09/16/2009    Hypothyroidism 09/16/2009    Chronic pain 09/16/2009         Erich Davis MD  [facility]  3/20/2021 5:34 PM

## 2021-03-20 NOTE — ED TRIAGE NOTES
Pt to ED via EMS with c/o urinary frequency, fatigue, poor appetite which pt thinks is d/t a change in her diabetic medications.  per EMS. Masked.

## 2021-03-20 NOTE — PROGRESS NOTES
03/20/21 1831   Dual Skin Pressure Injury Assessment   Dual Skin Pressure Injury Assessment X   Second Care Provider (Based on [address] [place] [address]) Alessio West 42, RN   Buttocks/Ishium  Right  (dime sized pressure ulcer under right buttocks cheek)   Skin Integumentary   Skin Integumentary (WDL) X    Pressure  Injury Documentation Pressure Injury Noted-See Wound LDA to Document   Skin Color Pale   Skin Condition/Temp Cool;Dry   Skin Integrity Wound (add Wound LDA)  (healing pressure wound under right buttocks )

## 2021-03-20 NOTE — PROGRESS NOTES
Bedside report received from Rubi Troy, [address] [facility]. Pt resting quietly eating a sandwhich. C/O that bed is uncomfortable; provided pt with more pillows and raised the head. Rocephin infusing. No other needs at present.

## 2021-03-20 NOTE — ED PROVIDER NOTES
Is autonomous living has not done well for about the last 6 weeks. Currently on Trulicity and. She is on Metformin 1000 mg twice daily. She has had increased frequency of voiding now for multiple weeks. She has had decreased oral intake over the last several days. States that her weight has gone from 262-237 5-week period. She has area of sore to gluteal    The history is provided by the patient. Other  This is a new problem. Episode onset: 5-6 weeks. The problem occurs daily. The problem has been gradually worsening. Pertinent negatives include no shortness of breath. Nothing aggravates the symptoms. Nothing relieves the symptoms.         Past Medical History:   Diagnosis Date    Arthritis     Asthma     bronchio-asthma mostly seasonal    Chronic obstructive asthma, unspecified     Chronic pain     DDD, LOW BACK, SIATICA    DDD (degenerative disc disease)     WITH RADICULOPATHY    GERD (gastroesophageal reflux disease)     Hypertension     Hypothyroidism     HYPOTHYROIDISM     ON MEDS    Incontinence of urine     Morbid obesity (HCC)     Neuropathy     ILYA (obstructive sleep apnea) 9/16/2009    Osteoarthritis of left knee 9/16/2009    Osteoarthritis of right knee 3/2/2011    S/P total knee replacement using cement 9/16/2009    Sleep apnea     HAS CPAP    Spastic colon     Status post right hip replacement 10/9/2017    Type II or unspecified type diabetes mellitus without mention of complication, not stated as uncontrolled     avg        Past Surgical History:   Procedure Laterality Date    HX HERNIA REPAIR  3/42/05    UMBILICAL WITH MESH    HX HYSTERECTOMY  2000    HX KNEE REPLACEMENT Right 2011    MO HAND/FINGER SURGERY UNLISTED Right 1990'S    MO TOTAL KNEE ARTHROPLASTY Left 2009         Family History:   Problem Relation Age of Onset    Cancer Mother     Diabetes Mother     Heart Disease Father     COPD Father     Cancer Father         lung    Diabetes Father    Vielka oCllado Cancer Brother     Diabetes Brother     Heart Disease Brother         CAD       Social History     Socioeconomic History    Marital status: SINGLE     Spouse name: Not on file    Number of children: Not on file    Years of education: Not on file    Highest education level: Not on file   Occupational History    Not on file   Social Needs    Financial resource strain: Not on file    Food insecurity     Worry: Not on file     Inability: Not on file    Transportation needs     Medical: Not on file     Non-medical: Not on file   Tobacco Use    Smoking status: Former Smoker     Packs/day: 1.00     Years: 15.00     Pack years: 15.00     Quit date: 1977     Years since quittin.2    Smokeless tobacco: Never Used    Tobacco comment: QUIT IN 0   Substance and Sexual Activity    Alcohol use: Yes     Comment: pt states she will drink 1 or 2 drinks per month    Drug use: No    Sexual activity: Not on file   Lifestyle    Physical activity     Days per week: Not on file     Minutes per session: Not on file    Stress: Not on file   Relationships    Social connections     Talks on phone: Not on file     Gets together: Not on file     Attends Roman Catholic service: Not on file     Active member of club or organization: Not on file     Attends meetings of clubs or organizations: Not on file     Relationship status: Not on file    Intimate partner violence     Fear of current or ex partner: Not on file     Emotionally abused: Not on file     Physically abused: Not on file     Forced sexual activity: Not on file   Other Topics Concern    Not on file   Social History Narrative    PATIENT IS SINGLE. SHE WORKS FOR THE [employer], TAX . ALLERGIES: Codeine, Erythromycin, and Tetracycline    Review of Systems   Constitutional: Negative for chills and fever. HENT: Negative. Respiratory: Negative for cough, choking and shortness of breath. Gastrointestinal: Negative for vomiting. Genitourinary: Positive for frequency and urgency. Musculoskeletal: Negative. Neurological: Negative. Psychiatric/Behavioral: Negative for confusion and decreased concentration. All other systems reviewed and are negative. Vitals:    03/20/21 1247   BP: (!) 178/75   Pulse: 88   Resp: 18   Temp: 97.7 °F (36.5 °C)   SpO2: 95%            Physical Exam  Vitals signs and nursing note reviewed. Constitutional:       General: She is not in acute distress. Appearance: She is well-developed. She is not ill-appearing or diaphoretic. HENT:      Head: Atraumatic. Eyes:      General: No scleral icterus. Neck:      Musculoskeletal: Neck supple. Cardiovascular:      Rate and Rhythm: Normal rate. Pulmonary:      Effort: Pulmonary effort is normal. No respiratory distress. Abdominal:      Palpations: Abdomen is soft. Tenderness: There is no right CVA tenderness, left CVA tenderness, guarding or rebound. Musculoskeletal:         General: No swelling, tenderness or signs of injury. Right lower leg: No edema. Left lower leg: No edema. Skin:     General: Skin is warm and dry. Comments: Dime sized eroded area buttock   Neurological:      Mental Status: She is alert. Mental status is at baseline. Psychiatric:         Thought Content: Thought content normal.          MDM  Number of Diagnoses or Management Options  Urinary tract infection without hematuria, site unspecified  Weakness  Diagnosis management comments: Generalized weakness over multiple weeks. She additionally has had poor blood sugar control probably compounding this. Patient may benefit from some physical therapy and some general medical adjustment. She is borderline for admission and discussed with hospitalist who will probably put her in for an observation type status.        Amount and/or Complexity of Data Reviewed  Clinical lab tests: ordered and reviewed  Tests in the radiology section of CPT®: reviewed and ordered  Decide to obtain previous medical records or to obtain history from someone other than the patient: yes  Review and summarize past medical records: yes  Independent visualization of images, tracings, or specimens: yes    Risk of Complications, Morbidity, and/or Mortality  Presenting problems: moderate  Diagnostic procedures: moderate  Management options: moderate           Procedures

## 2021-03-20 NOTE — ED NOTES
Pt requesting warm blankets, water and food at this time. Warm blankets given to pt but informed her that the MD has to see her first prior to eating or drinking.

## 2021-03-21 LAB
ANION GAP SERPL CALC-SCNC: 7 MMOL/L (ref 7–16)
BASOPHILS # BLD: 0 K/UL (ref 0–0.2)
BASOPHILS NFR BLD: 0 % (ref 0–2)
BUN SERPL-MCNC: 14 MG/DL (ref 8–23)
CALCIUM SERPL-MCNC: 8.9 MG/DL (ref 8.3–10.4)
CHLORIDE SERPL-SCNC: 105 MMOL/L (ref 98–107)
CO2 SERPL-SCNC: 29 MMOL/L (ref 21–32)
CREAT SERPL-MCNC: 0.83 MG/DL (ref 0.6–1)
DIFFERENTIAL METHOD BLD: NORMAL
EOSINOPHIL # BLD: 0.1 K/UL (ref 0–0.8)
EOSINOPHIL NFR BLD: 1 % (ref 0.5–7.8)
ERYTHROCYTE [DISTWIDTH] IN BLOOD BY AUTOMATED COUNT: 13.2 % (ref 11.9–14.6)
EST. AVERAGE GLUCOSE BLD GHB EST-MCNC: 338 MG/DL
GLUCOSE BLD STRIP.AUTO-MCNC: 235 MG/DL (ref 65–100)
GLUCOSE BLD STRIP.AUTO-MCNC: 246 MG/DL (ref 65–100)
GLUCOSE BLD STRIP.AUTO-MCNC: 263 MG/DL (ref 65–100)
GLUCOSE BLD STRIP.AUTO-MCNC: 271 MG/DL (ref 65–100)
GLUCOSE SERPL-MCNC: 219 MG/DL (ref 65–100)
HBA1C MFR BLD: 13.4 % (ref 4.2–6.3)
HCT VFR BLD AUTO: 43.8 % (ref 35.8–46.3)
HGB BLD-MCNC: 14.4 G/DL (ref 11.7–15.4)
IMM GRANULOCYTES # BLD AUTO: 0 K/UL (ref 0–0.5)
IMM GRANULOCYTES NFR BLD AUTO: 0 % (ref 0–5)
LYMPHOCYTES # BLD: 3.3 K/UL (ref 0.5–4.6)
LYMPHOCYTES NFR BLD: 34 % (ref 13–44)
MCH RBC QN AUTO: 28.3 PG (ref 26.1–32.9)
MCHC RBC AUTO-ENTMCNC: 32.9 G/DL (ref 31.4–35)
MCV RBC AUTO: 86.1 FL (ref 79.6–97.8)
MONOCYTES # BLD: 0.8 K/UL (ref 0.1–1.3)
MONOCYTES NFR BLD: 8 % (ref 4–12)
NEUTS SEG # BLD: 5.5 K/UL (ref 1.7–8.2)
NEUTS SEG NFR BLD: 57 % (ref 43–78)
NRBC # BLD: 0 K/UL (ref 0–0.2)
PLATELET # BLD AUTO: 218 K/UL (ref 150–450)
PMV BLD AUTO: 11.7 FL (ref 9.4–12.3)
POTASSIUM SERPL-SCNC: 3.3 MMOL/L (ref 3.5–5.1)
RBC # BLD AUTO: 5.09 M/UL (ref 4.05–5.2)
SERVICE CMNT-IMP: ABNORMAL
SODIUM SERPL-SCNC: 141 MMOL/L (ref 136–145)
WBC # BLD AUTO: 9.7 K/UL (ref 4.3–11.1)

## 2021-03-21 PROCEDURE — 96374 THER/PROPH/DIAG INJ IV PUSH: CPT

## 2021-03-21 PROCEDURE — 97530 THERAPEUTIC ACTIVITIES: CPT

## 2021-03-21 PROCEDURE — 97161 PT EVAL LOW COMPLEX 20 MIN: CPT

## 2021-03-21 PROCEDURE — 74011000258 HC RX REV CODE- 258: Performed by: HOSPITALIST

## 2021-03-21 PROCEDURE — 94660 CPAP INITIATION&MGMT: CPT

## 2021-03-21 PROCEDURE — 80048 BASIC METABOLIC PNL TOTAL CA: CPT

## 2021-03-21 PROCEDURE — 85025 COMPLETE CBC W/AUTO DIFF WBC: CPT

## 2021-03-21 PROCEDURE — 74011250637 HC RX REV CODE- 250/637: Performed by: HOSPITALIST

## 2021-03-21 PROCEDURE — 74011636637 HC RX REV CODE- 636/637: Performed by: FAMILY MEDICINE

## 2021-03-21 PROCEDURE — 2709999900 HC NON-CHARGEABLE SUPPLY

## 2021-03-21 PROCEDURE — 74011636637 HC RX REV CODE- 636/637: Performed by: HOSPITALIST

## 2021-03-21 PROCEDURE — 36415 COLL VENOUS BLD VENIPUNCTURE: CPT

## 2021-03-21 PROCEDURE — 96376 TX/PRO/DX INJ SAME DRUG ADON: CPT

## 2021-03-21 PROCEDURE — 96372 THER/PROPH/DIAG INJ SC/IM: CPT

## 2021-03-21 PROCEDURE — 99218 HC RM OBSERVATION: CPT

## 2021-03-21 PROCEDURE — 82962 GLUCOSE BLOOD TEST: CPT

## 2021-03-21 PROCEDURE — 83036 HEMOGLOBIN GLYCOSYLATED A1C: CPT

## 2021-03-21 PROCEDURE — 74011250636 HC RX REV CODE- 250/636: Performed by: HOSPITALIST

## 2021-03-21 RX ORDER — INSULIN LISPRO 100 [IU]/ML
0.05 INJECTION, SOLUTION INTRAVENOUS; SUBCUTANEOUS
Status: DISCONTINUED | OUTPATIENT
Start: 2021-03-21 | End: 2021-03-23 | Stop reason: HOSPADM

## 2021-03-21 RX ORDER — POTASSIUM CHLORIDE 20 MEQ/1
40 TABLET, EXTENDED RELEASE ORAL
Status: COMPLETED | OUTPATIENT
Start: 2021-03-21 | End: 2021-03-21

## 2021-03-21 RX ORDER — SODIUM CHLORIDE, SODIUM LACTATE, POTASSIUM CHLORIDE, CALCIUM CHLORIDE 600; 310; 30; 20 MG/100ML; MG/100ML; MG/100ML; MG/100ML
100 INJECTION, SOLUTION INTRAVENOUS CONTINUOUS
Status: DISPENSED | OUTPATIENT
Start: 2021-03-21 | End: 2021-03-21

## 2021-03-21 RX ORDER — INSULIN GLARGINE 100 [IU]/ML
18 INJECTION, SOLUTION SUBCUTANEOUS
Status: DISCONTINUED | OUTPATIENT
Start: 2021-03-22 | End: 2021-03-23 | Stop reason: HOSPADM

## 2021-03-21 RX ORDER — INSULIN GLARGINE 100 [IU]/ML
18 INJECTION, SOLUTION SUBCUTANEOUS ONCE
Status: COMPLETED | OUTPATIENT
Start: 2021-03-21 | End: 2021-03-21

## 2021-03-21 RX ADMIN — ENOXAPARIN SODIUM 40 MG: 40 INJECTION, SOLUTION INTRAVENOUS; SUBCUTANEOUS at 08:15

## 2021-03-21 RX ADMIN — Medication 10 ML: at 14:27

## 2021-03-21 RX ADMIN — PREGABALIN 150 MG: 150 CAPSULE ORAL at 20:53

## 2021-03-21 RX ADMIN — INSULIN LISPRO 6 UNITS: 100 INJECTION, SOLUTION INTRAVENOUS; SUBCUTANEOUS at 11:43

## 2021-03-21 RX ADMIN — INSULIN LISPRO 4 UNITS: 100 INJECTION, SOLUTION INTRAVENOUS; SUBCUTANEOUS at 08:15

## 2021-03-21 RX ADMIN — INSULIN LISPRO 6 UNITS: 100 INJECTION, SOLUTION INTRAVENOUS; SUBCUTANEOUS at 17:09

## 2021-03-21 RX ADMIN — Medication 1 AMPULE: at 20:51

## 2021-03-21 RX ADMIN — METFORMIN HYDROCHLORIDE 1000 MG: 500 TABLET ORAL at 08:15

## 2021-03-21 RX ADMIN — PREGABALIN 150 MG: 150 CAPSULE ORAL at 08:15

## 2021-03-21 RX ADMIN — ONDANSETRON 4 MG: 2 INJECTION INTRAMUSCULAR; INTRAVENOUS at 21:30

## 2021-03-21 RX ADMIN — CEFTRIAXONE 1 G: 1 INJECTION, POWDER, FOR SOLUTION INTRAMUSCULAR; INTRAVENOUS at 17:09

## 2021-03-21 RX ADMIN — Medication 10 ML: at 21:06

## 2021-03-21 RX ADMIN — POTASSIUM CHLORIDE 40 MEQ: 1500 TABLET, EXTENDED RELEASE ORAL at 09:01

## 2021-03-21 RX ADMIN — ZOLPIDEM TARTRATE 5 MG: 5 TABLET ORAL at 21:32

## 2021-03-21 RX ADMIN — INSULIN GLARGINE 18 UNITS: 100 INJECTION, SOLUTION SUBCUTANEOUS at 09:02

## 2021-03-21 RX ADMIN — OXYBUTYNIN CHLORIDE 5 MG: 5 TABLET, EXTENDED RELEASE ORAL at 08:15

## 2021-03-21 RX ADMIN — AMLODIPINE BESYLATE 5 MG: 5 TABLET ORAL at 08:15

## 2021-03-21 RX ADMIN — INSULIN LISPRO 6 UNITS: 100 INJECTION, SOLUTION INTRAVENOUS; SUBCUTANEOUS at 09:01

## 2021-03-21 RX ADMIN — METFORMIN HYDROCHLORIDE 1000 MG: 500 TABLET ORAL at 17:09

## 2021-03-21 RX ADMIN — LISINOPRIL 10 MG: 5 TABLET ORAL at 08:15

## 2021-03-21 RX ADMIN — LEVOTHYROXINE SODIUM 100 MCG: 0.1 TABLET ORAL at 08:15

## 2021-03-21 RX ADMIN — Medication 10 ML: at 05:29

## 2021-03-21 RX ADMIN — ENOXAPARIN SODIUM 40 MG: 40 INJECTION, SOLUTION INTRAVENOUS; SUBCUTANEOUS at 20:54

## 2021-03-21 RX ADMIN — SODIUM CHLORIDE, SODIUM LACTATE, POTASSIUM CHLORIDE, AND CALCIUM CHLORIDE 100 ML/HR: 600; 310; 30; 20 INJECTION, SOLUTION INTRAVENOUS at 12:41

## 2021-03-21 RX ADMIN — MONTELUKAST 10 MG: 10 TABLET, FILM COATED ORAL at 08:15

## 2021-03-21 NOTE — PROGRESS NOTES
Pt's HS bg = 317. Dr. Cristo Felder notified via perfect serve. Verbal orders for humalog sliding scale to be changed from only ac to ac&hs.

## 2021-03-21 NOTE — PROGRESS NOTES
Problem: Mobility Impaired (Adult and Pediatric)  Goal: *Acute Goals and Plan of Care (Insert Text)  Outcome: Progressing Towards Goal  Note: LTG:  (1.)Ms. Mcgowan will move from supine to sit and sit to supine  in bed with SUPERVISION within 5 treatment day(s). (2.)Ms. Mcgowan will transfer from bed to chair and chair to bed with SUPERVISION using the least restrictive device within 5 treatment day(s). (3.)Ms. Mcgowan will ambulate with STAND BY ASSIST for 150 feet with the least restrictive device within 5 treatment day(s). (4.)Ms. Mcgowan will climb 4 steps with L railing and SBA within 5 treatment days. ________________________________________________________________________________________________       PHYSICAL THERAPY: Initial Assessment and PM 3/21/2021  OBSERVATION: PT Visit Days : 1  [place] / [place] / Product Type: Medicare /       NAME/AGE/GENDER: Imani Julio is a 77 y.o. female   PRIMARY DIAGNOSIS: Acute UTI [N39.0]  Uncontrolled diabetes mellitus with hyperglycemia ([facility] [address] [E11.65] Acute UTI Acute UTI        ICD-10: Treatment Diagnosis:    Generalized Muscle Weakness (M62.81)  Difficulty in walking, Not elsewhere classified (R26.2)   Precaution/Allergies:  Codeine, Erythromycin, and Tetracycline      ASSESSMENT:     Ms. Genesis Bush presents presents with the above dx. She states she is feeling a little better but does not feel that she could go home until the day after tomorrow because she is too weak to care for herself. She is typically independent with home activities and drives nearby 1x/week for necessities. She ambulated with CGA and RW to toilet, performed toileting activities independently, then washed hands and worked on level surface ambulation in the room. Pt fatigued with activity but otherwise her balance and coordination appear to be at her baseline. Left pt up in recliner to eat lunch. Recommend RW and potential HHPT at discharge.  Will follow to progress strength and endurance for optimal ability navigate her home at discharge. This section established at most recent assessment   PROBLEM LIST (Impairments causing functional limitations):  Decreased Strength  Decreased Transfer Abilities  Decreased Ambulation Ability/Technique  Decreased Activity Tolerance   INTERVENTIONS PLANNED: (Benefits and precautions of physical therapy have been discussed with the patient.)  Balance Exercise  Gait Training  Home Exercise Program (HEP)  Neuromuscular Re-education/Strengthening  Range of Motion (ROM)  Therapeutic Activites  Therapeutic Exercise/Strengthening  Transfer Training     TREATMENT PLAN: Frequency/Duration: daily for duration of hospital stay  Rehabilitation Potential For Stated Goals: Excellent     REHAB RECOMMENDATIONS (at time of discharge pending progress):    Placement: It is my opinion, based on this patient's performance to date, that Ms. Mcgowan may benefit from [address] after discharge due to the functional deficits listed above that are likely to improve with skilled rehabilitation because she has difficulty driving and only feels comfortable driving 3-8Z/DHQD . Equipment:   Walkers, Type: Rolling Walker              HISTORY:   History of Present Injury/Illness (Reason for Referral):  72-year-old female with past medical history of diabetes mellitus type 2, hypertension, obesity and obstructive sleep apnea who had recently had her diabetic medications adjusted including her Metformin. she is on 1000 g of twice daily, in addition to Trulicity injections once a week. She has been having increased weakness malaise, and increased urinary frequency. Due to generally not feeling well she called EMS to her residence. In the emergency department a  Urinalysis showed 4+ bacteria, reflex culture collected. Also noted to have hyperglycemia with a serum blood glucose of 400, and hypokalemia.   Due to her generalized weakness and fatigue, she believe that she cannot take care of herself at home and had no one to assist her, due to her frail state she potentially developed a right-sided pressure ulcer on the right buttock due to debility and deconditioning. Past Medical History/Comorbidities:   Ms. Rae Grant  has a past medical history of Arthritis, Asthma, Chronic obstructive asthma, unspecified, Chronic pain, DDD (degenerative disc disease), GERD (gastroesophageal reflux disease), Hypertension, Hypothyroidism, HYPOTHYROIDISM, Incontinence of urine, Morbid obesity ([address] Neuropathy, ILYA (obstructive sleep apnea) (9/16/2009), Osteoarthritis of left knee (9/16/2009), Osteoarthritis of right knee (3/2/2011), S/P total knee replacement using cement (9/16/2009), Sleep apnea, Spastic colon, Status post right hip replacement (10/9/2017), and Type II or unspecified type diabetes mellitus without mention of complication, not stated as uncontrolled. She also has no past medical history of Adverse effect of anesthesia, Aneurysm ([address] Arrhythmia, Autoimmune disease ([address] CAD (coronary artery disease), Cancer ([address] Chronic kidney disease, Chronic obstructive pulmonary disease ([address] Coagulation disorder ([address] Difficult intubation, Endocarditis, Heart failure ([address] Ill-defined condition, Liver disease, Malignant hyperthermia due to anesthesia, Nausea & vomiting, Nicotine vapor product user, Non-nicotine vapor product user, Pseudocholinesterase deficiency, Psychiatric disorder, PUD (peptic ulcer disease), Rheumatic fever, Seizures ([address] Stroke ([address] or Thromboembolus ([address] Ms. Rae Grant  has a past surgical history that includes hx hernia repair (1/30/08); hx hysterectomy (2000); pr hand/finger surgery unlisted (Right, 1990'S); pr total knee arthroplasty (Left, 2009); and hx knee replacement (Right, 2011).   Social History/Living Environment:   Home Environment: Private residence  # Steps to Enter: 4  Rails to Enter: Yes  Hand Rails : Left  Wheelchair Ramp: No  One/Two Story Residence: One story  Living Alone: Yes  Support Systems: Family member(s)  Patient Expects to be Discharged to[de-identified] Private residence  Current DME Used/Available at Home: CPAP, Glucometer  Prior Level of Function/Work/Activity:  Independent with home duties     Number of Personal Factors/Comorbidities that affect the Plan of Care: 0: LOW COMPLEXITY   EXAMINATION:   Most Recent Physical Functioning:   Gross Assessment:  AROM: Generally decreased, functional  Strength: Generally decreased, functional  Coordination: Within functional limits               Posture:     Balance:  Sitting: Intact  Standing: Pull to stand; With support; Intact Bed Mobility:  Rolling: (Not tested)  Supine to Sit: (Not tested)  Wheelchair Mobility:     Transfers:  Sit to Stand: Contact guard assistance  Stand to Sit: Stand-by assistance  Gait:     Base of Support: Widened  Speed/Jayne: Shuffled  Step Length: Right shortened;Left shortened  Swing Pattern: Right symmetrical;Left symmetrical  Stance: Right increased; Left increased  Gait Abnormalities: Decreased step clearance  Distance (ft): 45 Feet (ft)  Assistive Device: Walker, rolling  Ambulation - Level of Assistance: Contact guard assistance  Interventions: Safety awareness training;Verbal cues      Body Structures Involved:  Bones  Joints  Muscles Body Functions Affected: Movement Related Activities and Participation Affected: Mobility   Number of elements that affect the Plan of Care: 1-2: LOW COMPLEXITY   CLINICAL PRESENTATION:   Presentation: Stable and uncomplicated: LOW COMPLEXITY   CLINICAL DECISION MAKIN [facility] [address] [id number] AM-PAC 6 Clicks   Basic Mobility Inpatient Short Form  How much difficulty does the patient currently have. .. Unable A Lot A Little None   1. Turning over in bed (including adjusting bedclothes, sheets and blankets)? [] 1   [] 2   [x] 3   [] 4   2.   Sitting down on and standing up from a chair with arms ( e.g., wheelchair, bedside commode, etc.)   [] 1   [] 2   [x] 3 [] 4   3. Moving from lying on back to sitting on the side of the bed? [] 1   [] 2   [x] 3   [] 4   How much help from another person does the patient currently need. .. Total A Lot A Little None   4. Moving to and from a bed to a chair (including a wheelchair)? [] 1   [] 2   [x] 3   [] 4   5. Need to walk in hospital room? [] 1   [] 2   [x] 3   [] 4   6. Climbing 3-5 steps with a railing? [] 1   [] 2   [x] 3   [] 4   © 2007, Trustees of [address] [place] [address] [address] [address], under license to [employer]. All rights reserved      Score:  Initial: 18 Most Recent: X (Date: -- )    Interpretation of Tool:  Represents activities that are increasingly more difficult (i.e. Bed mobility, Transfers, Gait). Medical Necessity:     Patient demonstrates   good   rehab potential due to higher previous functional level. Reason for Services/Other Comments:  Patient continues to require skilled intervention due to   patient unable to attend/participate in therapy as expected  . Use of outcome tool(s) and clinical judgement create a POC that gives a: Clear prediction of patient's progress: LOW COMPLEXITY            TREATMENT:   (In addition to Assessment/Re-Assessment sessions the following treatments were rendered)   Pre-treatment Symptoms/Complaints:  feeling a little better but still weak  Pain: Initial:   Pain Intensity 1: 0  Post Session:  0   Therapeutic Activity (   10): Therapeutic activities including chair transfers, level surface ambulation, toilet transfers, standing balance to improve mobility, strength, balance, and coordination. Required minimal verbal cues to promote dynamic balance in standing. Braces/Orthotics/Lines/Etc:   O2 Device: CPAP nasal  Treatment/Session Assessment:    Response to Treatment:  Good.  No LOB and fair-good endurance noted  Interdisciplinary Collaboration:   Physical Therapist  Registered Nurse  After treatment position/precautions:   Up in chair  Bed/Chair-wheels locked  Call light within reach  RN notified   Compliance with Program/Exercises: Will assess as treatment progresses  Recommendations/Intent for next treatment session: \"Next visit will focus on advancements to more challenging activities and reduction in assistance provided\".   Total Treatment Duration:  PT Patient Time In/Time Out  Time In: 1235  Time Out: [address], PT

## 2021-03-21 NOTE — PROGRESS NOTES
[facility]ist Service Progress Note    INTERVAL HISTORY  / Subjective:  Still appears fatigued, she has dry oral mucosa     Assessment / Plan:  69-year-old female with past medical history of diabetes mellitus type 2, hypertension, obesity and obstructive sleep apnea who had recently had her diabetic medications adjusted including her Metformin. she is on 1000 g of twice daily, in addition to Trulicity injections once a week. She has been having increased weakness malaise, and increased urinary frequency. Due to generally not feeling well she called EMS to her residence. In the emergency department a  Urinalysis showed 4+ bacteria, reflex culture collected. Also noted to have hyperglycemia with a serum blood glucose of 400, and hypokalemia.   Due to her generalized weakness and fatigue, she believe that she cannot take care of herself at home and had no one to assist her, due to her frail state she potentially developed a right-sided pressure ulcer on the right buttock due to debility and deconditioning.        Acute UTI, dehydration/hypokalemia, and generalized weakness  Start IV ceftriaxone, urinalysis reflex to culture  IV lactated Ringer's with potassium additive  Request physical therapy  --03/21 Resume IV ABX, Urine cultures pending, Potassium supplement ordered, IV fluids today she is exhibiting dry oral mucosa     Diabetes mellitus type 2  Point-of-care glucose and insulin sliding scale  Consistent carbohydrate diet  --03/21 A1C Uncontrolled 13.0  Continue home meds antidiabetic medications including Metformin, Start Bolus and start basil insulin ( Humalog TIDAC and Lantus)      Obesity and obstructive sleep apnea  Request respiratory therapy assistance with CPAP placement  Resume home MDI or hospital formulary equivalent       Right pressure ulcer  Of the right buttock, present on admission       Disposition: Observation  Diet: Consistent carbohydrate Lovenox subcutaneous  VTE ppx: Lovenox subcutaneous  GI ppx: Resume PPI prior to admission  CODE STATUS: Full  Surrogate decision-maker: Her Cousin              Objective:  Visit Vitals  BP (!) 140/79 (BP 1 Location: Right upper arm)   Pulse 77   Temp 98.2 °F (36.8 °C)   Resp 18   Ht 5' 4\" (1.626 m)   Wt 124.5 kg (274 lb 6.9 oz)   SpO2 92%   BMI 47.11 kg/m²                 Physical Exam:  General: No acute distress, speaking in full sentences, no use of accessory muscles   HEENT: Pupils equal and reactive to light and accommodation, oropharynx is clear but dry oral mucosa   Neck: Supple, no lymphadenopathy, no JVD   Lungs: Clear to auscultation bilaterally   Cardiovascular: Regular rate and rhythm with normal S1 and S2   Abdomen: Soft, nontender, nondistended, normoactive bowel sounds   Extremities: No cyanosis clubbing or edema   Neuro: Nonfocal, A&O x3   Psych: Normal affect     Intake and Output:  Date 03/20/21 0700 - 03/21/21 0659 03/21/21 0700 - 03/22/21 0659   Shift 9943-6025 8118-4588 24 Hour Total 3092-5912 1349-8590 24 Hour Total   INTAKE   P.O.  500 500        P. O.  500 500      I. V.(mL/kg/hr)  1100(0.7) 1100(0.4)        I.V.  1100 1100      Shift Total(mL/kg)  1600(12.9) 1600(12.9)      OUTPUT   Urine(mL/kg/hr)           Urine Occurrence(s)  1 x 1 x 1 x  1 x   Stool           Stool Occurrence(s)    1 x  1 x   Shift Total(mL/kg)         NET  1600 1600      Weight (kg) 124.5 124.5 124.5 124.5 124.5 124.5       LAB:  Admission on 03/20/2021   Component Date Value    WBC 03/20/2021 10.3     RBC 03/20/2021 5.42*    HGB 03/20/2021 15.5*    HCT 03/20/2021 46.9*    MCV 03/20/2021 86.5     MCH 03/20/2021 28.6     MCHC 03/20/2021 33.0     RDW 03/20/2021 13.2     PLATELET 63/24/3666 400     MPV 03/20/2021 11.7     ABSOLUTE NRBC 03/20/2021 0.00     DF 03/20/2021 AUTOMATED     NEUTROPHILS 03/20/2021 78     LYMPHOCYTES 03/20/2021 16     MONOCYTES 03/20/2021 5     EOSINOPHILS 03/20/2021 0*    BASOPHILS 03/20/2021 0     IMMATURE GRANULOCYTES 03/20/2021 0     ABS. NEUTROPHILS 03/20/2021 8.0     ABS. LYMPHOCYTES 03/20/2021 1.7     ABS. MONOCYTES 03/20/2021 0.6     ABS. EOSINOPHILS 03/20/2021 0.0     ABS. BASOPHILS 03/20/2021 0.0     ABS. IMM. GRANS. 03/20/2021 0.0     Sodium 03/20/2021 138     Potassium 03/20/2021 3.4*    Chloride 03/20/2021 97*    CO2 03/20/2021 28     Anion gap 03/20/2021 13     Glucose 03/20/2021 400*    BUN 03/20/2021 18     Creatinine 03/20/2021 1.08*    GFR est AA 03/20/2021 >60     GFR est non-AA 03/20/2021 53*    Calcium 03/20/2021 9.6     Bilirubin, total 03/20/2021 0.6     ALT (SGPT) 03/20/2021 30     AST (SGOT) 03/20/2021 19     Alk. phosphatase 03/20/2021 113     Protein, total 03/20/2021 7.5     Albumin 03/20/2021 3.7     Globulin 03/20/2021 3.8*    A-G Ratio 03/20/2021 1.0*    WBC 03/20/2021 3-5     RBC 03/20/2021 3-5     Epithelial cells 03/20/2021 0-3     Bacteria 03/20/2021 4+*    Casts 03/20/2021 0     Glucose (POC) 03/20/2021 256*    Performed by 03/20/2021 Tulio     Hemoglobin A1c 03/20/2021 13.1*    Est. average glucose 03/20/2021 329     Glucose (POC) 03/20/2021 317*    Performed by 03/20/2021 PatriciolardPCTCaitlin     Hemoglobin A1c 03/21/2021 13.4*    Est. average glucose 03/21/2021 338     WBC 03/21/2021 9.7     RBC 03/21/2021 5.09     HGB 03/21/2021 14.4     HCT 03/21/2021 43.8     MCV 03/21/2021 86.1     MCH 03/21/2021 28.3     MCHC 03/21/2021 32.9     RDW 03/21/2021 13.2     PLATELET 91/70/6734 584     MPV 03/21/2021 11.7     ABSOLUTE NRBC 03/21/2021 0.00     DF 03/21/2021 AUTOMATED     NEUTROPHILS 03/21/2021 57     LYMPHOCYTES 03/21/2021 34     MONOCYTES 03/21/2021 8     EOSINOPHILS 03/21/2021 1     BASOPHILS 03/21/2021 0     IMMATURE GRANULOCYTES 03/21/2021 0     ABS. NEUTROPHILS 03/21/2021 5.5     ABS. LYMPHOCYTES 03/21/2021 3.3     ABS. MONOCYTES 03/21/2021 0.8     ABS. EOSINOPHILS 03/21/2021 0.1     ABS. BASOPHILS 03/21/2021 0.0     ABS. IMMHolly Medal. 03/21/2021 0.0     Sodium 03/21/2021 141     Potassium 03/21/2021 3.3*    Chloride 03/21/2021 105     CO2 03/21/2021 29     Anion gap 03/21/2021 7     Glucose 03/21/2021 219*    BUN 03/21/2021 14     Creatinine 03/21/2021 0.83     GFR est AA 03/21/2021 >60     GFR est non-AA 03/21/2021 >60     Calcium 03/21/2021 8.9     Glucose (POC) 03/21/2021 235*    Performed by 03/21/2021 ReynaNicole        IMAGING:  Xr Chest Pa Lat    Result Date: 3/20/2021  Normal chest.      EKG:  No results found for this or any previous visit.           Kylie Zamudio MD  3/21/2021 9:21 AM

## 2021-03-22 LAB
GLUCOSE BLD STRIP.AUTO-MCNC: 205 MG/DL (ref 65–100)
GLUCOSE BLD STRIP.AUTO-MCNC: 229 MG/DL (ref 65–100)
GLUCOSE BLD STRIP.AUTO-MCNC: 263 MG/DL (ref 65–100)
GLUCOSE BLD STRIP.AUTO-MCNC: 276 MG/DL (ref 65–100)
SERVICE CMNT-IMP: ABNORMAL

## 2021-03-22 PROCEDURE — 82962 GLUCOSE BLOOD TEST: CPT

## 2021-03-22 PROCEDURE — 96372 THER/PROPH/DIAG INJ SC/IM: CPT

## 2021-03-22 PROCEDURE — 74011000258 HC RX REV CODE- 258: Performed by: HOSPITALIST

## 2021-03-22 PROCEDURE — 74011250637 HC RX REV CODE- 250/637: Performed by: HOSPITALIST

## 2021-03-22 PROCEDURE — 74011636637 HC RX REV CODE- 636/637: Performed by: HOSPITALIST

## 2021-03-22 PROCEDURE — 94760 N-INVAS EAR/PLS OXIMETRY 1: CPT

## 2021-03-22 PROCEDURE — 74011250636 HC RX REV CODE- 250/636: Performed by: HOSPITALIST

## 2021-03-22 PROCEDURE — 2709999900 HC NON-CHARGEABLE SUPPLY

## 2021-03-22 PROCEDURE — 97116 GAIT TRAINING THERAPY: CPT

## 2021-03-22 PROCEDURE — 99218 HC RM OBSERVATION: CPT

## 2021-03-22 PROCEDURE — 96375 TX/PRO/DX INJ NEW DRUG ADDON: CPT

## 2021-03-22 RX ORDER — INSULIN LISPRO 100 [IU]/ML
INJECTION, SOLUTION INTRAVENOUS; SUBCUTANEOUS
Qty: 1 VIAL | Refills: 0 | Status: SHIPPED | OUTPATIENT
Start: 2021-03-22 | End: 2021-03-23

## 2021-03-22 RX ORDER — INSULIN LISPRO 100 [IU]/ML
INJECTION, SOLUTION INTRAVENOUS; SUBCUTANEOUS
Qty: 1 VIAL | Refills: 0 | Status: SHIPPED
Start: 2021-03-22 | End: 2021-03-22

## 2021-03-22 RX ORDER — INSULIN GLARGINE 100 [IU]/ML
INJECTION, SOLUTION SUBCUTANEOUS
Qty: 1 VIAL | Refills: 0 | Status: SHIPPED | OUTPATIENT
Start: 2021-03-22 | End: 2021-03-23

## 2021-03-22 RX ORDER — VANCOMYCIN 1.75 GRAM/500 ML IN 0.9 % SODIUM CHLORIDE INTRAVENOUS
1750 EVERY 12 HOURS
Status: DISCONTINUED | OUTPATIENT
Start: 2021-03-22 | End: 2021-03-22

## 2021-03-22 RX ORDER — METFORMIN HYDROCHLORIDE 1000 MG/1
1000 TABLET ORAL 2 TIMES DAILY WITH MEALS
Qty: 60 TAB | Refills: 0 | Status: SHIPPED | OUTPATIENT
Start: 2021-03-23 | End: 2021-12-15

## 2021-03-22 RX ADMIN — ENOXAPARIN SODIUM 40 MG: 40 INJECTION, SOLUTION INTRAVENOUS; SUBCUTANEOUS at 21:16

## 2021-03-22 RX ADMIN — Medication 1 AMPULE: at 07:52

## 2021-03-22 RX ADMIN — AMLODIPINE BESYLATE 5 MG: 5 TABLET ORAL at 08:14

## 2021-03-22 RX ADMIN — INSULIN LISPRO 6 UNITS: 100 INJECTION, SOLUTION INTRAVENOUS; SUBCUTANEOUS at 12:58

## 2021-03-22 RX ADMIN — INSULIN LISPRO 6 UNITS: 100 INJECTION, SOLUTION INTRAVENOUS; SUBCUTANEOUS at 17:55

## 2021-03-22 RX ADMIN — METFORMIN HYDROCHLORIDE 1000 MG: 500 TABLET ORAL at 16:18

## 2021-03-22 RX ADMIN — INSULIN LISPRO 6 UNITS: 100 INJECTION, SOLUTION INTRAVENOUS; SUBCUTANEOUS at 08:09

## 2021-03-22 RX ADMIN — Medication 5 ML: at 14:00

## 2021-03-22 RX ADMIN — LISINOPRIL 10 MG: 5 TABLET ORAL at 08:14

## 2021-03-22 RX ADMIN — METFORMIN HYDROCHLORIDE 1000 MG: 500 TABLET ORAL at 07:50

## 2021-03-22 RX ADMIN — INSULIN GLARGINE 18 UNITS: 100 INJECTION, SOLUTION SUBCUTANEOUS at 21:17

## 2021-03-22 RX ADMIN — CELECOXIB 200 MG: 200 CAPSULE ORAL at 07:54

## 2021-03-22 RX ADMIN — Medication 10 ML: at 21:18

## 2021-03-22 RX ADMIN — PREGABALIN 150 MG: 150 CAPSULE ORAL at 21:24

## 2021-03-22 RX ADMIN — ENOXAPARIN SODIUM 40 MG: 40 INJECTION, SOLUTION INTRAVENOUS; SUBCUTANEOUS at 07:55

## 2021-03-22 RX ADMIN — Medication 10 ML: at 05:26

## 2021-03-22 RX ADMIN — Medication 1 AMPULE: at 21:16

## 2021-03-22 RX ADMIN — PREGABALIN 150 MG: 150 CAPSULE ORAL at 07:54

## 2021-03-22 RX ADMIN — OXYBUTYNIN CHLORIDE 5 MG: 5 TABLET, EXTENDED RELEASE ORAL at 08:09

## 2021-03-22 RX ADMIN — LEVOTHYROXINE SODIUM 100 MCG: 0.1 TABLET ORAL at 07:51

## 2021-03-22 RX ADMIN — CEFTRIAXONE 1 G: 1 INJECTION, POWDER, FOR SOLUTION INTRAMUSCULAR; INTRAVENOUS at 16:19

## 2021-03-22 RX ADMIN — SALINE NASAL SPRAY 2 SPRAY: 1.5 SOLUTION NASAL at 08:55

## 2021-03-22 RX ADMIN — LACTOBACILLUS ACIDOPHILUS / LACTOBACILLUS BULGARICUS 1 PACKET: 100 MILLION CFU STRENGTH GRANULES at 07:54

## 2021-03-22 RX ADMIN — MONTELUKAST 10 MG: 10 TABLET, FILM COATED ORAL at 07:54

## 2021-03-22 RX ADMIN — FLUTICASONE PROPIONATE 2 SPRAY: 50 SPRAY, METERED NASAL at 12:58

## 2021-03-22 NOTE — PROGRESS NOTES
Problem: Falls - Risk of  Goal: *Absence of Falls  Description: Document Steve Salazar Fall Risk and appropriate interventions in the flowsheet. Outcome: Progressing Towards Goal  Note: Fall Risk Interventions:  Mobility Interventions: Patient to call before getting OOB         Medication Interventions: Teach patient to arise slowly    Elimination Interventions: Call light in reach              Problem: Patient Education: Go to Patient Education Activity  Goal: Patient/Family Education  Outcome: Progressing Towards Goal     Problem: Pressure Injury - Risk of  Goal: *Prevention of pressure injury  Description: Document Gregorio Scale and appropriate interventions in the flowsheet. Outcome: Progressing Towards Goal  Note: Pressure Injury Interventions:  Sensory Interventions: Assess changes in LOC    Moisture Interventions: Absorbent underpads    Activity Interventions: Increase time out of bed    Mobility Interventions: HOB 30 degrees or less    Nutrition Interventions: Document food/fluid/supplement intake    Friction and Shear Interventions: HOB 30 degrees or less                Problem: Patient Education: Go to Patient Education Activity  Goal: Patient/Family Education  Outcome: Progressing Towards Goal     Problem: Diabetes Self-Management  Goal: *Disease process and treatment process  Description: Define diabetes and identify own type of diabetes; list 3 options for treating diabetes. Outcome: Progressing Towards Goal  Goal: *Incorporating nutritional management into lifestyle  Description: Describe effect of type, amount and timing of food on blood glucose; list 3 methods for planning meals. Outcome: Progressing Towards Goal  Goal: *Incorporating physical activity into lifestyle  Description: State effect of exercise on blood glucose levels.   Outcome: Progressing Towards Goal  Goal: *Developing strategies to promote health/change behavior  Description: Define the ABC's of diabetes; identify appropriate screenings, schedule and personal plan for screenings. Outcome: Progressing Towards Goal  Goal: *Using medications safely  Description: State effect of diabetes medications on diabetes; name diabetes medication taking, action and side effects. Outcome: Progressing Towards Goal  Goal: *Monitoring blood glucose, interpreting and using results  Description: Identify recommended blood glucose targets  and personal targets. Outcome: Progressing Towards Goal  Goal: *Prevention, detection, treatment of acute complications  Description: List symptoms of hyper- and hypoglycemia; describe how to treat low blood sugar and actions for lowering  high blood glucose level. Outcome: Progressing Towards Goal  Goal: *Prevention, detection and treatment of chronic complications  Description: Define the natural course of diabetes and describe the relationship of blood glucose levels to long term complications of diabetes.   Outcome: Progressing Towards Goal  Goal: *Developing strategies to address psychosocial issues  Description: Describe feelings about living with diabetes; identify support needed and support network  Outcome: Progressing Towards Goal  Goal: *Insulin pump training  Outcome: Progressing Towards Goal  Goal: *Sick day guidelines  Outcome: Progressing Towards Goal  Goal: *Patient Specific Goal (EDIT GOAL, INSERT TEXT)  Outcome: Progressing Towards Goal     Problem: Patient Education: Go to Patient Education Activity  Goal: Patient/Family Education  Outcome: Progressing Towards Goal     Problem: Patient Education: Go to Patient Education Activity  Goal: Patient/Family Education  Outcome: Progressing Towards Goal

## 2021-03-22 NOTE — DISCHARGE SUMMARY
[facility]   Progress note for 03/22 and Discharge Summary for 03/23     Patient ID:  Reji Cyr. female. 1945.  [id number]    Admit date: 3/20/2021 12:46 PM    Discharge date: 03/22/21     Admitting Physician: Daylin Young MD  Attending Physician: Fabian Camp MD  Primary Care Physician: Chuyita Rick MD     Discharge Physician: Daylin Young MD    Discharged Condition: Stable    Indication for Admission:   Chief Complaint   Patient presents with    Other     diabetic problem        Reason for hospitalization:   Patient Active Problem List   Diagnosis Code    Osteoarthritis of left knee M17.12    S/P total knee replacement using cement Z96.659    ILYA (obstructive sleep apnea) G47.33    Asthma J45.909    Type II or unspecified type diabetes mellitus without mention of complication, not stated as uncontrolled E11.9    Hypothyroidism E03.9    Chronic pain G89.29    Osteoarthritis of right knee M17.11    S/P total knee replacement using cement Z96.659    Morbid obesity with BMI of 45.0-49.9, adult ([facility] E66.01, Z68.42    Hypertension I10    Neuropathy G62.9    Elevated WBC count D72.829    Elevated serum creatinine R79.89    Status post right hip replacement Z96.641    Acute UTI N39.0    Uncontrolled diabetes mellitus with hyperglycemia ([facility] E11.65       Discharge Diagnosis: Controlled diabetes mellitus type 2 now requiring insulin, acute UTI, generalized weakness and dehydration   Discharge Disposition: Stable for discharge  Follow up Instructions: 1 week with primary care no    Did Patient have Sepsis (YES OR NO): No     Hospital Course:   76-year-old female with past medical history of diabetes mellitus type 2, hypertension, obesity and obstructive sleep apnea who had recently had her diabetic medications adjusted including her Metformin. she is on 1000 g of twice daily, in addition to Trulicity injections once a week. Bob Tsang has been having increased weakness malaise, and increased urinary frequency.  Due to generally not feeling well she called EMS to her residence. Chauncey Townsend the emergency department a  Urinalysis showed 4+ bacteria, reflex culture collected. Joan Blair noted to have hyperglycemia with a serum blood glucose of 400, and hypokalemia.  Due to her generalized weakness and fatigue, she believe that she cannot take care of herself at home and had no one to assist her, due to her frail state she potentially developed a right-sided pressure ulcer on the right buttock due to debility and deconditioning.     Acute UTI, dehydration/hypokalemia, and generalized weakness  --03/20-23 She received an adequate course of IV ceftriaxone while admitted f, she received IV hydration, and overall her weakness and fatigue had overall greatly improved.  --03/22 Overall she greatly improved and feels optimistic to be discharged  On 03/23 when she has someone available to help her at home     Diabetes mellitus type 2   Advised Consistent carbohydrate diet, DM education provided in the discharge summary   --03/21 A1C Uncontrolled 13.0  --she was started on IV Lantus, and subcutaneous Humalog, in addition to Metformin 1000 mg twice daily, diabetic education was provided     Obesity and obstructive sleep apnea  Request CPAP placement  Resume home MDI or hospital formulary equivalent     Right pressure ulcer  Of the right buttock, present on admission            Discharge Exam:  Visit Vitals  BP (!) 155/59 (BP 1 Location: Left lower arm, BP Patient Position: At rest)   Pulse 67   Temp 98.4 °F (36.9 °C)   Resp 18   Ht 5' 4\" (1.626 m)   Wt 124.5 kg (274 lb 6.9 oz)   SpO2 97%   BMI 47.11 kg/m²      General: No acute distress, speaking in full sentences, no use of accessory muscles   HEENT: Pupils equal and reactive to light and accommodation, oropharynx is clear   Neck: Supple, no lymphadenopathy, no JVD   Lungs: Clear to auscultation bilaterally   Cardiovascular: Regular rate and rhythm with normal S1 and S2   Abdomen: Soft, nontender, nondistended, normoactive bowel sounds   Extremities: No cyanosis clubbing or edema   Neuro: Nonfocal, A&O x3   Psych: Normal affect     Consults: None    Code Status: Full Code    Significant Diagnostic Studies:   CMP: No results found for: NA, K, CL, CO2, AGAP, GLU, BUN, CREA, GFRAA, GFRNA, CA, MG, PHOS, ALB, TBIL, TBILI, TP, ALB, GLOB, AGRAT, ALT      CBC:  No results found for: WBC, HGB, HCT, PLT, HGBEXT, HCTEXT, PLTEXT    Lab Results   Component Value Date/Time    INR 1.0 09/19/2017 10:35 AM    INR 0.9 03/05/2011 04:48 AM    INR 0.9 03/04/2011 04:45 AM    Prothrombin time 10.7 09/19/2017 10:35 AM    Prothrombin time 10.1 03/05/2011 04:48 AM    Prothrombin time 10.1 03/04/2011 04:45 AM       ABG:  No results found for: PH, PHI, PCO2, PCO2I, PO2, PO2I, HCO3, HCO3I, FIO2, FIO2I        No results found for: CPK, RCK1, RCK2, RCK3, RCK4, CKMB, CKNDX, CKND1, TROPT, TROIQ, BNPP, BNP        Radiology Reports :   [unfilled]      Discharge Medications:  Current Discharge Medication List      START taking these medications    Details   metFORMIN (GLUCOPHAGE) 1,000 mg tablet Take 1 Tab by mouth two (2) times daily (with meals). Qty: 60 Tab, Refills: 0      insulin glargine (LANTUS) 100 unit/mL injection 20 units daily  Qty: 1 Vial, Refills: 0      insulin lispro (HUMALOG) 100 unit/mL injection 6 Units TID AC  Qty: 1 Vial, Refills: 0         CONTINUE these medications which have NOT CHANGED    Details   amLODIPine (NORVASC) 5 mg tablet Take 5 mg by mouth daily. budesonide (PULMICORT) 0.5 mg/2 mL nbsp 500 mcg by Nebulization route two (2) times a day. formoterol (PERFOROMIST) 20 mcg/2 mL nebu neb solution 20 mcg by Nebulization route two (2) times a day. Lactobacillus acidoph-L.bulgar (RUMA ASSIST) 100 million cell pwpk Take  by mouth. traMADol (ULTRAM) 50 mg tablet Take 50 mg by mouth every six (6) hours as needed for Pain.       HYDROmorphone (DILAUDID) 2 mg tablet Take 1 Tab by mouth every four (4) hours as needed. Max Daily Amount: 12 mg.  Qty: 40 Tab, Refills: 0      cpap machine kit by Does Not Apply route. 9 cm H2O      FLUTICASONE/SALMETEROL (ADVAIR DISKUS IN) Take 2 Puffs by inhalation two (2) times daily as needed. celecoxib (CELEBREX) 200 mg capsule Take 200 mg by mouth daily. 1 to 2 a day. PREGABALIN (LYRICA PO) Take 150 mg by mouth two (2) times a day. montelukast (SINGULAIR) 10 mg tablet Take 10 mg by mouth daily. MULTIVITAMIN WITH MINERALS (MULTIVITAMIN & MINERAL FORMULA PO) Take 1 Tab by mouth daily. Multivitamin with D with Calcium       metaxalone (SKELAXIN) 800 mg tablet Take 1 Tab by mouth three (3) times daily as needed for Pain. Qty: 1 Tab, Refills: 0      OMEPRAZOLE PO 20 mg daily. Pt. Instructed to take morning of surgery per anesthesiologist.        CYANOCOBALAMIN (VITAMIN B-12 PO) Take 500 mg by mouth daily. LISINOPRIL-HYDROCHLOROTHIAZIDE 10-12.5 mg per tablet take 1 Tab by mouth daily. LASIX 20 mg Tab Take 20 mg by mouth daily as needed. SYNTHROID 100 mcg Tab Take 100 mcg by mouth daily. Pt. Instructed to take morning of surgery per anesthesiologist.        AMBIEN 10 mg Tab Take 10 mg by mouth nightly. Azelastine (ASTEPRO) 0.15 % (205.5 mcg) nasal spray 2 Sprays by Both Nostrils route as needed. fluticasone (FLONASE ALLERGY RELIEF) 50 mcg/actuation nasal spray 2 Sprays by Both Nostrils route as needed for Rhinitis. Biotin 2,500 mcg cap Take  by mouth.      cranberry extract 450 mg tab tablet Take 450 mg by mouth. !! OTHER Vein and leg support--- 1 tab po qd      estradiol (ESTRACE) 0.01 % (0.1 mg/gram) vaginal cream Insert 1 g into vagina daily. lidocaine-kinesiology tape 5 % kit by Apply Externally route. !! OTHER three (3) times daily. Lidocaine patch 5%      oxybutynin chloride XL (DITROPAN XL) 5 mg CR tablet Take 5 mg by mouth daily.       polyethylene glycol (MIRALAX) 17 gram packet Take 17 g by mouth as needed. PATANOL 0.1 % Drop 2 Drops by Both Eyes route as needed for Allergies. LIBRAX, WITH CLINIDIUM, 2.5-5 mg Cap Take 1 Cap by mouth four (4) times daily as needed. !! - Potential duplicate medications found. Please discuss with provider. STOP taking these medications       SITagliptin (JANUVIA) 100 mg tablet Comments:   Reason for Stopping:         GLYBURIDE-METFORMIN 2.5-500 mg per tablet Comments:   Reason for Stopping:         ciprofloxacin HCl (CIPRO) 500 mg tablet Comments:   Reason for Stopping:         neomycin-colist-hydrocortisone-thonzonium (CORTISPORIN-TC) otic suspension Comments:   Reason for Stopping:         neomycin-polymyxin-hydrocortisone, buffered, (PEDIOTIC) 3.5-10,000-1 mg/mL-unit/mL-% otic suspension Comments:   Reason for Stopping:         NEOMY SULF/POLYMYX B SULF/HC (CORTISPORIN OT) Comments:   Reason for Stopping:               Medications Discontinued during this hospitalization:   Medications Discontinued During This Encounter   Medication Reason    budesonide-formoteroL (SYMBICORT) 160-4.5 mcg/actuation HFA inhaler 1 Puff     . PHARMACY TO SUBSTITUTE PER PROTOCOL (Reordered from: GLYBURIDE-METFORMIN 2.5-500 mg per tablet)     hydrALAZINE (APRESOLINE) 20 mg/mL injection 20 mg     insulin lispro (HUMALOG) injection     insulin lispro (HUMALOG) injection     cefTRIAXone (ROCEPHIN) 1 g in 0.9% sodium chloride (MBP/ADV) 50 mL MBP     vancomycin (VANCOCIN) 1750 mg in  ml infusion     insulin lispro (HUMALOG) 100 unit/mL injection        Patient Instructions: Activity: as tolerated. Diet:   DIET DIABETIC CONSISTENT CARB Regular    Therapy Ordered:  No therapy plan of the specified type found. Follow-up appointments:   Follow-up Appointments   Procedures    FOLLOW UP VISIT Appointment in: One Week PCP     PCP     Standing Status:   Standing     Number of Occurrences:   1     Order Specific Question:   Appointment in Answer: One Week       Time Spent on Discharge greater than 30 minutes.     Missy Murdock MD  3/22/2021 5:07 PM

## 2021-03-22 NOTE — PROGRESS NOTES
0658-Bedside report received from Sadaf Betancur, [facility]. Resting in bed. No needs voiced. No s/s of acute distress. 1902-Bedside shift change report given to Monika Shah (oncoming nurse) by Makeda Bueno (offgoing nurse). Report included the following information SBAR, Kardex, Intake/Output, MAR and Recent Results.

## 2021-03-22 NOTE — PROGRESS NOTES
Care Management Interventions  PCP Verified by CM: Yes([facility] )  Palliative Care Criteria Met (RRAT>21 & CHF Dx)?: No(Risk 12% Dx UTI)  Transition of Care Consult (CM Consult): Discharge Planning  Discharge Durable Medical Equipment: No(CPAP, Cane)  Physical Therapy Consult: Yes  Current Support Network: Lives Alone  Confirm Follow Up Transport: Family(cousin Beto )  Discharge Location  Discharge Placement: Home  Met with patient for d/c planning. Patient alert and oriented x 3, independent of ADL's and lives alone in one story home with 4 steps to entrance. DME includes CPAP and cane. She has transportation and able to drive. Discussed recommendations for home health PT/RN, with RW she states \"somewhat of a hoarder\" and does not want anyone in her home and that a walker may not work in her home. CM asked can she safely get around in her home and she states yes. She states she is able to cook and has a bed that is easier to get out of than the hospital bed. She has a cousin Maurice [facility] [phone] that assist with transportation. She has Medicare and [employer] and obtains medications at [facility] on [address]. PCP is at [facility]. Patient voices no concerns or needs for d/c. CM will f/u about home health and RW in am to see if she has decided to accept home health and RW. D/C plan is home when medically stable. CM following.

## 2021-03-22 NOTE — DIABETES MGMT
Patient admitted with acute UTI. Admitting blood glucose 400. HbA1c 13.4 (). Blood glucose ranged 219-271 yesterday with patient receiving Lantus 18 units, Metformin 2000mg and Humalog 22 units. Blood glucose this morning was 205. Most recent FSBS 263. Reviewed patient current regimen: Lantus 18 units nightly, Humalog 6 units with meals, and Metformin 1000mg BID. Last Creatinine 0.83 and GFR >60 on 3/21/21. Patient would likely benefit from additional SSI and an increase in basal insulin if stricter glycemic control is desired. Provider updated via [employer] regarding recommendations and patient glycemic control. Patient seen for assessment regarding diabetes management. Patient has a past medical history of type 2 diabetes, ILYA, knee replacement, asthma, hypothyroidism, chronic pain, HTN, neuropathy, right hip replacement. Patient states they were diagnosed in 2001 with diabetes and voices a positive family history of diabetes. Patient states they do not have a working glucometer with supplies at home. Patient states they are currently taking Trulicity weekly at home for management of diabetes patient states she was recently taken off Januvia and Glyburide/Metformin. Patient states she did not realize she was also supposed to be taking Metformin 1000mg BID per her provider stating she saw the doctor on Thursday and then on Friday when she tried to clarify her instructions her provider was on maternity leave so she just took the weekly trulicity. Patient has attended formal diabetes education in the past. Patient reports no difficulty with affording their diabetic supplies. Patient given educational material, \"Diabetes Self-Management: A Patient Teaching Guide\", which was reviewed with pt. Explained basic physiology of diabetes, as well as causes, s/s, and treatments for hypoglycemia and hyperglycemia. Per patient they typically drink diet drinks.  Patient given educational handout regarding effects of sweetened beverages on glycemic control. Per patient they typically eat 2 meals a day but patient mentions she has not been eating like she should. Educated re: effects of carbohydrates on blood glucose, the \"plate method\" of healthy meal planning. Educated patient regarding the benefits of physical activity (as cleared by provider) on glycemic control patient states she did work with physical therapy but also mentions she wants to get stronger stating she is a \"hoarder\" and needs to clean out so she can use a walker in her house. Explained the relationship between hyperglycemia and infection and delayed healing. Described proper diabetic foot care and the importance of checking feet daily. Patient voices some numbness and tingling in feet. Discussed target goals for blood glucose and A1C. Educated patient regarding diabetic medications including mechanism of action, timing, and possible side effects. Patient verbalizes understanding of teaching. Explained the importance of blood glucose monitoring. Recommended frequency of ACHS blood glucose checks and to record in log book to take to PCP appointment. Patient politely refused glucometer instruction stating she is comfortable with this. Patient given log sheet to fill out and take to PCP appointment. Patient also interested in CGM therapy. Educated regarding CGM therapy and patient was encouraged to discuss this with PCP. Pt will need prescription for glucometer and glucometer supplies at discharge so that the patient may obtain the meter covered by their insurance. Provider updated via [employer]. Patient instructed regarding preparation and injection of insulin dose via vial and syringe method. Patient returned demonstrated proper insulin injection technique using injection model via vial and syringe method. Noted patient did have visual difficulties with drawing up insulin via vial and syringe method.  Nursing will continue to have patient practice insulin self-injection when insulin dose is due and document progress or refusals in progress notes. Patient verbalizes understanding of site rotation, storage of insulin, and proper sharps disposal. Patient was also instructed in proper use of insulin pens. Patient was able to return demonstrate proper use of insulin pen using injection model. Patient prefers insulin pens. Patient will need prescription for insulin pens and pen needles at discharge if discharged on insulin. Provider updated via [employer]. Educated patient regarding current basal/bolus regimen of Lantus 18 units nightly and Humalog 6 units with meals including type of insulin, timing with meals, onset, duration of effect, and peak of insulin dose. Educated patient on the differences between prandial insulin and sliding scale insulin. Patient successful with insulin dose calculations using practice scenarios. Instructed patient to always seek guidance from their primary care provider about adjusting their insulin doses and not to adjust them on their own as this could negatively impact their glycemic control or result in hypoglycemia. Patient verbalizes understanding. Patient has had diabetes outpatient education which she said she attended 2 years ago (2019). Patient states she learned a lot and wished she could have had more information about diet at a slower pace or on a one-on-one session. Patient provided with contact information to [facility] program and referral was placed for CGM start as requested by patient. Encouraged compliance with discharge regimen. Encouraged patient to continue to work on lifestyle modifications and to follow up with PCP (patient states she sees Martha Leonard at [facility]) for further titration of regimen. Patient verbalized understanding and voices no further questions regarding diabetes management. Patient states she is to discharge home tomorrow.

## 2021-03-22 NOTE — PROGRESS NOTES
Vancomycin Consult    MD ordering: Terence Latif   ID following? no  Indication: UTI  DOT:  3-5?  days  Goal level(s): 10 - 20    Ht Readings from Last 1 Encounters:   21 5' 4\" (1.626 m)      Wt Readings from Last 1 Encounters:   21 124.5 kg (274 lb 6.9 oz)         Temp (24hrs), Av °F (36.7 °C), Min:97.7 °F (36.5 °C), Max:98.7 °F (37.1 °C)    Dosing weight: 124 kg  77 y.o. Date:  Dose/Freq Admin Times Level/Time:   3/22 Vanco 1750 mg IV q12h (13)    3/23 Vanco 1750 mg IV q12h ()  (13)                        Recent Labs     21  0250 21  1306   BUN 14 18   CREA 0.83 1.08*   WBC 9.7 10.3     Estimated Creatinine Clearance: 77.5 mL/min (based on SCr of 0.83 mg/dL). No results found for: Tani Rizzo    Day 1 Assessment and Plan:  Vancomycin 1750 mg IV every 12 hours.     Reji Sparrow, PharmD, BCPS

## 2021-03-22 NOTE — PROGRESS NOTES
Problem: Falls - Risk of  Goal: *Absence of Falls  Description: Document Kinza Gonzáles Fall Risk and appropriate interventions in the flowsheet. 3/22/2021 3440 by Gomez GUERRERO  Outcome: Progressing Towards Goal  Note: Fall Risk Interventions:  Mobility Interventions: Patient to call before getting OOB         Medication Interventions: Patient to call before getting OOB    Elimination Interventions: Call light in reach           3/22/2021 0810 by Gomez GUERRERO  Outcome: Progressing Towards Goal  Note: Fall Risk Interventions:  Mobility Interventions: Patient to call before getting OOB         Medication Interventions: Teach patient to arise slowly    Elimination Interventions: Call light in reach              Problem: Patient Education: Go to Patient Education Activity  Goal: Patient/Family Education  3/22/2021 0922 by Gomez GUERRERO  Outcome: Progressing Towards Goal  3/22/2021 0810 by Gomez GUERRERO  Outcome: Progressing Towards Goal     Problem: Pressure Injury - Risk of  Goal: *Prevention of pressure injury  Description: Document Gregorio Scale and appropriate interventions in the flowsheet.   3/22/2021 5420 by Gomez GUERRERO  Outcome: Progressing Towards Goal  Note: Pressure Injury Interventions:  Sensory Interventions: Assess changes in LOC    Moisture Interventions: Absorbent underpads    Activity Interventions: Pressure redistribution bed/mattress(bed type)    Mobility Interventions: Pressure redistribution bed/mattress (bed type)    Nutrition Interventions: Offer support with meals,snacks and hydration, Discuss nutritional consult with provider, Document food/fluid/supplement intake    Friction and Shear Interventions: Foam dressings/transparent film/skin sealants             3/22/2021 0810 by Gomez GUERRERO  Outcome: Progressing Towards Goal  Note: Pressure Injury Interventions:  Sensory Interventions: Assess changes in LOC    Moisture Interventions: Absorbent underpads    Activity Interventions: Increase time out of bed    Mobility Interventions: HOB 30 degrees or less    Nutrition Interventions: Document food/fluid/supplement intake    Friction and Shear Interventions: HOB 30 degrees or less                Problem: Patient Education: Go to Patient Education Activity  Goal: Patient/Family Education  3/22/2021 0922 by Meka GUERRERO  Outcome: Progressing Towards Goal  3/22/2021 0810 by Meka Remeddeyanira J  Outcome: Progressing Towards Goal     Problem: Diabetes Self-Management  Goal: *Disease process and treatment process  Description: Define diabetes and identify own type of diabetes; list 3 options for treating diabetes. 3/22/2021 4784 by Meka Remedies J  Outcome: Progressing Towards Goal  3/22/2021 0810 by Meka Remedies J  Outcome: Progressing Towards Goal  Goal: *Incorporating nutritional management into lifestyle  Description: Describe effect of type, amount and timing of food on blood glucose; list 3 methods for planning meals. 3/22/2021 0183 by Meka Remedies YOLANDA  Outcome: Progressing Towards Goal  3/22/2021 0810 by Meka Remedies J  Outcome: Progressing Towards Goal  Goal: *Incorporating physical activity into lifestyle  Description: State effect of exercise on blood glucose levels. 3/22/2021 8544 by Meka Remedies YOLANDA  Outcome: Progressing Towards Goal  3/22/2021 0810 by Meka Remedies J  Outcome: Progressing Towards Goal  Goal: *Developing strategies to promote health/change behavior  Description: Define the ABC's of diabetes; identify appropriate screenings, schedule and personal plan for screenings. 3/22/2021 8921 by Meka Remedies YOLANDA  Outcome: Progressing Towards Goal  3/22/2021 0810 by Meka Remedies J  Outcome: Progressing Towards Goal  Goal: *Using medications safely  Description: State effect of diabetes medications on diabetes; name diabetes medication taking, action and side effects.   3/22/2021 7373 by Tish Borges Darcy GUERRERO  Outcome: Progressing Towards Goal  3/22/2021 0810 by Cristóbal GUERRERO  Outcome: Progressing Towards Goal  Goal: *Monitoring blood glucose, interpreting and using results  Description: Identify recommended blood glucose targets  and personal targets. 3/22/2021 9113 by Cristóbal GUERRERO  Outcome: Progressing Towards Goal  3/22/2021 0810 by Cristóbal GUERRERO  Outcome: Progressing Towards Goal  Goal: *Prevention, detection, treatment of acute complications  Description: List symptoms of hyper- and hypoglycemia; describe how to treat low blood sugar and actions for lowering  high blood glucose level. 3/22/2021 6305 by Cristóbal GUERRERO  Outcome: Progressing Towards Goal  3/22/2021 0810 by Cristóbal GUERRERO  Outcome: Progressing Towards Goal  Goal: *Prevention, detection and treatment of chronic complications  Description: Define the natural course of diabetes and describe the relationship of blood glucose levels to long term complications of diabetes.   3/22/2021 0922 by Cristóbal GUERRERO  Outcome: Progressing Towards Goal  3/22/2021 0810 by Cristóbal GUERRERO  Outcome: Progressing Towards Goal  Goal: *Developing strategies to address psychosocial issues  Description: Describe feelings about living with diabetes; identify support needed and support network  3/22/2021 0922 by Cristóbal GUERRERO  Outcome: Progressing Towards Goal  3/22/2021 0810 by Cristóbal GUERRERO  Outcome: Progressing Towards Goal  Goal: *Insulin pump training  3/22/2021 0922 by Cristóbal GUERRERO  Outcome: Progressing Towards Goal  3/22/2021 0810 by Cristóbal GUERRERO  Outcome: Progressing Towards Goal  Goal: *Sick day guidelines  3/22/2021 0922 by Cristóbal GUERRERO  Outcome: Progressing Towards Goal  3/22/2021 0810 by Cristóbal GUERRERO  Outcome: Progressing Towards Goal  Goal: *Patient Specific Goal (EDIT GOAL, INSERT TEXT)  3/22/2021 3218 by Cristóbal GUERRERO  Outcome: Progressing Towards Goal  3/22/2021 0810 by Brodie GUERRERO  Outcome: Progressing Towards Goal     Problem: Patient Education: Go to Patient Education Activity  Goal: Patient/Family Education  3/22/2021 0922 by Brodie GUERRERO  Outcome: Progressing Towards Goal  3/22/2021 0810 by Brodie GUERRERO  Outcome: Progressing Towards Goal     Problem: Patient Education: Go to Patient Education Activity  Goal: Patient/Family Education  3/22/2021 0922 by Brodie GUERRERO  Outcome: Progressing Towards Goal  3/22/2021 0810 by Brodie GUERRERO  Outcome: Progressing Towards Goal

## 2021-03-22 NOTE — PROGRESS NOTES
Problem: Mobility Impaired (Adult and Pediatric)  Goal: *Acute Goals and Plan of Care (Insert Text)  Outcome: Progressing Towards Goal  Note: LTG:  (1.)Ms. Mcgowan will move from supine to sit and sit to supine  in bed with SUPERVISION within 5 treatment day(s). (2.)Ms. Mcgowan will transfer from bed to chair and chair to bed with SUPERVISION using the least restrictive device within 5 treatment day(s). (3.)Ms. Mcgowan will ambulate with STAND BY ASSIST for 150 feet with the least restrictive device within 5 treatment day(s). (4.)Ms. Mcgowan will climb 4 steps with L railing and SBA within 5 treatment days. ________________________________________________________________________________________________       PHYSICAL THERAPY: Daily Note and PM 3/22/2021  OBSERVATION: PT Visit Days : 1  [place] / [place] B / Product Type: Medicare /       NAME/AGE/GENDER: Jorge Ross is a 77 y.o. female   PRIMARY DIAGNOSIS: Acute UTI [N39.0]  Uncontrolled [facility] with [facility] [E11.65] Acute UTI Acute UTI       ICD-10: Treatment Diagnosis:    · Generalized Muscle Weakness (M62.81)  · Difficulty in walking, Not elsewhere classified (R26.2)   Precaution/Allergies:  Codeine, Erythromycin, and Tetracycline      ASSESSMENT:     Ms. Magalis Pfeiffer presents presents with the above dx. She states she is feeling a little better but does not feel that she could go home until the day after tomorrow because she is too weak to care for herself. She is typically independent with home activities and drives nearby 1x/week for necessities. She ambulated with CGA and RW to toilet, performed toileting activities independently, then washed hands and worked on level surface ambulation in the room. Pt fatigued with activity but otherwise her balance and coordination appear to be at her baseline. Left pt up in recliner to eat lunch. Recommend RW and potential HHPT at discharge.  Will follow to progress strength and endurance for optimal ability navigate her home at discharge. 3/22--Pt performed supine to sit to stand. Pt ambulated in room. Pt ambulated and voided in bathroom. Pt in bedside chair, awaiting nursing assistant to help with bathing. This section established at most recent assessment   PROBLEM LIST (Impairments causing functional limitations):  1. Decreased Strength  2. Decreased Transfer Abilities  3. Decreased Ambulation Ability/Technique  4. Decreased Activity Tolerance   INTERVENTIONS PLANNED: (Benefits and precautions of physical therapy have been discussed with the patient.)  1. Balance Exercise  2. Gait Training  3. Home Exercise Program (HEP)  4. Neuromuscular Re-education/Strengthening  5. Range of Motion (ROM)  6. Therapeutic Activites  7. Therapeutic Exercise/Strengthening  8. Transfer Training     TREATMENT PLAN: Frequency/Duration: daily for duration of hospital stay  Rehabilitation Potential For Stated Goals: Excellent     REHAB RECOMMENDATIONS (at time of discharge pending progress):    Placement: It is my opinion, based on this patient's performance to date, that Ms. Mcgowan may benefit from [address] after discharge due to the functional deficits listed above that are likely to improve with skilled rehabilitation because she has difficulty driving and only feels comfortable driving 4-0J/TGEM . Equipment:    Walkers, Type: Rolling Walker              HISTORY:   History of Present Injury/Illness (Reason for Referral):  27-year-old female with past medical history of diabetes mellitus type 2, hypertension, obesity and obstructive sleep apnea who had recently had her diabetic medications adjusted including her Metformin. she is on 1000 g of twice daily, in addition to Trulicity injections once a week. She has been having increased weakness malaise, and increased urinary frequency. Due to generally not feeling well she called EMS to her residence.   In the emergency department a  Urinalysis showed 4+ bacteria, reflex culture collected.  Also noted to have hyperglycemia with a serum blood glucose of 400, and hypokalemia.  Due to her generalized weakness and fatigue, she believe that she cannot take care of herself at home and had no one to assist her, due to her frail state she potentially developed a right-sided pressure ulcer on the right buttock due to debility and deconditioning.  Past Medical History/Comorbidities:   Ms. Mcgowan  has a past medical history of Arthritis, Asthma, Chronic obstructive asthma, unspecified, Chronic pain, DDD (degenerative disc disease), GERD (gastroesophageal reflux disease), Hypertension, Hypothyroidism, HYPOTHYROIDISM, Incontinence of urine, Morbid obesity ([facility]), Neuropathy, ILYA (obstructive sleep apnea) (9/16/2009), Osteoarthritis of left knee (9/16/2009), Osteoarthritis of right knee (3/2/2011), S/P total knee replacement using cement (9/16/2009), Sleep apnea, Spastic colon, Status post right hip replacement (10/9/2017), and Type II or unspecified type diabetes mellitus without mention of complication, not stated as uncontrolled. She also has no past medical history of Adverse effect of anesthesia, Aneurysm (HCC), Arrhythmia, Autoimmune disease (HCC), CAD (coronary artery disease), Cancer (HCC), Chronic kidney disease, Chronic obstructive pulmonary disease (HCC), Coagulation disorder (HCC), Difficult intubation, Endocarditis, Heart failure (HCC), Ill-defined condition, Liver disease, Malignant hyperthermia due to anesthesia, Nausea & vomiting, Nicotine vapor product user, Non-nicotine vapor product user, Pseudocholinesterase deficiency, Psychiatric disorder, PUD (peptic ulcer disease), Rheumatic fever, Seizures (HCC), Stroke ([facility]), or Thromboembolus ([facility]).  Ms. Mcgowan  has a past surgical history that includes hx hernia repair (1/30/08); hx hysterectomy (2000); pr hand/finger surgery unlisted (Right, 1990'S); pr total knee arthroplasty (Left, 2009); and hx knee  replacement (Right, ). Social History/Living Environment:   Home Environment: Private residence  # Steps to Enter: 4  Rails to Enter: Yes  Hand Rails : Left  Wheelchair Ramp: No  One/Two Story Residence: One story  Living Alone: Yes  Support Systems: Family member(s)  Patient Expects to be Discharged to[de-identified] Private residence  Current DME Used/Available at Home: CPAP, Glucometer  Prior Level of Function/Work/Activity:  Independent with home duties     Number of Personal Factors/Comorbidities that affect the Plan of Care: 0: LOW COMPLEXITY   EXAMINATION:   Most Recent Physical Functioning:   Gross Assessment:  AROM: Generally decreased, functional  Strength: Generally decreased, functional  Coordination: Generally decreased, functional               Posture:  Posture (WDL): Within defined limits  Balance:  Sitting: Intact  Standing: Pull to stand; With support Bed Mobility:  Supine to Sit: Contact guard assistance  Sit to Supine: Contact guard assistance  Wheelchair Mobility:     Transfers:  Sit to Stand: Contact guard assistance  Stand to Sit: Contact guard assistance  Gait:     Gait Abnormalities: Antalgic  Distance (ft): 90 Feet (ft)  Assistive Device: Walker, rolling  Ambulation - Level of Assistance: Contact guard assistance      Body Structures Involved:  1. Bones  2. Joints  3. Muscles Body Functions Affected:  1. Movement Related Activities and Participation Affected:  1. Mobility   Number of elements that affect the Plan of Care: 1-2: LOW COMPLEXITY   CLINICAL PRESENTATION:   Presentation: Stable and uncomplicated: LOW COMPLEXITY   CLINICAL DECISION MAKIN [facility] [address] [id number] AM-PAC 6 Clicks   Basic Mobility Inpatient Short Form  How much difficulty does the patient currently have. .. Unable A Lot A Little None   1. Turning over in bed (including adjusting bedclothes, sheets and blankets)? [] 1   [] 2   [x] 3   [] 4   2.   Sitting down on and standing up from a chair with arms ( e.g., wheelchair, bedside commode, etc.)   [] 1   [] 2   [x] 3   [] 4   3. Moving from lying on back to sitting on the side of the bed? [] 1   [] 2   [x] 3   [] 4   How much help from another person does the patient currently need. .. Total A Lot A Little None   4. Moving to and from a bed to a chair (including a wheelchair)? [] 1   [] 2   [x] 3   [] 4   5. Need to walk in hospital room? [] 1   [] 2   [x] 3   [] 4   6. Climbing 3-5 steps with a railing? [] 1   [] 2   [x] 3   [] 4   © 2007, Trustees of [address] [place] [address] [address] [id number], under license to [employer]. All rights reserved      Score:  Initial: 18 Most Recent: X (Date: -- )    Interpretation of Tool:  Represents activities that are increasingly more difficult (i.e. Bed mobility, Transfers, Gait). Medical Necessity:     · Patient demonstrates   · good  ·  rehab potential due to higher previous functional level. Reason for Services/Other Comments:  · Patient continues to require skilled intervention due to   · patient unable to attend/participate in therapy as expected  · . Use of outcome tool(s) and clinical judgement create a POC that gives a: Clear prediction of patient's progress: LOW COMPLEXITY            TREATMENT:   (In addition to Assessment/Re-Assessment sessions the following treatments were rendered)   Pre-treatment Symptoms/Complaints:  feeling a little better but still weak  Pain: Initial:      Post Session:  0   Therapeutic Activity (   25): Therapeutic activities including chair transfers, level surface ambulation, toilet transfers, standing balance to improve mobility, strength, balance, and coordination. Required minimal verbal cues to promote dynamic balance in standing. Braces/Orthotics/Lines/Etc:   · O2 Device: CPAP nasal  Treatment/Session Assessment:    · Response to Treatment:  Pt agreeable to ambulate with therapy.   · Interdisciplinary Collaboration:   o Physical Therapist  o Registered Nurse  · After treatment position/precautions:   o Up in chair  o Bed/Chair-wheels locked  o Call light within reach  o RN notified   · Compliance with Program/Exercises: Will assess as treatment progresses  · Recommendations/Intent for next treatment session: \"Next visit will focus on advancements to more challenging activities and reduction in assistance provided\".   Total Treatment Duration:  PT Patient Time In/Time Out  Time In: 1405  Time Out: 55 Ingris Sahu, PT

## 2021-03-23 VITALS
RESPIRATION RATE: 18 BRPM | DIASTOLIC BLOOD PRESSURE: 66 MMHG | TEMPERATURE: 97.6 F | WEIGHT: 274.43 LBS | HEIGHT: 64 IN | HEART RATE: 77 BPM | BODY MASS INDEX: 46.85 KG/M2 | SYSTOLIC BLOOD PRESSURE: 97 MMHG | OXYGEN SATURATION: 97 %

## 2021-03-23 PROBLEM — N39.0 E-COLI UTI: Status: ACTIVE | Noted: 2021-03-23

## 2021-03-23 PROBLEM — L89.41: Status: ACTIVE | Noted: 2021-03-23

## 2021-03-23 PROBLEM — B96.20 E-COLI UTI: Status: ACTIVE | Noted: 2021-03-23

## 2021-03-23 LAB
GLUCOSE BLD STRIP.AUTO-MCNC: 190 MG/DL (ref 65–100)
GLUCOSE BLD STRIP.AUTO-MCNC: 248 MG/DL (ref 65–100)
SERVICE CMNT-IMP: ABNORMAL
SERVICE CMNT-IMP: ABNORMAL

## 2021-03-23 PROCEDURE — 82962 GLUCOSE BLOOD TEST: CPT

## 2021-03-23 PROCEDURE — 97530 THERAPEUTIC ACTIVITIES: CPT

## 2021-03-23 PROCEDURE — 65270000029 HC RM PRIVATE

## 2021-03-23 PROCEDURE — 74011250636 HC RX REV CODE- 250/636: Performed by: HOSPITALIST

## 2021-03-23 PROCEDURE — 74011250637 HC RX REV CODE- 250/637: Performed by: HOSPITALIST

## 2021-03-23 PROCEDURE — 74011636637 HC RX REV CODE- 636/637: Performed by: HOSPITALIST

## 2021-03-23 PROCEDURE — 99218 HC RM OBSERVATION: CPT

## 2021-03-23 RX ORDER — INSULIN PUMP SYRINGE, 3 ML
EACH MISCELLANEOUS
Qty: 1 KIT | Refills: 0 | Status: SHIPPED | OUTPATIENT
Start: 2021-03-23

## 2021-03-23 RX ORDER — PEN NEEDLE, DIABETIC 30 GX3/16"
NEEDLE, DISPOSABLE MISCELLANEOUS
Qty: 1 PACKAGE | Refills: 11 | Status: SHIPPED | OUTPATIENT
Start: 2021-03-23

## 2021-03-23 RX ORDER — INSULIN LISPRO 100 [IU]/ML
6 INJECTION, SOLUTION INTRAVENOUS; SUBCUTANEOUS
Qty: 2 PEN | Refills: 2 | Status: SHIPPED | OUTPATIENT
Start: 2021-03-23

## 2021-03-23 RX ORDER — INSULIN GLARGINE 100 [IU]/ML
20 INJECTION, SOLUTION SUBCUTANEOUS DAILY
Qty: 2 PEN | Refills: 2 | Status: SHIPPED | OUTPATIENT
Start: 2021-03-23

## 2021-03-23 RX ADMIN — INSULIN LISPRO 6 UNITS: 100 INJECTION, SOLUTION INTRAVENOUS; SUBCUTANEOUS at 09:02

## 2021-03-23 RX ADMIN — LISINOPRIL 10 MG: 5 TABLET ORAL at 08:53

## 2021-03-23 RX ADMIN — LEVOTHYROXINE SODIUM 100 MCG: 0.1 TABLET ORAL at 08:53

## 2021-03-23 RX ADMIN — ZOLPIDEM TARTRATE 5 MG: 5 TABLET ORAL at 00:06

## 2021-03-23 RX ADMIN — CELECOXIB 200 MG: 200 CAPSULE ORAL at 08:53

## 2021-03-23 RX ADMIN — TRAMADOL HYDROCHLORIDE 50 MG: 50 TABLET, FILM COATED ORAL at 08:54

## 2021-03-23 RX ADMIN — Medication 10 ML: at 05:17

## 2021-03-23 RX ADMIN — INSULIN LISPRO 6 UNITS: 100 INJECTION, SOLUTION INTRAVENOUS; SUBCUTANEOUS at 11:55

## 2021-03-23 RX ADMIN — AMLODIPINE BESYLATE 5 MG: 5 TABLET ORAL at 08:53

## 2021-03-23 RX ADMIN — ENOXAPARIN SODIUM 40 MG: 40 INJECTION, SOLUTION INTRAVENOUS; SUBCUTANEOUS at 08:54

## 2021-03-23 RX ADMIN — METFORMIN HYDROCHLORIDE 1000 MG: 500 TABLET ORAL at 08:53

## 2021-03-23 RX ADMIN — Medication 1 AMPULE: at 08:54

## 2021-03-23 RX ADMIN — TRAMADOL HYDROCHLORIDE 50 MG: 50 TABLET, FILM COATED ORAL at 00:06

## 2021-03-23 RX ADMIN — LACTOBACILLUS ACIDOPHILUS / LACTOBACILLUS BULGARICUS 1 PACKET: 100 MILLION CFU STRENGTH GRANULES at 08:55

## 2021-03-23 RX ADMIN — MONTELUKAST 10 MG: 10 TABLET, FILM COATED ORAL at 08:53

## 2021-03-23 RX ADMIN — OXYBUTYNIN CHLORIDE 5 MG: 5 TABLET, EXTENDED RELEASE ORAL at 08:53

## 2021-03-23 RX ADMIN — PREGABALIN 150 MG: 150 CAPSULE ORAL at 08:53

## 2021-03-23 NOTE — PROGRESS NOTES
Care Management Interventions  PCP Verified by CM: Yes([facility] )  Palliative Care Criteria Met (RRAT>21 & CHF Dx)?: No(Risk 12% Dx UTI)  Mode of Transport at Discharge: Other (see comment)(cousin)  Transition of Care Consult (CM Consult): Discharge Planning  Discharge Durable Medical Equipment: No(CPAP, Cane)  Physical Therapy Consult: Yes  Current Support Network: Lives Alone  Confirm Follow Up Transport: Family  Discharge Location  Discharge Placement: Home  Patient medically stable for d/c. Patient declined home health and walker states needs to get her house cleaned up. She is asking about someone coming to assist her with cleaning her house like mopping etc. Patient given information on Home care agencies that she can call and set up assistance example [facility], [facility] [address] at [address] [facility]. Dr Saulo Aguilar in room and patient request generic prescription sent to [facility] [address] states if brand will have to do mail order and cost more. She also request needles, insulin pens and glucometer. Patient voices no concerns or needs for d/c.  Patient to d/c home and her cousin is going to provide transportation home

## 2021-03-23 NOTE — DIABETES MGMT
Patient to discharge home today. Noted per discharge summary patient to discharge on Metformin 1000mg BID, Lantus 20 units daily (vial), and Humalog 6 units with meals (vial). Patient prefers insulin pens and had visual difficulties with vial and syringe method. Provider updated via [employer] that patient will need prescriptions for glucometer kit, lancets, test strips, insulin pens, and pen needles.

## 2021-03-23 NOTE — PROGRESS NOTES
Problem: Falls - Risk of  Goal: *Absence of Falls  Description: Document Waqassurya Brooke Fall Risk and appropriate interventions in the flowsheet. Outcome: Progressing Towards Goal  Note: Fall Risk Interventions:  Mobility Interventions: Communicate number of staff needed for ambulation/transfer, Patient to call before getting OOB         Medication Interventions: Evaluate medications/consider consulting pharmacy, Teach patient to arise slowly, Patient to call before getting OOB    Elimination Interventions: Call light in reach, Patient to call for help with toileting needs, Toilet paper/wipes in reach              Problem: Pressure Injury - Risk of  Goal: *Prevention of pressure injury  Description: Document Gergorio Scale and appropriate interventions in the flowsheet.   Outcome: Progressing Towards Goal  Note: Pressure Injury Interventions:  Sensory Interventions: Assess changes in LOC, Maintain/enhance activity level, Pressure redistribution bed/mattress (bed type)    Moisture Interventions: Absorbent underpads, Maintain skin hydration (lotion/cream)    Activity Interventions: Increase time out of bed, Pressure redistribution bed/mattress(bed type)    Mobility Interventions: HOB 30 degrees or less, Pressure redistribution bed/mattress (bed type)    Nutrition Interventions: Document food/fluid/supplement intake    Friction and Shear Interventions: HOB 30 degrees or less TBD TBD TBD

## 2021-03-23 NOTE — DISCHARGE SUMMARY
Hospitalist Discharge Summary     Patient ID:  Taylor Morel  [id number]  87 y.o.  1945  Admit date: 3/20/2021  Discharge date: 3/23/2021  Attending: Ron Mayer DO  PCP:  Isabel Brizuela MD  Treatment Team: Attending Provider: Carlos Kurtz; Student Nurse: Kaushik Rueda; Utilization Review: Suzanna Smart RN; Care Manager: Ty Cadena RN; Physical Therapist: Marko Terry PT; Staff Nurse: Juvenal Cervantes    Principal Diagnosis E-coli UTI    Hospital Problems as of 3/23/2021 Date Reviewed: 2/27/2018          Codes Class Noted - Resolved POA    * (Principal) E-coli UTI ICD-10-CM: N39.0, B96.20  ICD-9-CM: 599.0, 041.49  3/23/2021 - Present Yes        Pressure injury of contiguous region involving back and right buttock, stage 1 ICD-10-CM: L89.41  ICD-9-CM: 707.09, 707.21  3/23/2021 - Present Yes        Acute UTI ICD-10-CM: N39.0  ICD-9-CM: 599.0  3/20/2021 - Present         Uncontrolled diabetes mellitus with hyperglycemia ([facility] ICD-10-CM: E11.65  ICD-9-CM: 250.02  3/20/2021 - Present Yes        Hypertension ICD-10-CM: I10  ICD-9-CM: 401.9  Unknown - Present Yes        Morbid obesity with BMI of 45.0-49.9, adult ([facility] ICD-10-CM: E66.01, Z68.42  ICD-9-CM: 278.01, V85.42  3/3/2011 - Present Yes        Hypothyroidism (Chronic) ICD-10-CM: E03.9  ICD-9-CM: 244.9  9/16/2009 - Present Yes        Chronic pain (Chronic) ICD-10-CM: Z19.83  ICD-9-CM: 338.29  9/16/2009 - Present Yes              Hospital Course:  Please refer to the admission H&P for details of presentation. In summary, the patient is a 50-year-old female with past medical history of diabetes mellitus type 2, hypertension, obesity and obstructive sleep apnea who had recently had her diabetic medications adjusted including her Metformin. she is on 1000 g of twice daily, in addition to Trulicity injections once a week. Claudia Batres has been having increased weakness malaise, and increased urinary frequency.  Due to generally not feeling well she called EMS to her residence. Paulina Billingskatherine the emergency department a  Urinalysis showed 4+ bacteria, reflex culture collected. Dereck Calloway noted to have hyperglycemia with a serum blood glucose of 400, and hypokalemia.  Due to her generalized weakness and fatigue, she believe that she cannot take care of herself at home and had no one to assist her, due to her frail state she potentially developed a right-sided pressure ulcer on the right buttock due to debility and deconditioning. She received 3 days of Ceftriaxone. Urine culture was NOT sent. She felt stronger. She was discharged home in stable condition. Significant Diagnostic Studies:    Labs: Results:       Chemistry Recent Labs     03/21/21  0250 03/20/21  1306   * 400*    138   K 3.3* 3.4*    97*   CO2 29 28   BUN 14 18   CREA 0.83 1.08*   CA 8.9 9.6   AGAP 7 13   AP  --  113   TP  --  7.5   ALB  --  3.7   GLOB  --  3.8*   AGRAT  --  1.0*      CBC w/Diff Recent Labs     03/21/21  0250 03/20/21  1306   WBC 9.7 10.3   RBC 5.09 5.42*   HGB 14.4 15.5*   HCT 43.8 46.9*    235   GRANS 57 78   LYMPH 34 16   EOS 1 0*      Cardiac Enzymes No results for input(s): CPK, CKND1, EVELYNE in the last 72 hours. No lab exists for component: CKRMB, TROIP   Coagulation No results for input(s): PTP, INR, APTT, INREXT, INREXT in the last 72 hours. Lipid Panel No results found for: CHOL, CHOLPOCT, CHOLX, CHLST, CHOLV, 731701, HDL, HDLP, LDL, LDLC, DLDLP, 220585, VLDLC, VLDL, TGLX, TRIGL, TRIGP, TGLPOCT, CHHD, CHHDX   BNP No results for input(s): BNPP in the last 72 hours. Liver Enzymes Recent Labs     03/20/21  1306   TP 7.5   ALB 3.7         Thyroid Studies No results found for: T4, T3U, TSH, TSHEXT, TSHEXT         Imaging:  Xr Chest Pa Lat    Result Date: 3/20/2021  Normal chest.      Microbiology/Cultures:   All Micro Results     None          Discharge Exam:  Visit Vitals  /61 (BP 1 Location: Left upper arm, BP Patient Position: At rest)   Pulse 62   Temp 97.3 °F (36.3 °C)   Resp 18   Ht 5' 4\" (1.626 m)   Wt 124.5 kg (274 lb 6.9 oz)   SpO2 95%   BMI 47.11 kg/m²     General appearance: alert, cooperative, no distress, appears stated age  Lungs: clear to auscultation bilaterally  Heart: regular rate and rhythm, S1, S2 normal, no murmur, click, rub or gallop  Abdomen: soft, non-tender. Bowel sounds normal. No masses,  no organomegaly  Extremities: no cyanosis or edema  Neurologic: Grossly normal    Disposition: home  Discharge Condition: stable  Patient Instructions:   Current Discharge Medication List      START taking these medications    Details   insulin glargine (LANTUS,BASAGLAR) 100 unit/mL (3 mL) inpn 20 Units by SubCUTAneous route daily. Qty: 2 Pen, Refills: 2      insulin lispro (HUMALOG) 100 unit/mL kwikpen 6 Units by SubCUTAneous route Before breakfast, lunch, and dinner. Qty: 2 Pen, Refills: 2      Blood-Glucose Meter monitoring kit 1 Kit  Lancets  Test Strips  Pen Needles  Qty: 1 Kit, Refills: 0      Insulin Needles, Disposable, 31 gauge x 5/16\" ndle Use with glucometer  Check TIDM  Qty: 1 Package, Refills: 11      metFORMIN (GLUCOPHAGE) 1,000 mg tablet Take 1 Tab by mouth two (2) times daily (with meals). Qty: 60 Tab, Refills: 0         CONTINUE these medications which have NOT CHANGED    Details   amLODIPine (NORVASC) 5 mg tablet Take 5 mg by mouth daily. budesonide (PULMICORT) 0.5 mg/2 mL nbsp 500 mcg by Nebulization route two (2) times a day. formoterol (PERFOROMIST) 20 mcg/2 mL nebu neb solution 20 mcg by Nebulization route two (2) times a day. Lactobacillus acidoph-L.bulgar (RUAM ASSIST) 100 million cell pwpk Take  by mouth. traMADol (ULTRAM) 50 mg tablet Take 50 mg by mouth every six (6) hours as needed for Pain. HYDROmorphone (DILAUDID) 2 mg tablet Take 1 Tab by mouth every four (4) hours as needed.  Max Daily Amount: 12 mg.  Qty: 40 Tab, Refills: 0      cpap machine kit by Does Not Apply route. 9 cm H2O      FLUTICASONE/SALMETEROL (ADVAIR DISKUS IN) Take 2 Puffs by inhalation two (2) times daily as needed. celecoxib (CELEBREX) 200 mg capsule Take 200 mg by mouth daily. 1 to 2 a day. PREGABALIN (LYRICA PO) Take 150 mg by mouth two (2) times a day. montelukast (SINGULAIR) 10 mg tablet Take 10 mg by mouth daily. MULTIVITAMIN WITH MINERALS (MULTIVITAMIN & MINERAL FORMULA PO) Take 1 Tab by mouth daily. Multivitamin with D with Calcium       metaxalone (SKELAXIN) 800 mg tablet Take 1 Tab by mouth three (3) times daily as needed for Pain. Qty: 1 Tab, Refills: 0      OMEPRAZOLE PO 20 mg daily. Pt. Instructed to take morning of surgery per anesthesiologist.        CYANOCOBALAMIN (VITAMIN B-12 PO) Take 500 mg by mouth daily. LISINOPRIL-HYDROCHLOROTHIAZIDE 10-12.5 mg per tablet take 1 Tab by mouth daily. LASIX 20 mg Tab Take 20 mg by mouth daily as needed. SYNTHROID 100 mcg Tab Take 100 mcg by mouth daily. Pt. Instructed to take morning of surgery per anesthesiologist.        AMBIEN 10 mg Tab Take 10 mg by mouth nightly. Azelastine (ASTEPRO) 0.15 % (205.5 mcg) nasal spray 2 Sprays by Both Nostrils route as needed. fluticasone (FLONASE ALLERGY RELIEF) 50 mcg/actuation nasal spray 2 Sprays by Both Nostrils route as needed for Rhinitis. Biotin 2,500 mcg cap Take  by mouth.      cranberry extract 450 mg tab tablet Take 450 mg by mouth. !! OTHER Vein and leg support--- 1 tab po qd      estradiol (ESTRACE) 0.01 % (0.1 mg/gram) vaginal cream Insert 1 g into vagina daily. lidocaine-kinesiology tape 5 % kit by Apply Externally route. !! OTHER three (3) times daily. Lidocaine patch 5%      oxybutynin chloride XL (DITROPAN XL) 5 mg CR tablet Take 5 mg by mouth daily. polyethylene glycol (MIRALAX) 17 gram packet Take 17 g by mouth as needed. PATANOL 0.1 % Drop 2 Drops by Both Eyes route as needed for Allergies.       LIBRAX, WITH CLINIDIUM, 2.5-5 mg Cap Take 1 Cap by mouth four (4) times daily as needed.       !! - Potential duplicate medications found. Please discuss with provider.      STOP taking these medications       SITagliptin (JANUVIA) 100 mg tablet Comments:   Reason for Stopping:         GLYBURIDE-METFORMIN 2.5-500 mg per tablet Comments:   Reason for Stopping:         ciprofloxacin HCl (CIPRO) 500 mg tablet Comments:   Reason for Stopping:         neomycin-colist-hydrocortisone-thonzonium (CORTISPORIN-TC) otic suspension Comments:   Reason for Stopping:         neomycin-polymyxin-hydrocortisone, buffered, (PEDIOTIC) 3.5-10,000-1 mg/mL-unit/mL-% otic suspension Comments:   Reason for Stopping:         NEOMY SULF/POLYMYX B SULF/HC (CORTISPORIN OT) Comments:   Reason for Stopping:               Activity: Activity as tolerated  Diet: Diabetic Diet  Wound Care: As directed    Follow-up  ·   PCP in one week  Time spent to discharge patient 35 minutes  Signed:  Ana Key DO  3/23/2021  9:15 AM

## 2021-03-23 NOTE — PROGRESS NOTES
Problem: Mobility Impaired (Adult and Pediatric)  Goal: *Acute Goals and Plan of Care (Insert Text)  Outcome: Progressing Towards Goal  Note: LTG:  (1.)Ms. Mcgowan will move from supine to sit and sit to supine  in bed with SUPERVISION within 5 treatment day(s). (2.)Ms. Mcgowan will transfer from bed to chair and chair to bed with SUPERVISION using the least restrictive device within 5 treatment day(s). (3.)Ms. Mcgowan will ambulate with STAND BY ASSIST for 150 feet with the least restrictive device within 5 treatment day(s). (4.)Ms. Mcgowan will climb 4 steps with L railing and SBA within 5 treatment days. ________________________________________________________________________________________________       PHYSICAL THERAPY: Daily Note and PM 3/23/2021  INPATIENT: PT Visit Days : 1  [place] / [place] / Product Type: Medicare /       NAME/AGE/GENDER: Laurita Kim is a 77 y.o. female   PRIMARY DIAGNOSIS: Acute UTI [N39.0]  Uncontrolled diabetes mellitus with hyperglycemia ([facility] [E11.65]  E-coli UTI [N39.0, B96.20] E-coli UTI E-coli UTI       ICD-10: Treatment Diagnosis:    · Generalized Muscle Weakness (M62.81)  · Difficulty in walking, Not elsewhere classified (R26.2)   Precaution/Allergies:  Codeine, Erythromycin, and Tetracycline      ASSESSMENT:     Ms. Petty Rodríguez presents presents with the above dx. Pt. Supine on contact and she feels so much better and that she is going home today. She is shocked that she is feeling so much better after feeling so bad when she came in. I encouraged her to walk in the bowser and do some steps this am so she feels good about going home. She refused walking in the bowser and doing steps saying she feels good and will have no problem. She agreed to walk in the room and do some exercises. She ambulated in the room with RW and then also without an assistive device. She walked much better with the RW but refuses to use one because it won't fit in her home. She had much more side trunk sway without the walker. She told me several times how she is a hoarder and can't get a walker in her home and won't let HHPT come out. She did some exercises in the chair and we focused on ways to increase her right hip abduction strength as this was her main complaint. She had no other questions and she told me she felt comfortable going home today and she would be fine. This section established at most recent assessment   PROBLEM LIST (Impairments causing functional limitations):  1. Decreased Strength  2. Decreased Transfer Abilities  3. Decreased Ambulation Ability/Technique  4. Decreased Activity Tolerance   INTERVENTIONS PLANNED: (Benefits and precautions of physical therapy have been discussed with the patient.)  1. Balance Exercise  2. Gait Training  3. Home Exercise Program (HEP)  4. Neuromuscular Re-education/Strengthening  5. Range of Motion (ROM)  6. Therapeutic Activites  7. Therapeutic Exercise/Strengthening  8. Transfer Training     TREATMENT PLAN: Frequency/Duration: daily for duration of hospital stay  Rehabilitation Potential For Stated Goals: Excellent     REHAB RECOMMENDATIONS (at time of discharge pending progress):    Placement: It is my opinion, based on this patient's performance to date, that Ms. Mcgowan may benefit from [address] after discharge due to the functional deficits listed above that are likely to improve with skilled rehabilitation because she has difficulty driving and only feels comfortable driving 6-6Y/DJVC . Equipment:    Walkers, Type: Rolling Walker              HISTORY:   History of Present Injury/Illness (Reason for Referral):  67-year-old female with past medical history of diabetes mellitus type 2, hypertension, obesity and obstructive sleep apnea who had recently had her diabetic medications adjusted including her Metformin. she is on 1000 g of twice daily, in addition to Trulicity injections once a week.   She has been having increased weakness malaise, and increased urinary frequency. Due to generally not feeling well she called EMS to her residence. In the emergency department a  Urinalysis showed 4+ bacteria, reflex culture collected. Also noted to have hyperglycemia with a serum blood glucose of 400, and hypokalemia. Due to her generalized weakness and fatigue, she believe that she cannot take care of herself at home and had no one to assist her, due to her frail state she potentially developed a right-sided pressure ulcer on the right buttock due to debility and deconditioning. Past Medical History/Comorbidities:   Ms. Ed Caraballo  has a past medical history of Arthritis, Asthma, Chronic obstructive asthma, unspecified, Chronic pain, DDD (degenerative disc disease), GERD (gastroesophageal reflux disease), Hypertension, Hypothyroidism, HYPOTHYROIDISM, Incontinence of urine, Morbid obesity ([address] Neuropathy, ILYA (obstructive sleep apnea) (9/16/2009), Osteoarthritis of left knee (9/16/2009), Osteoarthritis of right knee (3/2/2011), S/P total knee replacement using cement (9/16/2009), Sleep apnea, Spastic colon, Status post right hip replacement (10/9/2017), and Type II or unspecified type diabetes mellitus without mention of complication, not stated as uncontrolled. She also has no past medical history of Adverse effect of anesthesia, Aneurysm ([address] Arrhythmia, Autoimmune disease ([address] CAD (coronary artery disease), Cancer ([address] Chronic kidney disease, Chronic obstructive pulmonary disease ([address] Coagulation disorder ([address] Difficult intubation, Endocarditis, Heart failure ([address] Ill-defined condition, Liver disease, Malignant hyperthermia due to anesthesia, Nausea & vomiting, Nicotine vapor product user, Non-nicotine vapor product user, Pseudocholinesterase deficiency, Psychiatric disorder, PUD (peptic ulcer disease), Rheumatic fever, Seizures ([address] Stroke ([address] or Thromboembolus ([address]   Ms. Ed Caraballo  has a past surgical history that includes hx hernia repair (08); hx hysterectomy (); pr hand/finger surgery unlisted (Right, ); pr total knee arthroplasty (Left, ); and hx knee replacement (Right, ). Social History/Living Environment:   Home Environment: Private residence  # Steps to Enter: 4  Rails to Enter: Yes  Hand Rails : Left  Wheelchair Ramp: No  One/Two Story Residence: One story  Living Alone: Yes  Support Systems: Family member(s)  Patient Expects to be Discharged to[de-identified] Private residence  Current DME Used/Available at Home: CPAP, Glucometer  Prior Level of Function/Work/Activity:  Independent with home duties     Number of Personal Factors/Comorbidities that affect the Plan of Care: 0: LOW COMPLEXITY   EXAMINATION:   Most Recent Physical Functioning:   Gross Assessment:                  Posture:     Balance:  Sitting: Intact  Standing: With support Bed Mobility:  Supine to Sit: Stand-by assistance  Wheelchair Mobility:     Transfers:  Sit to Stand: Stand-by assistance  Stand to Sit: Stand-by assistance  Duration: 25 Minutes  Gait:     Base of Support: Widened  Speed/Jayne: Delayed; Shuffled  Step Length: Right shortened;Left shortened  Gait Abnormalities: Decreased step clearance; Antalgic;Trunk sway increased  Distance (ft): 30 Feet (ft)(x 2)  Assistive Device: Walker, rolling  Ambulation - Level of Assistance: Stand-by assistance  Interventions: Safety awareness training;Verbal cues      Body Structures Involved:  1. Bones  2. Joints  3. Muscles Body Functions Affected:  1. Movement Related Activities and Participation Affected:  1. Mobility   Number of elements that affect the Plan of Care: 1-2: LOW COMPLEXITY   CLINICAL PRESENTATION:   Presentation: Stable and uncomplicated: LOW COMPLEXITY   CLINICAL DECISION MAKIN [facility] [address] [address] AM-PAC 6 Clicks   Basic Mobility Inpatient Short Form  How much difficulty does the patient currently have. .. Unable A Lot A Little None   1.   Turning over in bed (including adjusting bedclothes, sheets and blankets)? [] 1   [] 2   [x] 3   [] 4   2. Sitting down on and standing up from a chair with arms ( e.g., wheelchair, bedside commode, etc.)   [] 1   [] 2   [x] 3   [] 4   3. Moving from lying on back to sitting on the side of the bed? [] 1   [] 2   [x] 3   [] 4   How much help from another person does the patient currently need. .. Total A Lot A Little None   4. Moving to and from a bed to a chair (including a wheelchair)? [] 1   [] 2   [x] 3   [] 4   5. Need to walk in hospital room? [] 1   [] 2   [x] 3   [] 4   6. Climbing 3-5 steps with a railing? [] 1   [] 2   [x] 3   [] 4   © 2007, Trustees of [address] [place] [address], under license to [employer]. All rights reserved      Score:  Initial: 18 Most Recent: X (Date: -- )    Interpretation of Tool:  Represents activities that are increasingly more difficult (i.e. Bed mobility, Transfers, Gait). Medical Necessity:     · Patient demonstrates   · good  ·  rehab potential due to higher previous functional level. Reason for Services/Other Comments:  · Patient continues to require skilled intervention due to   · patient unable to attend/participate in therapy as expected  · . Use of outcome tool(s) and clinical judgement create a POC that gives a: Clear prediction of patient's progress: LOW COMPLEXITY            TREATMENT:   (In addition to Assessment/Re-Assessment sessions the following treatments were rendered)   Pre-treatment Symptoms/Complaints:  Feels so much better  Pain: Initial:      Post Session:  0   Therapeutic Activity (  25 Minutes): Therapeutic activities including chair transfers, level surface ambulation with and without RW, standing balance to improve mobility, strength, balance, and coordination. Required minimal verbal cues to promote dynamic balance in standing. Performed seated, supine, and standing hip abduction and determined supine was best for her.   Sit to stand but patient reports she can't do that at home as her recliner is broken. She did not feel comfortable doing standing exercises for balance reasons and not enough room at her home so we decided hip exercises in her recliner were the best.     Braces/Orthotics/Lines/Etc:   · none  Treatment/Session Assessment:    · Response to Treatment:  Did fine  · Interdisciplinary Collaboration:   o Physical Therapist  o Registered Nurse  · After treatment position/precautions:   o Up in chair  o Bed/Chair-wheels locked  o Bed in low position  o Call light within reach  o left with cna   · Compliance with Program/Exercises: Will assess as treatment progresses  · Recommendations/Intent for next treatment session: \"Next visit will focus on advancements to more challenging activities and reduction in assistance provided\".   Total Treatment Duration:  PT Patient Time In/Time Out  Time In: 1105  Time Out: [address] [facility], PT

## 2021-07-07 ENCOUNTER — HOSPITAL ENCOUNTER (OUTPATIENT)
Dept: DIABETES SERVICES | Age: 76
Discharge: HOME | End: 2021-07-07
Payer: MEDICARE

## 2021-07-07 PROCEDURE — G0108 DIAB MANAGE TRN  PER INDIV: HCPCS

## 2021-07-07 NOTE — PROGRESS NOTES
This is a one on one appointment. Due to being during [facility] public health emergency, social distancing and mandatory precautions are in place and utilized. Client attended two hour yearly follow up session today. Topics discussed were client driven. Topics included carbohydrates, proteins and fats emphasizing heart healthy choices and high fiber carbohydrates. Educated on label reading and 3 methods of meal planning. Educated on portion control, adequate fluid intake, weight loss tips, target blood sugar goals and criteria for insurance to cover a CGM. Helped client plan several breakfast, lunch, dinner and snack ideas. Written info provided. Encouraged pt to attend our free grocery shopping tour. Current po: 3 meals and 2 snacks. Current diet low in water and high in carbohydrates and fats. Educated to limit fats to 1-2 servings per meal and limit carbohydrates to 45 grams per meal and snacks to 15-30 grams of carbohydrates. Current diet does not include a 7 gram protein with snacks and not consistent with breakfast meal.     Current exercise: Limited due to trouble walking and balance issues. Currently checks blood sugars 2-3 times/day. Checks FBS and maybe before meals and maybe bedtime. Educated to check FBS, bedtime and 2 hrs from the first bite of a meal. Educated pt to eat a carbohydrate/protein snack if bedtime blood sugars under 100 and ok to eat a carbohydrate/protein snack even if blood sugars over 100. Client has our phone number for questions/concerns.

## 2021-12-15 PROBLEM — E11.9 TYPE 2 DIABETES MELLITUS TREATED WITHOUT INSULIN (HCC): Status: ACTIVE | Noted: 2021-03-20

## 2022-02-11 PROBLEM — R06.02 SHORTNESS OF BREATH: Status: ACTIVE | Noted: 2022-02-11

## 2022-03-18 PROBLEM — Z96.641 STATUS POST RIGHT HIP REPLACEMENT: Status: ACTIVE | Noted: 2017-10-09

## 2022-03-18 PROBLEM — E11.9 TYPE 2 DIABETES MELLITUS TREATED WITHOUT INSULIN (HCC): Status: ACTIVE | Noted: 2021-03-20

## 2022-03-19 PROBLEM — R79.89 ELEVATED SERUM CREATININE: Status: ACTIVE | Noted: 2017-09-19

## 2022-03-19 PROBLEM — L89.41: Status: ACTIVE | Noted: 2021-03-23

## 2022-03-19 PROBLEM — N39.0 ACUTE UTI: Status: ACTIVE | Noted: 2021-03-20

## 2022-03-19 PROBLEM — D72.829 ELEVATED WBC COUNT: Status: ACTIVE | Noted: 2017-09-19

## 2022-03-19 PROBLEM — R06.02 SHORTNESS OF BREATH: Status: ACTIVE | Noted: 2022-02-11

## 2022-03-19 PROBLEM — B96.20 E-COLI UTI: Status: ACTIVE | Noted: 2021-03-23

## 2022-03-19 PROBLEM — N39.0 E-COLI UTI: Status: ACTIVE | Noted: 2021-03-23

## 2022-06-17 ENCOUNTER — TELEPHONE (OUTPATIENT)
Dept: ORTHOPEDIC SURGERY | Age: 77
End: 2022-06-17

## 2022-06-17 NOTE — TELEPHONE ENCOUNTER
Left VM for patient to call me back about her appt with Karin on 6/20/2022. Please let me know if she calls back.

## 2022-06-20 NOTE — TELEPHONE ENCOUNTER
Cheryl Butterfield,  I was trying to move this patient earlier today on Karin's schedule but was unable to reach her on Friday. Just wanted you to be aware.

## 2022-06-29 ENCOUNTER — OFFICE VISIT (OUTPATIENT)
Dept: ORTHOPEDIC SURGERY | Age: 77
End: 2022-06-29
Payer: MEDICARE

## 2022-06-29 DIAGNOSIS — M25.552 LEFT HIP PAIN: Primary | ICD-10-CM

## 2022-06-29 DIAGNOSIS — M54.40 LOW BACK PAIN WITH SCIATICA, SCIATICA LATERALITY UNSPECIFIED, UNSPECIFIED BACK PAIN LATERALITY, UNSPECIFIED CHRONICITY: ICD-10-CM

## 2022-06-29 DIAGNOSIS — M16.12 PRIMARY OSTEOARTHRITIS OF LEFT HIP: ICD-10-CM

## 2022-06-29 PROBLEM — E78.5 HYPERLIPIDEMIA: Status: ACTIVE | Noted: 2022-06-29

## 2022-06-29 PROBLEM — H49.12 4TH NERVE PALSY, LEFT: Status: ACTIVE | Noted: 2020-11-06

## 2022-06-29 PROBLEM — N39.3 STRESS INCONTINENCE OF URINE: Status: ACTIVE | Noted: 2022-06-29

## 2022-06-29 PROBLEM — M47.819 SPONDYLOSIS WITHOUT MYELOPATHY: Status: ACTIVE | Noted: 2022-06-29

## 2022-06-29 PROBLEM — K58.9 IRRITABLE BOWEL SYNDROME: Status: ACTIVE | Noted: 2017-10-16

## 2022-06-29 PROBLEM — E55.9 VITAMIN D DEFICIENCY: Status: ACTIVE | Noted: 2022-06-29

## 2022-06-29 PROBLEM — F51.01 PRIMARY INSOMNIA: Status: ACTIVE | Noted: 2017-10-16

## 2022-06-29 PROBLEM — H16.223 KERATOCONJUNCTIVITIS SICCA, NOT SPECIFIED AS SJOGREN'S, BILATERAL: Status: ACTIVE | Noted: 2020-11-06

## 2022-06-29 PROBLEM — E11.42 POLYNEUROPATHY DUE TO TYPE 2 DIABETES MELLITUS (HCC): Status: ACTIVE | Noted: 2022-06-29

## 2022-06-29 PROBLEM — H57.051: Status: ACTIVE | Noted: 2020-11-06

## 2022-06-29 PROCEDURE — 1090F PRES/ABSN URINE INCON ASSESS: CPT | Performed by: PHYSICIAN ASSISTANT

## 2022-06-29 PROCEDURE — 1036F TOBACCO NON-USER: CPT | Performed by: PHYSICIAN ASSISTANT

## 2022-06-29 PROCEDURE — 99204 OFFICE O/P NEW MOD 45 MIN: CPT | Performed by: PHYSICIAN ASSISTANT

## 2022-06-29 PROCEDURE — G8427 DOCREV CUR MEDS BY ELIG CLIN: HCPCS | Performed by: PHYSICIAN ASSISTANT

## 2022-06-29 PROCEDURE — G8400 PT W/DXA NO RESULTS DOC: HCPCS | Performed by: PHYSICIAN ASSISTANT

## 2022-06-29 PROCEDURE — G8417 CALC BMI ABV UP PARAM F/U: HCPCS | Performed by: PHYSICIAN ASSISTANT

## 2022-06-29 PROCEDURE — 1123F ACP DISCUSS/DSCN MKR DOCD: CPT | Performed by: PHYSICIAN ASSISTANT

## 2022-06-29 PROCEDURE — 3017F COLORECTAL CA SCREEN DOC REV: CPT | Performed by: PHYSICIAN ASSISTANT

## 2022-06-29 RX ORDER — FUROSEMIDE 20 MG/1
TABLET ORAL
COMMUNITY

## 2022-06-29 RX ORDER — LISINOPRIL 2.5 MG/1
TABLET ORAL
COMMUNITY
Start: 2022-04-11 | End: 2022-07-18

## 2022-06-29 RX ORDER — PREGABALIN 50 MG/1
CAPSULE ORAL 2 TIMES DAILY
COMMUNITY

## 2022-06-29 RX ORDER — CELECOXIB 200 MG/1
CAPSULE ORAL
COMMUNITY

## 2022-06-29 NOTE — PROGRESS NOTES
Name: Bernice Habermann  YOB: 1945  Gender: female  MRN: [id number]    CC:   Chief Complaint   Patient presents with    Hip Pain     Left         HPI: Bernice Habermann is a 77 y.o. female with a PMHx of HTN, DM on insulin, hypothyroidism and s/p right ISIAH in 2017 by Dr. Elvia Simmons here over 1hr late for her scheduled appointment for evaluation of left hip pain  There was not an acute injury  Regarding the hip pain, it has been present episodically for years and is becoming worse. The pain is primarily anterior into the groin and posterior buttock area  she describes the pain as a constant ache that is intermittently sharp with activity  The pain is worse with any weight bearing, going up and/or down stairs, rising after sitting, twisting, standing, walking, at night, often waking them from sleep, crossing their legs, lifting their leg and lying on the left side  The pain does radiate down the leg into her toes. She currently sees Dr. Shyann Bowden for management of her chronic back pain. She is presently treated with intermittent interlaminar L3-4 epidural steroid injections and bilateral sacroiliac joint injections, most recently performed April 2022. These typically improve her lower back pain. MRI of the lumbar spine from March 2017, reviewed by Dr. Shyann Bowden, showed moderate central stenosis L2-3-4-5 with multilevel neuroforaminal narrowing. She had x-rays of the lumbar spine July 2020, and these showed lumbar scoliosis and degenerative changes. Treatment so far has been prescription and OTC NSAIDS which have not effectively relieved pain/inflammation, activity modification, celebrex, tramadol, cortisone injections and lyrica with little relief. Review of Systems  As per HPI. Pertinent positives and negatives are addressed with the patient, particularly those related to musculoskeletal concerns. Non-orthopaedic concerns were referred back to the primary care physician.       Allergies   Allergen Reactions    Codeine Other (See Comments)     dizziness    Erythromycin Rash    Tetracycline Rash                    PHYSICAL EXAMINATION:   The patient is alert and oriented, in no distress. There were no vitals filed for this visit. The cervical, thoracic and lumbar spine are without compromising deformity. The upper extremities demonstrate reasonable tone, strength and function bilaterally. The lower extremities are as described below. Circulation is normal with palpable pedal pulses bilaterally and no edema. Skin of the leg is normal with no chronic stasis disease bilaterally. Sensation is intact to light touch bilaterally. The gait is noted to be with a slight trendelenburg and antalgia and and ambulates with a Cane  There is mild tenderness to palpation along the spinous processes and paraspinal musculature. There mild tenderness to palpation over the left greater trochanter  left hip range of motion is 0-75 degrees of flexion and 20 degrees on abduction and adduction. There is 0 degrees internal rotation and 15 degrees of external rotation with groin pain  Limb lengths are equal.  Straight leg test is Positive left  Stability is normal bilaterally. Their judgment and insight are normal.  They are oriented to time, place and person. Their memory is good and the mood and affect appropriate. XRAYS:   AP pelvis and lateral views of the left hip are reviewed. There is  significant joint space loss, eburnated bone and osteophyte formation of the hip. X-ray impression: advanced osteoarthropathy of the left hip      IMPRESSION:      ICD-10-CM    1. Left hip pain  M25.552 XR HIP 2-3 VW W PELVIS LEFT         RECOMMENDATION:    X-rays were reviewed with the patient today. This patient's clinical history and physical exam is consistent with osteoarthritis of the left hip(s).  We discussed the natural history of this condition as a degenerative process that causes inflammation of the joints due to a breakdown of cartilage, the hard, thick tissue that cushions the joints. We discussed that osteoarthritis never completely resolves on its own and symptoms including pain, stiffness, and swelling may wax and wane as the condition progresses. She understands that conservative treatments typically include activity modification, NSAIDs and physical therapy. Oral and/or steroid injections into the joint could be considered in resistant scenarios. I advised avoiding any prolonged bedrest and to try to maintain ADLs as much as possible. The patient was counseled to follow up with me should she develop any worsening symptoms that begin to limit activities of daily living. We did discuss that most of her radicular symptoms are coming from her lower back and she should continue to follow-up with Dr. John Reyna with that. We also discussed necessity for left total hip replacement but more importantly working on her diet and weight loss prior to scheduling any surgery. I recommend she work with physical therapy and diet control and follow-up in 6 weeks with Dr. Amadou Foss for a weight check and to discuss scheduling left ISIAH.  ----The patient will be treated observantly with self directed symptomatic care. Instructions were given to follow up if there is any neurologic or functional decline.  ---- A home exercise program was prescribed for stretching and strengthening. A list of exercises was provided. ---- Physical Therapy was prescribed and will include stretching, strengthening and modalities to promote blood flow, flexibility and core strengthening.  ----Weight Loss: We discussed that pain and problems in the hip and/or knee may be aggravated by obesity. The effectiveness of joint replacement surgery can also be affected by a patient's weight. Obese patients are higher risk of infections and failure of hardware due to weight stresses.  We discussed that attention to weight loss before undergoing surgery may improve the healing process and overall outcome. Weight loss options include: diet, exercise, nutritional support and bariatric surgery. ----ISIAH - Today we discussed left hip replacement surgery. They are aware of the 1% risk of dislocation and 1% risk of infection. They were also informed of the possibility of stroke, heart attack and blood clot-any of these which could result in further hospitalization or death. I reviewed the procedure as well as the pre and post-operative process at length and written information was provided for further review. This will be scheduled and arranged.     4--this is a chronic illness/condition with exacerbation    SHABBIR Parmar

## 2022-06-29 NOTE — PATIENT INSTRUCTIONS
Patient Education        Learning About Total Hip Replacement Surgery  What is total hip replacement surgery? During total hip replacement surgery, your doctor replaces the worn parts ofyour hip joint with artificial parts made of metal, ceramic, or plastic. You may want this surgery if you have hip pain and trouble moving that you can't treat in other ways. Osteoarthritis or rheumatoid arthritis can causethese types of problems. Another cause is bone loss due to a poor blood supply. Hip replacement is sometimes done after a hip fracture. How is this surgery done? Hip replacement surgery is done through one or two cuts (incisions). The cuts may be toward the front (anterior) of your hip, or they may be on the side or toward the back (posterior). You and your doctor can discuss which surgery isbest for you. You may have anesthesia to block pain and medicine to make you drowsy. Or youmay get medicine to make you sleep. After making the incision, your doctor will:   Remove worn bone tissue and cartilage from the hip joint.  Replace the ball at the upper end of your thighbone (femur).  Replace your hip socket with a shell and liner.  Fit the ball into the shell and liner to make a new hip joint. There are two kinds of replacement joints.  Cemented joints. The cement fits between the new joint and the bone.  Uncemented joints. These have a metal coating with many small openings. The bone is shaped to fit the new joint almost perfectly. At first, there will be some small spaces between the bone and the new joint. Over time, the bone grows to fill these small openings. Sometimes a doctor uses a cemented ball and an uncemented socket. Your doctor can tell you which type of new hip joint is best for you. What can you expect after a total hip replacement? On the day of surgery, you'll learn how to get in and out of bed. [employer] also learn how to walk with a walker, crutches, or a cane.  By the time you leave the hospital, you'll be able to safely sit down and stand up, dress yourself, usethe toilet, and bathe. Ronit Hayward also start physical therapy. Your therapist will teach you exercises to help you get stronger. You'll learn ways to move your body without dislocatingyour hip. During the first week or so after surgery, you will need less and less pain medicine. For a few weeks after surgery, you will probably take medicine toprevent blood clots. Your doctor will tell you when you can walk on your own, drive, return to work,and get back to other activities. It usually takes a few months to get back to full activity. Follow-up care is a key part of your treatment and safety. Be sure to make and go to all appointments, and call your doctor if you are having problems. It's also a good idea to know your test results and keep alist of the medicines you take. Where can you learn more? Go to https://indicopeJooobz!.Lagiar. org and sign in to your [employer] account. Enter X316 in the Dine in box to learn more about \"Learning About Total Hip Replacement Surgery. \"     If you do not have an account, please click on the \"Sign Up Now\" link. Current as of: March 9, 2022               Content Version: 13.3  © 5574-3673 Healthwise, Incorporated. Care instructions adapted under license by [facility] ([facility]). If you have questions about a medical condition or this instruction, always ask your healthcare professional. [place] [address] any warranty or liability for your use of this information. Patient Education        Hip Arthritis: Exercises  Introduction  Here are some examples of exercises for you to try. The exercises may be suggested for a condition or for rehabilitation. Start each exercise slowly. Ease off the exercises if you start to have pain. You will be told when to start these exercises and which ones will work bestfor you.   How to do the exercises  Straight-leg raises to the outside    1. Lie on your side, with your affected hip on top. 2. Tighten the front thigh muscles of your top leg to keep your knee straight. 3. Keep your hip and your leg straight in line with the rest of your body, and keep your knee pointing forward. Do not drop your hip back. 4. Lift your top leg straight up toward the ceiling, about 12 inches off the floor. Hold for about 6 seconds, then slowly lower your leg. 5. Repeat 8 to 12 times. 6. Switch legs and repeat steps 1 through 5, even if only one hip is sore. Straight-leg raises to the inside    1. Lie on your side with your affected hip on the floor. 2. You can either prop up your other leg on a chair, or you can bend that knee and put that foot in front of your other knee. Do not drop your hip back. 3. Tighten the muscles on the front thigh of your bottom leg to straighten that knee. 4. Keep your kneecap pointing forward and your leg straight, and lift your bottom leg up toward the ceiling about 6 inches. Hold for about 6 seconds, then lower slowly. 5. Repeat 8 to 12 times. 6. Switch legs and repeat steps 1 through 5, even if only one hip is sore. Hip hike    1. Stand sideways on the bottom step of a staircase, and hold on to the banister or wall. 2. Keeping both knees straight, lift your good leg off the step and let it hang down. Then hike your good hip up to the same level as your affected hip or a little higher. 3. Repeat 8 to 12 times. 4. Switch legs and repeat steps 1 through 3, even if only one hip is sore. Bridging    1. Lie on your back with both knees bent. Your knees should be bent about 90 degrees. 2. Then push your feet into the floor, squeeze your buttocks, and lift your hips off the floor until your shoulders, hips, and knees are all in a straight line. 3. Hold for about 6 seconds as you continue to breathe normally, and then slowly lower your hips back down to the floor and rest for up to 10 seconds.   4. Repeat 8 to 12 times. Hamstring stretch (lying down)    1. Lie flat on your back with your legs straight. If you feel discomfort in your back, place a small towel roll under your lower back. 2. Holding the back of your affected leg, lift your leg straight up and toward your body until you feel a stretch at the back of your thigh. 3. Hold the stretch for at least 30 seconds. 4. Repeat 2 to 4 times. 5. Switch legs and repeat steps 1 through 4, even if only one hip is sore. Standing quadriceps stretch    1. If you are not steady on your feet, hold on to a chair, counter, or wall. You can also lie on your stomach or your side to do this exercise. 2. Bend the knee of the leg you want to stretch, and reach behind you to grab the front of your foot or ankle with the hand on the same side. For example, if you are stretching your right leg, use your right hand. 3. Keeping your knees next to each other, pull your foot toward your buttock until you feel a gentle stretch across the front of your hip and down the front of your thigh. Your knee should be pointed directly to the ground, and not out to the side. 4. Hold the stretch for at least 15 to 30 seconds. 5. Repeat 2 to 4 times. 6. Switch legs and repeat steps 1 through 5, even if only one hip is sore. Hip rotator stretch    1. Lie on your back with both knees bent and your feet flat on the floor. 2. Put the ankle of your affected leg on your opposite thigh near your knee. 3. Use your hand to gently push your knee away from your body until you feel a gentle stretch around your hip. 4. Hold the stretch for 15 to 30 seconds. 5. Repeat 2 to 4 times. 6. Repeat steps 1 through 5, but this time use your hand to gently pull your knee toward your opposite shoulder. 7. Switch legs and repeat steps 1 through 6, even if only one hip is sore. Knee-to-chest    1. Lie on your back with your knees bent and your feet flat on the floor.   2. Bring your affected leg to your chest, keeping hip joint. When you leave the hospital, you will probably be walking with crutches or a walker. You may be able to climb a few stairs and get in and out of bed and chairs. But you will need someone to help you at home until you have more energy and can movearound better. You will go home with a bandage and stitches, staples, skin glue, or tape strips. You can remove the bandage when your doctor tells you to. If you have stitches or staples, your doctor will remove them about 2 weeks after your surgery. Glue or tape strips will fall off on their own over time. You may still have some mild pain, and the area may be swollen for 3 to 4 months aftersurgery. Your doctor may give you medicine for the pain. You will continue the rehabilitation program (rehab) you started in the hospital. The better you do with your rehab exercises, the sooner you will get your strength and movement back. Most people are able to return to work 4 weeksto 4 months after surgery. This care sheet gives you a general idea about how long it will take for you to recover. But each person recovers at a different pace. Follow the steps belowto get better as quickly as possible. How can you care for yourself at home? Activity     Your doctor may not want your affected leg to cross the center of your body toward the other leg. If so, your therapist may suggest these ideas:  ? Do not cross your legs. ? Be very careful as you get in or out of bed or a car so your leg does not cross the imaginary line in the middle of your body.      Go slowly when you climb stairs. Make sure the lights are on. Have someone watch you, if you can. When you climb stairs:  ? Step up first with your unaffected leg. Then bring the affected leg up to the same step. Bring your crutches or cane up. ? To go down stairs, reverse the order. First, put your crutches or cane on the lower step. Then bring the affected leg down to that step.  Finally, step down with the unaffected leg.      You can ride in a car, but stop at least once every hour to get out and walk around.      You may want to sleep on your back. Don't reach down too far to pull up blankets when you lie in bed.      If your doctor recommends exercises, do them as directed. You can cut back on your exercises if your muscles start to ache, but don't stop doing them.      Rest when you feel tired. You may take a nap, but don't stay in bed all day.      Work with your physical therapist to learn the best way to exercise. You will probably have to use a walker, crutches, or a cane for at least 4 to 6 weeks.      Your doctor may advise you to stay away from activities that put stress on the joint. This includes sports such as tennis, football, and jogging.      Try not to sit for too long at one time. You will feel less stiff if you take a short walk about every hour. When you sit, use chairs with arms, and don't sit in low chairs.      Do not bend over more than 90 degrees (like the angle in a letter \"L\").      Sleep on your back with your legs slightly apart or on your side with a pillow between your knees for about 6 weeks or as your doctor tells you. Do not sleep on your stomach or affected leg.      Ask your doctor when you can drive again.      Most people are able to return to work 4 weeks to 4 months after surgery.      Ask your doctor when it is okay for you to have sex. Diet     By the time you leave the hospital, you will probably be eating your normal diet. Your doctor may recommend that you take iron and vitamin supplements.      Drink plenty of fluids (unless your doctor tells you not to).    Eat healthy foods, and watch your portion sizes. Try to stay at your ideal weight. Too much weight puts more stress on your new hip joint.      You may notice that your bowel movements are not regular right after your surgery. This is common.  Try to avoid constipation and straining with bowel movements. You may want to take a fiber supplement every day. If you have not had a bowel movement after a couple of days, ask your doctor about taking a mild laxative. Medicines     Your doctor will tell you if and when you can restart your medicines. You will also get instructions about taking any new medicines.      If you take aspirin or some other blood thinner, ask your doctor if and when to start taking it again. Make sure that you understand exactly what your doctor wants you to do.      Your doctor may give you a blood-thinning medicine to prevent blood clots. If you take a blood thinner, be sure you get instructions about how to take your medicine safely. Blood thinners can cause serious bleeding problems. This medicine could be in pill form or as a shot (injection). If a shot is necessary, your doctor will tell you how to do this.      Be safe with medicines. Take pain medicines exactly as directed. ? If the doctor gave you a prescription medicine for pain, take it as prescribed. ? If you are not taking a prescription pain medicine, ask your doctor if you can take an over-the-counter medicine.      If you think your pain medicine is making you sick to your stomach:  ? Take your medicine after meals (unless your doctor has told you not to). ? Ask your doctor for a different pain medicine.      If your doctor prescribed antibiotics, take them as directed. Do not stop taking them just because you feel better. You need to take the full course of antibiotics. Incision care     If your doctor told you how to care for your cut (incision), follow your doctor's instructions. You will have a dressing over the cut. A dressing helps the incision heal and protects it. Your doctor will tell you how to take care of this.      If you did not get instructions, follow this general advice:  ?  If you have strips of tape on the cut the doctor made, leave the tape on for a week or until it falls off.  ? If you have stitches or staples, your doctor will tell you when to come back to have them removed. ? If you have skin glue on the cut, leave it on until it falls off. Skin glue is also called skin adhesive or liquid stitches. ? Change the bandage every day. ? Wash the area daily with warm water, and pat it dry. Don't use hydrogen peroxide or alcohol. They can slow healing. ? You may cover the area with a gauze bandage if it oozes fluid or rubs against clothing. ? You may shower 24 to 48 hours after surgery. Pat the incision dry. Don't swim or take a bath for the first 2 weeks, or until your doctor tells you it is okay. Exercise     Your physical therapist will teach you exercises to do at home. Always do them as your therapist tells you.      Avoid activities where you might fall. Ice and elevation     For pain, put ice or a cold pack on the area for 10 to 20 minutes at a time. Put a thin cloth between the ice and your skin. If your doctor recommended cold therapy using a portable machine, follow the instructions that came with the machine.      Your ankle may swell for about 3 months. Prop up your ankle when you ice it or anytime you sit or lie down. Try to keep it above the level of your heart. This will help reduce swelling. Other instructions     Wear compression stockings if your doctor told you to. These may help to prevent blood clots. Your doctor will tell you how long you need to keep wearing the compression stockings.      Try to prevent falls. To avoid falling:  ? Arrange furniture so that you will not trip on it. ? Get rid of throw rugs, and move electrical cords out of the way. ? Walk only in areas with plenty of light. ? Put grab bars in showers and bathtubs. ? Try to avoid icy or snowy sidewalks. Choose shoes with good traction, or consider using traction devices that attach to your shoes. ? Wear shoes with sturdy, flat soles. Follow-up care is a key part of your treatment and safety. Be sure to make and go to all appointments, and call your doctor if you are having problems. It's also a good idea to know your test results and keep alist of the medicines you take. When should you call for help? Call 911 anytime you think you may need emergency care. For example, call if:     You passed out (lost consciousness).      You have severe trouble breathing.      You have sudden chest pain and shortness of breath, or you cough up blood. Call your doctor now or seek immediate medical care if:     You have signs that your hip may be dislocated, including:  ? Severe pain and not being able to stand. ? A crooked leg that looks like your hip is out of position. ? Not being able to bend or straighten your leg.      Your leg or foot is cool or pale or changes color.      You cannot feel or move your leg.      You have signs of a blood clot, such as:  ? Pain in your calf, back of the knee, thigh, or groin. ? Redness and swelling in your leg or groin.      Your incision comes open and begins to bleed, or the bleeding increases.      You feel like your heart is racing or beating irregularly.      You have signs of infection, such as:  ? Increased pain, swelling, warmth, or redness. ? Red streaks leading from the incision. ? Pus draining from the incision. ? A fever. Watch closely for changes in your health, and be sure to contact your doctor if:     You do not have a bowel movement after taking a laxative.      You do not get better as expected. Where can you learn more? Go to https://Triplejump [email or web address] [email or web address] org and sign in to your [employer] account. Enter U597 in the [employer] box to learn more about \"Hip Replacement Surgery (Posterior): What to Expect at Home. \"     If you do not have an account, please click on the \"Sign Up Now\" link. Current as of: March 9, 2022               Content Version: 13.3  © 2736-3502 Healthwise, Incorporated.    Care instructions adapted under license by [facility] ([facility]). If you have questions about a medical condition or this instruction, always ask your healthcare professional. [address] any warranty or liability for your use of this information. Patient Education        Learning About Meal Planning for Diabetes  Why plan your meals? Meal planning can be a key part of managing diabetes. Planning meals and snacks with the right balance of carbohydrate, protein, and fat can help you keep yourblood sugar at the target level you set with your doctor. You don't have to eat special foods. You can eat what your family eats, including sweets once in a while. But you do have to pay attention to how oftenyou eat and how much you eat of certain foods. You may want to work with a dietitian or a diabetes educator. They can give you tips and meal ideas and can answer your questions about meal planning. This health professional can also help you reach a healthy weight if that is one ofyour goals. What plan is right for you? Your dietitian or diabetes educator may suggest that you start with the plateformat or carbohydrate counting. The plate format  The plate format is a simple way to help you manage how you eat. You plan meals by learning how much space each food should take on a plate. Using the plate format helps you manage the amount of carbohydrate you eat. It can make it easier to keep your blood sugar level within your target range. It also helpsyou see if you're eating healthy portion sizes. To use the plate format, you put non-starchy vegetables on half your plate. Add lean protein foods, such as fish, lean meats and poultry, or soy products, on one-quarter of the plate. Put a grain or starchy vegetable (such as brown rice or a potato) on the final quarter of the plate.  You can add a small piece of fruit and some low-fat or fat-free milk or yogurt, depending on yourcarbohydrate goal for each meal.  Here are some tips for using the plate format:   Make sure that you are not using an oversized plate. A 9-inch plate is best. Many restaurants use larger plates.  Get used to using the plate format at home. Then you can use it when you eat out.  Write down your questions about using the plate format. Talk to your doctor, a dietitian, or a diabetes educator about your concerns. Carbohydrate counting  With carbohydrate counting, you plan meals based on the amount of carbohydrate in each food. Carbohydrate raises blood sugar higher and more quickly than any other nutrient. It is found in desserts, breads and cereals, and fruit. It's also found in starchy vegetables such as potatoes and corn, grains such as rice and pasta, and milk and yogurt. You can help keep your blood sugar levels within your target range by planning how much carbohydrate to have at meals andsnacks. The amount you need depends on several things. These include your weight, how active you are, which diabetes medicines you take, and what your goals are for your blood sugar levels. A registered dietitian or diabetes educator can helpyou plan how much carbohydrate to include in each meal and snack. An example of a carbohydrate counting plan is:   45 to 60 grams at each meal. That's about the same as 3 to 4 carbohydrate servings.  15 to 20 grams at each snack. That's about the same as 1 carbohydrate serving. The Nutrition Facts label on packaged foods tells you how much carbohydrate is in a serving of the food. First, look at the serving size on the food label. Is that the amount you eat in a serving? All of the nutrition information on a food label is based on that serving size. So if you eat more or less than that, you'll need to adjust the other numbers. Total carbohydrate is the next thing you need to look for on the label. If you count carbohydrate servings, oneserving of carbohydrate is 15 grams.   For foods that don't come with labels, such as fresh fruits and vegetables, you'll need a guide that lists carbohydrate in these foods. Ask your doctor, dietitian, or diabetes educator about books or other nutrition guides you canuse. If you take insulin, you need to know how many grams of carbohydrate are in a meal. This lets you know how much rapid-acting insulin to take before you eat. If you use an insulin pump, you get a constant rate of insulin during the day. So the pump must be programmed at meals to give you extra insulin to cover therise in blood sugar after meals. When you know how much carbohydrate you will eat, you can take the right amount of insulin. Or, if you always use the same amount of insulin, you need to [facility] that you eat the same amount of carbohydrate at meals. If you need more help to understand carbohydrate counting and food labels, askyour doctor, dietitian, or diabetes educator. How can you plan healthy meals? Here are some tips to get started:  [facility] your meals a week at a time. Don't forget to include snacks too.  Use cookbooks or online recipes to plan several main meals. Plan some quick meals for busy nights. You also can double some recipes that freeze well. Then you can save half for other busy nights when you don't have time to cook.  Make sure you have the ingredients you need for your recipes. If you're running low on basic items, put these items on your shopping list too.  List foods that you use to make breakfasts, lunches, and snacks. List plenty of fruits and vegetables.  Post this list on the refrigerator. Add to it as you think of more things you need.  Take the list to the store to do your weekly shopping. Follow-up care is a key part of your treatment and safety. Be sure to make and go to all appointments, and call your doctor if you are having problems. It's also a good idea to know your test results and keep alist of the medicines you take. Where can you learn more?   Go to https://[email or web address] org and sign in to your [employer] account. Enter E612 in the [facility] box to learn more about \"Learning About Meal Planning for Diabetes. \"     If you do not have an account, please click on the \"Sign Up Now\" link. Current as of: September 8, 2021               Content Version: 13.3  © 2006-2022 [employer]. Care instructions adapted under license by [facility] ([facility]). If you have questions about a medical condition or this instruction, always ask your healthcare professional. [address] any warranty or liability for your use of this information. Patient Education        Learning About Dietary Guidelines  What are the Dietary Guidelines for Americans? Dietary Guidelines for Americans provide tips for eating well and stayinghealthy. This helps reduce the risk for long-term (chronic) diseases. These guidelines recommend that you:   Eat and drink the right amount for you. The U.S. government's food guide is called MyPlate. It can help you make your own well-balanced eating plan.  Try to balance your eating with your activity. This helps you stay at a healthy weight.  Drink alcohol in moderation, if at all.  Limit foods high in salt, saturated fat, trans fat, and added sugar. These guidelines are from the U.S. Department of Agriculture and the U. S. Department of Health and [employer]. They are updated every 5 years. What is MyPlate? MyPlate is the U.S. government's food guide. It can help you make your own well-balanced eating plan. A balanced eating plan means that you eat enough, but not too much, and that your food gives you the nutrients you need to stayhealthy. MyPlate focuses on eating plenty of whole grains, fruits, and vegetables, andon limiting fat and sugar. It is available online at www. ChooseMyPlate.gov. How can you get started?   If you're trying to eat healthier, you can slowly change your eating habits over time. You don't have to make big changes all at once. Start by adding oneor two healthy foods to your meals each day. Grains  Choose whole-grain breads and cereals and whole-wheat pasta and whole-graincrackers. Vegetables  Eat a variety of vegetables every day. They have lots of nutrients and are partof a heart-healthy diet. Fruits  Eat a variety of fruits every day. Fruits contain lots of nutrients. Choosefresh fruit instead of fruit juice. Protein foods  Choose fish and lean poultry more often. Eat red meat and fried meats lessoften. Dried beans, tofu, and nuts are also good sources of protein. Dairy  Choose low-fat or fat-free products from this food group. If you have problemsdigesting milk, try eating cheese or yogurt instead. Fats and oils  Limit fats and oils if you're trying to cut calories. Choose healthy fats whenyou cook. These include canola oil and olive oil. Where can you learn more? Go to https://[employer] DpeVIPerks.[employer]. org and sign in to your [employer] account. Enter D373 in the [employer] box to learn more about \"Learning About Dietary Guidelines. \"     If you do not have an account, please click on the \"Sign Up Now\" link. Current as of: September 8, 2021               Content Version: 13.3  © 2006-2022 Healthwise, Incorporated. Care instructions adapted under license by [facility] ([facility]). If you have questions about a medical condition or this instruction, always ask your healthcare professional. [place] [address] any warranty or liability for your use of this information.

## 2022-07-13 ENCOUNTER — HOSPITAL ENCOUNTER (EMERGENCY)
Dept: CT IMAGING | Age: 77
Discharge: HOME | DRG: 948 | End: 2022-07-16
Payer: MEDICARE

## 2022-07-13 ENCOUNTER — HOSPITAL ENCOUNTER (INPATIENT)
Age: 77
LOS: 2 days | Discharge: NURSING, REHAB OR LONG-TERM CARE | DRG: 948 | End: 2022-07-18
Attending: EMERGENCY MEDICINE | Admitting: FAMILY MEDICINE
Payer: MEDICARE

## 2022-07-13 DIAGNOSIS — G89.29 OTHER CHRONIC PAIN: ICD-10-CM

## 2022-07-13 DIAGNOSIS — R53.1 GENERALIZED WEAKNESS: ICD-10-CM

## 2022-07-13 DIAGNOSIS — E86.0 DEHYDRATION: ICD-10-CM

## 2022-07-13 DIAGNOSIS — R10.9 ABDOMINAL PAIN, UNSPECIFIED ABDOMINAL LOCATION: Primary | ICD-10-CM

## 2022-07-13 PROBLEM — R53.2 FUNCTIONAL QUADRIPLEGIA (HCC): Status: ACTIVE | Noted: 2022-07-13

## 2022-07-13 LAB
ALBUMIN SERPL-MCNC: 3.5 G/DL (ref 3.2–4.6)
ALBUMIN/GLOB SERPL: 0.9 {RATIO} (ref 1.2–3.5)
ALP SERPL-CCNC: 111 U/L (ref 50–136)
ALT SERPL-CCNC: 20 U/L (ref 12–65)
ANION GAP SERPL CALC-SCNC: 4 MMOL/L (ref 7–16)
AST SERPL-CCNC: 14 U/L (ref 15–37)
BASOPHILS # BLD: 0 K/UL (ref 0–0.2)
BASOPHILS NFR BLD: 0 % (ref 0–2)
BILIRUB SERPL-MCNC: 0.6 MG/DL (ref 0.2–1.1)
BUN SERPL-MCNC: 24 MG/DL (ref 8–23)
CALCIUM SERPL-MCNC: 9.7 MG/DL (ref 8.3–10.4)
CHLORIDE SERPL-SCNC: 105 MMOL/L (ref 98–107)
CO2 SERPL-SCNC: 33 MMOL/L (ref 21–32)
CREAT SERPL-MCNC: 0.96 MG/DL (ref 0.6–1)
DIFFERENTIAL METHOD BLD: ABNORMAL
EOSINOPHIL # BLD: 0.1 K/UL (ref 0–0.8)
EOSINOPHIL NFR BLD: 0 % (ref 0.5–7.8)
ERYTHROCYTE [DISTWIDTH] IN BLOOD BY AUTOMATED COUNT: 14.2 % (ref 11.9–14.6)
GLOBULIN SER CALC-MCNC: 4.1 G/DL (ref 2.3–3.5)
GLUCOSE SERPL-MCNC: 197 MG/DL (ref 65–100)
HCT VFR BLD AUTO: 48.8 % (ref 35.8–46.3)
HGB BLD-MCNC: 16.1 G/DL (ref 11.7–15.4)
IMM GRANULOCYTES # BLD AUTO: 0 K/UL (ref 0–0.5)
IMM GRANULOCYTES NFR BLD AUTO: 0 % (ref 0–5)
LACTATE SERPL-SCNC: 1.8 MMOL/L (ref 0.4–2)
LIPASE SERPL-CCNC: 61 U/L (ref 73–393)
LYMPHOCYTES # BLD: 2.2 K/UL (ref 0.5–4.6)
LYMPHOCYTES NFR BLD: 19 % (ref 13–44)
MCH RBC QN AUTO: 28.5 PG (ref 26.1–32.9)
MCHC RBC AUTO-ENTMCNC: 33 G/DL (ref 31.4–35)
MCV RBC AUTO: 86.4 FL (ref 79.6–97.8)
MONOCYTES # BLD: 0.7 K/UL (ref 0.1–1.3)
MONOCYTES NFR BLD: 6 % (ref 4–12)
NEUTS SEG # BLD: 8.6 K/UL (ref 1.7–8.2)
NEUTS SEG NFR BLD: 74 % (ref 43–78)
NRBC # BLD: 0 K/UL (ref 0–0.2)
PLATELET # BLD AUTO: 262 K/UL (ref 150–450)
PMV BLD AUTO: 11.2 FL (ref 9.4–12.3)
POTASSIUM SERPL-SCNC: 3.6 MMOL/L (ref 3.5–5.1)
PROT SERPL-MCNC: 7.6 G/DL (ref 6.3–8.2)
RBC # BLD AUTO: 5.65 M/UL (ref 4.05–5.2)
SARS-COV-2 RDRP RESP QL NAA+PROBE: NOT DETECTED
SODIUM SERPL-SCNC: 142 MMOL/L (ref 136–145)
SOURCE: NORMAL
WBC # BLD AUTO: 11.6 K/UL (ref 4.3–11.1)

## 2022-07-13 PROCEDURE — 2580000003 HC RX 258: Performed by: NURSE PRACTITIONER

## 2022-07-13 PROCEDURE — 85025 COMPLETE CBC W/AUTO DIFF WBC: CPT

## 2022-07-13 PROCEDURE — G0378 HOSPITAL OBSERVATION PER HR: HCPCS

## 2022-07-13 PROCEDURE — 99285 EMERGENCY DEPT VISIT HI MDM: CPT

## 2022-07-13 PROCEDURE — 2580000003 HC RX 258: Performed by: PHYSICIAN ASSISTANT

## 2022-07-13 PROCEDURE — 6360000004 HC RX CONTRAST MEDICATION: Performed by: PHYSICIAN ASSISTANT

## 2022-07-13 PROCEDURE — 6370000000 HC RX 637 (ALT 250 FOR IP): Performed by: PHYSICIAN ASSISTANT

## 2022-07-13 PROCEDURE — 96372 THER/PROPH/DIAG INJ SC/IM: CPT

## 2022-07-13 PROCEDURE — 83690 ASSAY OF LIPASE: CPT

## 2022-07-13 PROCEDURE — 87635 SARS-COV-2 COVID-19 AMP PRB: CPT

## 2022-07-13 PROCEDURE — 80053 COMPREHEN METABOLIC PANEL: CPT

## 2022-07-13 PROCEDURE — 74177 CT ABD & PELVIS W/CONTRAST: CPT

## 2022-07-13 PROCEDURE — 6370000000 HC RX 637 (ALT 250 FOR IP): Performed by: NURSE PRACTITIONER

## 2022-07-13 PROCEDURE — 83605 ASSAY OF LACTIC ACID: CPT

## 2022-07-13 PROCEDURE — 94664 DEMO&/EVAL PT USE INHALER: CPT

## 2022-07-13 PROCEDURE — 6360000002 HC RX W HCPCS: Performed by: NURSE PRACTITIONER

## 2022-07-13 RX ORDER — PREGABALIN 50 MG/1
150 CAPSULE ORAL 2 TIMES DAILY
Status: DISCONTINUED | OUTPATIENT
Start: 2022-07-14 | End: 2022-07-18 | Stop reason: HOSPADM

## 2022-07-13 RX ORDER — ONDANSETRON 2 MG/ML
4 INJECTION INTRAMUSCULAR; INTRAVENOUS EVERY 6 HOURS PRN
Status: DISCONTINUED | OUTPATIENT
Start: 2022-07-13 | End: 2022-07-18 | Stop reason: HOSPADM

## 2022-07-13 RX ORDER — SODIUM CHLORIDE 0.9 % (FLUSH) 0.9 %
5-40 SYRINGE (ML) INJECTION EVERY 12 HOURS SCHEDULED
Status: DISCONTINUED | OUTPATIENT
Start: 2022-07-13 | End: 2022-07-18 | Stop reason: HOSPADM

## 2022-07-13 RX ORDER — POLYETHYLENE GLYCOL 3350 17 G/17G
17 POWDER, FOR SOLUTION ORAL DAILY PRN
Status: DISCONTINUED | OUTPATIENT
Start: 2022-07-13 | End: 2022-07-18 | Stop reason: HOSPADM

## 2022-07-13 RX ORDER — FAMOTIDINE 20 MG/1
20 TABLET, FILM COATED ORAL 2 TIMES DAILY
Status: DISCONTINUED | OUTPATIENT
Start: 2022-07-13 | End: 2022-07-18 | Stop reason: HOSPADM

## 2022-07-13 RX ORDER — BUDESONIDE 0.5 MG/2ML
500 INHALANT ORAL 2 TIMES DAILY
Status: DISCONTINUED | OUTPATIENT
Start: 2022-07-14 | End: 2022-07-16

## 2022-07-13 RX ORDER — MONTELUKAST SODIUM 10 MG/1
10 TABLET ORAL EVERY EVENING
Status: DISCONTINUED | OUTPATIENT
Start: 2022-07-14 | End: 2022-07-18 | Stop reason: HOSPADM

## 2022-07-13 RX ORDER — ONDANSETRON 4 MG/1
4 TABLET, ORALLY DISINTEGRATING ORAL EVERY 8 HOURS PRN
Status: DISCONTINUED | OUTPATIENT
Start: 2022-07-13 | End: 2022-07-18 | Stop reason: HOSPADM

## 2022-07-13 RX ORDER — CELECOXIB 200 MG/1
200 CAPSULE ORAL DAILY
Status: DISCONTINUED | OUTPATIENT
Start: 2022-07-14 | End: 2022-07-18 | Stop reason: HOSPADM

## 2022-07-13 RX ORDER — DICYCLOMINE HYDROCHLORIDE 10 MG/1
10 CAPSULE ORAL
Status: COMPLETED | OUTPATIENT
Start: 2022-07-13 | End: 2022-07-13

## 2022-07-13 RX ORDER — ACETAMINOPHEN 650 MG/1
650 SUPPOSITORY RECTAL EVERY 6 HOURS PRN
Status: DISCONTINUED | OUTPATIENT
Start: 2022-07-13 | End: 2022-07-18 | Stop reason: HOSPADM

## 2022-07-13 RX ORDER — AMLODIPINE BESYLATE 5 MG/1
5 TABLET ORAL DAILY
Status: DISCONTINUED | OUTPATIENT
Start: 2022-07-14 | End: 2022-07-18 | Stop reason: HOSPADM

## 2022-07-13 RX ORDER — ENOXAPARIN SODIUM 100 MG/ML
30 INJECTION SUBCUTANEOUS 2 TIMES DAILY
Status: DISCONTINUED | OUTPATIENT
Start: 2022-07-13 | End: 2022-07-18 | Stop reason: HOSPADM

## 2022-07-13 RX ORDER — SODIUM CHLORIDE 0.9 % (FLUSH) 0.9 %
10 SYRINGE (ML) INJECTION
Status: COMPLETED | OUTPATIENT
Start: 2022-07-13 | End: 2022-07-13

## 2022-07-13 RX ORDER — SODIUM CHLORIDE 9 MG/ML
INJECTION, SOLUTION INTRAVENOUS PRN
Status: DISCONTINUED | OUTPATIENT
Start: 2022-07-13 | End: 2022-07-18 | Stop reason: HOSPADM

## 2022-07-13 RX ORDER — LISINOPRIL 5 MG/1
2.5 TABLET ORAL DAILY
Status: DISCONTINUED | OUTPATIENT
Start: 2022-07-14 | End: 2022-07-18 | Stop reason: HOSPADM

## 2022-07-13 RX ORDER — CHLORDIAZEPOXIDE HYDROCHLORIDE AND CLIDINIUM BROMIDE 5; 2.5 MG/1; MG/1
1 CAPSULE ORAL
Status: DISCONTINUED | OUTPATIENT
Start: 2022-07-13 | End: 2022-07-14 | Stop reason: RX

## 2022-07-13 RX ORDER — 0.9 % SODIUM CHLORIDE 0.9 %
1000 INTRAVENOUS SOLUTION INTRAVENOUS ONCE
Status: COMPLETED | OUTPATIENT
Start: 2022-07-13 | End: 2022-07-13

## 2022-07-13 RX ORDER — SIMETHICONE 80 MG
80 TABLET,CHEWABLE ORAL EVERY 6 HOURS PRN
Status: DISCONTINUED | OUTPATIENT
Start: 2022-07-13 | End: 2022-07-18 | Stop reason: HOSPADM

## 2022-07-13 RX ORDER — SODIUM CHLORIDE 9 MG/ML
INJECTION, SOLUTION INTRAVENOUS CONTINUOUS
Status: DISCONTINUED | OUTPATIENT
Start: 2022-07-13 | End: 2022-07-16

## 2022-07-13 RX ORDER — FUROSEMIDE 20 MG/1
20 TABLET ORAL DAILY
Status: DISCONTINUED | OUTPATIENT
Start: 2022-07-14 | End: 2022-07-18 | Stop reason: HOSPADM

## 2022-07-13 RX ORDER — 0.9 % SODIUM CHLORIDE 0.9 %
100 INTRAVENOUS SOLUTION INTRAVENOUS
Status: COMPLETED | OUTPATIENT
Start: 2022-07-13 | End: 2022-07-13

## 2022-07-13 RX ORDER — TRAMADOL HYDROCHLORIDE 50 MG/1
50 TABLET ORAL EVERY 6 HOURS PRN
Status: DISCONTINUED | OUTPATIENT
Start: 2022-07-13 | End: 2022-07-16

## 2022-07-13 RX ORDER — ACETAMINOPHEN 325 MG/1
650 TABLET ORAL EVERY 6 HOURS PRN
Status: DISCONTINUED | OUTPATIENT
Start: 2022-07-13 | End: 2022-07-18 | Stop reason: HOSPADM

## 2022-07-13 RX ORDER — LEVOTHYROXINE SODIUM 112 UG/1
112 TABLET ORAL DAILY
Status: DISCONTINUED | OUTPATIENT
Start: 2022-07-14 | End: 2022-07-18 | Stop reason: HOSPADM

## 2022-07-13 RX ORDER — SODIUM CHLORIDE 0.9 % (FLUSH) 0.9 %
5-40 SYRINGE (ML) INJECTION PRN
Status: DISCONTINUED | OUTPATIENT
Start: 2022-07-13 | End: 2022-07-18 | Stop reason: HOSPADM

## 2022-07-13 RX ADMIN — SIMETHICONE 80 MG: 80 TABLET, CHEWABLE ORAL at 23:13

## 2022-07-13 RX ADMIN — SODIUM CHLORIDE: 9 INJECTION, SOLUTION INTRAVENOUS at 23:14

## 2022-07-13 RX ADMIN — SODIUM CHLORIDE, PRESERVATIVE FREE 10 ML: 5 INJECTION INTRAVENOUS at 23:14

## 2022-07-13 RX ADMIN — SODIUM CHLORIDE 100 ML: 9 INJECTION, SOLUTION INTRAVENOUS at 13:48

## 2022-07-13 RX ADMIN — IOPAMIDOL 100 ML: 755 INJECTION, SOLUTION INTRAVENOUS at 13:48

## 2022-07-13 RX ADMIN — FAMOTIDINE 20 MG: 20 TABLET ORAL at 23:13

## 2022-07-13 RX ADMIN — SODIUM CHLORIDE, PRESERVATIVE FREE 10 ML: 5 INJECTION INTRAVENOUS at 13:47

## 2022-07-13 RX ADMIN — DICYCLOMINE HYDROCHLORIDE 10 MG: 10 CAPSULE ORAL at 15:59

## 2022-07-13 RX ADMIN — TRAMADOL HYDROCHLORIDE 50 MG: 50 TABLET, COATED ORAL at 23:13

## 2022-07-13 RX ADMIN — SODIUM CHLORIDE 1000 ML: 9 INJECTION, SOLUTION INTRAVENOUS at 14:42

## 2022-07-13 RX ADMIN — ENOXAPARIN SODIUM 30 MG: 100 INJECTION SUBCUTANEOUS at 23:19

## 2022-07-13 ASSESSMENT — PAIN DESCRIPTION - LOCATION
LOCATION: ABDOMEN
LOCATION: ABDOMEN;FLANK

## 2022-07-13 ASSESSMENT — ENCOUNTER SYMPTOMS
NAUSEA: 1
ABDOMINAL PAIN: 1
DIARRHEA: 1
RESPIRATORY NEGATIVE: 1

## 2022-07-13 ASSESSMENT — PAIN SCALES - GENERAL
PAINLEVEL_OUTOF10: 0
PAINLEVEL_OUTOF10: 8
PAINLEVEL_OUTOF10: 6
PAINLEVEL_OUTOF10: 8
PAINLEVEL_OUTOF10: 6

## 2022-07-13 ASSESSMENT — PAIN DESCRIPTION - DESCRIPTORS
DESCRIPTORS: SHARP;TIGHTNESS
DESCRIPTORS: ACHING;CRAMPING
DESCRIPTORS: CRAMPING

## 2022-07-13 ASSESSMENT — PAIN DESCRIPTION - ORIENTATION
ORIENTATION: LEFT
ORIENTATION: MID

## 2022-07-13 ASSESSMENT — PAIN - FUNCTIONAL ASSESSMENT
PAIN_FUNCTIONAL_ASSESSMENT: PREVENTS OR INTERFERES SOME ACTIVE ACTIVITIES AND ADLS
PAIN_FUNCTIONAL_ASSESSMENT: 0-10

## 2022-07-13 NOTE — H&P
Hospitalist History and Physical   Admit Date:  2022  1:14 PM   Name:  Brittany Timmons   Age:  77 y.o. Sex:  female  :  1945   MRN:  [id number]   Room:  [place] [address]    Presenting Complaint: Abdominal Pain     Reason(s) for Admission: [facility] [R53.2]     History of Present Illness:   Brittany Timmons is a 77 y.o. female with medical history of DM II, chronic lower back pain, IBS, hyperlipidemia,  HTN who presented with c/o lower back pain, increase gas in her abd, unable to care for herself at home. States has steadily declined over the last year as far as ADLs. Over the last 5 days she states she has not eaten or drank anything because she can't get up to fix it. She has been ambulatory to the bathroom. Pt also c/o sweating over the last 5 days. Labs essentially normal. CT abd no acute findings. Pt adamant about admission. Of note Cardiology notes from 2021 have same complaints as above. Denies CP, SOB, n/v/d, abd pain. Review of Systems:  10 systems reviewed and negative except as noted in HPI. Assessment & Plan:     Principal Problem:    Functional quadriplegia/back pain ([facility]  Has chronic back pain, sees Ortho and pain management outpatient  Will have PT/OT evaluate pt  Continue home lyrica, tramadol    DM II  Home meds  SSI    Hypothyroidism  Home meds    GERD/IBS/increased belching/flatulence  Will add gas ex  Pepcid      Dispo/Discharge Planning:     Pending clinical course     Diet: Diet NPO  VTE ppx: lovenox  Code status: DNR  Surrogate decision maker: cousin [facility] Problems:  Principal Problem:    Functional quadriplegia ([facility]  Resolved Problems:    * No resolved hospital problems.  *       Past History:     Past Medical History:   Diagnosis Date    Arthritis     Asthma     bronchio-asthma mostly seasonal    Chronic obstructive asthma, unspecified     Chronic pain     DDD, LOW BACK, SIATICA    DDD (degenerative disc disease)     WITH RADICULOPATHY    GERD (gastroesophageal reflux disease)     Hypertension     Hypothyroidism     Incontinence of urine     Morbid obesity (HCC)     Neuropathy     BELKIS (obstructive sleep apnea) 2009    Osteoarthritis of left knee 2009    Osteoarthritis of right knee 3/2/2011    S/P total knee replacement using cement 2009    Sleep apnea     HAS CPAP    Spastic colon     Status post right hip replacement 10/9/2017    Thyroid disease     ON MEDS    Type II or unspecified type diabetes mellitus without mention of complication, not stated as uncontrolled     avg      Past Surgical History:   Procedure Laterality Date    HAND/FINGER SURGERY UNLISTED Right     HERNIA REPAIR  3/43/65    UMBILICAL WITH MESH    HYSTERECTOMY (CERVIX STATUS UNKNOWN)  2000    TOTAL KNEE ARTHROPLASTY Left 2009    TOTAL KNEE ARTHROPLASTY Right       Social History     Tobacco Use    Smoking status: Former Smoker     Packs/day: 1.00     Quit date: 1977     Years since quittin.5    Smokeless tobacco: Never Used    Tobacco comment: Quit smoking: QUIT IN    Substance Use Topics    Alcohol use: Not Currently      Social History     Substance and Sexual Activity   Drug Use No     Family History   Problem Relation Age of Onset    Diabetes Father     Cancer Father         lung    COPD Father     Cancer Brother     Diabetes Mother     Heart Disease Father     Heart Disease Brother         CAD    Diabetes Brother     Cancer Mother       Family history reviewed and negative except as noted above.       Immunization History   Administered Date(s) Administered    COVID-19, PFIZER PURPLE top, DILUTE for use, (age 15 y+), 30mcg/0.3mL 2021, 2021    PPD Test 10/09/2017    Pneumococcal Conjugate 13-valent (Eitttto62) 2015    Pneumococcal Polysaccharide (Jcdbweino50) 2015    Zoster Live (Zostavax) 2015     Allergies   Allergen Reactions    Codeine Other (See Comments)     dizziness    Erythromycin Rash    Tetracycline Rash     Prior to Admit Medications:  Current Outpatient Medications   Medication Instructions    amLODIPine (NORVASC) 5 mg, Oral, DAILY    azelastine HCl 0.15 % SOLN 2 sprays, Nasal, PRN    Biotin 2.5 MG CAPS Oral    budesonide (PULMICORT) 500 mcg, Inhalation, 2 TIMES DAILY    celecoxib (CELEBREX) 200 MG capsule 1 capsule    celecoxib (CELEBREX) 200 mg, Oral, DAILY    chlordiazePOXIDE-clidinium (LIBRAX) 5-2.5 MG per capsule 1 capsule, Oral, 4 TIMES DAILY PRN    formoterol (PERFOROMIST) 20 mcg, 2 TIMES DAILY    furosemide (LASIX) 20 MG tablet 1 tablet    furosemide (LASIX) 20 mg, Oral, DAILY PRN    insulin glargine (LANTUS;BASAGLAR) 20 Units, SubCUTAneous, DAILY    insulin lispro (1 Unit Dial) 6 Units, SubCUTAneous, 3 TIMES DAILY BEFORE MEALS    insulin lispro (HUMALOG KWIKPEN U-200) 200 UNIT/ML SOPN pen as directed    levothyroxine (SYNTHROID) 100 mcg, Oral, DAILY    lisinopril (PRINIVIL;ZESTRIL) 2.5 MG tablet 1 tablet    lisinopril-hydroCHLOROthiazide (PRINZIDE;ZESTORETIC) 10-12.5 MG per tablet 1 tablet, Oral, DAILY    montelukast (SINGULAIR) 10 mg, Oral, DAILY    olopatadine (PATANOL) 0.1 % ophthalmic solution 2 drops, Ophthalmic, PRN    OMEPRAZOLE PO 20 mg, DAILY    polyethylene glycol (GLYCOLAX) 17 g, Oral, PRN    pregabalin (LYRICA) 50 MG capsule Oral    traMADol (ULTRAM) 50 mg, Oral, EVERY 6 HOURS PRN         Objective:     Patient Vitals for the past 24 hrs:   Temp Pulse Resp BP SpO2   07/13/22 1602 -- 86 17 (!) 178/78 96 %   07/13/22 1422 -- 77 18 (!) 169/83 96 %   07/13/22 1230 98.5 °F (36.9 °C) 99 16 126/69 98 %       Oxygen Therapy  SpO2: 96 %  O2 Device: None (Room air)    Estimated body mass index is 45.49 kg/m² as calculated from the following:    Height as of this encounter: 5' 4\" (1.626 m). Weight as of this encounter: 265 lb (120.2 kg).     Intake/Output Summary (Last 24 hours) at 7/13/2022 1730  Last data filed at 7/13/2022 1557  Gross per 24 hour   Intake 1000 ml   Output 150 ml   Net 850 ml         Physical Exam:    Blood pressure (!) 178/78, pulse 86, temperature 98.5 °F (36.9 °C), temperature source Oral, resp. rate 17, height 5' 4\" (1.626 m), weight 265 lb (120.2 kg), SpO2 96 %. General:    Well nourished. Anxious. Obese   Head:  Normocephalic, atraumatic  Eyes:  Sclerae appear normal.  Pupils equally round. ENT:  Nares appear normal, no drainage. Moist oral mucosa  Neck:  No restricted ROM. Trachea midline   CV:   RRR.  2/6 systolic murmur. No jugular venous distension. Lungs:   CTAB. No wheezing, rhonchi, or rales. Symmetric expansion. Abdomen: Bowel sounds present. Soft, nontender, nondistended. Extremities: No cyanosis or clubbing. No edema  Skin:     No rashes and normal coloration. Warm and dry. Neuro:  CN II-XII grossly intact. Sensation intact. A&Ox3  Psych:  Normal mood and affect.       I have reviewed ordered lab tests and independently visualized imaging below:    Last 24hr Labs:  Recent Results (from the past 24 hour(s))   CBC with Diff    Collection Time: 07/13/22 12:38 PM   Result Value Ref Range    WBC 11.6 (H) 4.3 - 11.1 K/uL    RBC 5.65 (H) 4.05 - 5.2 M/uL    Hemoglobin 16.1 (H) 11.7 - 15.4 g/dL    Hematocrit 48.8 (H) 35.8 - 46.3 %    MCV 86.4 79.6 - 97.8 FL    MCH 28.5 26.1 - 32.9 PG    MCHC 33.0 31.4 - 35.0 g/dL    RDW 14.2 11.9 - 14.6 %    Platelets 172 738 - 311 K/uL    MPV 11.2 9.4 - 12.3 FL    nRBC 0.00 0.0 - 0.2 K/uL    Differential Type AUTOMATED      Seg Neutrophils 74 43 - 78 %    Lymphocytes 19 13 - 44 %    Monocytes 6 4.0 - 12.0 %    Eosinophils % 0 (L) 0.5 - 7.8 %    Basophils 0 0.0 - 2.0 %    Immature Granulocytes 0 0.0 - 5.0 %    Segs Absolute 8.6 (H) 1.7 - 8.2 K/UL    Absolute Lymph # 2.2 0.5 - 4.6 K/UL    Absolute Mono # 0.7 0.1 - 1.3 K/UL    Absolute Eos # 0.1 0.0 - 0.8 K/UL    Basophils Absolute 0.0 0.0 - 0.2 K/UL    Absolute Immature Granulocyte 0.0 0.0 - 0.5 K/UL   CMP    Collection Time: 07/13/22 12:38 PM   Result Value Ref Range    Sodium 142 136 - 145 mmol/L    Potassium 3.6 3.5 - 5.1 mmol/L    Chloride 105 98 - 107 mmol/L    CO2 33 (H) 21 - 32 mmol/L    Anion Gap 4 (L) 7 - 16 mmol/L    Glucose 197 (H) 65 - 100 mg/dL    BUN 24 (H) 8 - 23 MG/DL    CREATININE 0.96 0.6 - 1.0 MG/DL    GFR African American >60 >60 ml/min/1.73m2    GFR Non- >60 >60 ml/min/1.73m2    Calcium 9.7 8.3 - 10.4 MG/DL    Total Bilirubin 0.6 0.2 - 1.1 MG/DL    ALT 20 12 - 65 U/L    AST 14 (L) 15 - 37 U/L    Alk Phosphatase 111 50 - 136 U/L    Total Protein 7.6 6.3 - 8.2 g/dL    Albumin 3.5 3.2 - 4.6 g/dL    Globulin 4.1 (H) 2.3 - 3.5 g/dL    Albumin/Globulin Ratio 0.9 (L) 1.2 - 3.5     Lipase    Collection Time: 07/13/22 12:38 PM   Result Value Ref Range    Lipase 61 (L) 73 - 393 U/L   Lactic Acid (Select if patient is over 65 to rule out mesenteric ischemia)    Collection Time: 07/13/22 12:38 PM   Result Value Ref Range    Lactic Acid, Plasma 1.8 0.4 - 2.0 MMOL/L   COVID-19, Rapid    Collection Time: 07/13/22  2:45 PM    Specimen: Nasopharyngeal   Result Value Ref Range    Source Nasopharyngeal      SARS-CoV-2, Rapid Not detected NOTD         Other Studies:  CT ABDOMEN PELVIS W IV CONTRAST Additional Contrast? None    Result Date: 7/13/2022  Exam: CT ABDOMEN PELVIS W IV CONTRAST on 7/13/2022 2:06 PM Clinical History: The Female patient is 77years old  presenting for intermittent abdominal pain Technique: Thin slice axial images were obtained through the abdomen and pelvis with intravenous and without oral contrast.  Coronal reformatted images were also provided for review. A total of 100 ml of Iopamidol (ISOVUE-370) 76 % contrast was administered intravenously. All CT scans at this facility are performed using dose reduction/dose modulation techniques, as appropriate the performed exam, including the following: Automated Exposure Control;  Adjustment of the mA and/or kV according to patient size (this includes techniques or standardized protocols for targeted exams where dose is matched to indication/reason for exam); and Use of Iterative Reconstruction Technique. Radiation Exposure Indices: Reference Air Kerma (Ka,r) = 1578 mGy-cm COMPARISON:  None. FINDINGS:  Abdomen: Lung bases: Clear without evidence of focal infiltrate, consolidation, or effusion. Liver: Normal size, contour, density and enhancement without focal mass. Biliary: Cholelithiasis. No evidence of intra or extrahepatic biliary dilatation. Pancreas: There are atrophic and fatty replaced pancreas particularly the pancreatic head. Spleen: Normal in size and contour without focal lesion. Adrenal glands: Normal in size without focal lesion. Kidneys: Symmetric without evidence of hydronephrosis or nephrolithiasis. Normal enhancement throughout all visualized phases of contrast excretion. 3.5 cm cyst projecting from lower pole of left kidney. Bowel: No evidence of obstruction or focal lesion. Retroperitoneum/vasculature: No evidence of significant adenopathy. Unremarkable aorta and IVC. Abdominal soft tissues: Fat-containing periumbilical hernia measuring 6.5 cm. Osseous structures: No acute osseous abnormality. Right hip prosthesis. Pelvis: Unremarkable bladder. No significant adenopathy or free fluid. Sharon Sebastien Hysterectomy     1. No obvious acute intra-abdominal or pelvic abnormality with chronic changes as detailed above. CPT code(s): N2015119, [id number]      Echocardiogram:  02/09/22    TRANSTHORACIC ECHOCARDIOGRAM (TTE) COMPLETE (CONTRAST/BUBBLE/3D PRN) 02/10/2022  5:07 PM (Final)    Narrative  This is a summary report. The complete report is available in the patient's medical record. If you cannot access the medical record, please contact the sending organization for a detailed fax or copy.   Left Ventricle: Left ventricle is mildly dilated. Increased wall thickness. Normal wall motion.  Normal left ventricular systolic function. EF by 2D Simpsons Biplane is 58%. Normal diastolic function.   Mitral Valve: Mildly thickened leaflets.   Tricuspid Valve: RVSP is 33 mmHg.     Signed by: Anel Herzog MD on 2/10/2022  5:07 PM      Meds previously ordered:  Orders Placed This Encounter   Medications    0.9 % sodium chloride bolus    dicyclomine (BENTYL) capsule 10 mg         Signed:  Jovita Leal APRN - CNP      Notes, labs, VS, diagnostic testing reviewed  Case discussed with pt, care team ,Dr. Christiana Blood

## 2022-07-13 NOTE — ED NOTES
Pt able to stand at bedside and take a few steps with SHABBIR Nicole at bedside but became dizzy     Anjelica Rivas RN  07/13/22 [id number]

## 2022-07-13 NOTE — ED PROVIDER NOTES
[facility] Emergency Department Provider Note                   PCP:                Christina Gilbert MD               Age: 77 y.o. Sex: female       ICD-10-CM    1. Abdominal pain, unspecified abdominal location  R10.9    2. Generalized weakness  R53.1        DISPOSITION          MDM  Number of Diagnoses or Management Options  Abdominal pain, unspecified abdominal location  Dehydration  Generalized weakness  Diagnosis management comments: Patient is a 80-year-old female who presents with abdominal pain with decreased p.o. intake and abnormal bowels. She is worried about abdominal cramping and a lot of gas. No fevers. Worsening left-sided abdominal pain. She comes in today because she is so weak and she states she cannot stand or get around in her house by herself. She says normally she is totally independent and walks around and goes outside to feed her cats. She has been unable to feed the cats. She had to get her brother to come over to help her and bring her to the emergency department in a wheelchair. Her work-up does not show any significant acute abnormality. She is weak when trying to stand and walk. She requires assistance with standing in the emergency department. She does not feel like it is safe for her to go home. Hospitalist consulted for admission.        Amount and/or Complexity of Data Reviewed  Discuss the patient with other providers: yes (Dr. Karol Rojo)    Risk of Complications, Morbidity, and/or Mortality  Presenting problems: moderate  Diagnostic procedures: moderate  Management options: moderate    Patient Progress  Patient progress: stable       Orders Placed This Encounter   Procedures    COVID-19, Rapid    CT ABDOMEN PELVIS W IV CONTRAST Additional Contrast? None    CBC with Diff    CMP    Lipase    Lactic Acid (Select if patient is over 65 to rule out mesenteric ischemia)    Diet NPO    POCT Urine Dipstick    Straight cath    Saline lock IV        Jerel Rees is a 77 y.o. female who presents to the Emergency Department with chief complaint of    Chief Complaint   Patient presents with    Abdominal Pain      Patient is a 71-year-old female with multiple chronic medical problems who presents with abdominal discomfort for about 5 months. She says she saw GI who said that it was gas and she needed to take Metamucil. Over the past 5 days she has had decreased p.o. intake, worsening left lower quadrant abdominal pain, sweats and chills. Thinks she is losing weight. She denies any acute urinary complaints but does think she could have a UTI because she has been very weak. She is very gassy and not having bowel movements, which is her main concern. She seems to be very worried about lack of normal bowel movements. She tells me she has had decreased p.o. intake and has been very weak the past few days to the point where she cannot even stand up and walk to the bathroom on her own. She had to get her brother come to the house and help her. Came in the emergency department in a wheelchair. She lives alone and is concerned about this. Review of Systems   Constitutional: Positive for appetite change, chills, diaphoresis, fatigue and unexpected weight change. HENT: Negative. Respiratory: Negative. Cardiovascular: Negative. Gastrointestinal: Positive for abdominal pain, diarrhea and nausea. Genitourinary: Negative. All other systems reviewed and are negative.       Past Medical History:   Diagnosis Date    Arthritis     Asthma     bronchio-asthma mostly seasonal    Chronic obstructive asthma, unspecified     Chronic pain     DDD, LOW BACK, SIATICA    DDD (degenerative disc disease)     WITH RADICULOPATHY    GERD (gastroesophageal reflux disease)     Hypertension     Hypothyroidism     Incontinence of urine     Morbid obesity (HCC)     Neuropathy     BELKIS (obstructive sleep apnea) 9/16/2009    Osteoarthritis of left knee 9/16/2009    Osteoarthritis

## 2022-07-13 NOTE — ED TRIAGE NOTES
Patient has been having intermittent abdominal discomfort 6/10 with inconsistent stools since February. Has seen GI for this issue and they told her to take metamucil. Hx of fibromyalgia and diabetes. Abdominal pain. No nausea, vomiting. Cold sweats. States she is dehydrated. Trouble eating and drinking the past few days. States she's likely lost \"20-30 pounds the last week from sweating\". Reports increased bloating and gas. Not sure if she's having blood in her stools given some discoloration of the stools. PE  Heart - regular rate and rhythm  Lungs - CTAB  Abdomen - sore to palpation of abdomen, normal bowel sounds, nondistended    Patient evaluated initially in triage. Rapid Medical Evaluation was conducted and necessary orders have been placed. I have performed a medical screening exam.  Care will now be transferred to the provider in the back of the emergency department.   Michele Anglin PA-C 12:29 PM

## 2022-07-14 PROBLEM — R79.89 ELEVATED SERUM CREATININE: Status: RESOLVED | Noted: 2017-09-19 | Resolved: 2022-07-14

## 2022-07-14 PROBLEM — B96.20 E-COLI UTI: Status: RESOLVED | Noted: 2021-03-23 | Resolved: 2022-07-14

## 2022-07-14 PROBLEM — N39.0 E-COLI UTI: Status: RESOLVED | Noted: 2021-03-23 | Resolved: 2022-07-14

## 2022-07-14 PROBLEM — D72.829 ELEVATED WBC COUNT: Status: RESOLVED | Noted: 2017-09-19 | Resolved: 2022-07-14

## 2022-07-14 PROBLEM — N39.0 ACUTE UTI: Status: RESOLVED | Noted: 2021-03-20 | Resolved: 2022-07-14

## 2022-07-14 LAB
ALBUMIN SERPL-MCNC: 2.9 G/DL (ref 3.2–4.6)
ALBUMIN/GLOB SERPL: 0.9 {RATIO} (ref 1.2–3.5)
ALP SERPL-CCNC: 87 U/L (ref 50–130)
ALT SERPL-CCNC: 18 U/L (ref 12–65)
ANION GAP SERPL CALC-SCNC: 5 MMOL/L (ref 7–16)
AST SERPL-CCNC: 16 U/L (ref 15–37)
BASOPHILS # BLD: 0.1 K/UL (ref 0–0.2)
BASOPHILS NFR BLD: 1 % (ref 0–2)
BILIRUB SERPL-MCNC: 0.5 MG/DL (ref 0.2–1.1)
BUN SERPL-MCNC: 17 MG/DL (ref 8–23)
CALCIUM SERPL-MCNC: 8.9 MG/DL (ref 8.3–10.4)
CHLORIDE SERPL-SCNC: 112 MMOL/L (ref 98–107)
CO2 SERPL-SCNC: 28 MMOL/L (ref 21–32)
CREAT SERPL-MCNC: 0.71 MG/DL (ref 0.6–1)
DIFFERENTIAL METHOD BLD: NORMAL
EOSINOPHIL # BLD: 0.1 K/UL (ref 0–0.8)
EOSINOPHIL NFR BLD: 1 % (ref 0.5–7.8)
ERYTHROCYTE [DISTWIDTH] IN BLOOD BY AUTOMATED COUNT: 14.3 % (ref 11.9–14.6)
GLOBULIN SER CALC-MCNC: 3.4 G/DL (ref 2.3–3.5)
GLUCOSE BLD STRIP.AUTO-MCNC: 114 MG/DL (ref 65–100)
GLUCOSE BLD STRIP.AUTO-MCNC: 123 MG/DL (ref 65–100)
GLUCOSE BLD STRIP.AUTO-MCNC: 133 MG/DL (ref 65–100)
GLUCOSE BLD STRIP.AUTO-MCNC: 145 MG/DL (ref 65–100)
GLUCOSE BLD STRIP.AUTO-MCNC: 88 MG/DL (ref 65–100)
GLUCOSE SERPL-MCNC: 98 MG/DL (ref 65–100)
HCT VFR BLD AUTO: 43.8 % (ref 35.8–46.3)
HGB BLD-MCNC: 14.2 G/DL (ref 11.7–15.4)
IMM GRANULOCYTES # BLD AUTO: 0 K/UL (ref 0–0.5)
IMM GRANULOCYTES NFR BLD AUTO: 0 % (ref 0–5)
LYMPHOCYTES # BLD: 2.5 K/UL (ref 0.5–4.6)
LYMPHOCYTES NFR BLD: 28 % (ref 13–44)
MCH RBC QN AUTO: 28.2 PG (ref 26.1–32.9)
MCHC RBC AUTO-ENTMCNC: 32.4 G/DL (ref 31.4–35)
MCV RBC AUTO: 87.1 FL (ref 79.6–97.8)
MONOCYTES # BLD: 0.7 K/UL (ref 0.1–1.3)
MONOCYTES NFR BLD: 8 % (ref 4–12)
NEUTS SEG # BLD: 5.6 K/UL (ref 1.7–8.2)
NEUTS SEG NFR BLD: 63 % (ref 43–78)
NRBC # BLD: 0 K/UL (ref 0–0.2)
PLATELET # BLD AUTO: 213 K/UL (ref 150–450)
PMV BLD AUTO: 10.7 FL (ref 9.4–12.3)
POTASSIUM SERPL-SCNC: 3.6 MMOL/L (ref 3.5–5.1)
PROT SERPL-MCNC: 6.3 G/DL (ref 6.3–8.2)
RBC # BLD AUTO: 5.03 M/UL (ref 4.05–5.2)
SERVICE CMNT-IMP: ABNORMAL
SERVICE CMNT-IMP: NORMAL
SODIUM SERPL-SCNC: 145 MMOL/L (ref 136–145)
TSH W FREE THYROID IF ABNORMAL: 0.8 UIU/ML
WBC # BLD AUTO: 8.9 K/UL (ref 4.3–11.1)

## 2022-07-14 PROCEDURE — 85025 COMPLETE CBC W/AUTO DIFF WBC: CPT

## 2022-07-14 PROCEDURE — 80053 COMPREHEN METABOLIC PANEL: CPT

## 2022-07-14 PROCEDURE — 97161 PT EVAL LOW COMPLEX 20 MIN: CPT

## 2022-07-14 PROCEDURE — 6370000000 HC RX 637 (ALT 250 FOR IP): Performed by: FAMILY MEDICINE

## 2022-07-14 PROCEDURE — 97535 SELF CARE MNGMENT TRAINING: CPT

## 2022-07-14 PROCEDURE — 82962 GLUCOSE BLOOD TEST: CPT

## 2022-07-14 PROCEDURE — 84443 ASSAY THYROID STIM HORMONE: CPT

## 2022-07-14 PROCEDURE — 94760 N-INVAS EAR/PLS OXIMETRY 1: CPT

## 2022-07-14 PROCEDURE — 97165 OT EVAL LOW COMPLEX 30 MIN: CPT

## 2022-07-14 PROCEDURE — 97530 THERAPEUTIC ACTIVITIES: CPT

## 2022-07-14 PROCEDURE — 36415 COLL VENOUS BLD VENIPUNCTURE: CPT

## 2022-07-14 PROCEDURE — 6370000000 HC RX 637 (ALT 250 FOR IP): Performed by: NURSE PRACTITIONER

## 2022-07-14 PROCEDURE — 6360000002 HC RX W HCPCS: Performed by: NURSE PRACTITIONER

## 2022-07-14 PROCEDURE — G0378 HOSPITAL OBSERVATION PER HR: HCPCS

## 2022-07-14 PROCEDURE — 96372 THER/PROPH/DIAG INJ SC/IM: CPT

## 2022-07-14 PROCEDURE — 2580000003 HC RX 258: Performed by: NURSE PRACTITIONER

## 2022-07-14 RX ORDER — ZOLPIDEM TARTRATE 5 MG/1
5 TABLET ORAL ONCE
Status: COMPLETED | OUTPATIENT
Start: 2022-07-14 | End: 2022-07-14

## 2022-07-14 RX ORDER — INSULIN LISPRO 100 [IU]/ML
0-4 INJECTION, SOLUTION INTRAVENOUS; SUBCUTANEOUS
Status: DISCONTINUED | OUTPATIENT
Start: 2022-07-14 | End: 2022-07-18 | Stop reason: HOSPADM

## 2022-07-14 RX ORDER — DEXTROSE MONOHYDRATE 50 MG/ML
100 INJECTION, SOLUTION INTRAVENOUS PRN
Status: DISCONTINUED | OUTPATIENT
Start: 2022-07-14 | End: 2022-07-18 | Stop reason: HOSPADM

## 2022-07-14 RX ORDER — INSULIN LISPRO 100 [IU]/ML
0-4 INJECTION, SOLUTION INTRAVENOUS; SUBCUTANEOUS NIGHTLY
Status: DISCONTINUED | OUTPATIENT
Start: 2022-07-14 | End: 2022-07-18 | Stop reason: HOSPADM

## 2022-07-14 RX ADMIN — MONTELUKAST 10 MG: 10 TABLET, FILM COATED ORAL at 17:01

## 2022-07-14 RX ADMIN — TRAMADOL HYDROCHLORIDE 50 MG: 50 TABLET, COATED ORAL at 21:40

## 2022-07-14 RX ADMIN — ZOLPIDEM TARTRATE 5 MG: 5 TABLET ORAL at 23:13

## 2022-07-14 RX ADMIN — FAMOTIDINE 20 MG: 20 TABLET ORAL at 10:26

## 2022-07-14 RX ADMIN — LEVOTHYROXINE SODIUM 112 MCG: 0.11 TABLET ORAL at 10:45

## 2022-07-14 RX ADMIN — CELECOXIB 200 MG: 200 CAPSULE ORAL at 10:26

## 2022-07-14 RX ADMIN — MONTELUKAST 10 MG: 10 TABLET, FILM COATED ORAL at 01:17

## 2022-07-14 RX ADMIN — ENOXAPARIN SODIUM 30 MG: 100 INJECTION SUBCUTANEOUS at 10:28

## 2022-07-14 RX ADMIN — PREGABALIN 150 MG: 50 CAPSULE ORAL at 21:40

## 2022-07-14 RX ADMIN — TRAMADOL HYDROCHLORIDE 50 MG: 50 TABLET, COATED ORAL at 15:04

## 2022-07-14 RX ADMIN — SODIUM CHLORIDE, PRESERVATIVE FREE 10 ML: 5 INJECTION INTRAVENOUS at 21:41

## 2022-07-14 RX ADMIN — FAMOTIDINE 20 MG: 20 TABLET ORAL at 21:41

## 2022-07-14 RX ADMIN — SODIUM CHLORIDE: 9 INJECTION, SOLUTION INTRAVENOUS at 10:47

## 2022-07-14 RX ADMIN — LISINOPRIL 2.5 MG: 5 TABLET ORAL at 10:27

## 2022-07-14 RX ADMIN — SIMETHICONE 80 MG: 80 TABLET, CHEWABLE ORAL at 21:40

## 2022-07-14 RX ADMIN — AMLODIPINE BESYLATE 5 MG: 5 TABLET ORAL at 10:26

## 2022-07-14 RX ADMIN — FUROSEMIDE 20 MG: 20 TABLET ORAL at 12:07

## 2022-07-14 RX ADMIN — ENOXAPARIN SODIUM 30 MG: 100 INJECTION SUBCUTANEOUS at 21:40

## 2022-07-14 RX ADMIN — SIMETHICONE 80 MG: 80 TABLET, CHEWABLE ORAL at 15:04

## 2022-07-14 RX ADMIN — SODIUM CHLORIDE, PRESERVATIVE FREE 10 ML: 5 INJECTION INTRAVENOUS at 09:00

## 2022-07-14 RX ADMIN — ZOLPIDEM TARTRATE 5 MG: 5 TABLET ORAL at 01:17

## 2022-07-14 ASSESSMENT — PAIN DESCRIPTION - DESCRIPTORS
DESCRIPTORS: ACHING
DESCRIPTORS: ACHING;SPASM
DESCRIPTORS: ACHING;CRAMPING

## 2022-07-14 ASSESSMENT — PAIN DESCRIPTION - ORIENTATION
ORIENTATION: LOWER
ORIENTATION: MID
ORIENTATION: ANTERIOR

## 2022-07-14 ASSESSMENT — PAIN DESCRIPTION - LOCATION
LOCATION: HAND
LOCATION: ABDOMEN
LOCATION: BACK

## 2022-07-14 ASSESSMENT — PAIN SCALES - GENERAL
PAINLEVEL_OUTOF10: 0
PAINLEVEL_OUTOF10: 6
PAINLEVEL_OUTOF10: 6
PAINLEVEL_OUTOF10: 3

## 2022-07-14 ASSESSMENT — PAIN - FUNCTIONAL ASSESSMENT
PAIN_FUNCTIONAL_ASSESSMENT: PREVENTS OR INTERFERES SOME ACTIVE ACTIVITIES AND ADLS
PAIN_FUNCTIONAL_ASSESSMENT: PREVENTS OR INTERFERES SOME ACTIVE ACTIVITIES AND ADLS
PAIN_FUNCTIONAL_ASSESSMENT: ACTIVITIES ARE NOT PREVENTED

## 2022-07-14 NOTE — PROGRESS NOTES
A Little  How much difficulty sitting down on / standing up from a chair with arms?: A Little  How much difficulty moving from lying on back to sitting on side of bed?: A Little  How much help from another person moving to and from a bed to a chair?: A Little  How much help from another person needed to walk in hospital room?: A Little  How much help from another person for climbing 3-5 steps with a railing?: A Lot  AM-PAC Inpatient Mobility Raw Score : 17  AM-PAC Inpatient T-Scale Score : 42.13  Mobility Inpatient CMS 0-100% Score: 50.57  Mobility Inpatient CMS G-Code Modifier : CK    SUBJECTIVE:   Ms. Angelita Peterson states, \"Tell me what we are going to do. \"  States that she is bossy several times during therapy. She became tearful at the end and said \" I cannot go home until they figure out what is wrong with me\" Patient verbalized that her choice would be to go to therapy    Social/Functional Lives With: Alone  Type of Home: House  Ambulation Assistance: Independent  Transfer Assistance: Independent  Active :  Yes  Additional Comments: per patient report she is a hoarder    OBJECTIVE:     PAIN: Lili Amanda / O2: PRECAUTION / Severa Broom / DRAINS:   Pre Treatment:          Post Treatment: did not complain of pain Vitals        Oxygen      IV and Purewick    RESTRICTIONS/PRECAUTIONS:  Restrictions/Precautions: Fall Risk                 GROSS EVALUATION: Intact Impaired (Comments):   AROM []   WFL   PROM []    Strength []   grossly limited but functional   Balance [] Sitting - Static: Good  Sitting - Dynamic: Good  Standing - Static: Fair  Standing - Dynamic: Fair   Posture []    Sensation [x]     Coordination [x]      Tone []     Edema []    Activity Tolerance [] Patient limited by fatigue,Patient limited by pain    []      COGNITION/  PERCEPTION: Intact Impaired (Comments):   Orientation [x]     Vision [x]     Hearing [x]     Cognition  [x]       MOBILITY: I Mod I S SBA CGA Min Mod Max Total  NT x2 Comments:   Bed Mobility Rolling [] [] [] [x] [] [] [] [] [] [] []    Supine to Sit [] [] [] [x] [] [] [] [] [] [] []    Scooting [] [] [] [x] [] [] [] [] [] [] []    Sit to Supine [] [] [] [] [] [] [] [] [] [] []    Transfers    Sit to Stand [] [] [] [] [x] [] [] [] [] [] []    Bed to Chair [] [] [] [] [x] [] [] [] [] [] []    Stand to Sit [] [] [] [] [x] [] [] [] [] [] []     [] [] [] [] [] [] [] [] [] [] []    I=Independent, Mod I=Modified Independent, S=Supervision, SBA=Standby Assistance, CGA=Contact Guard Assistance,   Min=Minimal Assistance, Mod=Moderate Assistance, Max=Maximal Assistance, Total=Total Assistance, NT=Not Tested    GAIT: I Mod I S SBA CGA Min Mod Max Total  NT x2 Comments:   Level of Assistance [] [] [] [] [x] [] [] [] [] [] []    Distance 25 feet    DME Rolling Walker    Gait Quality Antalgic, Decreased step clearance, Path deviations  and Trunk sway increased    Weightbearing Status Restrictions/Precautions  Restrictions/Precautions: Fall Risk    Stairs      I=Independent, Mod I=Modified Independent, S=Supervision, SBA=Standby Assistance, CGA=Contact Guard Assistance,   Min=Minimal Assistance, Mod=Moderate Assistance, Max=Maximal Assistance, Total=Total Assistance, NT=Not Tested    PLAN:   ACUTE PHYSICAL THERAPY GOALS:   (Developed with and agreed upon by patient and/or caregiver. )     LTG:  (1.)Ms. Valle will move from supine to sit and sit to supine  in bed with MODIFIED INDEPENDENCE within 7 treatment day(s). (2.)Ms. Valle will transfer from bed to chair and chair to bed with MODIFIED INDEPENDENCE using the least restrictive device within 7 treatment day(s). (3.)Ms. Valle will ambulate with SUPERVISION for 150 feet with the least restrictive device within 7 treatment day(s).   ________________________________________________________________________________________________    FREQUENCY AND DURATION: Daily for duration of hospital stay or until stated goals are met, whichever comes first.    THERAPY PROGNOSIS: Good    PROBLEM LIST:   (Skilled intervention is medically necessary to address:)  Decreased Activity Tolerance  Decreased AROM/PROM  Decreased Balance  Decreased Gait Ability  Decreased Safety Awareness  Decreased Strength  Decreased Transfer Abilities  Increased Pain INTERVENTIONS PLANNED:   (Benefits and precautions of physical therapy have been discussed with the patient.)  Therapeutic Activity  Therapeutic Exercise/HEP  Gait Training  Education       TREATMENT:   EVALUATION: LOW COMPLEXITY: (Untimed Charge)    TREATMENT:   Therapeutic Activity (25 Minutes): Therapeutic activity included Rolling, Supine to Sit, Sit to Supine, Scooting, Transfer Training, Ambulation on level ground, Sitting balance  and Standing balance to improve functional Activity tolerance, Balance, Mobility and Strength. TREATMENT GRID:  N/A    AFTER TREATMENT PRECAUTIONS: Bed/Chair Locked, Call light within reach, Chair, Needs within reach and RN notified    INTERDISCIPLINARY COLLABORATION:  MD/ PA/ NP , RN/ PCT, PT/ PTA, OT/ FRANKLIN and RN Case Manager/      EDUCATION: Education Given To: Patient  Education Provided: Role of Therapy;Plan of Care;Transfer Training; Fall Prevention Strategies  Education Method: Demonstration;Verbal  Education Outcome: Verbalized understanding;Demonstrated understanding    TIME IN/OUT:  Time In: 1050  Time Out: [address]  Minutes: [address], PT

## 2022-07-14 NOTE — PLAN OF CARE
Problem: Discharge Planning  Goal: Discharge to home or other facility with appropriate resources  Outcome: Progressing  Flowsheets (Taken 7/14/2022 0007)  Discharge to home or other facility with appropriate resources:   Identify barriers to discharge with patient and caregiver   Identify discharge learning needs (meds, wound care, etc)   Refer to discharge planning if patient needs post-hospital services based on physician order or complex needs related to functional status, cognitive ability or social support system     Problem: Pain  Goal: Verbalizes/displays adequate comfort level or baseline comfort level  Outcome: Progressing     Problem: Skin/Tissue Integrity  Goal: Absence of new skin breakdown  Description: 1. Monitor for areas of redness and/or skin breakdown  2. Assess vascular access sites hourly  3. Every 4-6 hours minimum:  Change oxygen saturation probe site  4. Every 4-6 hours:  If on nasal continuous positive airway pressure, respiratory therapy assess nares and determine need for appliance change or resting period.   Outcome: Progressing     Problem: Safety - Adult  Goal: Free from fall injury  Outcome: Progressing     Problem: ABCDS Injury Assessment  Goal: Absence of physical injury  Outcome: Progressing

## 2022-07-14 NOTE — PLAN OF CARE
Problem: Discharge Planning  Goal: Discharge to home or other facility with appropriate resources  Outcome: Progressing  Flowsheets (Taken 7/14/2022 0808 by Chago Dove RN)  Discharge to home or other facility with appropriate resources: Identify barriers to discharge with patient and caregiver     Problem: Pain  Goal: Verbalizes/displays adequate comfort level or baseline comfort level  Outcome: Progressing     Problem: Skin/Tissue Integrity  Goal: Absence of new skin breakdown  Description: 1. Monitor for areas of redness and/or skin breakdown  2. Assess vascular access sites hourly  3. Every 4-6 hours minimum:  Change oxygen saturation probe site  4. Every 4-6 hours:  If on nasal continuous positive airway pressure, respiratory therapy assess nares and determine need for appliance change or resting period.   Outcome: Progressing     Problem: Safety - Adult  Goal: Free from fall injury  Outcome: Progressing     Problem: ABCDS Injury Assessment  Goal: Absence of physical injury  Outcome: Progressing

## 2022-07-14 NOTE — ED NOTES
TRANSFER - OUT REPORT:    Verbal report given to ANA ventura on [employer]  being transferred to  for routine progression of patient care       Report consisted of patient's Situation, Background, Assessment and   Recommendations(SBAR). Information from the following report(s) Nurse Handoff Report, ED SBAR, [facility] and Neuro Assessment, and Medications given, were reviewed with the receiving nurse. Lines:   Peripheral IV 07/13/22 Left Antecubital (Active)   Site Assessment Clean, dry & intact 07/13/22 1236   Line Status Blood return noted;Normal saline locked;Specimen collected; Flushed 07/13/22 1236   Phlebitis Assessment No symptoms 07/13/22 1236   Infiltration Assessment 0 07/13/22 1236        Opportunity for questions and clarification was provided.       Patient transported with:  [employer] RN  07/13/22 2582

## 2022-07-14 NOTE — PROGRESS NOTES
OCCUPATIONAL THERAPY Initial Assessment, Daily Note and AM       OT Visit Days: 1  Acknowledge Orders  Time  OT Charge Capture  Rehab Caseload Tracker      Heath Guillermo is a 77 y.o. female   PRIMARY DIAGNOSIS: Functional quadriplegia ([address]  Dehydration [E86.0]  Generalized weakness [R53.1]  Functional quadriplegia (HCC) [R53.2]  Abdominal pain, unspecified abdominal location [R10.9]       Reason for Referral: Generalized Muscle Weakness (M62.81)  Other lack of cordination (R27.8)  Difficulty in walking, Not elsewhere classified (R26.2)  Observation: Payor: MEDICARE / Plan: MEDICARE PART A AND B / Product Type: *No Product type* /     ASSESSMENT:     REHAB RECOMMENDATIONS:   Recommendation to date pending progress:  Setting:   Short-term Rehab    Equipment:     To Be Determined     ASSESSMENT:  Ms. Niko Mccauley is admitted for progressive weakness over time and pain and gas in abdomen and presents with overall deficits in strength, functional mobility and ADL performance. At baseline, she lives alone in a 1 level  home on 2 acres of land and reports that she is a hoarder. She reports ultimately wanting to sell her things and move into a smaller, more manageable place. She reports independence with ADLs and independence with mobility. Still drives, grocery shops and goes to [address] [place] [address]. Today, she was able to perform bed mobility, toileting, grooming and functional household mobility with assistance as charted below. She presents with increased weakness, decreased activity tolerance, and abdominal discomfort. She is tearful at end of session. She was left in recliner chair with all needs met and in reach. Pt is functioning below their baseline and will benefit from skilled OT during hospital stay. Anticipate pt will benefit from STR upon discharge (also as [facility] likely would not be feasible based on her current living situation).       [address] [facility] [address] [address] AM-PAC 6 Clicks Daily Activity Inpatient Short Form: AM-PAC Daily Activity Inpatient   How much help for putting on and taking off regular lower body clothing?: A Lot  How much help for Bathing?: A Lot  How much help for Toileting?: A Little  How much help for putting on and taking off regular upper body clothing?: A Little  How much help for taking care of personal grooming?: None  How much help for eating meals?: None  AM-[facility] Inpatient Daily Activity Raw Score: 18  AM-PAC Inpatient ADL T-Scale Score : 38.66  ADL Inpatient CMS 0-100% Score: 46.65  ADL Inpatient CMS G-Code Modifier : CK           SUBJECTIVE:     Ms. Evens Rouse states tearfully, \"I can't go back home until they fix what is wrong with me\"     Social/Functional Lives With: Alone  Type of Home: House  Ambulation Assistance: Independent  Transfer Assistance: Independent  Active :  Yes  Additional Comments: per patient report she is a hoarder    OBJECTIVE:     Andra Jones / Elver Lacks / AIRWAY: IV    RESTRICTIONS/PRECAUTIONS:  Restrictions/Precautions: Fall Risk    PAIN: VITALS / O2:   Pre Treatment:          Post Treatment: no complaint of pain       Vitals          Oxygen            GROSS EVALUATION: INTACT IMPAIRED   (See Comments)   UE AROM [x] []   UE PROM [x] []   Strength []   grossly decreased   Posture / Balance []  cues for safety during mobility and ADL tasks   Sensation [x]     Coordination [x]       Tone []       Edema []    Activity Tolerance []   grossly decreased     Hand Dominance R [] L []      COGNITION/  PERCEPTION: INTACT IMPAIRED   (See Comments)   Orientation [x]     Vision [x]     Hearing [x]     Cognition  [x]     Perception [x]       MOBILITY: I Mod I S SBA CGA Min Mod Max Total  NT x2 Comments:   Bed Mobility    Rolling [] [] [] [] [] [] [] [] [] [] []    Supine to Sit [] [] [] [x] [] [] [] [] [] [] []    Scooting [] [] [] [x] [] [] [] [] [] [] []    Sit to Supine [] [] [] [] [] [] [] [] [] [] []    Transfers    Sit to Stand [] [] [] [] [x] [] [] [] [] [] []    Bed to Chair [] [] [] [] [x] [] [] [] [] [] []    Stand to Sit [] [] [] [] [x] [] [] [] [] [] []    Tub/Shower [] [] [] [] [] [] [] [] [] [] []     Toilet [] [] [] [] [x] [] [] [] [] [] []      [] [] [] [] [] [] [] [] [] [] []    I=Independent, Mod I=Modified Independent, S=Supervision/Setup, SBA=Standby Assistance, CGA=Contact Guard Assistance, Min=Minimal Assistance, Mod=Moderate Assistance, Max=Maximal Assistance, Total=Total Assistance, NT=Not Tested    ACTIVITIES OF DAILY LIVING: I Mod I S SBA CGA Min Mod Max Total NT Comments   BASIC ADLs:              Upper Body Bathing  [] [] [] [] [] [] [] [] [] []    Lower Body Bathing [] [] [] [] [] [] [] [] [] []    Toileting [] [] [] [] [] [x] [] [] [] []    Upper Body Dressing [] [] [] [] [] [] [] [] [] []    Lower Body Dressing [] [] [] [] [x] [] [] [] [] []    Feeding [] [] [] [] [] [] [] [] [] []    Grooming [] [] [] [] [x] [] [] [] [] []    Personal Device Care [] [] [] [] [] [] [] [] [] []    Functional Mobility [] [] [] [] [x] [] [] [] [] []    I=Independent, Mod I=Modified Independent, S=Supervision/Setup, SBA=Standby Assistance, CGA=Contact Guard Assistance, Min=Minimal Assistance, Mod=Moderate Assistance, Max=Maximal Assistance, Total=Total Assistance, NT=Not Tested    PLAN:     FREQUENCY/DURATION   OT Plan of Care: 3 times/week for duration of hospital stay or until stated goals are met, whichever comes first.    ACUTE OCCUPATIONAL THERAPY GOALS:   (Developed with and agreed upon by patient and/or caregiver.)  1. Patient will complete lower body dressing and upper body dressing with contact guard assist to increase self care independence. 2. Patient will complete toileting with contact guard assist to increase self care independence. 3. Patient will improve static standing and dynamic standing at edge of bed for 10 minutes to improve independence with transfers and self cares.   4. Patient will tolerate 25 minutes of OT treatment with self incorporated rest breaks to increase activity tolerance to enhance participation in hobbies. 5. Patient will complete all functional transfers with supervision using adaptive equipment as needed. Timeframe: 7 visits        PROBLEM LIST:   (Skilled intervention is medically necessary to address:)  Decreased ADL/Functional Activities  Decreased Activity Tolerance  Decreased Balance  Decreased Gait Ability  Decreased Safety Awareness  Decreased Strength  Decreased Transfer Abilities   INTERVENTIONS PLANNED:  (Benefits and precautions of occupational therapy have been discussed with the patient.)  Self Care Training  Therapeutic Activity  Therapeutic Exercise/HEP  Neuromuscular Re-education  Manual Therapy  Education         TREATMENT:     EVALUATION: LOW COMPLEXITY: (Untimed Charge)    TREATMENT:   Co-Treatment PT/OT necessary due to patient's decreased overall endurance/tolerance levels, as well as need for high level skilled assistance to complete functional transfers/mobility and functional tasks  Self Care (30 minutes): Patient participated in toileting, upper body dressing and grooming ADLs in unsupported sitting and standing with moderate verbal, manual and tactile cueing to decrease assistance required and increase safety awareness. Patient also participated in functional mobility, functional transfer and adaptive equipment training to increase independence, increase activity tolerance and increase safety awareness. TREATMENT GRID:  N/A    AFTER TREATMENT PRECAUTIONS: Bed/Chair Locked, Call light within reach, Chair, Needs within reach and RN notified    INTERDISCIPLINARY COLLABORATION:  RN/ PCT and PT/ PTA    EDUCATION:  Education Given To: Patient  Education Provided: Role of Therapy;Plan of Care;ADL Adaptive Strategies; Energy Conservation;Transfer Training; Fall Prevention Strategies  Education Method: Demonstration;Verbal  Barriers to Learning: None  Education Outcome: Continued education needed;Verbalized understanding;Demonstrated understanding    TOTAL TREATMENT DURATION AND TIME:  Time In: 5773  Time Out: [address]  Minutes: [address]

## 2022-07-14 NOTE — PROGRESS NOTES
Hospitalist Progress Note   Admit Date:  2022  1:14 PM   Name:  Donell Santoro   Age:  77 y.o. Sex:  female  :  1945   MRN:  [id number]   Room:  [facility]/    Presenting Complaint: Abdominal Pain     Reason(s) for Admission: Dehydration [E86.0]  Generalized weakness [R53.1]  Functional quadriplegia (HCC) [R53.2]  Abdominal pain, unspecified abdominal location [R10.9]     Hospital Course & Interval History:   Donell Santoro is a 77 y.o. female with medical history of DM II, chronic lower back pain, IBS, hyperlipidemia,  HTN who presented with c/o lower back pain, increase gas in her abd, unable to care for herself at home. States has steadily declined over the last year as far as ADLs. Over the last 5 days she states she has not eaten or drank anything because she can't get up to fix it. She has been ambulatory to the bathroom. Pt also c/o sweating over the last 5 days. Labs essentially normal. CT abd no acute findings. Pt adamant about admission. Of note Cardiology notes from 2021 have same complaints as above. Subjective/24hr Events (22): Seen at bedside this AM. Very talkative. Lives at home alone and feels that she has been declining physically and admits that she is hoarding in her home. All of her family has  (no children/never ) but does have a cousin that provides some socioemotional support. Otherwise spends most of her time alone. Having difficulty making meals and cleaning her house. Complaints continue of abdominal discomfort. Had a colonoscopy several years ago that was unremarkable and per patient she has seen GI as an outpatient regarding similar symptoms. Takes OTC gas and antiacids with no relief of symptoms. Has not had a bowel movement but was having some diarrhea, Discussed advancing her diet and she is in agreement. Discussed likely further need for outpatient follow up with GI and likely discharge once evaluated by PT/OT.  Patient reluctant and is requesting further inpatient work up - she is specifically concerned about her sweating and thinks her symptoms are related to her thyroid. Denies CP, SOB, vomiting, changes in vision/hearing. Back pain present and chronic. Assessment & Plan:     Principal Problem:  Functional quadriplegia/back pain ([address]  - Has chronic back pain, sees Ortho and pain management outpatient  - PT/OT recommend short term rehab  - Continue home lyrica, tramadol     DM II  - Home meds  - SSI  - will check A1c     Hypothyroidism  - Home meds  - check TSH     GERD/IBS/increased belching/flatulence  - Gas ex and pepcid    Psych  - consulted given concerns that patients symptoms functional    Dispo/Discharge Planning:   - short term rehab     Diet: Diet NPO  VTE ppx: lovenox  Code status: DNR  Surrogate decision maker: patrick [facility] acute hospital Problems           Last Modified POA    * (Principal) Functional quadriplegia ([facility] 7/13/2022 Yes    Hypothyroidism due to acquired atrophy of thyroid 7/14/2022 Yes    Overview Addendum 7/14/2022  9:35 AM by Ne Pierson MD     Synthroid 100 µg daily. Primary insomnia 7/14/2022 Yes    Overview Addendum 7/14/2022  9:35 AM by Ne Pierson MD     Ambien as needed. On PTA and had filled 10/1. Polyneuropathy due to type 2 diabetes mellitus ([facility] 7/14/2022 Yes    Type 2 diabetes mellitus with hyperglycemia ([facility] 7/14/2022 Yes    Overview Addendum 7/14/2022  9:34 AM by Ne Pierson MD     Glucovance and Januvia.          Morbid obesity with BMI of 45.0-49.9, adult ([facility] (Chronic) 7/14/2022 Yes    S/P total knee replacement using cement 7/14/2022 Yes            Objective:     Patient Vitals for the past 24 hrs:   Temp Pulse Resp BP SpO2   07/14/22 0944 -- 68 -- -- 96 %   07/14/22 0808 97.9 °F (36.6 °C) 89 16 (!) 150/81 97 %   07/14/22 0410 97.7 °F (36.5 °C) 75 18 (!) 170/78 91 %   07/14/22 0056 97.7 °F (36.5 °C) 66 18 (!) 144/67 94 %   07/13/22 2343 -- -- 17 -- -- 07/13/22 2116 98.6 °F (37 °C) 83 16 (!) 154/75 93 %   07/13/22 2005 -- 87 18 (!) 147/71 94 %   07/13/22 1900 -- -- 18 -- --   07/13/22 1703 -- 80 -- -- 98 %   07/13/22 1603 -- 88 -- (!) 178/78 97 %   07/13/22 1602 -- 86 17 (!) 178/78 96 %   07/13/22 1422 -- 77 18 (!) 169/83 96 %   07/13/22 1230 98.5 °F (36.9 °C) 99 16 126/69 98 %       Estimated body mass index is 45.49 kg/m² as calculated from the following:    Height as of this encounter: 5' 4\" (1.626 m). Weight as of this encounter: 265 lb (120.2 kg). Intake/Output Summary (Last 24 hours) at 7/14/2022 1003  Last data filed at 7/14/2022 0410  Gross per 24 hour   Intake 1000 ml   Output 300 ml   Net 700 ml         Physical Exam:   Blood pressure (!) 150/81, pulse 68, temperature 97.9 °F (36.6 °C), temperature source Oral, resp. rate 16, height 5' 4\" (1.626 m), weight 265 lb (120.2 kg), SpO2 96 %. General:    Well nourished. No overt distress  Head:  Normocephalic, atraumatic  Eyes:  Sclerae appear normal.  Pupils equally round. ENT:  Nares appear normal, no drainage. Moist oral mucosa  Neck:  No restricted ROM. Trachea midline   CV:   RRR. No m/r/g. No jugular venous distension. Lungs:   CTAB. No wheezing, rhonchi, or rales. Respirations even, unlabored  Abdomen: Bowel sounds present. Soft, nontender, nondistended. Extremities: No cyanosis or clubbing. No edema  Skin:     No rashes and normal coloration. Warm and dry. Neuro:  CN II-XII grossly intact. Sensation intact.   A&Ox3  Psych:  Pressured speech    I have reviewed ordered lab tests and independently visualized imaging below:    Recent Labs:  Recent Results (from the past 48 hour(s))   CBC with Diff    Collection Time: 07/13/22 12:38 PM   Result Value Ref Range    WBC 11.6 (H) 4.3 - 11.1 K/uL    RBC 5.65 (H) 4.05 - 5.2 M/uL    Hemoglobin 16.1 (H) 11.7 - 15.4 g/dL    Hematocrit 48.8 (H) 35.8 - 46.3 %    MCV 86.4 79.6 - 97.8 FL    MCH 28.5 26.1 - 32.9 PG    MCHC 33.0 31.4 - 35.0 g/dL RDW 14.2 11.9 - 14.6 %    Platelets 960 489 - 911 K/uL    MPV 11.2 9.4 - 12.3 FL    nRBC 0.00 0.0 - 0.2 K/uL    Differential Type AUTOMATED      Seg Neutrophils 74 43 - 78 %    Lymphocytes 19 13 - 44 %    Monocytes 6 4.0 - 12.0 %    Eosinophils % 0 (L) 0.5 - 7.8 %    Basophils 0 0.0 - 2.0 %    Immature Granulocytes 0 0.0 - 5.0 %    Segs Absolute 8.6 (H) 1.7 - 8.2 K/UL    Absolute Lymph # 2.2 0.5 - 4.6 K/UL    Absolute Mono # 0.7 0.1 - 1.3 K/UL    Absolute Eos # 0.1 0.0 - 0.8 K/UL    Basophils Absolute 0.0 0.0 - 0.2 K/UL    Absolute Immature Granulocyte 0.0 0.0 - 0.5 K/UL   CMP    Collection Time: 07/13/22 12:38 PM   Result Value Ref Range    Sodium 142 136 - 145 mmol/L    Potassium 3.6 3.5 - 5.1 mmol/L    Chloride 105 98 - 107 mmol/L    CO2 33 (H) 21 - 32 mmol/L    Anion Gap 4 (L) 7 - 16 mmol/L    Glucose 197 (H) 65 - 100 mg/dL    BUN 24 (H) 8 - 23 MG/DL    CREATININE 0.96 0.6 - 1.0 MG/DL    GFR African American >60 >60 ml/min/1.73m2    GFR Non- >60 >60 ml/min/1.73m2    Calcium 9.7 8.3 - 10.4 MG/DL    Total Bilirubin 0.6 0.2 - 1.1 MG/DL    ALT 20 12 - 65 U/L    AST 14 (L) 15 - 37 U/L    Alk Phosphatase 111 50 - 136 U/L    Total Protein 7.6 6.3 - 8.2 g/dL    Albumin 3.5 3.2 - 4.6 g/dL    Globulin 4.1 (H) 2.3 - 3.5 g/dL    Albumin/Globulin Ratio 0.9 (L) 1.2 - 3.5     Lipase    Collection Time: 07/13/22 12:38 PM   Result Value Ref Range    Lipase 61 (L) 73 - 393 U/L   Lactic Acid (Select if patient is over 65 to rule out mesenteric ischemia)    Collection Time: 07/13/22 12:38 PM   Result Value Ref Range    Lactic Acid, Plasma 1.8 0.4 - 2.0 MMOL/L   COVID-19, Rapid    Collection Time: 07/13/22  2:45 PM    Specimen: Nasopharyngeal   Result Value Ref Range    Source Nasopharyngeal      SARS-CoV-2, Rapid Not detected NOTD     POCT Glucose    Collection Time: 07/14/22  9:46 AM   Result Value Ref Range    POC Glucose 88 65 - 100 mg/dL    Performed by: Jak          Other Studies:  CT ABDOMEN PELVIS W IV CONTRAST Additional Contrast? None    Result Date: 7/13/2022  Exam: CT ABDOMEN PELVIS W IV CONTRAST on 7/13/2022 2:06 PM Clinical History: The Female patient is 77years old  presenting for intermittent abdominal pain Technique: Thin slice axial images were obtained through the abdomen and pelvis with intravenous and without oral contrast.  Coronal reformatted images were also provided for review. A total of 100 ml of Iopamidol (ISOVUE-370) 76 % contrast was administered intravenously. All CT scans at this facility are performed using dose reduction/dose modulation techniques, as appropriate the performed exam, including the following: Automated Exposure Control; Adjustment of the mA and/or kV according to patient size (this includes techniques or standardized protocols for targeted exams where dose is matched to indication/reason for exam); and Use of Iterative Reconstruction Technique. Radiation Exposure Indices: Reference Air Kerma (Ka,r) = 1578 mGy-cm COMPARISON:  None. FINDINGS:  Abdomen: Lung bases: Clear without evidence of focal infiltrate, consolidation, or effusion. Liver: Normal size, contour, density and enhancement without focal mass. Biliary: Cholelithiasis. No evidence of intra or extrahepatic biliary dilatation. Pancreas: There are atrophic and fatty replaced pancreas particularly the pancreatic head. Spleen: Normal in size and contour without focal lesion. Adrenal glands: Normal in size without focal lesion. Kidneys: Symmetric without evidence of hydronephrosis or nephrolithiasis. Normal enhancement throughout all visualized phases of contrast excretion. 3.5 cm cyst projecting from lower pole of left kidney. Bowel: No evidence of obstruction or focal lesion. Retroperitoneum/vasculature: No evidence of significant adenopathy. Unremarkable aorta and IVC. Abdominal soft tissues: Fat-containing periumbilical hernia measuring 6.5 cm. Osseous structures: No acute osseous abnormality. Right hip prosthesis. Pelvis: Unremarkable bladder. No significant adenopathy or free fluid. Paullette Rakes Hysterectomy     1. No obvious acute intra-abdominal or pelvic abnormality with chronic changes as detailed above.  CPT code(s): 00754, [id number]      Current Meds:  Current Facility-Administered Medications   Medication Dose Route Frequency    amLODIPine (NORVASC) tablet 5 mg  5 mg Oral Daily    budesonide (PULMICORT) nebulizer suspension 500 mcg  500 mcg Nebulization BID    celecoxib (CELEBREX) capsule 200 mg  200 mg Oral Daily    chlordiazePOXIDE-clidinium (LIBRAX) 5-2.5 MG per capsule 1 capsule  1 capsule Oral 4x Daily AC & HS    olodaterol (STRIVERDI RESPIMAT) inhaler 2 puff  2 puff Inhalation Daily    furosemide (LASIX) tablet 20 mg  20 mg Oral Daily    insulin glargine (LANTUS;BASAGLAR) injection pen 20 Units  20 Units SubCUTAneous Daily    levothyroxine (SYNTHROID) tablet 112 mcg  112 mcg Oral Daily    lisinopril (PRINIVIL;ZESTRIL) tablet 2.5 mg  2.5 mg Oral Daily    montelukast (SINGULAIR) tablet 10 mg  10 mg Oral QPM    pregabalin (LYRICA) capsule 50 mg  50 mg Oral Daily    traMADol (ULTRAM) tablet 50 mg  50 mg Oral Q6H PRN    sodium chloride flush 0.9 % injection 5-40 mL  5-40 mL IntraVENous 2 times per day    sodium chloride flush 0.9 % injection 5-40 mL  5-40 mL IntraVENous PRN    0.9 % sodium chloride infusion   IntraVENous PRN    enoxaparin Sodium (LOVENOX) injection 30 mg  30 mg SubCUTAneous BID    ondansetron (ZOFRAN-ODT) disintegrating tablet 4 mg  4 mg Oral Q8H PRN    Or    ondansetron (ZOFRAN) injection 4 mg  4 mg IntraVENous Q6H PRN    polyethylene glycol (GLYCOLAX) packet 17 g  17 g Oral Daily PRN    acetaminophen (TYLENOL) tablet 650 mg  650 mg Oral Q6H PRN    Or    acetaminophen (TYLENOL) suppository 650 mg  650 mg Rectal Q6H PRN    0.9 % sodium chloride infusion   IntraVENous Continuous    famotidine (PEPCID) tablet 20 mg  20 mg Oral BID    simethicone (MYLICON) chewable tablet 80 mg  80 mg Oral Q6H PRN       Signed:  SHABBIR Newton

## 2022-07-15 LAB
EST. AVERAGE GLUCOSE BLD GHB EST-MCNC: 151 MG/DL
GLUCOSE BLD STRIP.AUTO-MCNC: 108 MG/DL (ref 65–100)
GLUCOSE BLD STRIP.AUTO-MCNC: 134 MG/DL (ref 65–100)
GLUCOSE BLD STRIP.AUTO-MCNC: 148 MG/DL (ref 65–100)
GLUCOSE BLD STRIP.AUTO-MCNC: 171 MG/DL (ref 65–100)
HBA1C MFR BLD: 6.9 % (ref 4.8–5.6)
SERVICE CMNT-IMP: ABNORMAL

## 2022-07-15 PROCEDURE — 6360000002 HC RX W HCPCS: Performed by: NURSE PRACTITIONER

## 2022-07-15 PROCEDURE — 6370000000 HC RX 637 (ALT 250 FOR IP): Performed by: FAMILY MEDICINE

## 2022-07-15 PROCEDURE — 36415 COLL VENOUS BLD VENIPUNCTURE: CPT

## 2022-07-15 PROCEDURE — 6370000000 HC RX 637 (ALT 250 FOR IP): Performed by: NURSE PRACTITIONER

## 2022-07-15 PROCEDURE — 2500000003 HC RX 250 WO HCPCS: Performed by: FAMILY MEDICINE

## 2022-07-15 PROCEDURE — 2580000003 HC RX 258: Performed by: NURSE PRACTITIONER

## 2022-07-15 PROCEDURE — 82962 GLUCOSE BLOOD TEST: CPT

## 2022-07-15 PROCEDURE — G0378 HOSPITAL OBSERVATION PER HR: HCPCS

## 2022-07-15 PROCEDURE — 83036 HEMOGLOBIN GLYCOSYLATED A1C: CPT

## 2022-07-15 PROCEDURE — 96372 THER/PROPH/DIAG INJ SC/IM: CPT

## 2022-07-15 RX ORDER — ZOLPIDEM TARTRATE 5 MG/1
5 TABLET ORAL ONCE
Status: COMPLETED | OUTPATIENT
Start: 2022-07-15 | End: 2022-07-15

## 2022-07-15 RX ADMIN — ENOXAPARIN SODIUM 30 MG: 100 INJECTION SUBCUTANEOUS at 21:40

## 2022-07-15 RX ADMIN — PREGABALIN 150 MG: 50 CAPSULE ORAL at 21:40

## 2022-07-15 RX ADMIN — ENOXAPARIN SODIUM 30 MG: 100 INJECTION SUBCUTANEOUS at 08:56

## 2022-07-15 RX ADMIN — SODIUM CHLORIDE, PRESERVATIVE FREE 10 ML: 5 INJECTION INTRAVENOUS at 08:54

## 2022-07-15 RX ADMIN — CELECOXIB 200 MG: 200 CAPSULE ORAL at 08:51

## 2022-07-15 RX ADMIN — TUBERCULIN PURIFIED PROTEIN DERIVATIVE 5 UNITS: 5 INJECTION, SOLUTION INTRADERMAL at 10:40

## 2022-07-15 RX ADMIN — LISINOPRIL 2.5 MG: 5 TABLET ORAL at 08:51

## 2022-07-15 RX ADMIN — FAMOTIDINE 20 MG: 20 TABLET ORAL at 21:40

## 2022-07-15 RX ADMIN — SODIUM CHLORIDE, PRESERVATIVE FREE 10 ML: 5 INJECTION INTRAVENOUS at 21:45

## 2022-07-15 RX ADMIN — LEVOTHYROXINE SODIUM 112 MCG: 0.11 TABLET ORAL at 08:51

## 2022-07-15 RX ADMIN — SODIUM CHLORIDE: 9 INJECTION, SOLUTION INTRAVENOUS at 00:34

## 2022-07-15 RX ADMIN — PREGABALIN 150 MG: 50 CAPSULE ORAL at 08:51

## 2022-07-15 RX ADMIN — ZOLPIDEM TARTRATE 5 MG: 5 TABLET ORAL at 21:40

## 2022-07-15 RX ADMIN — FUROSEMIDE 20 MG: 20 TABLET ORAL at 08:51

## 2022-07-15 RX ADMIN — FAMOTIDINE 20 MG: 20 TABLET ORAL at 08:51

## 2022-07-15 RX ADMIN — MONTELUKAST 10 MG: 10 TABLET, FILM COATED ORAL at 17:29

## 2022-07-15 RX ADMIN — AMLODIPINE BESYLATE 5 MG: 5 TABLET ORAL at 08:51

## 2022-07-15 NOTE — PROGRESS NOTES
Nutrition:  Received referral Malnutrition Screening Tool: Malnutrition Screen  Have you recently lost weight without trying?: 24 to 33 pounds (3 points)  Have you been eating poorly because of a decreased appetite?: Yes (1 point)  Malnutrition Screening Tool Score: 4  Weight hx per EMR  Weight History Weight Weight Weight Method   7/13/2022 265 lbs 120.2 kg Stated   2/9/2022 269 lbs 122 kg ECHO   12/15/2021 269 lbs 13 oz 122.4 kg Cardiology OV   7/14/2021 252 lbs 114.3 kg Ortho OV       No significant weight loss identified per EMR. Note pt reported poor po intake for 5 days PTA and 20-30# of weight loss d/t sweating in the past week. Also with gas/abdominal pain,thus diet has been NPO/CLD since admission. Pt in bed with eyes closed at time of RD visit. Did not awaken, RN concurs. Will defer assessment until actual weight obtained and diet able to be advanced.   Discussed with  NP during IDT rounds this AM and need for weight with Eitan Doan, [address], [address],[place], [address] [facility]

## 2022-07-15 NOTE — CARE COORDINATION
07/15/22 1009   Service Assessment   Patient Orientation Alert and Oriented   Cognition Alert   History Provided By Patient   Primary Caregiver Clarisse 18 Family Members  (has a cousin/Mono Reid (POA))   PCP Verified by CM Yes   Last Visit to PCP Within last 6 months   Prior Functional Level Assistance with the following:;Housework; Mobility  (States is a hoarder and cannot get a walker thorugh the home, holds onto furniture.)   Current Functional Level Assistance with the following:;Mobility   Can patient return to prior living arrangement Unknown at present   Ability to make needs known: Good   Family able to assist with home care needs: Yes   Social/Functional History   Lives With Alone   Type of [address] One level   Home Access Stairs to enter with rails   Entrance Stairs - Number of Steps [address] Needs assistance  (pt claims to be a hoarder)   Ambulation Assistance Needs assistance   Mode of Transportation Car   Discharge Planning   Type of Residence [address] Prior To Admission None   Potential [address] Discharge   Confirm Follow Up Transport Family   Condition of Participation: Discharge Planning   Freedom of Choice list was provided with basic dialogue that supports the patient's individualized plan of care/goals, treatment preferences, and shares the quality data associated with the providers? Yes   Visited with pt to establish prior to admission baseline and discuss their anticipated goals at time of d/c. Pt came in c/o back pain and abd bloating. States Hx of B knee and Rt hip replacements. Pt states that she lives at home, alone, she is a hoarder and it is difficult to navigate through her home. She has an walker and could benefit form it, but is unable to get a walker through b/c of the clutter. She admits that this is a problem and is having Mono help her find a inde/assisted living to transition to. Therapy saw pt and are recommending STR, pt given \"pt choice list\" and CM will check back with her. Tele-psych consult was also placed, monitor at bedside. PT/OT/PPD orders placed and CM to follow.

## 2022-07-15 NOTE — CARE COORDINATION
Patient refusing [place] and wants STR at D/C. Current observation status with [place] [employer]. RUDDY contacted a SNF liaison to confirm if the 3 night inpatient stay waiver is still in place. Reportedly it was supposed to  today but is being extended into October. SW met with patient and explained the situation and that whatever facility she selected would need to be agreeable to accepting the waiver. Patient choose [address] and [address] Choice list left for alternative options should they decline.  Reached out to facility liaisons who state they will review the referral.     Maurisio Sorensen, RUDDY    [facility]

## 2022-07-15 NOTE — PROGRESS NOTES
Hospitalist Progress Note   Admit Date:  2022  1:14 PM   Name:  Purvi Villalobos   Age:  77 y.o. Sex:  female  :  1945   MRN:  [id number]   Room:  [facility]/    Presenting Complaint: Abdominal Pain     Reason(s) for Admission: Dehydration [E86.0]  Generalized weakness [R53.1]  Functional quadriplegia (HCC) [R53.2]  Abdominal pain, unspecified abdominal location [R10.9]     Hospital Course & Interval History:   Purvi Villalobos is a 77 y.o. female with medical history of DM II, chronic lower back pain, IBS, hyperlipidemia,  HTN who presented with c/o lower back pain, increase gas in her abd, unable to care for herself at home. States has steadily declined over the last year as far as ADLs. Over the last 5 days she states she has not eaten or drank anything because she can't get up to fix it. She has been ambulatory to the bathroom. Pt also c/o sweating over the last 5 days. Labs essentially normal. CT abd no acute findings. Pt adamant about admission. Of note Cardiology notes from 2021 have same complaints as above. Lives at home along, no children or spouse. Has difficulty cleaning her house as she admits she is a hoarder. Had colonoscopy several  years ago which was normal.  She has seen GI with same symptoms outpatient. Subjective/24hr Events (07/15/22): Pt sitting up on side of bed giving herself a bath. Staff reported patient walked to bathroom with walker (which she uses at home) without difficulty. Pt adamant she is not leaving until \"we know what is wrong\" with her. Tele psych evaluated, waiting on report. Pt anxious, angry and demanding STR. Denies CP, SOB, n/v/d, abd pain. Discussed in depth her need for continued outpatient work up.        Assessment & Plan:     Functional quadriplegia/back pain ([facility]  - Has chronic back pain, sees Ortho and pain management outpatient  - PT/OT recommend short term rehab  - Continue home lyrica, tramadol     DM II  - Home meds  - SSI  - will check A1c  7/15 A1C 6.9  Continue current meds     Hypothyroidism  - Home meds  - check TSH   7/15 TSH 0.8    GERD/IBS/increased belching/flatulence  - Gas ex and pepcid     Psych  - consulted given concerns that patients symptoms functional     Dispo/Discharge Planning:   - short term rehab     Diet: Diet NPO  VTE ppx: lovenox  Code status: DNR  Surrogate decision maker: patrick [facility] Problems:  Principal Problem:    Functional quadriplegia ([facility]  Active Problems:    Hypothyroidism due to acquired atrophy of thyroid    Primary insomnia    Polyneuropathy due to type 2 diabetes mellitus ([facility]    Type 2 diabetes mellitus with hyperglycemia, without long-term current use of insulin ([facility]    Morbid obesity with BMI of 45.0-49.9, adult ([facility]    S/P total knee replacement using cement  Resolved Problems:    * No resolved hospital problems. *      Objective:   Patient Vitals for the past 24 hrs:   Temp Pulse Resp BP SpO2   07/15/22 1046 97.3 °F (36.3 °C) 80 17 (!) 141/93 98 %   07/15/22 0739 97.7 °F (36.5 °C) 60 18 130/65 95 %   07/15/22 0446 98.4 °F (36.9 °C) 63 14 134/71 92 %   07/14/22 2210 -- -- 16 -- --   07/14/22 1942 98.6 °F (37 °C) 67 14 132/68 95 %   07/14/22 1637 97.9 °F (36.6 °C) 91 16 132/88 95 %       Oxygen Therapy  SpO2: 98 %  Pulse Oximeter Device Mode: Intermittent  O2 Device: None (Room air)    Estimated body mass index is 45.49 kg/m² as calculated from the following:    Height as of this encounter: 5' 4\" (1.626 m). Weight as of this encounter: 265 lb (120.2 kg). Intake/Output Summary (Last 24 hours) at 7/15/2022 1204  Last data filed at 7/14/2022 1811  Gross per 24 hour   Intake 420 ml   Output --   Net 420 ml         Physical Exam:     Blood pressure (!) 141/93, pulse 80, temperature 97.3 °F (36.3 °C), temperature source Oral, resp. rate 17, height 5' 4\" (1.626 m), weight 265 lb (120.2 kg), SpO2 98 %. General:          Well nourished. Anxious.   Obese  Head: Normocephalic, atraumatic  Eyes:               Sclerae appear normal.  Pupils equally round. ENT:                Nares appear normal, no drainage. Moist oral mucosa  Neck:               No restricted ROM. Trachea midline   CV:                  RRR. 2/6 systolic murmur. No jugular venous distension. Lungs:             CTAB. No wheezing, rhonchi, or rales. Symmetric expansion. Abdomen: Bowel sounds present. Soft, nontender, nondistended. Extremities:     No cyanosis or clubbing. No edema  Skin:                No rashes and normal coloration. Warm and dry. Neuro:             CN II-XII grossly intact. Sensation intact. A&Ox3  Psych:             Normal mood and affect.       I have reviewed ordered lab tests and independently visualized imaging below:    Recent Labs:  Recent Results (from the past 48 hour(s))   CBC with Diff    Collection Time: 07/13/22 12:38 PM   Result Value Ref Range    WBC 11.6 (H) 4.3 - 11.1 K/uL    RBC 5.65 (H) 4.05 - 5.2 M/uL    Hemoglobin 16.1 (H) 11.7 - 15.4 g/dL    Hematocrit 48.8 (H) 35.8 - 46.3 %    MCV 86.4 79.6 - 97.8 FL    MCH 28.5 26.1 - 32.9 PG    MCHC 33.0 31.4 - 35.0 g/dL    RDW 14.2 11.9 - 14.6 %    Platelets 824 316 - 402 K/uL    MPV 11.2 9.4 - 12.3 FL    nRBC 0.00 0.0 - 0.2 K/uL    Differential Type AUTOMATED      Seg Neutrophils 74 43 - 78 %    Lymphocytes 19 13 - 44 %    Monocytes 6 4.0 - 12.0 %    Eosinophils % 0 (L) 0.5 - 7.8 %    Basophils 0 0.0 - 2.0 %    Immature Granulocytes 0 0.0 - 5.0 %    Segs Absolute 8.6 (H) 1.7 - 8.2 K/UL    Absolute Lymph # 2.2 0.5 - 4.6 K/UL    Absolute Mono # 0.7 0.1 - 1.3 K/UL    Absolute Eos # 0.1 0.0 - 0.8 K/UL    Basophils Absolute 0.0 0.0 - 0.2 K/UL    Absolute Immature Granulocyte 0.0 0.0 - 0.5 K/UL   CMP    Collection Time: 07/13/22 12:38 PM   Result Value Ref Range    Sodium 142 136 - 145 mmol/L    Potassium 3.6 3.5 - 5.1 mmol/L    Chloride 105 98 - 107 mmol/L    CO2 33 (H) 21 - 32 mmol/L    Anion Gap 4 (L) 7 - 16 mmol/L    Glucose 197 (H) 65 - 100 mg/dL    BUN 24 (H) 8 - 23 MG/DL    CREATININE 0.96 0.6 - 1.0 MG/DL    GFR African American >60 >60 ml/min/1.73m2    GFR Non- >60 >60 ml/min/1.73m2    Calcium 9.7 8.3 - 10.4 MG/DL    Total Bilirubin 0.6 0.2 - 1.1 MG/DL    ALT 20 12 - 65 U/L    AST 14 (L) 15 - 37 U/L    Alk Phosphatase 111 50 - 136 U/L    Total Protein 7.6 6.3 - 8.2 g/dL    Albumin 3.5 3.2 - 4.6 g/dL    Globulin 4.1 (H) 2.3 - 3.5 g/dL    Albumin/Globulin Ratio 0.9 (L) 1.2 - 3.5     Lipase    Collection Time: 07/13/22 12:38 PM   Result Value Ref Range    Lipase 61 (L) 73 - 393 U/L   Lactic Acid (Select if patient is over 65 to rule out mesenteric ischemia)    Collection Time: 07/13/22 12:38 PM   Result Value Ref Range    Lactic Acid, Plasma 1.8 0.4 - 2.0 MMOL/L   COVID-19, Rapid    Collection Time: 07/13/22  2:45 PM    Specimen: Nasopharyngeal   Result Value Ref Range    Source Nasopharyngeal      SARS-CoV-2, Rapid Not detected NOTD     POCT Glucose    Collection Time: 07/14/22  9:46 AM   Result Value Ref Range    POC Glucose 88 65 - 100 mg/dL    Performed by: Jak    CBC with Auto Differential    Collection Time: 07/14/22 10:11 AM   Result Value Ref Range    WBC 8.9 4.3 - 11.1 K/uL    RBC 5.03 4.05 - 5.2 M/uL    Hemoglobin 14.2 11.7 - 15.4 g/dL    Hematocrit 43.8 35.8 - 46.3 %    MCV 87.1 79.6 - 97.8 FL    MCH 28.2 26.1 - 32.9 PG    MCHC 32.4 31.4 - 35.0 g/dL    RDW 14.3 11.9 - 14.6 %    Platelets 198 280 - 048 K/uL    MPV 10.7 9.4 - 12.3 FL    nRBC 0.00 0.0 - 0.2 K/uL    Differential Type AUTOMATED      Seg Neutrophils 63 43 - 78 %    Lymphocytes 28 13 - 44 %    Monocytes 8 4.0 - 12.0 %    Eosinophils % 1 0.5 - 7.8 %    Basophils 1 0.0 - 2.0 %    Immature Granulocytes 0 0.0 - 5.0 %    Segs Absolute 5.6 1.7 - 8.2 K/UL    Absolute Lymph # 2.5 0.5 - 4.6 K/UL    Absolute Mono # 0.7 0.1 - 1.3 K/UL    Absolute Eos # 0.1 0.0 - 0.8 K/UL    Basophils Absolute 0.1 0.0 - 0.2 K/UL Absolute Immature Granulocyte 0.0 0.0 - 0.5 K/UL   Comprehensive Metabolic Panel    Collection Time: 07/14/22 10:11 AM   Result Value Ref Range    Sodium 145 136 - 145 mmol/L    Potassium 3.6 3.5 - 5.1 mmol/L    Chloride 112 (H) 98 - 107 mmol/L    CO2 28 21 - 32 mmol/L    Anion Gap 5 (L) 7 - 16 mmol/L    Glucose 98 65 - 100 mg/dL    BUN 17 8 - 23 MG/DL    CREATININE 0.71 0.6 - 1.0 MG/DL    GFR African American >60 >60 ml/min/1.73m2    GFR Non- >60 >60 ml/min/1.73m2    Calcium 8.9 8.3 - 10.4 MG/DL    Total Bilirubin 0.5 0.2 - 1.1 MG/DL    ALT 18 12 - 65 U/L    AST 16 15 - 37 U/L    Alk Phosphatase 87 50 - 130 U/L    Total Protein 6.3 6.3 - 8.2 g/dL    Albumin 2.9 (L) 3.2 - 4.6 g/dL    Globulin 3.4 2.3 - 3.5 g/dL    Albumin/Globulin Ratio 0.9 (L) 1.2 - 3.5     TSH with Reflex    Collection Time: 07/14/22 10:11 AM   Result Value Ref Range    TSH w Free Thyroid if Abnormal 0.80 UIU/ML   POCT Glucose    Collection Time: 07/14/22 10:50 AM   Result Value Ref Range    POC Glucose 145 (H) 65 - 100 mg/dL    Performed by: Mil    POCT Glucose    Collection Time: 07/14/22  4:42 PM   Result Value Ref Range    POC Glucose 133 (H) 65 - 100 mg/dL    Performed by: Mil    POCT Glucose    Collection Time: 07/14/22  9:07 PM   Result Value Ref Range    POC Glucose 114 (H) 65 - 100 mg/dL    Performed by: Jose E    POCT Glucose    Collection Time: 07/14/22  9:21 PM   Result Value Ref Range    POC Glucose 123 (H) 65 - 100 mg/dL    Performed by: Toñito    Hemoglobin A1C    Collection Time: 07/15/22  5:21 AM   Result Value Ref Range    Hemoglobin A1C 6.9 (H) 4.8 - 5.6 %    eAG 151 mg/dL   POCT Glucose    Collection Time: 07/15/22  6:35 AM   Result Value Ref Range    POC Glucose 108 (H) 65 - 100 mg/dL    Performed by: Toñito    POCT Glucose    Collection Time: 07/15/22 10:48 AM   Result Value Ref Range    POC Glucose 171 (H) 65 - 100 mg/dL    Performed by: KnightonPatricia        Other Studies:  CT ABDOMEN PELVIS W IV CONTRAST Additional Contrast? None   Final Result      1. No obvious acute intra-abdominal or pelvic abnormality with chronic changes   as detailed above.       CPT code(s): 18723, [id number]          Current Meds:  Current Facility-Administered Medications   Medication Dose Route Frequency    tuberculin injection 5 Units  5 Units IntraDERmal Once    insulin lispro (HUMALOG) injection vial 0-4 Units  0-4 Units SubCUTAneous TID WC    insulin lispro (HUMALOG) injection vial 0-4 Units  0-4 Units SubCUTAneous Nightly    glucose chewable tablet 16 g  4 tablet Oral PRN    dextrose bolus 10% 125 mL  125 mL IntraVENous PRN    Or    dextrose bolus 10% 250 mL  250 mL IntraVENous PRN    glucagon (rDNA) injection 1 mg  1 mg IntraMUSCular PRN    dextrose 5 % solution  100 mL/hr IntraVENous PRN    amLODIPine (NORVASC) tablet 5 mg  5 mg Oral Daily    budesonide (PULMICORT) nebulizer suspension 500 mcg  500 mcg Nebulization BID    celecoxib (CELEBREX) capsule 200 mg  200 mg Oral Daily    furosemide (LASIX) tablet 20 mg  20 mg Oral Daily    levothyroxine (SYNTHROID) tablet 112 mcg  112 mcg Oral Daily    lisinopril (PRINIVIL;ZESTRIL) tablet 2.5 mg  2.5 mg Oral Daily    montelukast (SINGULAIR) tablet 10 mg  10 mg Oral QPM    pregabalin (LYRICA) capsule 150 mg  150 mg Oral BID    traMADol (ULTRAM) tablet 50 mg  50 mg Oral Q6H PRN    sodium chloride flush 0.9 % injection 5-40 mL  5-40 mL IntraVENous 2 times per day    sodium chloride flush 0.9 % injection 5-40 mL  5-40 mL IntraVENous PRN    0.9 % sodium chloride infusion   IntraVENous PRN    enoxaparin Sodium (LOVENOX) injection 30 mg  30 mg SubCUTAneous BID    ondansetron (ZOFRAN-ODT) disintegrating tablet 4 mg  4 mg Oral Q8H PRN    Or    ondansetron (ZOFRAN) injection 4 mg  4 mg IntraVENous Q6H PRN    polyethylene glycol (GLYCOLAX) packet 17 g  17 g Oral Daily PRN    acetaminophen (TYLENOL) tablet 650 mg  650 mg Oral Q6H PRN Or    acetaminophen (TYLENOL) suppository 650 mg  650 mg Rectal Q6H PRN    0.9 % sodium chloride infusion   IntraVENous Continuous    famotidine (PEPCID) tablet 20 mg  20 mg Oral BID    simethicone (MYLICON) chewable tablet 80 mg  80 mg Oral Q6H PRN       Signed:  Raul Castillo APRN - CNP    Notes, labs, VS, diagnostic testing reviewed  Case discussed with pt, care team, Dr. Quintin Primrose

## 2022-07-16 PROBLEM — Z53.20 FUNCTIONAL ASSESSMENT DECLINED: Status: ACTIVE | Noted: 2022-07-16

## 2022-07-16 LAB
GLUCOSE BLD STRIP.AUTO-MCNC: 120 MG/DL (ref 65–100)
GLUCOSE BLD STRIP.AUTO-MCNC: 148 MG/DL (ref 65–100)
GLUCOSE BLD STRIP.AUTO-MCNC: 152 MG/DL (ref 65–100)
GLUCOSE BLD STRIP.AUTO-MCNC: 169 MG/DL (ref 65–100)
MM INDURATION, POC: 0 MM (ref 0–5)
PPD, POC: NEGATIVE
SERVICE CMNT-IMP: ABNORMAL

## 2022-07-16 PROCEDURE — 96372 THER/PROPH/DIAG INJ SC/IM: CPT

## 2022-07-16 PROCEDURE — 76937 US GUIDE VASCULAR ACCESS: CPT

## 2022-07-16 PROCEDURE — 97110 THERAPEUTIC EXERCISES: CPT

## 2022-07-16 PROCEDURE — 2580000003 HC RX 258: Performed by: NURSE PRACTITIONER

## 2022-07-16 PROCEDURE — 6370000000 HC RX 637 (ALT 250 FOR IP): Performed by: NURSE PRACTITIONER

## 2022-07-16 PROCEDURE — G0378 HOSPITAL OBSERVATION PER HR: HCPCS

## 2022-07-16 PROCEDURE — 1100000000 HC RM PRIVATE

## 2022-07-16 PROCEDURE — 6360000002 HC RX W HCPCS: Performed by: NURSE PRACTITIONER

## 2022-07-16 PROCEDURE — 82962 GLUCOSE BLOOD TEST: CPT

## 2022-07-16 PROCEDURE — 97530 THERAPEUTIC ACTIVITIES: CPT

## 2022-07-16 RX ORDER — TRAMADOL HYDROCHLORIDE 50 MG/1
25 TABLET ORAL EVERY 6 HOURS PRN
Status: DISCONTINUED | OUTPATIENT
Start: 2022-07-16 | End: 2022-07-18 | Stop reason: HOSPADM

## 2022-07-16 RX ADMIN — CELECOXIB 200 MG: 200 CAPSULE ORAL at 09:04

## 2022-07-16 RX ADMIN — PREGABALIN 150 MG: 50 CAPSULE ORAL at 09:03

## 2022-07-16 RX ADMIN — LEVOTHYROXINE SODIUM 112 MCG: 0.11 TABLET ORAL at 09:03

## 2022-07-16 RX ADMIN — SODIUM CHLORIDE, PRESERVATIVE FREE 10 ML: 5 INJECTION INTRAVENOUS at 23:17

## 2022-07-16 RX ADMIN — AMLODIPINE BESYLATE 5 MG: 5 TABLET ORAL at 09:03

## 2022-07-16 RX ADMIN — MONTELUKAST 10 MG: 10 TABLET, FILM COATED ORAL at 16:27

## 2022-07-16 RX ADMIN — FAMOTIDINE 20 MG: 20 TABLET ORAL at 23:19

## 2022-07-16 RX ADMIN — PREGABALIN 150 MG: 50 CAPSULE ORAL at 23:18

## 2022-07-16 RX ADMIN — ENOXAPARIN SODIUM 30 MG: 100 INJECTION SUBCUTANEOUS at 23:19

## 2022-07-16 RX ADMIN — ENOXAPARIN SODIUM 30 MG: 100 INJECTION SUBCUTANEOUS at 09:02

## 2022-07-16 RX ADMIN — FAMOTIDINE 20 MG: 20 TABLET ORAL at 09:03

## 2022-07-16 RX ADMIN — ACETAMINOPHEN 650 MG: 325 TABLET, FILM COATED ORAL at 16:27

## 2022-07-16 RX ADMIN — FUROSEMIDE 20 MG: 20 TABLET ORAL at 09:03

## 2022-07-16 RX ADMIN — LISINOPRIL 2.5 MG: 5 TABLET ORAL at 09:03

## 2022-07-16 ASSESSMENT — PAIN SCALES - GENERAL
PAINLEVEL_OUTOF10: 3
PAINLEVEL_OUTOF10: 4
PAINLEVEL_OUTOF10: 0

## 2022-07-16 ASSESSMENT — PAIN DESCRIPTION - LOCATION
LOCATION: ABDOMEN
LOCATION: HEAD

## 2022-07-16 ASSESSMENT — PAIN DESCRIPTION - DESCRIPTORS: DESCRIPTORS: ACHING

## 2022-07-16 ASSESSMENT — PAIN - FUNCTIONAL ASSESSMENT: PAIN_FUNCTIONAL_ASSESSMENT: ACTIVITIES ARE NOT PREVENTED

## 2022-07-16 NOTE — PROGRESS NOTES
Patient IV infiltrated. ICU Saline attempted 3x and unsuccessful. Dr. Dejuan La notified and orders for a PICC team consult tomorrow (7/16/22) received.

## 2022-07-16 NOTE — CARE COORDINATION
Patient upgraded from observation to inpatient. Ronn Lanes with [employer] who is reviewing the STR referral has been notified by this SW. Awaiting bed offer.      Kirsten Wright LMSW    [facility]

## 2022-07-16 NOTE — PROGRESS NOTES
PHYSICAL THERAPY Daily Note and AM  (Link to Caseload Tracking: PT Visit Days : 2  Acknowledge Orders  Time In/Out  PT Charge Capture  Rehab Caseload Tracker    Jacki Palm is a 77 y.o. female   PRIMARY DIAGNOSIS: Functional quadriplegia ([address]  Dehydration [E86.0]  Generalized weakness [R53.1]  Functional quadriplegia (HCC) [R53.2]  Abdominal pain, unspecified abdominal location [R10.9]       Reason for Referral: Pain in left hip (M25.552)  Difficulty in walking, Not elsewhere classified (R26.2)  Generalized Muscle Weakness (M62.81)  Observation: Payor: MEDICARE / Plan: MEDICARE PART A AND B / Product Type: *No Product type* /     ASSESSMENT:     REHAB RECOMMENDATIONS:   Recommendation to date pending progress:  Setting:  STR primarily recommended due to decline from functional baseline, lives alone and due to home environment, I am not sure HHPT is feasible due to hoarding    Equipment:     says she has a walker somewhere but cannot find it     ASSESSMENT:  Ms. Ruddy Graves presents with progressive weakness which is limiting her from being functional safe and independent at home. She will benefit from PT while here and may need further therapy at discharge. She could possibly go home with HHPT (based on her mobility at [facility]) but due to her hoarding situation (per patient report) I am not sure that [facility] services are feasible. She could definitely benefit from PT due to the recent decline from her functional baseline. We will continue to assess as she works with PT. Today she was supine on contact and agreeable to therapy. She transferred out of bed and ambulated to/from the bathroom with RW. She stood at sink to wash hands, brush teeth, and wash face for 5-10 minutes, RW for UE support as needed, occasionally leaning on sink. Pt then ambulated around room (30 ft), and sat upright on edge of bed to do seated exercises. Sit to supine transfer performed with min assist with lower extremities.  Supine exercises performed well. Some dizziness reported with position changes (standing up, laying flat), resolved quickly with cues for deep breathing. Pt was left in bed with needs in reach. She is unsteady and fatigues easily. She requires SBA to min assist for bed mobility, SBA for transfers and SBA for gait with RW. [employer] [facility] 6 Clicks Basic Mobility Inpatient Short Form  -PAC Mobility Inpatient   How much difficulty turning over in bed?: A Little  How much difficulty sitting down on / standing up from a chair with arms?: A Little  How much difficulty moving from lying on back to sitting on side of bed?: A Little  How much help from another person moving to and from a bed to a chair?: A Little  How much help from another person needed to walk in hospital room?: A Little  How much help from another person for climbing 3-5 steps with a railing?: A Lot  AM-PAC Inpatient Mobility Raw Score : 17  AM-PAC Inpatient T-Scale Score : 42.13  Mobility Inpatient CMS 0-100% Score: 50.57  Mobility Inpatient CMS G-Code Modifier : CK    SUBJECTIVE:   Ms. Gloria Posey states, \"Tell me what we are going to do. \"  States that she is bossy several times during therapy.  She became tearful at the end and said \" I cannot go home until they figure out what is wrong with me\" Patient verbalized that her choice would be to go to therapy    Social/Functional Lives With: Alone  Type of Home: [address] [place] [address],[address]: One level  Home Access: Stairs to enter with rails  Entrance Stairs - Number of Steps: 4  Bathroom Toilet: Standard  Home Equipment: Walker, rolling  Homemaking Assistance: Needs assistance (pt claims to be a hoarder)  Ambulation Assistance: Needs assistance  Transfer Assistance: Independent  Active : Yes  Mode of Transportation: Car  Additional Comments: per patient report she is a hoarder    OBJECTIVE:     PAIN: VITALS / O2: PRECAUTION / Dwana Laughter / DRAINS:   Pre Treatment:   Pain Assessment: 0-10  Pain Level: 4  Pain Location: Abdomen      Post Treatment: did not complain of pain after treatment Vitals        Oxygen      None    RESTRICTIONS/PRECAUTIONS:  Restrictions/Precautions: Fall Risk                 GROSS EVALUATION: Intact Impaired (Comments):   AROM []   WFL   PROM []    Strength []   grossly limited but functional   Balance []     Posture []    Sensation [x]     Coordination [x]      Tone []     Edema []    Activity Tolerance []      []      COGNITION/  PERCEPTION: Intact Impaired (Comments):   Orientation [x]     Vision [x]     Hearing [x]     Cognition  [x]       MOBILITY: I Mod I S SBA CGA Min Mod Max Total  NT x2 Comments:   Bed Mobility    Rolling [] [] [] [x] [] [] [] [] [] [] []    Supine to Sit [] [] [] [x] [] [] [] [] [] [] []    Scooting [] [] [] [x] [] [] [] [] [] [] []    Sit to Supine [] [] [] [] [] [x] [] [] [] [] []    Transfers    Sit to Stand [] [] [] [] [x] [] [] [] [] [] []    Bed to Chair [] [] [] [] [x] [] [] [] [] [] []    Stand to Sit [] [] [] [] [x] [] [] [] [] [] []     [] [] [] [] [] [] [] [] [] [] []    I=Independent, Mod I=Modified Independent, S=Supervision, SBA=Standby Assistance, CGA=Contact Guard Assistance,   Min=Minimal Assistance, Mod=Moderate Assistance, Max=Maximal Assistance, Total=Total Assistance, NT=Not Tested    GAIT: I Mod I S SBA CGA Min Mod Max Total  NT x2 Comments:   Level of Assistance [] [] [] [x] [] [] [] [] [] [] []    Distance 30 feet    DME Rolling Walker    Gait Quality Antalgic, Decreased step clearance, Decreased step length, Path deviations , and Trunk sway increased    Weightbearing Status      Stairs      I=Independent, Mod I=Modified Independent, S=Supervision, SBA=Standby Assistance, CGA=Contact Guard Assistance,   Min=Minimal Assistance, Mod=Moderate Assistance, Max=Maximal Assistance, Total=Total Assistance, NT=Not Tested    PLAN:   ACUTE PHYSICAL THERAPY GOALS:   (Developed with and agreed upon by patient and/or caregiver. )     LTG:  (1.)Ms. Valle will move from supine to sit and sit to supine  in bed with MODIFIED INDEPENDENCE within 7 treatment day(s). (2.)Ms. Valle will transfer from bed to chair and chair to bed with MODIFIED INDEPENDENCE using the least restrictive device within 7 treatment day(s). (3.)Ms. Valle will ambulate with SUPERVISION for 150 feet with the least restrictive device within 7 treatment day(s). ________________________________________________________________________________________________    FREQUENCY AND DURATION: Daily for duration of hospital stay or until stated goals are met, whichever comes first.    THERAPY PROGNOSIS: Good    PROBLEM LIST:   (Skilled intervention is medically necessary to address:)  Decreased Activity Tolerance  Decreased AROM/PROM  Decreased Balance  Decreased Gait Ability  Decreased Safety Awareness  Decreased Strength  Decreased Transfer Abilities  Increased Pain INTERVENTIONS PLANNED:   (Benefits and precautions of physical therapy have been discussed with the patient.)  Therapeutic Activity  Therapeutic Exercise/HEP  Gait Training  Education       TREATMENT:   TREATMENT:   Therapeutic Activity (25 Minutes): Therapeutic activity included Supine to Sit, Sit to Supine, Scooting, Lateral Scooting, Transfer Training, Ambulation on level ground, Sitting balance , and Standing balance to improve functional Activity tolerance, Balance, Mobility, and Strength. Therapeutic Exercise (15 Minutes): Therapeutic exercises noted below to improve functional activity tolerance, AROM, and strength.      TREATMENT GRID:   Date:  7/16/22 Date:   Date:     Activity/Exercise Parameters Parameters Parameters   Ankle pumps 2x10 bilaterally     LAQ X10 bilaterally     Seated march X10 alternating     Heel slides X10 bilaterally                           AFTER TREATMENT PRECAUTIONS: Bed, Bed/Chair Locked, Call light within reach, Needs within reach, RN notified, and Side rails x2    INTERDISCIPLINARY COLLABORATION:  RN/ PCT and PT/ PTA    EDUCATION: Education Given To: Patient  Education Provided: Plan of Care;Home Exercise Program  Education Provided Comments:  (Frequency of exercises (10 repetitions, 2-3x/day))  Education Method: Verbal  Barriers to Learning: None  Education Outcome: Verbalized understanding    TIME IN/OUT:  Time In: 1005  Time Out: Julian 38  Minutes: [address], PT

## 2022-07-16 NOTE — PROGRESS NOTES
Hospitalist Progress Note   Admit Date:  2022  1:14 PM   Name:  Lashay Campbell   Age:  77 y.o. Sex:  female  :  1945   MRN:  [id number]   Room:  [facility]/    Presenting Complaint: Abdominal Pain     Reason(s) for Admission: Dehydration [E86.0]  Generalized weakness [R53.1]  Functional quadriplegia (HCC) [R53.2]  Abdominal pain, unspecified abdominal location [R10.9]     Hospital Course & Interval History:   77 y.o. female with medical history of DM II, chronic lower back pain, IBS, hyperlipidemia,  HTN who presented with c/o lower back pain, increase gas in her abd, unable to care for herself at home. States has steadily declined over the last year as far as ADLs. Over the last 5 days she states she has not eaten or drank anything because she can't get up to fix it. She has been ambulatory to the bathroom. Pt also c/o sweating over the last 5 days. Labs essentially normal. CT abd no acute findings. Pt adamant about admission. Of note Cardiology notes from 2021 have same complaints as above. Lives at home along, no children or spouse. Has difficulty cleaning her house as she admits she is a hoarder. Had colonoscopy several  years ago which was normal.  She has seen GI with same symptoms outpatient. Subjective/24hr Events (22): Anticipating transfer to rehab. Waiting on insurance. Working with PT, OT. Assessment & Plan:   Functional quadriplegia/back pain ([address]  - Has chronic back pain, sees Ortho and pain management outpatient  - PT/OT recommend short term rehab  - lyrica, tramadol     DM II with  Admission A1C 6.9  - SSI  2022: currently diet controlled     Hypothyroidism  Admission TSH 0.8  - Home meds    GERD/IBS/increased belching/flatulence  - Gas ex and pepcid     Psych  - consulted given concerns that patients symptoms functional      Discharge Planning:      Awaiting insurance approval for rehab    Diet:  ADULT DIET;  Clear Liquid  DVT PPx: on enoxaparin  Code status: DNR    Hospital Problems:  Principal Problem:    Functional quadriplegia ([facility] [address]  Active Problems:    Hypothyroidism due to acquired atrophy of thyroid    Primary insomnia    Polyneuropathy due to type 2 diabetes mellitus ([facility]    Type 2 diabetes mellitus with hyperglycemia, without long-term current use of insulin ([facility])    Morbid obesity with BMI of 45.0-49.9, adult ([facility])    S/P total knee replacement using cement  Resolved Problems:    * No resolved hospital problems. *      Objective:   Patient Vitals for the past 24 hrs:   Temp Pulse Resp BP SpO2   07/16/22 1104 97.5 °F (36.4 °C) 74 -- 128/74 99 %   07/16/22 0717 98.1 °F (36.7 °C) 56 17 (!) 111/57 95 %   07/16/22 0330 98.2 °F (36.8 °C) 60 16 127/82 96 %   07/15/22 2331 97.4 °F (36.3 °C) 67 16 112/72 100 %   07/15/22 1927 97.5 °F (36.4 °C) 66 18 124/69 94 %   07/15/22 1637 97.7 °F (36.5 °C) 65 18 139/60 96 %       Oxygen Therapy  SpO2: 99 %  Pulse Oximeter Device Mode: Intermittent  O2 Device: None (Room air)    Estimated body mass index is 42.19 kg/m² as calculated from the following:    Height as of this encounter: 5' 4\" (1.626 m). Weight as of this encounter: 245 lb 12.8 oz (111.5 kg). Intake/Output Summary (Last 24 hours) at 7/16/2022 1333  Last data filed at 7/16/2022 0717  Gross per 24 hour   Intake 240 ml   Output 800 ml   Net -560 ml       Blood pressure 128/74, pulse 74, temperature 97.5 °F (36.4 °C), resp. rate 17, height 5' 4\" (1.626 m), weight 245 lb 12.8 oz (111.5 kg), SpO2 99 %. Physical Exam  Vitals and nursing note reviewed. Constitutional:       General: She is not in acute distress. Appearance: She is morbidly obese. She is ill-appearing. She is not diaphoretic. Comments: Moderate health literacy   Eyes:      Extraocular Movements: Extraocular movements intact. Cardiovascular:      Rate and Rhythm: Normal rate. Pulmonary:      Effort: Pulmonary effort is normal. No respiratory distress.    Abdominal:      General: There is no distension. Musculoskeletal:         General: No deformity. Skin:     Coloration: Skin is not jaundiced or pale. Neurological:      General: No focal deficit present. Mental Status: She is alert and oriented to person, place, and time. Psychiatric:         Mood and Affect: Mood is anxious. Behavior: Behavior normal. Behavior is cooperative.        I have reviewed ordered lab tests and independently visualized imaging below:    Recent Labs:  Recent Results (from the past 48 hour(s))   POCT Glucose    Collection Time: 07/14/22  4:42 PM   Result Value Ref Range    POC Glucose 133 (H) 65 - 100 mg/dL    Performed by: Mil    POCT Glucose    Collection Time: 07/14/22  9:07 PM   Result Value Ref Range    POC Glucose 114 (H) 65 - 100 mg/dL    Performed by: Jose E    POCT Glucose    Collection Time: 07/14/22  9:21 PM   Result Value Ref Range    POC Glucose 123 (H) 65 - 100 mg/dL    Performed by: [email or web address]    Hemoglobin A1C    Collection Time: 07/15/22  5:21 AM   Result Value Ref Range    Hemoglobin A1C 6.9 (H) 4.8 - 5.6 %    eAG 151 mg/dL   POCT Glucose    Collection Time: 07/15/22  6:35 AM   Result Value Ref Range    POC Glucose 108 (H) 65 - 100 mg/dL    Performed by: [email or web address]    POCT Glucose    Collection Time: 07/15/22 10:48 AM   Result Value Ref Range    POC Glucose 171 (H) 65 - 100 mg/dL    Performed by: [place] [employer]    POCT Glucose    Collection Time: 07/15/22  4:35 PM   Result Value Ref Range    POC Glucose 148 (H) 65 - 100 mg/dL    Performed by: [place] [employer]    POCT Glucose    Collection Time: 07/15/22  9:24 PM   Result Value Ref Range    POC Glucose 134 (H) 65 - 100 mg/dL    Performed by: [facility]    POCT Glucose    Collection Time: 07/16/22  6:44 AM   Result Value Ref Range    POC Glucose 120 (H) 65 - 100 mg/dL    Performed by: [facility]    PLEASE READ & DOCUMENT PPD TEST IN 24 HRS    Collection Time: 07/16/22 11:00 AM   Result Value Ref Range    PPD, (POC) Negative Negative    mm Induration 0 0 - 5 mm   POCT Glucose    Collection Time: 07/16/22 11:05 AM   Result Value Ref Range    POC Glucose 169 (H) 65 - 100 mg/dL    Performed by: Daren        Other Studies:  CT ABDOMEN PELVIS W IV CONTRAST Additional Contrast? None   Final Result      1. No obvious acute intra-abdominal or pelvic abnormality with chronic changes   as detailed above.       CPT code(s): 71471, [id number]          Current Meds:  Current Facility-Administered Medications   Medication Dose Route Frequency    traMADol (ULTRAM) tablet 25 mg  25 mg Oral Q6H PRN    insulin lispro (HUMALOG) injection vial 0-4 Units  0-4 Units SubCUTAneous TID WC    insulin lispro (HUMALOG) injection vial 0-4 Units  0-4 Units SubCUTAneous Nightly    glucose chewable tablet 16 g  4 tablet Oral PRN    dextrose bolus 10% 125 mL  125 mL IntraVENous PRN    Or    dextrose bolus 10% 250 mL  250 mL IntraVENous PRN    glucagon (rDNA) injection 1 mg  1 mg IntraMUSCular PRN    dextrose 5 % solution  100 mL/hr IntraVENous PRN    amLODIPine (NORVASC) tablet 5 mg  5 mg Oral Daily    celecoxib (CELEBREX) capsule 200 mg  200 mg Oral Daily    furosemide (LASIX) tablet 20 mg  20 mg Oral Daily    levothyroxine (SYNTHROID) tablet 112 mcg  112 mcg Oral Daily    lisinopril (PRINIVIL;ZESTRIL) tablet 2.5 mg  2.5 mg Oral Daily    montelukast (SINGULAIR) tablet 10 mg  10 mg Oral QPM    pregabalin (LYRICA) capsule 150 mg  150 mg Oral BID    sodium chloride flush 0.9 % injection 5-40 mL  5-40 mL IntraVENous 2 times per day    sodium chloride flush 0.9 % injection 5-40 mL  5-40 mL IntraVENous PRN    0.9 % sodium chloride infusion   IntraVENous PRN    enoxaparin Sodium (LOVENOX) injection 30 mg  30 mg SubCUTAneous BID    ondansetron (ZOFRAN-ODT) disintegrating tablet 4 mg  4 mg Oral Q8H PRN    Or    ondansetron (ZOFRAN) injection 4 mg  4 mg IntraVENous Q6H PRN    polyethylene glycol (GLYCOLAX) packet 17 g  17 g Oral Daily PRN    acetaminophen (TYLENOL) tablet 650 mg  650 mg Oral Q6H PRN    Or    acetaminophen (TYLENOL) suppository 650 mg  650 mg Rectal Q6H PRN    famotidine (PEPCID) tablet 20 mg  20 mg Oral BID    simethicone (MYLICON) chewable tablet 80 mg  80 mg Oral Q6H PRN       Signed:  Grecia Moore MD

## 2022-07-16 NOTE — PLAN OF CARE
Problem: Discharge Planning  Goal: Discharge to home or other facility with appropriate resources  Outcome: Progressing. STR     Problem: Pain  Goal: Verbalizes/displays adequate comfort level or baseline comfort level  Outcome: Progressing. Able to ambulate to bathroom with walker/assist     Problem: Safety - Adult  Goal: Free from fall injury  Outcome: Progressing.  Calls appropriately

## 2022-07-17 LAB
GLUCOSE BLD STRIP.AUTO-MCNC: 142 MG/DL (ref 65–100)
GLUCOSE BLD STRIP.AUTO-MCNC: 156 MG/DL (ref 65–100)
GLUCOSE BLD STRIP.AUTO-MCNC: 166 MG/DL (ref 65–100)
GLUCOSE BLD STRIP.AUTO-MCNC: 184 MG/DL (ref 65–100)
MM INDURATION, POC: 0 MM (ref 0–5)
PPD, POC: NEGATIVE
SERVICE CMNT-IMP: ABNORMAL

## 2022-07-17 PROCEDURE — 1100000000 HC RM PRIVATE

## 2022-07-17 PROCEDURE — 6360000002 HC RX W HCPCS: Performed by: NURSE PRACTITIONER

## 2022-07-17 PROCEDURE — 82962 GLUCOSE BLOOD TEST: CPT

## 2022-07-17 PROCEDURE — 97530 THERAPEUTIC ACTIVITIES: CPT

## 2022-07-17 PROCEDURE — 6370000000 HC RX 637 (ALT 250 FOR IP): Performed by: NURSE PRACTITIONER

## 2022-07-17 PROCEDURE — 2580000003 HC RX 258: Performed by: NURSE PRACTITIONER

## 2022-07-17 PROCEDURE — 6370000000 HC RX 637 (ALT 250 FOR IP): Performed by: FAMILY MEDICINE

## 2022-07-17 RX ORDER — POTASSIUM CHLORIDE 20 MEQ/1
40 TABLET, EXTENDED RELEASE ORAL PRN
Status: DISCONTINUED | OUTPATIENT
Start: 2022-07-17 | End: 2022-07-18 | Stop reason: HOSPADM

## 2022-07-17 RX ORDER — POTASSIUM CHLORIDE 7.45 MG/ML
10 INJECTION INTRAVENOUS PRN
Status: DISCONTINUED | OUTPATIENT
Start: 2022-07-17 | End: 2022-07-18 | Stop reason: HOSPADM

## 2022-07-17 RX ORDER — ZOLPIDEM TARTRATE 5 MG/1
5 TABLET ORAL ONCE
Status: COMPLETED | OUTPATIENT
Start: 2022-07-17 | End: 2022-07-17

## 2022-07-17 RX ORDER — MAGNESIUM SULFATE IN WATER 40 MG/ML
2000 INJECTION, SOLUTION INTRAVENOUS PRN
Status: DISCONTINUED | OUTPATIENT
Start: 2022-07-17 | End: 2022-07-18 | Stop reason: HOSPADM

## 2022-07-17 RX ADMIN — SODIUM CHLORIDE, PRESERVATIVE FREE 10 ML: 5 INJECTION INTRAVENOUS at 09:00

## 2022-07-17 RX ADMIN — ENOXAPARIN SODIUM 30 MG: 100 INJECTION SUBCUTANEOUS at 08:34

## 2022-07-17 RX ADMIN — LISINOPRIL 2.5 MG: 5 TABLET ORAL at 08:36

## 2022-07-17 RX ADMIN — ENOXAPARIN SODIUM 30 MG: 100 INJECTION SUBCUTANEOUS at 21:13

## 2022-07-17 RX ADMIN — MONTELUKAST 10 MG: 10 TABLET, FILM COATED ORAL at 17:20

## 2022-07-17 RX ADMIN — ZOLPIDEM TARTRATE 5 MG: 5 TABLET ORAL at 01:49

## 2022-07-17 RX ADMIN — FUROSEMIDE 20 MG: 20 TABLET ORAL at 08:37

## 2022-07-17 RX ADMIN — PREGABALIN 150 MG: 50 CAPSULE ORAL at 08:36

## 2022-07-17 RX ADMIN — PREGABALIN 150 MG: 50 CAPSULE ORAL at 21:14

## 2022-07-17 RX ADMIN — FAMOTIDINE 20 MG: 20 TABLET ORAL at 08:36

## 2022-07-17 RX ADMIN — SODIUM CHLORIDE, PRESERVATIVE FREE 10 ML: 5 INJECTION INTRAVENOUS at 21:15

## 2022-07-17 RX ADMIN — AMLODIPINE BESYLATE 5 MG: 5 TABLET ORAL at 08:36

## 2022-07-17 RX ADMIN — CELECOXIB 200 MG: 200 CAPSULE ORAL at 08:36

## 2022-07-17 RX ADMIN — LEVOTHYROXINE SODIUM 112 MCG: 0.11 TABLET ORAL at 08:36

## 2022-07-17 RX ADMIN — FAMOTIDINE 20 MG: 20 TABLET ORAL at 21:23

## 2022-07-17 NOTE — PROGRESS NOTES
PHYSICAL THERAPY Daily Note and PM  (Link to Caseload Tracking: PT Visit Days : 3  Acknowledge Orders  Time In/Out  PT Charge Capture  Rehab Caseload Tracker    Donell Santoro is a 77 y.o. female   PRIMARY DIAGNOSIS: Functional quadriplegia ([address]  Dehydration [E86.0]  Generalized weakness [R53.1]  Functional quadriplegia (HCC) [R53.2]  Abdominal pain, unspecified abdominal location [R10.9]  Functional assessment declined [Z53.20]       Reason for Referral: Pain in left hip (M25.552)  Difficulty in walking, Not elsewhere classified (R26.2)  Generalized Muscle Weakness (M62.81)  Inpatient: Payor: MEDICARE / Plan: MEDICARE PART A AND B / Product Type: *No Product type* /     ASSESSMENT:     REHAB RECOMMENDATIONS:   Recommendation to date pending progress:  Setting:  STR primarily recommended due to decline from functional baseline, lives alone and due to home environment, I am not sure HHPT is feasible due to hoarding    Equipment:     says she has a walker somewhere but cannot find it     ASSESSMENT:  Ms. Pradeep Le presents with progressive weakness which is limiting her from being functional safe and independent at home. She will benefit from PT while here and may need further therapy at discharge. She could possibly go home with HHPT (based on her mobility at [facility]) but due to her hoarding situation (per patient report) [facility]. She could definitely benefit from PT due to the recent decline from her functional baseline. We will continue to assess as she works with PT.    7/17 she was supine on contact and agreeable to therapy. All be mobility is SBA. Sit>stand with walker in with CGA and walker in front. Ambulated 40 ft to/from the restroom, independent with hygiene. Return to the chair and performs B LE exercises with good technique. Left in the chair with needs in reach and instructed to call for assists, before getting up.        [address] Mobility Inpatient Short Form  AM-PAC Mobility Inpatient   How much difficulty turning over in bed?: A Little  How much difficulty sitting down on / standing up from a chair with arms?: A Little  How much difficulty moving from lying on back to sitting on side of bed?: A Little  How much help from another person moving to and from a bed to a chair?: A Little  How much help from another person needed to walk in hospital room?: A Little  How much help from another person for climbing 3-5 steps with a railing?: A Lot  AM-PAC Inpatient Mobility Raw Score : 17  AM-PAC Inpatient T-Scale Score : 42.13  Mobility Inpatient CMS 0-100% Score: 50.57  Mobility Inpatient CMS G-Code Modifier : CK    SUBJECTIVE:   Ms. Tanesha Rangel states, \"Tell me what we are going to do. \"  States that she is bossy several times during therapy.  She became tearful at the end and said \" I cannot go home until they figure out what is wrong with me\" Patient verbalized that her choice would be to go to therapy    Social/Functional Lives With: Alone  Type of Home: [address] [place] [address],[address]: One level  Home Access: Stairs to enter with rails  Entrance Stairs - Number of Steps: 4  Bathroom Toilet: Standard  Home Equipment: Walker, rolling  Homemaking Assistance: Needs assistance (pt claims to be a hoarder)  Ambulation Assistance: Needs assistance  Transfer Assistance: Independent  Active : Yes  Mode of Transportation: Car  Additional Comments: per patient report she is a hoarder    OBJECTIVE:     PAIN: Gaylyn Nam / O2: PRECAUTION / Manuel Anthony / Nancy Camejo:   Pre Treatment:          Post Treatment: did not complain of pain after treatment Vitals        Oxygen      None    RESTRICTIONS/PRECAUTIONS:  Restrictions/Precautions: Fall Risk                 GROSS EVALUATION: Intact Impaired (Comments):   AROM []   WFL   PROM []    Strength []   grossly limited but functional   Balance []     Posture []    Sensation [x]     Coordination [x]      Tone []     Edema []    Activity Tolerance []      []      COGNITION/  PERCEPTION: Intact Impaired (Comments):   Orientation [x]     Vision [x]     Hearing [x]     Cognition  [x]       MOBILITY: I Mod I S SBA CGA Min Mod Max Total  NT x2 Comments:   Bed Mobility    Rolling [] [] [] [x] [] [] [] [] [] [] []    Supine to Sit [] [] [] [x] [] [] [] [] [] [] []    Scooting [] [] [] [x] [] [] [] [] [] [] []    Sit to Supine [] [] [] [] [] [] [] [] [] [] []    Transfers    Sit to Stand [] [] [] [] [x] [] [] [] [] [] []    Bed to Chair [] [] [] [] [x] [] [] [] [] [] []    Stand to Sit [] [] [] [] [x] [] [] [] [] [] []     [] [] [] [] [] [] [] [] [] [] []    I=Independent, Mod I=Modified Independent, S=Supervision, SBA=Standby Assistance, CGA=Contact Guard Assistance,   Min=Minimal Assistance, Mod=Moderate Assistance, Max=Maximal Assistance, Total=Total Assistance, NT=Not Tested    GAIT: I Mod I S SBA CGA Min Mod Max Total  NT x2 Comments:   Level of Assistance [] [] [] [x] [] [] [] [] [] [] []    Distance 40 feet    DME Rolling Walker    Gait Quality Antalgic, Decreased step clearance, Decreased step length, Path deviations , and Trunk sway increased    Weightbearing Status      Stairs      I=Independent, Mod I=Modified Independent, S=Supervision, SBA=Standby Assistance, CGA=Contact Guard Assistance,   Min=Minimal Assistance, Mod=Moderate Assistance, Max=Maximal Assistance, Total=Total Assistance, NT=Not Tested    PLAN:   ACUTE PHYSICAL THERAPY GOALS:   (Developed with and agreed upon by patient and/or caregiver. )     LTG:  (1.)Ms. Valle will move from supine to sit and sit to supine  in bed with MODIFIED INDEPENDENCE within 7 treatment day(s). (2.)Ms. Valle will transfer from bed to chair and chair to bed with MODIFIED INDEPENDENCE using the least restrictive device within 7 treatment day(s). (3.)Ms. Valle will ambulate with SUPERVISION for 150 feet with the least restrictive device within 7 treatment day(s). ________________________________________________________________________________________________    FREQUENCY AND DURATION: Daily for duration of hospital stay or until stated goals are met, whichever comes first.    THERAPY PROGNOSIS: Good    PROBLEM LIST:   (Skilled intervention is medically necessary to address:)  Decreased Activity Tolerance  Decreased AROM/PROM  Decreased Balance  Decreased Gait Ability  Decreased Safety Awareness  Decreased Strength  Decreased Transfer Abilities  Increased Pain INTERVENTIONS PLANNED:   (Benefits and precautions of physical therapy have been discussed with the patient.)  Therapeutic Activity  Therapeutic Exercise/HEP  Gait Training  Education       TREATMENT:   TREATMENT:   Therapeutic Activity (30 Minutes): Therapeutic activity included Supine to Sit, Lateral Scooting, Transfer Training, Ambulation on level ground, Sitting balance , and Standing balance to improve functional Activity tolerance, Balance, Mobility, and Strength.     TREATMENT GRID:  All exercises B Date:  7/16/22 Date:  7/17   Date:     Activity/Exercise Parameters Parameters Parameters   Ankle pumps 2x10 bilaterally 20    LAQ X10 bilaterally     Seated march X10 alternating     Heel slides X10 bilaterally 15    Quad sets  15    Hip abd/add  15    SAQ  15        AFTER TREATMENT PRECAUTIONS: Bed/Chair Locked, Call light within reach, Needs within reach, and RN notified    INTERDISCIPLINARY COLLABORATION:  RN/ PCT and PT/ PTA    EDUCATION: Education Given To: Patient  Education Provided: Role of Therapy  Education Method: Verbal;Demonstration    TIME IN/OUT:  Time In: 1430  Time Out: 1500  Minutes: 27    FRANCHESCA BRIZUELA PTA

## 2022-07-17 NOTE — PROGRESS NOTES
Pt states that she would like to speak with the physician in the morning regarding her diet (current diet: clear liquids) and plan of care. Will convey Pt request to the oncoming RN at shift report.

## 2022-07-17 NOTE — PROGRESS NOTES
Hospitalist Progress Note   Admit Date:  2022  1:14 PM   Name:  Donell Santoro   Age:  77 y.o. Sex:  female  :  1945   MRN:  [id number]   Room:  [facility]/    Presenting Complaint: Abdominal Pain     Reason(s) for Admission: Dehydration [E86.0]  Generalized weakness [R53.1]  Functional quadriplegia (HCC) [R53.2]  Abdominal pain, unspecified abdominal location [R10.9]  Functional assessment declined [facility] Course & Interval History:   77 y.o. female with medical history of DM II, chronic lower back pain, IBS, hyperlipidemia,  HTN who presented with c/o lower back pain, increase gas in her abd, unable to care for herself at home. States has steadily declined over the last year as far as ADLs. Over the last 5 days she states she has not eaten or drank anything because she can't get up to fix it. She has been ambulatory to the bathroom. Pt also c/o sweating over the last 5 days. Labs essentially normal. CT abd no acute findings. Pt adamant about admission. Of note Cardiology notes from 2021 have same complaints as above. Lives at home along, no children or spouse. Has difficulty cleaning her house as she admits she is a hoarder. Had colonoscopy several  years ago which was normal.  She has seen GI with same symptoms outpatient. Subjective/24hr Events (22): Anticipating transfer to rehab. Waiting on insurance. Working with PT, OT.        Assessment & Plan:   Functional quadriplegia/back pain ([facility] [address]  - Has chronic back pain, sees Ortho and pain management outpatient  - PT/OT recommend short term rehab  - lyrica, tramadol     DM II with  Admission A1C 6.9  - SSI  2022: currently diet controlled     Hypothyroidism  Admission TSH 0.8  - Home meds    GERD/IBS/increased belching/flatulence  - Gas ex and pepcid     Psych  - consulted given concerns that patients symptoms functional      Discharge Planning:      Awaiting insurance approval for rehab    Diet:  ADULT DIET; Full Liquid  DVT PPx: on enoxaparin  Code status: DNR    Hospital Problems:  Principal Problem:    Functional quadriplegia ([facility] [address]  Active Problems:    Hypothyroidism due to acquired atrophy of thyroid    Primary insomnia    Polyneuropathy due to type 2 diabetes mellitus (HCC)    Type 2 diabetes mellitus with hyperglycemia, without long-term current use of insulin (HCC)    Functional assessment declined    Class 3 severe obesity due to excess calories with serious comorbidity and body mass index (BMI) of 40.0 to 44.9 in adult ([facility] [address]    S/P total knee replacement using cement  Resolved Problems:    * No resolved hospital problems. *      Objective:   Patient Vitals for the past 24 hrs:   Temp Pulse Resp BP SpO2   07/17/22 1128 97.2 °F (36.2 °C) 68 16 104/63 95 %   07/17/22 0854 98.3 °F (36.8 °C) 78 16 121/70 96 %   07/17/22 0447 98.2 °F (36.8 °C) 80 16 119/64 91 %   07/17/22 0134 98.8 °F (37.1 °C) 88 17 105/62 95 %   07/16/22 1943 98.1 °F (36.7 °C) 71 16 128/63 96 %   07/16/22 1609 98.2 °F (36.8 °C) 84 16 108/63 96 %         Oxygen Therapy  SpO2: 95 %  Pulse Oximeter Device Mode: Intermittent  O2 Device: None (Room air)    Estimated body mass index is 42.19 kg/m² as calculated from the following:    Height as of this encounter: 5' 4\" (1.626 m). Weight as of this encounter: 245 lb 12.8 oz (111.5 kg). Intake/Output Summary (Last 24 hours) at 7/17/2022 1453  Last data filed at 7/16/2022 1755  Gross per 24 hour   Intake 500 ml   Output --   Net 500 ml         Blood pressure 104/63, pulse 68, temperature 97.2 °F (36.2 °C), temperature source Oral, resp. rate 16, height 5' 4\" (1.626 m), weight 245 lb 12.8 oz (111.5 kg), SpO2 95 %. Physical Exam  Vitals and nursing note reviewed. Constitutional:       General: She is not in acute distress. Appearance: She is morbidly obese. She is ill-appearing. She is not diaphoretic.       Comments: Moderate health literacy   Eyes:      Extraocular Movements: Extraocular movements intact. Cardiovascular:      Rate and Rhythm: Normal rate. Pulmonary:      Effort: Pulmonary effort is normal. No respiratory distress. Abdominal:      General: There is no distension. Musculoskeletal:         General: No deformity. Skin:     Coloration: Skin is not jaundiced or pale. Neurological:      General: No focal deficit present. Mental Status: She is alert and oriented to person, place, and time. Psychiatric:         Mood and Affect: Mood is anxious. Behavior: Behavior normal. Behavior is cooperative. I have reviewed ordered lab tests and independently visualized imaging below:    Recent Labs:  Recent Results (from the past 48 hour(s))   POCT Glucose    Collection Time: 07/15/22  4:35 PM   Result Value Ref Range    POC Glucose 148 (H) 65 - 100 mg/dL    Performed by: Saba Askew    POCT Glucose    Collection Time: 07/15/22  9:24 PM   Result Value Ref Range    POC Glucose 134 (H) 65 - 100 mg/dL    Performed by: Toñito    POCT Glucose    Collection Time: 07/16/22  6:44 AM   Result Value Ref Range    POC Glucose 120 (H) 65 - 100 mg/dL    Performed by: SolveBoardaPCT    PLEASE READ & DOCUMENT PPD TEST IN 24 HRS    Collection Time: 07/16/22 11:00 AM   Result Value Ref Range    PPD, (POC) Negative Negative    mm Induration 0 0 - 5 mm   POCT Glucose    Collection Time: 07/16/22 11:05 AM   Result Value Ref Range    POC Glucose 169 (H) 65 - 100 mg/dL    Performed by:  Daren    POCT Glucose    Collection Time: 07/16/22  4:12 PM   Result Value Ref Range    POC Glucose 152 (H) 65 - 100 mg/dL    Performed by: Bethanie    POCT Glucose    Collection Time: 07/16/22  9:54 PM   Result Value Ref Range    POC Glucose 148 (H) 65 - 100 mg/dL    Performed by: DiannPCJAQUELINE    POCT Glucose    Collection Time: 07/17/22  6:55 AM   Result Value Ref Range    POC Glucose 142 (H) 65 - 100 mg/dL    Performed by: AlanisoePCA    POCT Glucose    Collection Time: 07/17/22 11:23 AM   Result Value Ref Range    POC Glucose 156 (H) 65 - 100 mg/dL    Performed by: Valentina Gray        Other Studies:  CT ABDOMEN PELVIS W IV CONTRAST Additional Contrast? None   Final Result      1. No obvious acute intra-abdominal or pelvic abnormality with chronic changes   as detailed above.       CPT code(s): 11844, [id number]          Current Meds:  Current Facility-Administered Medications   Medication Dose Route Frequency    potassium chloride (KLOR-CON M) extended release tablet 40 mEq  40 mEq Oral PRN    Or    potassium bicarb-citric acid (EFFER-K) effervescent tablet 40 mEq  40 mEq Oral PRN    Or    potassium chloride 10 mEq/100 mL IVPB (Peripheral Line)  10 mEq IntraVENous PRN    magnesium sulfate 2000 mg in 50 mL IVPB premix  2,000 mg IntraVENous PRN    sodium phosphate 10 mmol in sodium chloride 0.9 % 250 mL IVPB  10 mmol IntraVENous PRN    Or    sodium phosphate 15 mmol in sodium chloride 0.9 % 250 mL IVPB  15 mmol IntraVENous PRN    Or    sodium phosphate 20 mmol in sodium chloride 0.9 % 500 mL IVPB  20 mmol IntraVENous PRN    traMADol (ULTRAM) tablet 25 mg  25 mg Oral Q6H PRN    insulin lispro (HUMALOG) injection vial 0-4 Units  0-4 Units SubCUTAneous TID WC    insulin lispro (HUMALOG) injection vial 0-4 Units  0-4 Units SubCUTAneous Nightly    glucose chewable tablet 16 g  4 tablet Oral PRN    dextrose bolus 10% 125 mL  125 mL IntraVENous PRN    Or    dextrose bolus 10% 250 mL  250 mL IntraVENous PRN    glucagon (rDNA) injection 1 mg  1 mg IntraMUSCular PRN    dextrose 5 % solution  100 mL/hr IntraVENous PRN    amLODIPine (NORVASC) tablet 5 mg  5 mg Oral Daily    celecoxib (CELEBREX) capsule 200 mg  200 mg Oral Daily    furosemide (LASIX) tablet 20 mg  20 mg Oral Daily    levothyroxine (SYNTHROID) tablet 112 mcg  112 mcg Oral Daily    lisinopril (PRINIVIL;ZESTRIL) tablet 2.5 mg  2.5 mg Oral Daily    montelukast (SINGULAIR) tablet 10 mg  10 mg Oral QPM    pregabalin (LYRICA) capsule 150 mg 150 mg Oral BID    sodium chloride flush 0.9 % injection 5-40 mL  5-40 mL IntraVENous 2 times per day    sodium chloride flush 0.9 % injection 5-40 mL  5-40 mL IntraVENous PRN    0.9 % sodium chloride infusion   IntraVENous PRN    enoxaparin Sodium (LOVENOX) injection 30 mg  30 mg SubCUTAneous BID    ondansetron (ZOFRAN-ODT) disintegrating tablet 4 mg  4 mg Oral Q8H PRN    Or    ondansetron (ZOFRAN) injection 4 mg  4 mg IntraVENous Q6H PRN    polyethylene glycol (GLYCOLAX) packet 17 g  17 g Oral Daily PRN    acetaminophen (TYLENOL) tablet 650 mg  650 mg Oral Q6H PRN    Or    acetaminophen (TYLENOL) suppository 650 mg  650 mg Rectal Q6H PRN    famotidine (PEPCID) tablet 20 mg  20 mg Oral BID    simethicone (MYLICON) chewable tablet 80 mg  80 mg Oral Q6H PRN       Signed:  Grecia Moore MD

## 2022-07-18 VITALS
BODY MASS INDEX: 43.45 KG/M2 | HEIGHT: 64 IN | DIASTOLIC BLOOD PRESSURE: 87 MMHG | RESPIRATION RATE: 16 BRPM | SYSTOLIC BLOOD PRESSURE: 132 MMHG | OXYGEN SATURATION: 92 % | WEIGHT: 254.5 LBS | HEART RATE: 72 BPM | TEMPERATURE: 97.9 F

## 2022-07-18 PROBLEM — Z53.20 FUNCTIONAL ASSESSMENT DECLINED: Status: RESOLVED | Noted: 2022-07-16 | Resolved: 2022-07-18

## 2022-07-18 PROBLEM — R53.2 FUNCTIONAL QUADRIPLEGIA (HCC): Status: RESOLVED | Noted: 2022-07-13 | Resolved: 2022-07-18

## 2022-07-18 LAB
ALBUMIN SERPL-MCNC: 2.7 G/DL (ref 3.2–4.6)
ALBUMIN/GLOB SERPL: 0.8 {RATIO} (ref 1.2–3.5)
ALP SERPL-CCNC: 86 U/L (ref 50–130)
ALT SERPL-CCNC: 18 U/L (ref 12–65)
ANION GAP SERPL CALC-SCNC: 7 MMOL/L (ref 7–16)
AST SERPL-CCNC: 12 U/L (ref 15–37)
BASOPHILS # BLD: 0 K/UL (ref 0–0.2)
BASOPHILS NFR BLD: 1 % (ref 0–2)
BILIRUB SERPL-MCNC: 0.4 MG/DL (ref 0.2–1.1)
BUN SERPL-MCNC: 16 MG/DL (ref 8–23)
CALCIUM SERPL-MCNC: 8.8 MG/DL (ref 8.3–10.4)
CHLORIDE SERPL-SCNC: 104 MMOL/L (ref 98–107)
CO2 SERPL-SCNC: 30 MMOL/L (ref 21–32)
CREAT SERPL-MCNC: 0.87 MG/DL (ref 0.6–1)
DIFFERENTIAL METHOD BLD: NORMAL
EOSINOPHIL # BLD: 0.2 K/UL (ref 0–0.8)
EOSINOPHIL NFR BLD: 2 % (ref 0.5–7.8)
ERYTHROCYTE [DISTWIDTH] IN BLOOD BY AUTOMATED COUNT: 14.1 % (ref 11.9–14.6)
GLOBULIN SER CALC-MCNC: 3.2 G/DL (ref 2.3–3.5)
GLUCOSE BLD STRIP.AUTO-MCNC: 136 MG/DL (ref 65–100)
GLUCOSE BLD STRIP.AUTO-MCNC: 173 MG/DL (ref 65–100)
GLUCOSE SERPL-MCNC: 151 MG/DL (ref 65–100)
HCT VFR BLD AUTO: 41.3 % (ref 35.8–46.3)
HGB BLD-MCNC: 13.6 G/DL (ref 11.7–15.4)
IMM GRANULOCYTES # BLD AUTO: 0 K/UL (ref 0–0.5)
IMM GRANULOCYTES NFR BLD AUTO: 0 % (ref 0–5)
LYMPHOCYTES # BLD: 3.4 K/UL (ref 0.5–4.6)
LYMPHOCYTES NFR BLD: 39 % (ref 13–44)
MAGNESIUM SERPL-MCNC: 1.3 MG/DL (ref 1.8–2.4)
MCH RBC QN AUTO: 28.4 PG (ref 26.1–32.9)
MCHC RBC AUTO-ENTMCNC: 32.9 G/DL (ref 31.4–35)
MCV RBC AUTO: 86.2 FL (ref 79.6–97.8)
MONOCYTES # BLD: 0.7 K/UL (ref 0.1–1.3)
MONOCYTES NFR BLD: 8 % (ref 4–12)
NEUTS SEG # BLD: 4.4 K/UL (ref 1.7–8.2)
NEUTS SEG NFR BLD: 50 % (ref 43–78)
NRBC # BLD: 0 K/UL (ref 0–0.2)
PHOSPHATE SERPL-MCNC: 3.8 MG/DL (ref 2.3–3.7)
PLATELET # BLD AUTO: 196 K/UL (ref 150–450)
PMV BLD AUTO: 11 FL (ref 9.4–12.3)
POTASSIUM SERPL-SCNC: 3.4 MMOL/L (ref 3.5–5.1)
PROT SERPL-MCNC: 5.9 G/DL (ref 6.3–8.2)
RBC # BLD AUTO: 4.79 M/UL (ref 4.05–5.2)
SARS-COV-2 RDRP RESP QL NAA+PROBE: NOT DETECTED
SERVICE CMNT-IMP: ABNORMAL
SERVICE CMNT-IMP: ABNORMAL
SODIUM SERPL-SCNC: 141 MMOL/L (ref 136–145)
SOURCE: NORMAL
WBC # BLD AUTO: 8.8 K/UL (ref 4.3–11.1)

## 2022-07-18 PROCEDURE — 6370000000 HC RX 637 (ALT 250 FOR IP): Performed by: FAMILY MEDICINE

## 2022-07-18 PROCEDURE — 84100 ASSAY OF PHOSPHORUS: CPT

## 2022-07-18 PROCEDURE — 6360000002 HC RX W HCPCS: Performed by: NURSE PRACTITIONER

## 2022-07-18 PROCEDURE — 2580000003 HC RX 258: Performed by: NURSE PRACTITIONER

## 2022-07-18 PROCEDURE — 36415 COLL VENOUS BLD VENIPUNCTURE: CPT

## 2022-07-18 PROCEDURE — 87635 SARS-COV-2 COVID-19 AMP PRB: CPT

## 2022-07-18 PROCEDURE — 85025 COMPLETE CBC W/AUTO DIFF WBC: CPT

## 2022-07-18 PROCEDURE — 83735 ASSAY OF MAGNESIUM: CPT

## 2022-07-18 PROCEDURE — 82962 GLUCOSE BLOOD TEST: CPT

## 2022-07-18 PROCEDURE — 80053 COMPREHEN METABOLIC PANEL: CPT

## 2022-07-18 PROCEDURE — 6370000000 HC RX 637 (ALT 250 FOR IP): Performed by: NURSE PRACTITIONER

## 2022-07-18 RX ORDER — SIMETHICONE 80 MG
80 TABLET,CHEWABLE ORAL EVERY 6 HOURS PRN
Qty: 180 TABLET | Refills: 0 | Status: SHIPPED | OUTPATIENT
Start: 2022-07-18

## 2022-07-18 RX ORDER — ZOLPIDEM TARTRATE 5 MG/1
5 TABLET ORAL ONCE
Status: COMPLETED | OUTPATIENT
Start: 2022-07-18 | End: 2022-07-18

## 2022-07-18 RX ORDER — NALOXONE HYDROCHLORIDE 4 MG/.1ML
1 SPRAY NASAL PRN
Qty: 2 EACH | Refills: 0 | Status: SHIPPED | OUTPATIENT
Start: 2022-07-18

## 2022-07-18 RX ORDER — TRAMADOL HYDROCHLORIDE 50 MG/1
25 TABLET ORAL EVERY 6 HOURS PRN
Qty: 6 TABLET | Refills: 0 | Status: SHIPPED | OUTPATIENT
Start: 2022-07-18 | End: 2022-07-21

## 2022-07-18 RX ADMIN — ENOXAPARIN SODIUM 30 MG: 100 INJECTION SUBCUTANEOUS at 08:52

## 2022-07-18 RX ADMIN — SODIUM CHLORIDE, PRESERVATIVE FREE 10 ML: 5 INJECTION INTRAVENOUS at 08:56

## 2022-07-18 RX ADMIN — LEVOTHYROXINE SODIUM 112 MCG: 0.11 TABLET ORAL at 08:55

## 2022-07-18 RX ADMIN — ZOLPIDEM TARTRATE 5 MG: 5 TABLET ORAL at 00:29

## 2022-07-18 RX ADMIN — PREGABALIN 150 MG: 50 CAPSULE ORAL at 08:55

## 2022-07-18 RX ADMIN — CELECOXIB 200 MG: 200 CAPSULE ORAL at 08:55

## 2022-07-18 RX ADMIN — POTASSIUM CHLORIDE 40 MEQ: 20 TABLET, EXTENDED RELEASE ORAL at 06:24

## 2022-07-18 RX ADMIN — FAMOTIDINE 20 MG: 20 TABLET ORAL at 08:55

## 2022-07-18 RX ADMIN — LISINOPRIL 2.5 MG: 5 TABLET ORAL at 08:57

## 2022-07-18 NOTE — DISCHARGE SUMMARY
Hospitalist Discharge Summary   Admit Date:  2022  1:14 PM   DC Note date: 2022  Name:  Heath Guillermo   Age:  77 y.o. Sex:  female  :  1945   MRN:  [id number]   Room:  [facility]  PCP:  Momo Carreon MD    Presenting Complaint: Abdominal Pain     Initial Admission Diagnosis: Dehydration [E86.0]  Generalized weakness [R53.1]  Functional quadriplegia (HCC) [R53.2]  Abdominal pain, unspecified abdominal location [R10.9]  Functional assessment declined [Z53.20]     Problem List for this Hospitalization (present on admission):    Principal Problem (Resolved): Functional quadriplegia (HCC)  Active Problems:    Hypothyroidism due to acquired atrophy of thyroid    Primary insomnia    Polyneuropathy due to type 2 diabetes mellitus (HCC)    Type 2 diabetes mellitus with hyperglycemia, without long-term current use of insulin (HCC)    Class 3 severe obesity due to excess calories with serious comorbidity and body mass index (BMI) of 40.0 to 44.9 in adult ([facility]    S/P total knee replacement using cement  Resolved Problems:    Functional assessment declined    Did Patient have Sepsis (YES OR NO): No    Hospital Course:  76F PMHx DM II, chronic lower back pain, IBS, hyperlipidemia,  HTN who presented with c/o lower back pain, increase gas in her abd, unable to care for herself at home. Stated had steadily declined over the last year as far as ADLs. Over the prior 5 days she stated she had not eaten or drank anything because she couldn't get up to fix it. She had been ambulatory to the bathroom. Pt also c/o sweating over the last 5 days. Labs essentially normal. CT abd no acute findings. Of note Cardiology notes from 2021 have same complaints as above. Lives at home alone, no children or spouse. Had difficulty cleaning her house as she admits she was a hoarder. Had colonoscopy several  years ago which was normal.  She has seen GI with same symptoms outpatient.  She was admitted and evaluated by PT, OT. She was determined to be appropriate for discharge to short term rehab 7/18. Disposition: short term rehab  Diet: ADULT DIET; Regular; 3 carb choices (45 gm/meal)  Code Status: DNR    Follow Ups:   Contact information for follow-up providers     Moriah Collins MD. Schedule an appointment as soon as possible for a visit   in 2 week(s). Specialty: Internal Medicine  Why: Transition of Care Management  Contact information:  [address]  [phone]                   Contact information for after-discharge care     Discharge [address] [facility]) . Service: Skilled Nursing  Contact information:  [address] [facility]  [phone]                           Time spent in patient discharge and coordination 44 minutes. Follow up labs/diagnostics (ultimately defer to outpatient provider):  Medication changes as noted  Repeat a1c off insulin  Reassess BP outpatient    Plan was discussed with patient, nurse, . All questions answered. Patient was stable at time of discharge. Instructions given to call a physician or return if any concerns. Current Discharge Medication List        START taking these medications    Details   SITagliptin (JANUVIA) 50 MG tablet Take 1 tablet by mouth in the morning. Qty: 30 tablet, Refills: 0      simethicone (MYLICON) 80 MG chewable tablet Take 1 tablet by mouth every 6 hours as needed for Flatulence  Qty: 180 tablet, Refills: 0      naloxone 4 MG/0.1ML LIQD nasal spray 1 spray by Nasal route as needed for Opioid Reversal  Qty: 2 each, Refills: 0           CONTINUE these medications which have CHANGED    Details   traMADol (ULTRAM) 50 MG tablet Take 0.5 tablets by mouth every 6 hours as needed for Pain for up to 3 days.   Qty: 6 tablet, Refills: 0    Comments: Reduce doses taken as pain becomes manageable  Associated Diagnoses: Other chronic pain           CONTINUE these medications which have NOT CHANGED    Details   pregabalin (LYRICA) 50 MG capsule Take by mouth 2 times daily.        celecoxib (CELEBREX) 200 MG capsule 1 capsule      furosemide (LASIX) 20 MG tablet 1 tablet      OMEPRAZOLE PO 20 mg daily      azelastine HCl 0.15 % SOLN 2 sprays by Nasal route as needed      Biotin 2.5 MG CAPS Take by mouth      budesonide (PULMICORT) 0.5 MG/2ML nebulizer suspension Inhale 500 mcg into the lungs 2 times daily as needed       formoterol (PERFOROMIST) 20 MCG/2ML nebulizer solution Inhale 20 mcg into the lungs 2 times daily as needed       levothyroxine (SYNTHROID) 100 MCG tablet Take 100 mcg by mouth daily      lisinopril-hydroCHLOROthiazide (PRINZIDE;ZESTORETIC) 10-12.5 MG per tablet Take 1 tablet by mouth daily      montelukast (SINGULAIR) 10 MG tablet Take 10 mg by mouth daily      olopatadine (PATANOL) 0.1 % ophthalmic solution Apply 2 drops to eye as needed      polyethylene glycol (GLYCOLAX) 17 GM/SCOOP powder Take 17 g by mouth as needed           STOP taking these medications       insulin lispro (HUMALOG KWIKPEN U-200) 200 UNIT/ML SOPN pen Comments:   Reason for Stopping:         lisinopril (PRINIVIL;ZESTRIL) 2.5 MG tablet Comments:   Reason for Stopping:         amLODIPine (NORVASC) 5 MG tablet Comments:   Reason for Stopping:         chlordiazePOXIDE-clidinium (LIBRAX) 5-2.5 MG per capsule Comments:   Reason for Stopping:         HYDROmorphone (DILAUDID) 2 MG tablet Comments:   Reason for Stopping:         insulin glargine (LANTUS;BASAGLAR) 100 UNIT/ML injection pen Comments:   Reason for Stopping:         insulin lispro, 1 Unit Dial, 100 UNIT/ML SOPN Comments:   Reason for Stopping:         metaxalone (SKELAXIN) 800 MG tablet Comments:   Reason for Stopping:         Naproxen Sodium 220 MG CAPS Comments:   Reason for Stopping:               Procedures done this admission:  * No surgery found *    Consults this admission:  IP CONSULT TO CASE MANAGEMENT  IP CONSULT TO PSYCHIATRY    Echocardiogram results:  02/09/22    TRANSTHORACIC ECHOCARDIOGRAM (TTE) COMPLETE (CONTRAST/BUBBLE/3D PRN) 02/10/2022  5:07 PM (Final)    Narrative  This is a summary report. The complete report is available in the patient's medical record. If you cannot access the medical record, please contact the sending organization for a detailed fax or copy. Left Ventricle: Left ventricle is mildly dilated. Increased wall thickness. Normal wall motion. Normal left ventricular systolic function. EF by 2D Simpsons Biplane is 58%. Normal diastolic function. Mitral Valve: Mildly thickened leaflets. Tricuspid Valve: RVSP is 33 mmHg. Signed by: Emma eRid MD on 2/10/2022  5:07 PM      Diagnostic Imaging/Tests:   CT ABDOMEN PELVIS W IV CONTRAST Additional Contrast? None    Result Date: 7/13/2022  1. No obvious acute intra-abdominal or pelvic abnormality with chronic changes as detailed above. CPT code(s): 62787, [id number]    XR HIP 2-3 VW W PELVIS LEFT    Result Date: 6/29/2022  See Progress note in the chart.          Labs: Results:       BMP, Mg, Phos Recent Labs     07/18/22 0452      K 3.4*      CO2 30   ANIONGAP 7   BUN 16   CREATININE 0.87   LABGLOM >60   GFRAA >60   CALCIUM 8.8   GLUCOSE 151*   MG 1.3*   PHOS 3.8*      CBC Recent Labs     07/18/22 0452   WBC 8.8   RBC 4.79   HGB 13.6   HCT 41.3   MCV 86.2   MCH 28.4   MCHC 32.9   RDW 14.1      MPV 11.0   NRBC 0.00   SEGS 50   LYMPHOPCT 39   EOSRELPCT 2   MONOPCT 8   BASOPCT 1   IMMGRAN 0   SEGSABS 4.4   LYMPHSABS 3.4   EOSABS 0.2   MONOSABS 0.7   BASOSABS 0.0   ABSIMMGRAN 0.0      LFT Recent Labs     07/18/22 0452   BILITOT 0.4   ALKPHOS 86   AST 12*   ALT 18   PROT 5.9*   LABALBU 2.7*   GLOB 3.2      Cardiac  No results found for: NTPROBNP, TROPHS   Coags No results found for: PROTIME, INR, APTT   A1c Lab Results   Component Value Date/Time    LABA1C 6.9 07/15/2022 05:21 AM    LABA1C 13.4 03/21/2021 02:50 AM    LABA1C 13.1 03/20/2021 01:06 PM     07/15/2022 05:21 AM     03/21/2021 02:50 AM     03/20/2021 01:06 PM      Lipids No results found for: CHOL, LDLCALC, LABVLDL, HDL, CHOLHDLRATIO, TRIG   Thyroid  Lab Results   Component Value Date/Time    TSHELE 0.80 07/14/2022 10:11 AM        Most Recent UA Lab Results   Component Value Date/Time    WBCUA 3-5 03/20/2021 01:59 PM    RBCUA 3-5 03/20/2021 01:59 PM    BACTERIA 4+ 03/20/2021 01:59 PM          All Labs from Last 24 Hrs:  Recent Results (from the past 24 hour(s))   POCT Glucose    Collection Time: 07/17/22  4:37 PM   Result Value Ref Range    POC Glucose 184 (H) 65 - 100 mg/dL    Performed by: Xiomara Long    POCT Glucose    Collection Time: 07/17/22  9:27 PM   Result Value Ref Range    POC Glucose 166 (H) 65 - 100 mg/dL    Performed by: Ale    Comprehensive Metabolic Panel w/ Reflex to MG    Collection Time: 07/18/22  4:52 AM   Result Value Ref Range    Sodium 141 136 - 145 mmol/L    Potassium 3.4 (L) 3.5 - 5.1 mmol/L    Chloride 104 98 - 107 mmol/L    CO2 30 21 - 32 mmol/L    Anion Gap 7 7 - 16 mmol/L    Glucose 151 (H) 65 - 100 mg/dL    BUN 16 8 - 23 MG/DL    CREATININE 0.87 0.6 - 1.0 MG/DL    GFR African American >60 >60 ml/min/1.73m2    GFR Non- >60 >60 ml/min/1.73m2    Calcium 8.8 8.3 - 10.4 MG/DL    Total Bilirubin 0.4 0.2 - 1.1 MG/DL    ALT 18 12 - 65 U/L    AST 12 (L) 15 - 37 U/L    Alk Phosphatase 86 50 - 130 U/L    Total Protein 5.9 (L) 6.3 - 8.2 g/dL    Albumin 2.7 (L) 3.2 - 4.6 g/dL    Globulin 3.2 2.3 - 3.5 g/dL    Albumin/Globulin Ratio 0.8 (L) 1.2 - 3.5     CBC with Auto Differential    Collection Time: 07/18/22  4:52 AM   Result Value Ref Range    WBC 8.8 4.3 - 11.1 K/uL    RBC 4.79 4.05 - 5.2 M/uL    Hemoglobin 13.6 11.7 - 15.4 g/dL    Hematocrit 41.3 35.8 - 46.3 %    MCV 86.2 79.6 - 97.8 FL    MCH 28.4 26.1 - 32.9 PG    MCHC 32.9 31.4 - 35.0 g/dL    RDW 14.1 11.9 - 14.6 %    Platelets 002 935 - 442 K/uL MPV 11.0 9.4 - 12.3 FL    nRBC 0.00 0.0 - 0.2 K/uL    Differential Type AUTOMATED      Seg Neutrophils 50 43 - 78 %    Lymphocytes 39 13 - 44 %    Monocytes 8 4.0 - 12.0 %    Eosinophils % 2 0.5 - 7.8 %    Basophils 1 0.0 - 2.0 %    Immature Granulocytes 0 0.0 - 5.0 %    Segs Absolute 4.4 1.7 - 8.2 K/UL    Absolute Lymph # 3.4 0.5 - 4.6 K/UL    Absolute Mono # 0.7 0.1 - 1.3 K/UL    Absolute Eos # 0.2 0.0 - 0.8 K/UL    Basophils Absolute 0.0 0.0 - 0.2 K/UL    Absolute Immature Granulocyte 0.0 0.0 - 0.5 K/UL   Phosphorus    Collection Time: 07/18/22  4:52 AM   Result Value Ref Range    Phosphorus 3.8 (H) 2.3 - 3.7 MG/DL   Magnesium    Collection Time: 07/18/22  4:52 AM   Result Value Ref Range    Magnesium 1.3 (L) 1.8 - 2.4 mg/dL   POCT Glucose    Collection Time: 07/18/22  6:43 AM   Result Value Ref Range    POC Glucose 136 (H) 65 - 100 mg/dL    Performed by: Carlos    POCT Glucose    Collection Time: 07/18/22 11:38 AM   Result Value Ref Range    POC Glucose 173 (H) 65 - 100 mg/dL    Performed by: Jak        Allergies   Allergen Reactions    Codeine Other (See Comments)     dizziness    Erythromycin Rash    Tetracycline Rash     Immunization History   Administered Date(s) Administered    COVID-19, PFIZER PURPLE top, DILUTE for use, (age 15 y+), 30mcg/0.3mL 04/26/2021, 05/17/2021    PPD Test 10/09/2017, 07/15/2022    Pneumococcal Conjugate 13-valent ([id number]) 01/28/2015    Pneumococcal Polysaccharide ([id number]) 03/11/2015    Zoster Live (Zostavax) 03/11/2015       Recent Vital Data:  Patient Vitals for the past 24 hrs:   Temp Pulse Resp BP SpO2   07/18/22 1139 97.9 °F (36.6 °C) 72 16 132/87 92 %   07/18/22 0820 97.7 °F (36.5 °C) 65 16 (!) 116/59 92 %   07/18/22 0354 98.2 °F (36.8 °C) 65 18 (!) 115/52 93 %   07/18/22 0032 97.9 °F (36.6 °C) 71 18 107/65 93 %   07/17/22 2023 98.6 °F (37 °C) 91 18 99/63 95 %   07/17/22 1628 97.4 °F (36.3 °C) 94 -- (!) 143/126 96 %   07/17/22 1626 -- 93 -- (!) 154/138 97 %       Oxygen Therapy  SpO2: 92 %  Pulse Oximeter Device Mode: Intermittent  O2 Device: None (Room air)    Estimated body mass index is 43.68 kg/m² as calculated from the following:    Height as of this encounter: 5' 4\" (1.626 m). Weight as of this encounter: 254 lb 8 oz (115.4 kg). Intake/Output Summary (Last 24 hours) at 7/18/2022 1223  Last data filed at 7/17/2022 2023  Gross per 24 hour   Intake 250 ml   Output --   Net 250 ml       Physical Exam  Vitals and nursing note reviewed. Constitutional:       General: She is not in acute distress. Appearance: She is morbidly obese. She is not ill-appearing or diaphoretic. Comments: borderline health literacy   Eyes:      Extraocular Movements: Extraocular movements intact. Cardiovascular:      Rate and Rhythm: Normal rate. Pulmonary:      Effort: Pulmonary effort is normal. No respiratory distress. Abdominal:      General: There is no distension. Musculoskeletal:         General: No deformity. Skin:     Coloration: Skin is not jaundiced or pale. Neurological:      General: No focal deficit present. Mental Status: She is alert and oriented to person, place, and time. Psychiatric:         Mood and Affect: Mood is anxious. Behavior: Behavior normal. Behavior is cooperative.          Signed:  Alexus Goddard MD

## 2022-07-18 NOTE — CARE COORDINATION
SW spoke with Umair Frias at [facility] who requested the referral be resent. Request completed. SW confirmed with patient that she would bring her own inhalers, patient notified and agreed. Patient accepted and selected in Epic. Update: Per Umair Frias at [facility] patient's bed is available today. Discharge packet prepared. Patient declined EMS transport, wishes for a friend to take her via private vehicle. Requested rapid COVID. RN given number for report.      Kinsey Lepe LMSW    [address] [facility]

## 2022-07-19 ENCOUNTER — TELEPHONE (OUTPATIENT)
Dept: INTERNAL MEDICINE CLINIC | Facility: CLINIC | Age: 77
End: 2022-07-19

## 2022-07-19 ENCOUNTER — CARE COORDINATION (OUTPATIENT)
Dept: CARE COORDINATION | Facility: CLINIC | Age: 77
End: 2022-07-19

## 2022-07-19 NOTE — TELEPHONE ENCOUNTER
Patient discharged from [facility] 07/18/2022. Transferred to [facility].

## 2022-07-21 NOTE — CARE COORDINATION
CM was advised on Tuesday 7/19 that patient had been discharged to facility without several prescriptions that had been left on the nursing station printer. CM also received a call from patient on the same day, very upset about discrepancies with her medication. CM reported to patient that the prescriptions would be faxed that day, as soon as signed by the physician. Prescriptions were signed and CM has made attempts to reach someone at [facility] ([phone]) to confirm fax information so the prescriptions could be sent to the appropriate person. 7/19: Messages left for admission coordinator and Director of Nursing ([phone]). No call was returned. 7/20: CM spoke with a staff member who stated that there were indeed discrepancies with prescriptions and advised CM to call the nurse practitioner, Amaury Kelly at [phone]. CM attempted to reach Ms. Leal without success. Unable to leave a voicemail message because the mailbox was full.     7/21: CM attempted to reach Ms. Concha Polo again. No answer and no opportunity to leave a message due to the mailbox being full. CM called the [facility] to attempt to speak with the . Was advised that the  was not in. Detailed message left with  advising that CM has tried for the past 2 days to reach any staff member that may be able to help. Contact information left with a message that CM will await a return phone call.

## 2022-07-22 NOTE — CARE COORDINATION
RUDDY followed up with Zurdo Cruz to confirm the fax number at [facility]. Char states that it's [phone]. SW inquired about what was needed and stated that the patient did not have a discharge summary in her packet, but Aashish Quevedo was able to print a copy from [address] [place] [address] Rd on Tuesday after admission. RUDDY advised that the discharge summary was placed in the packet as this SW personally checked it off after printing it from [address] [place] [address] Rd. Char was able to order all of the patient's needed prescriptions but states that sending the prescriptions we have along with any additional documents would be helpful. RUDDY reached out to Aashish Quevedo at [facility] to confirm that nothing else is needed for continuity of care.      Socorro Ayala, MARCE    [facility]

## 2022-08-01 ENCOUNTER — CARE COORDINATION (OUTPATIENT)
Dept: CARE COORDINATION | Facility: CLINIC | Age: 77
End: 2022-08-01

## 2022-08-01 NOTE — CARE COORDINATION
This Care Coordinator spoke with [facility] and confirmed patient is still currently a patient. The VANE will be postpone another 14 days.

## 2022-08-11 ENCOUNTER — OFFICE VISIT (OUTPATIENT)
Dept: ORTHOPEDIC SURGERY | Age: 77
End: 2022-08-11
Payer: MEDICARE

## 2022-08-11 DIAGNOSIS — M16.12 PRIMARY OSTEOARTHRITIS OF LEFT HIP: Primary | ICD-10-CM

## 2022-08-11 PROCEDURE — 1036F TOBACCO NON-USER: CPT | Performed by: ORTHOPAEDIC SURGERY

## 2022-08-11 PROCEDURE — 3017F COLORECTAL CA SCREEN DOC REV: CPT | Performed by: ORTHOPAEDIC SURGERY

## 2022-08-11 PROCEDURE — 1123F ACP DISCUSS/DSCN MKR DOCD: CPT | Performed by: ORTHOPAEDIC SURGERY

## 2022-08-11 PROCEDURE — G8428 CUR MEDS NOT DOCUMENT: HCPCS | Performed by: ORTHOPAEDIC SURGERY

## 2022-08-11 PROCEDURE — 1111F DSCHRG MED/CURRENT MED MERGE: CPT | Performed by: ORTHOPAEDIC SURGERY

## 2022-08-11 PROCEDURE — G8417 CALC BMI ABV UP PARAM F/U: HCPCS | Performed by: ORTHOPAEDIC SURGERY

## 2022-08-11 PROCEDURE — G8400 PT W/DXA NO RESULTS DOC: HCPCS | Performed by: ORTHOPAEDIC SURGERY

## 2022-08-11 PROCEDURE — 99214 OFFICE O/P EST MOD 30 MIN: CPT | Performed by: ORTHOPAEDIC SURGERY

## 2022-08-11 PROCEDURE — 1090F PRES/ABSN URINE INCON ASSESS: CPT | Performed by: ORTHOPAEDIC SURGERY

## 2022-08-11 NOTE — PROGRESS NOTES
Name: Jami Yoon  YOB: 1945  Gender: female  MRN: [id number]    CC:   Chief Complaint   Patient presents with    Hip Pain     Left Hip pain          HPI: Jami Yoon is a 77 y.o. female with a PMHx of HTN, DM on insulin, hypothyroidism and s/p right ISIAH in 2017 by Dr. Medrano Em  here for follow up evaluation of left hip pain  There was not an acute injury  Regarding the hip pain, it has been present episodically for years and is becoming worse. The pain is primarily anterior into the groin and posterior buttock area  she describes the pain as a constant ache that is intermittently sharp with activity  The pain is worse with any weight bearing, going up and/or down stairs, rising after sitting, twisting, standing, walking, at night, often waking them from sleep, crossing their legs, lifting their leg and lying on the left side  The pain does radiate down the leg into her toes. She currently sees Dr. Aurora Hercules for management of her chronic back pain. She is presently treated with intermittent interlaminar L3-4 epidural steroid injections and bilateral sacroiliac joint injections, most recently performed April 2022. These typically improve her lower back pain. MRI of the lumbar spine from March 2017, reviewed by Dr. Aurora Hercules, showed moderate central stenosis L2-3-4-5 with multilevel neuroforaminal narrowing. She had x-rays of the lumbar spine July 2020, and these showed lumbar scoliosis and degenerative changes. Treatment so far has been prescription and OTC NSAIDS which have not effectively relieved pain/inflammation, activity modification, celebrex, tramadol, cortisone injections and lyrica  with little relief. Review of Systems  As per HPI. Pertinent positives and negatives are addressed with the patient, particularly those related to musculoskeletal concerns. Non-orthopaedic concerns were referred back to the primary care physician.       Allergies   Allergen Reactions    Codeine Other (See Comments)     dizziness    Erythromycin Rash    Tetracycline Rash                    PHYSICAL EXAMINATION:   The patient is alert and oriented, in no distress. There were no vitals filed for this visit. The cervical, thoracic and lumbar spine are without compromising deformity. The upper extremities demonstrate reasonable tone, strength and function bilaterally. The lower extremities are as described below. Circulation is normal with palpable pedal pulses bilaterally and no edema. Skin of the leg is normal with no chronic stasis disease bilaterally. Sensation is intact to light touch bilaterally. The gait is noted to be with a slight trendelenburg and antalgia and and ambulates with a Cane  There is mild tenderness to palpation along the spinous processes and paraspinal musculature. There mild tenderness to palpation over the left greater trochanter  left hip range of motion is 0-75 degrees of flexion and 20 degrees on abduction and adduction. There is 0 degrees internal rotation and 15 degrees of external rotation with groin pain  Limb lengths are equal.  Straight leg test is Positive left  Stability is normal bilaterally. Their judgment and insight are normal.  They are oriented to time, place and person. Their memory is good and the mood and affect appropriate. XRAYS:   AP pelvis and lateral views of the left hip are reviewed. There is  significant joint space loss, eburnated bone and osteophyte formation of the hip. X-ray impression: advanced osteoarthropathy of the left hip      IMPRESSION:      ICD-10-CM    1. Primary osteoarthritis of left hip  M16.12               RECOMMENDATION:    X-rays were reviewed with the patient today. He is being evaluated for GI issues. She has not been diagnosed. She was admitted to the hospital and then discharged to the [facility] where she has been for over 2 weeks.   She has been scheduled to have an appointment with gastroenterology in October. This patient's clinical history and physical exam is consistent with osteoarthritis of the left hip(s). We discussed the natural history of this condition as a degenerative process that causes inflammation of the joints due to a breakdown of cartilage, the hard, thick tissue that cushions the joints. We discussed that osteoarthritis never completely resolves on its own and symptoms including pain, stiffness, and swelling may wax and wane as the condition progresses. She understands that conservative treatments typically include activity modification, NSAIDs and physical therapy. Oral and/or steroid injections into the joint could be considered in resistant scenarios. I advised avoiding any prolonged bedrest and to try to maintain ADLs as much as possible. The patient was counseled to follow up with me should she develop any worsening symptoms that begin to limit activities of daily living. We did discuss that most of her radicular symptoms are coming from her lower back and she should continue to follow-up with Dr. Tashi Peña with that. We also discussed necessity for left total hip replacement but more importantly working on her diet and weight loss prior to scheduling any surgery. I recommend she work with physical therapy and diet control and follow-up in 6 weeks with Dr. Rafa Modi for a weight check and to discuss scheduling left ISIAH.  ----The patient will be treated observantly with self directed symptomatic care. Instructions were given to follow up if there is any neurologic or functional decline.  ---- A home exercise program was prescribed for stretching and strengthening. A list of exercises was provided. ---- Physical Therapy was prescribed and will include stretching, strengthening and modalities to promote blood flow, flexibility and core strengthening.  ----Weight Loss: We discussed that pain and problems in the hip and/or knee may be aggravated by obesity.  The effectiveness of joint replacement surgery can also be affected by a patient's weight. Obese patients are higher risk of infections and failure of hardware due to weight stresses. We discussed that attention to weight loss before undergoing surgery may improve the healing process and overall outcome. Weight loss options include: diet, exercise, nutritional support and bariatric surgery. ----ISIAH - Today we discussed left hip replacement surgery. They are aware of the 1% risk of dislocation and 1% risk of infection. They were also informed of the possibility of stroke, heart attack and blood clot-any of these which could result in further hospitalization or death. I reviewed the procedure as well as the pre and post-operative process at length and written information was provided for further review. This will be scheduled and arranged.     4--this is a chronic illness/condition with exacerbation    SHABBIR Bragg

## 2022-08-15 ENCOUNTER — CARE COORDINATION (OUTPATIENT)
Dept: CARE COORDINATION | Facility: CLINIC | Age: 77
End: 2022-08-15

## 2022-08-15 NOTE — CARE COORDINATION
This Care Coordinator spoke with [facility]  staff @ [facility] and states patient is still a residents. VANE call will be postpone for another 14 days.

## 2022-09-01 ENCOUNTER — CARE COORDINATION (OUTPATIENT)
Dept: CARE COORDINATION | Facility: CLINIC | Age: 77
End: 2022-09-01

## 2022-09-01 NOTE — CARE COORDINATION
Care Transitions Outreach Attempt    Call within 2 business days of discharge: No   Attempted to reach patient for transitions of care . Unable to reach patient. This Care Coordinator spoke with [facility] and confirmed that patient has been discharged. Patient: Elsy Garland Patient : 1945 MRN: [id number]    Last Discharge [facility]       Date Complaint Diagnosis Description Type Department Provider    22 Abdominal Pain Abdominal pain, unspecified abdominal location . .. ED to Hosp-Admission (Discharged) (ADMITTED) Nas Medina MD; Gaurang Pandya. .. Was this an external facility discharge? No Discharge Facility: [address] [place] [address]    Noted following upcoming appointments from discharge chart review:   [facility] follow up appointment(s): No future appointments. Non-[facility] follow up appointment(s): No appointments scheduled at this time for this patient.

## 2022-09-02 ENCOUNTER — CARE COORDINATION (OUTPATIENT)
Dept: CARE COORDINATION | Facility: CLINIC | Age: 77
End: 2022-09-02

## 2022-09-07 ENCOUNTER — HOSPITAL ENCOUNTER (EMERGENCY)
Age: 77
Discharge: HOME | End: 2022-09-07
Attending: EMERGENCY MEDICINE
Payer: MEDICARE

## 2022-09-07 VITALS
OXYGEN SATURATION: 96 % | RESPIRATION RATE: 18 BRPM | DIASTOLIC BLOOD PRESSURE: 71 MMHG | HEIGHT: 64 IN | BODY MASS INDEX: 43.54 KG/M2 | HEART RATE: 92 BPM | TEMPERATURE: 98.7 F | SYSTOLIC BLOOD PRESSURE: 132 MMHG | WEIGHT: 255 LBS

## 2022-09-07 DIAGNOSIS — T14.8XXA BITE BY ANIMAL: ICD-10-CM

## 2022-09-07 DIAGNOSIS — L03.113 CELLULITIS OF RIGHT HAND: Primary | ICD-10-CM

## 2022-09-07 PROCEDURE — 99283 EMERGENCY DEPT VISIT LOW MDM: CPT

## 2022-09-07 RX ORDER — AMOXICILLIN AND CLAVULANATE POTASSIUM 875; 125 MG/1; MG/1
1 TABLET, FILM COATED ORAL 2 TIMES DAILY
Qty: 14 TABLET | Refills: 0 | Status: SHIPPED | OUTPATIENT
Start: 2022-09-07 | End: 2022-09-14

## 2022-09-07 ASSESSMENT — PAIN DESCRIPTION - LOCATION: LOCATION: HAND

## 2022-09-07 ASSESSMENT — PAIN - FUNCTIONAL ASSESSMENT: PAIN_FUNCTIONAL_ASSESSMENT: 0-10

## 2022-09-07 ASSESSMENT — PAIN SCALES - GENERAL: PAINLEVEL_OUTOF10: 4

## 2022-09-07 NOTE — ED PROVIDER NOTES
Emergency Department Provider Note                   PCP:                Colonel Siobhan MD               Age: 77 y.o. Sex: female       ICD-10-CM    1. Cellulitis of right hand  L03.113       2. Bite by animal  T14. 8XXA           DISPOSITION Decision To Discharge 09/07/2022 06:26:46 PM        MDM  Number of Diagnoses or Management Options  Bite by animal  Cellulitis of right hand  Diagnosis management comments: Patient presents with a cat bite. She knows the cat and there is no evidence of rabies. She has swelling and redness. I do not think she needs IV antibiotics or labs at this point in time. While the redness is expanding beyond the bite, she states that the swelling is actually getting better. There is no fever and her vital signs are normal.  We talked about topical wound care, oral antibiotics, and return precautions. No need for rabies vaccine given that she knows the cat on her property and watches the cat. No orders of the defined types were placed in this encounter. Medications - No data to display    New Prescriptions    AMOXICILLIN-CLAVULANATE (AUGMENTIN) 875-125 MG PER TABLET    Take 1 tablet by mouth 2 times daily for 7 days        Martir Blakely is a 77 y.o. female who presents to the Emergency Department with chief complaint of    Chief Complaint   Patient presents with    Animal Bite      Patient presents to the ER with an animal bite to the right hand. It is very swollen. She was bit by a cat four days ago on the thenar eminence of her thumb. She has swelling, redness. No fever. The redness streaks up her wrist some. No antibiotics. Using topical cortisone, but no relief. Starting to get better with the swelling. She has otherwise been well. She has chronic shortness of breath. She recently got out of acute rehab. She has some chronic hip pain. No chest pain or shortness of breath. No abdominal pain.   No medications taken prior to arrival.          Review of Systems All other systems reviewed and are negative.     Past Medical History:   Diagnosis Date    Acute UTI 3/20/2021    Arthritis     Asthma     bronchio-asthma mostly seasonal    Chronic obstructive asthma, unspecified     Chronic pain     DDD, LOW BACK, SIATICA    DDD (degenerative disc disease)     WITH RADICULOPATHY    E-coli UTI 3/23/2021    Elevated serum creatinine 2017    Elevated WBC count 2017    Functional assessment declined 2022    Functional quadriplegia ([facility] 2022    GERD (gastroesophageal reflux disease)     Hypertension     Hypothyroidism     Incontinence of urine     Morbid obesity ([address]     Neuropathy     BELKIS (obstructive sleep apnea) 2009    Osteoarthritis of left knee 2009    Osteoarthritis of right knee 3/2/2011    S/P total knee replacement using cement 2009    Sleep apnea     HAS CPAP    Spastic colon     Status post right hip replacement 10/9/2017    Thyroid disease     ON MEDS    Type II or unspecified type diabetes mellitus without mention of complication, not stated as uncontrolled     avg         Past Surgical History:   Procedure Laterality Date    HAND/FINGER SURGERY UNLISTED Right     HERNIA REPAIR      UMBILICAL WITH MESH    HYSTERECTOMY (CERVIX STATUS UNKNOWN)  2000    TOTAL KNEE ARTHROPLASTY Left 2009    TOTAL KNEE ARTHROPLASTY Right         Family History   Problem Relation Age of Onset    Diabetes Father     Cancer Father         lung    COPD Father     Cancer Brother     Diabetes Mother     Heart Disease Father     Heart Disease Brother         CAD    Diabetes Brother     Cancer Mother         Social History     Socioeconomic History    Marital status: Single     Spouse name: None    Number of children: None    Years of education: None    Highest education level: None   Tobacco Use    Smoking status: Former     Packs/day: 1.00     Types: Cigarettes     Quit date: 1977     Years since quittin.7    Smokeless tobacco: Never    Tobacco comments:     Quit smoking: QUIT IN 1977   Substance and Sexual Activity    Alcohol use: Not Currently    Drug use: No   Social History Narrative    PATIENT IS SINGLE. SHE WORKS FOR THE [employer] . Codeine, Erythromycin, and Tetracycline     Previous Medications    AZELASTINE HCL 0.15 % SOLN    2 sprays by Nasal route as needed    BIOTIN 2.5 MG CAPS    Take by mouth    BUDESONIDE (PULMICORT) 0.5 MG/2ML NEBULIZER SUSPENSION    Inhale 500 mcg into the lungs 2 times daily as needed     CELECOXIB (CELEBREX) 200 MG CAPSULE    1 capsule    FORMOTEROL (PERFOROMIST) 20 MCG/2ML NEBULIZER SOLUTION    Inhale 20 mcg into the lungs 2 times daily as needed     FUROSEMIDE (LASIX) 20 MG TABLET    1 tablet    LEVOTHYROXINE (SYNTHROID) 100 MCG TABLET    Take 100 mcg by mouth daily    LISINOPRIL-HYDROCHLOROTHIAZIDE (PRINZIDE;ZESTORETIC) 10-12.5 MG PER TABLET    Take 1 tablet by mouth daily    MONTELUKAST (SINGULAIR) 10 MG TABLET    Take 10 mg by mouth daily    NALOXONE 4 MG/0.1ML LIQD NASAL SPRAY    1 spray by Nasal route as needed for Opioid Reversal    OLOPATADINE (PATANOL) 0.1 % OPHTHALMIC SOLUTION    Apply 2 drops to eye as needed    OMEPRAZOLE PO    20 mg daily    POLYETHYLENE GLYCOL (GLYCOLAX) 17 GM/SCOOP POWDER    Take 17 g by mouth as needed    PREGABALIN (LYRICA) 50 MG CAPSULE    Take by mouth 2 times daily. SIMETHICONE (MYLICON) 80 MG CHEWABLE TABLET    Take 1 tablet by mouth every 6 hours as needed for Flatulence    SITAGLIPTIN (JANUVIA) 50 MG TABLET    Take 1 tablet by mouth in the morning. Vitals signs and nursing note reviewed. Patient Vitals for the past 4 hrs:   Temp Pulse Resp BP SpO2   09/07/22 1826 98.7 °F (37.1 °C) 92 18 132/71 96 %          Physical Exam  Vitals reviewed. Constitutional:       Appearance: Normal appearance. HENT:      Head: Normocephalic and atraumatic.       Mouth/Throat:      Mouth: Mucous membranes are moist.   Eyes:      Extraocular Movements: Extraocular movements intact. Conjunctiva/sclera: Conjunctivae normal.   Cardiovascular:      Rate and Rhythm: Regular rhythm. Pulses: Normal pulses. Pulmonary:      Effort: Pulmonary effort is normal. No respiratory distress. Breath sounds: Normal breath sounds. Musculoskeletal:         General: Swelling and tenderness present. Cervical back: Normal range of motion and neck supple. Comments: Swelling in the thenar eminence, but full range of motion of the fingers and wrists   Skin:     General: Skin is warm and dry. Capillary Refill: Capillary refill takes less than 2 seconds. Findings: Erythema present. Comments: Blanching erythema with extension to the wrist; no fluctuance or absence   Neurological:      General: No focal deficit present. Mental Status: She is alert and oriented to person, place, and time. Psychiatric:         Mood and Affect: Mood normal.         Behavior: Behavior normal.        Procedures      [unfilled]     No orders to display                          Voice dictation software was used during the making of this note. This software is not perfect and grammatical and other typographical errors may be present. This note has not been completely proofread for errors.      Karen Farooq MD  09/07/22 6098

## 2022-09-07 NOTE — ED TRIAGE NOTES
Pt reports a cat bit her hand four days ago on her right hand. Reports swelling, redness, and pain since. States she cleaned the area after and has been applying hydrocortisone cream. Denies fever, chills, body aches.

## 2022-09-07 NOTE — ED NOTES
I have reviewed discharge instructions with the patient. The patient verbalized understanding. Patient left ED via Discharge Method: wheelchair to Home with self. Opportunity for questions and clarification provided. Patient given 1 e- scripts. To continue your aftercare when you leave the hospital, you may receive an automated call from our care team to check in on how you are doing. This is a free service and part of our promise to provide the best care and service to meet your aftercare needs.  If you have questions, or wish to unsubscribe from this service please call [phone]. Thank you for Choosing our [address] [place] [address] Emergency Department.         Aguila Pedersen [facility]  09/07/22 0460

## 2022-09-07 NOTE — DISCHARGE INSTRUCTIONS
We are treating you for a cat bite with antibiotic. Anticipate that the redness and swelling will go down in the next 48 to 72 hours. If you develop a fever, rapidly spreading redness, or worsening pain, please return to the emergency room for repeat assessment.

## 2022-09-09 ENCOUNTER — CARE COORDINATION (OUTPATIENT)
Dept: CARE COORDINATION | Facility: CLINIC | Age: 77
End: 2022-09-09

## 2022-09-09 NOTE — CARE COORDINATION
Care Transitions Outreach Attempt    Call within 2 business days of discharge: No   2nd Attempted to reach patient for transitions of care call. Unable to reach patient. Left message, will re-open if phone call is returned. Patient: Maura Sun Patient : 1945 MRN: [id number]    Last Discharge [facility]       Date Complaint Diagnosis Description Type Department Provider    22 Animal Bite Cellulitis of right hand . .. ED (DISCHARGE) [facility] Meena Donovan MD              Was this an external facility discharge? No Discharge Facility: [address] [place] [address]    Noted following upcoming appointments from discharge chart review:   [facility] follow up appointment(s): No future appointments.   [facility] follow up appointment(s):

## 2022-10-20 DIAGNOSIS — M48.062 SPINAL STENOSIS, LUMBAR REGION WITH NEUROGENIC CLAUDICATION: ICD-10-CM

## 2022-10-20 DIAGNOSIS — M53.3 SACROCOCCYGEAL DISORDERS, NOT ELSEWHERE CLASSIFIED: ICD-10-CM

## 2022-10-20 DIAGNOSIS — M54.40 LOW BACK PAIN WITH SCIATICA, SCIATICA LATERALITY UNSPECIFIED, UNSPECIFIED BACK PAIN LATERALITY, UNSPECIFIED CHRONICITY: Primary | ICD-10-CM

## 2022-10-20 DIAGNOSIS — M47.817 SPONDYLOSIS WITHOUT MYELOPATHY OR RADICULOPATHY, LUMBOSACRAL REGION: ICD-10-CM

## 2022-10-31 ENCOUNTER — OFFICE VISIT (OUTPATIENT)
Dept: ORTHOPEDIC SURGERY | Age: 77
End: 2022-10-31
Payer: MEDICARE

## 2022-10-31 DIAGNOSIS — M48.062 SPINAL STENOSIS OF LUMBAR REGION WITH NEUROGENIC CLAUDICATION: ICD-10-CM

## 2022-10-31 DIAGNOSIS — M48.061 SPINAL STENOSIS OF LUMBAR REGION, UNSPECIFIED WHETHER NEUROGENIC CLAUDICATION PRESENT: ICD-10-CM

## 2022-10-31 DIAGNOSIS — M53.3 SACROCOCCYGEAL DISORDERS, NOT ELSEWHERE CLASSIFIED: Primary | ICD-10-CM

## 2022-10-31 PROCEDURE — 62323 NJX INTERLAMINAR LMBR/SAC: CPT | Performed by: PHYSICAL MEDICINE & REHABILITATION

## 2022-10-31 RX ORDER — METHYLPREDNISOLONE ACETATE 80 MG/ML
40 INJECTION, SUSPENSION INTRA-ARTICULAR; INTRALESIONAL; INTRAMUSCULAR; SOFT TISSUE ONCE
Status: COMPLETED | OUTPATIENT
Start: 2022-10-31 | End: 2022-10-31

## 2022-10-31 RX ADMIN — METHYLPREDNISOLONE ACETATE 40 MG: 80 INJECTION, SUSPENSION INTRA-ARTICULAR; INTRALESIONAL; INTRAMUSCULAR; SOFT TISSUE at 15:57

## 2022-10-31 NOTE — PROGRESS NOTES
Name: Mariaa Onofre  YOB: 1945  Gender: female  MRN: [id number]        Interlaminar ADIEL Procedure Note      Procedure: L3-L4 interlaminar epidural steroid injection    Precautions: Mariaa Onofre denies prior sensitivity to steroid, local anesthetic, iodine, or shellfish. Consent:  Consent was obtained prior to the procedure. The procedure was discussed at length with Mariaa Onofre. She was given the opportunity to ask questions regarding the procedure and its associated risks. In addition to the potential risks associated with the procedure itself, the patient was informed both verbally and in writing of potential side effects of the use glucocorticoids. The patient appeared to comprehend the informed consent and desired to have the procedure performed, and informed consent was signed. She was placed in a prone position on the fluoroscopy table and the skin was prepped and draped in a routine sterile fashion. The areas to be injected were each anesthetized with 1 ml of 1% Lidocaine. A 22 gauge 3.5 inch spinal needle was carefully advanced under fluoroscopic guidance to the L3-L4 interlaminar space. 0.5 ml of 70% of Omnipaque was injected to confirm proper needle placement and absence of subdural or vascular flow Once proper placement was confirmed, 2ml of sterile water and 80 mg of depomedrol were injected through the spinal needle. Fluoroscopic guidance was used intermittently over a 10-minute period to insure proper needle placement and her safety. A hard copy of the fluoroscopic image has been placed in her chart and is saved on the C-arm hard drive. She was monitored for 30 minutes after the procedure and discharged home in a stable fashion with a routine follow up. Procedural Diagnosis:     ICD-10-CM    1.  Sacrococcygeal disorders, not elsewhere classified  M53.3 CANCELED: XR INJECTION SI JT W WO ARTHROGRAM      2. Spinal stenosis of lumbar region, unspecified whether neurogenic claudication present  M48.061       3.  Spinal stenosis of lumbar region with neurogenic claudication  M48.062 XR INJ SPINE THER SUBST LUM/SAC W IMG     methylPREDNISolone acetate (DEPO-MEDROL) injection 40 mg         Sammy Long MD  11/09/22

## 2022-11-08 ENCOUNTER — OFFICE VISIT (OUTPATIENT)
Dept: ORTHOPEDIC SURGERY | Age: 77
End: 2022-11-08
Payer: MEDICARE

## 2022-11-08 DIAGNOSIS — M53.3 SACROCOCCYGEAL DISORDERS, NOT ELSEWHERE CLASSIFIED: Primary | ICD-10-CM

## 2022-11-08 PROCEDURE — 27096 INJECT SACROILIAC JOINT: CPT | Performed by: PHYSICAL MEDICINE & REHABILITATION

## 2022-11-08 RX ORDER — METHYLPREDNISOLONE ACETATE 80 MG/ML
40 INJECTION, SUSPENSION INTRA-ARTICULAR; INTRALESIONAL; INTRAMUSCULAR; SOFT TISSUE ONCE
Status: COMPLETED | OUTPATIENT
Start: 2022-11-08 | End: 2022-11-08

## 2022-11-08 RX ADMIN — METHYLPREDNISOLONE ACETATE 40 MG: 80 INJECTION, SUSPENSION INTRA-ARTICULAR; INTRALESIONAL; INTRAMUSCULAR; SOFT TISSUE at 15:13

## 2022-12-18 ENCOUNTER — HOSPITAL ENCOUNTER (EMERGENCY)
Age: 77
Discharge: HOME | End: 2022-12-18
Attending: EMERGENCY MEDICINE
Payer: MEDICARE

## 2022-12-18 VITALS
BODY MASS INDEX: 37.56 KG/M2 | OXYGEN SATURATION: 97 % | WEIGHT: 220 LBS | RESPIRATION RATE: 16 BRPM | DIASTOLIC BLOOD PRESSURE: 60 MMHG | HEART RATE: 98 BPM | HEIGHT: 64 IN | SYSTOLIC BLOOD PRESSURE: 115 MMHG | TEMPERATURE: 97.5 F

## 2022-12-18 DIAGNOSIS — M54.50 ACUTE EXACERBATION OF CHRONIC LOW BACK PAIN: Primary | ICD-10-CM

## 2022-12-18 DIAGNOSIS — G89.29 ACUTE EXACERBATION OF CHRONIC LOW BACK PAIN: Primary | ICD-10-CM

## 2022-12-18 LAB
ALBUMIN SERPL-MCNC: 3.7 G/DL (ref 3.2–4.6)
ALBUMIN/GLOB SERPL: 0.9 {RATIO} (ref 0.4–1.6)
ALP SERPL-CCNC: 119 U/L (ref 50–130)
ALT SERPL-CCNC: 20 U/L (ref 12–65)
ANION GAP SERPL CALC-SCNC: 6 MMOL/L (ref 2–11)
APPEARANCE UR: CLEAR
AST SERPL-CCNC: 18 U/L (ref 15–37)
BACTERIA URNS QL MICRO: NEGATIVE /HPF
BASOPHILS # BLD: 0.1 K/UL (ref 0–0.2)
BASOPHILS NFR BLD: 1 % (ref 0–2)
BILIRUB SERPL-MCNC: 0.4 MG/DL (ref 0.2–1.1)
BILIRUB UR QL: ABNORMAL
BUN SERPL-MCNC: 21 MG/DL (ref 8–23)
CALCIUM SERPL-MCNC: 9.9 MG/DL (ref 8.3–10.4)
CASTS URNS QL MICRO: ABNORMAL /LPF
CHLORIDE SERPL-SCNC: 104 MMOL/L (ref 101–110)
CO2 SERPL-SCNC: 31 MMOL/L (ref 21–32)
COLOR UR: ABNORMAL
CREAT SERPL-MCNC: 0.96 MG/DL (ref 0.6–1)
DIFFERENTIAL METHOD BLD: ABNORMAL
EOSINOPHIL # BLD: 0 K/UL (ref 0–0.8)
EOSINOPHIL NFR BLD: 0 % (ref 0.5–7.8)
EPI CELLS #/AREA URNS HPF: ABNORMAL /HPF
ERYTHROCYTE [DISTWIDTH] IN BLOOD BY AUTOMATED COUNT: 13.3 % (ref 11.9–14.6)
GLOBULIN SER CALC-MCNC: 4.2 G/DL (ref 2.8–4.5)
GLUCOSE SERPL-MCNC: 164 MG/DL (ref 65–100)
GLUCOSE UR STRIP.AUTO-MCNC: NEGATIVE MG/DL
HCT VFR BLD AUTO: 48.2 % (ref 35.8–46.3)
HGB BLD-MCNC: 15.6 G/DL (ref 11.7–15.4)
HGB UR QL STRIP: NEGATIVE
IMM GRANULOCYTES # BLD AUTO: 0 K/UL (ref 0–0.5)
IMM GRANULOCYTES NFR BLD AUTO: 0 % (ref 0–5)
KETONES UR QL STRIP.AUTO: 80 MG/DL
LEUKOCYTE ESTERASE UR QL STRIP.AUTO: NEGATIVE
LYMPHOCYTES # BLD: 2 K/UL (ref 0.5–4.6)
LYMPHOCYTES NFR BLD: 19 % (ref 13–44)
MAGNESIUM SERPL-MCNC: 1.7 MG/DL (ref 1.8–2.4)
MCH RBC QN AUTO: 27.5 PG (ref 26.1–32.9)
MCHC RBC AUTO-ENTMCNC: 32.4 G/DL (ref 31.4–35)
MCV RBC AUTO: 85 FL (ref 82–102)
MONOCYTES # BLD: 0.7 K/UL (ref 0.1–1.3)
MONOCYTES NFR BLD: 7 % (ref 4–12)
NEUTS SEG # BLD: 7.5 K/UL (ref 1.7–8.2)
NEUTS SEG NFR BLD: 72 % (ref 43–78)
NITRITE UR QL STRIP.AUTO: NEGATIVE
NRBC # BLD: 0 K/UL (ref 0–0.2)
PH UR STRIP: 6 [PH] (ref 5–9)
PLATELET # BLD AUTO: 243 K/UL (ref 150–450)
PMV BLD AUTO: 11.6 FL (ref 9.4–12.3)
POTASSIUM SERPL-SCNC: 3.3 MMOL/L (ref 3.5–5.1)
PROT SERPL-MCNC: 7.9 G/DL (ref 6.3–8.2)
PROT UR STRIP-MCNC: 30 MG/DL
RBC # BLD AUTO: 5.67 M/UL (ref 4.05–5.2)
RBC #/AREA URNS HPF: ABNORMAL /HPF
SODIUM SERPL-SCNC: 141 MMOL/L (ref 133–143)
SP GR UR REFRACTOMETRY: 1.03 (ref 1–1.02)
UROBILINOGEN UR QL STRIP.AUTO: 1 EU/DL (ref 0.2–1)
WBC # BLD AUTO: 10.4 K/UL (ref 4.3–11.1)
WBC URNS QL MICRO: ABNORMAL /HPF

## 2022-12-18 PROCEDURE — 2580000003 HC RX 258: Performed by: EMERGENCY MEDICINE

## 2022-12-18 PROCEDURE — 6370000000 HC RX 637 (ALT 250 FOR IP): Performed by: EMERGENCY MEDICINE

## 2022-12-18 PROCEDURE — 81001 URINALYSIS AUTO W/SCOPE: CPT

## 2022-12-18 PROCEDURE — 80053 COMPREHEN METABOLIC PANEL: CPT

## 2022-12-18 PROCEDURE — 83735 ASSAY OF MAGNESIUM: CPT

## 2022-12-18 PROCEDURE — 85025 COMPLETE CBC W/AUTO DIFF WBC: CPT

## 2022-12-18 PROCEDURE — 99284 EMERGENCY DEPT VISIT MOD MDM: CPT

## 2022-12-18 RX ORDER — SODIUM CHLORIDE 9 MG/ML
INJECTION, SOLUTION INTRAVENOUS CONTINUOUS
Status: DISCONTINUED | OUTPATIENT
Start: 2022-12-18 | End: 2022-12-18 | Stop reason: HOSPADM

## 2022-12-18 RX ORDER — CYCLOBENZAPRINE HCL 10 MG
10 TABLET ORAL
Status: COMPLETED | OUTPATIENT
Start: 2022-12-18 | End: 2022-12-18

## 2022-12-18 RX ORDER — HYDROCODONE BITARTRATE AND ACETAMINOPHEN 7.5; 325 MG/1; MG/1
1 TABLET ORAL
Status: COMPLETED | OUTPATIENT
Start: 2022-12-18 | End: 2022-12-18

## 2022-12-18 RX ADMIN — HYDROCODONE BITARTRATE AND ACETAMINOPHEN 1 TABLET: 7.5; 325 TABLET ORAL at 18:29

## 2022-12-18 RX ADMIN — CYCLOBENZAPRINE 10 MG: 10 TABLET, FILM COATED ORAL at 18:28

## 2022-12-18 RX ADMIN — SODIUM CHLORIDE: 9 INJECTION, SOLUTION INTRAVENOUS at 15:49

## 2022-12-18 ASSESSMENT — ENCOUNTER SYMPTOMS
SINUS PAIN: 0
SHORTNESS OF BREATH: 0
ABDOMINAL PAIN: 0
BACK PAIN: 1

## 2022-12-18 ASSESSMENT — PAIN SCALES - GENERAL
PAINLEVEL_OUTOF10: 7
PAINLEVEL_OUTOF10: 7
PAINLEVEL_OUTOF10: 8

## 2022-12-18 ASSESSMENT — PAIN DESCRIPTION - LOCATION
LOCATION: BACK
LOCATION: BACK;HIP

## 2022-12-18 NOTE — ED PROVIDER NOTES
[employer] Emergency Department Provider Note                   PCP:                Jack Geronimo MD               Age: 69 y.o. Sex: female       ICD-10-CM    1. Acute exacerbation of chronic low back pain  M54.50     G89.29           DISPOSITION Decision To Discharge 12/18/2022 09:01:50 PM       ED Course as of 12/18/22 2112   Sun Dec 18, 2022   2001 Nothing identified on laboratory or physical examination that would suggest patient would not be able to be discharged home. She was able to ambulate from triage to a stretcher with minimal assistance. After getting on stretcher she now claims that she is unable to move or get around without assistance. She states that she wants to be admitted and that she has \"good insurance\". Patient states that she does not feel that she can take care of her self and does not feel safe at home. She understands that this alone would not necessarily mean she needed to be admitted to an acute care hospital setting but that I could keep her overnight and have  talk to her to see if they can place her into rehab directly from the emergency room. In lieu of consultation with case management/social work, some pain medicine was provided for the patient in the ER. Fortunately unable to get a hospital bed for the ER and patient uncomfortable. [GR]      ED Course User Index  [GR] Robyn Holder DO     MDM       Orders Placed This Encounter   Procedures    CMP    CBC with Auto Differential    Magnesium    Urinalysis    Insert peripheral IV      _____________________________    Jose Jones is a 69 y.o. female who presents to the Emergency Department with chief complaint of    Chief Complaint   Patient presents with    Back Pain      52-year-old female presents emergency room by ambulance after reporting that she is having difficulty getting around home. Apparently lives in a \"hoarding situation\" and is already difficult to get around residence.   Patient with multiple orthopedic issues including degenerative hip and spine and is supposed to be having a total hip replacement in February next year on the left side. Patient endorses that she does not feel if she can go home and that she is potentially a danger to herself due to her instability and lack of support at home. The history is provided by the patient, medical records and the EMS personnel. All other systems reviewed and are negative. Review of Systems   Constitutional:  Positive for activity change. Negative for chills and fever. HENT:  Negative for congestion and sinus pain. Respiratory:  Negative for shortness of breath. Cardiovascular:  Negative for chest pain. Gastrointestinal:  Negative for abdominal pain. Genitourinary:  Negative for difficulty urinating and dysuria. Musculoskeletal:  Positive for arthralgias, back pain, gait problem and myalgias. Neurological:  Negative for speech difficulty.      Past Medical History:   Diagnosis Date    Acute UTI 3/20/2021    Arthritis     Asthma     bronchio-asthma mostly seasonal    Chronic obstructive asthma, unspecified     Chronic pain     DDD, LOW BACK, SIATICA    DDD (degenerative disc disease)     WITH RADICULOPATHY    E-coli UTI 3/23/2021    Elevated serum creatinine 9/19/2017    Elevated WBC count 9/19/2017    Functional assessment declined 7/16/2022    Functional quadriplegia ([address] 7/13/2022    GERD (gastroesophageal reflux disease)     Hypertension     Hypothyroidism     Incontinence of urine     Morbid obesity ([address]     Neuropathy     BELKIS (obstructive sleep apnea) 9/16/2009    Osteoarthritis of left knee 9/16/2009    Osteoarthritis of right knee 3/2/2011    S/P total knee replacement using cement 9/16/2009    Sleep apnea     HAS CPAP    Spastic colon     Status post right hip replacement 10/9/2017    Thyroid disease     ON MEDS    Type II or unspecified type diabetes mellitus without mention of complication, not stated as uncontrolled     avg         Past Surgical History:   Procedure Laterality Date    HAND/FINGER SURGERY UNLISTED Right 'S    HERNIA REPAIR      UMBILICAL WITH MESH    HYSTERECTOMY (CERVIX STATUS UNKNOWN)  2000    TOTAL KNEE ARTHROPLASTY Left 2009    TOTAL KNEE ARTHROPLASTY Right 2011        Family History   Problem Relation Age of Onset    Diabetes Father     Cancer Father         lung    COPD Father     Cancer Brother     Diabetes Mother     Heart Disease Father     Heart Disease Brother         CAD    Diabetes Brother     Cancer Mother         Social History     Socioeconomic History    Marital status: Single   Tobacco Use    Smoking status: Former     Packs/day: 1.00     Types: Cigarettes     Quit date: 1977     Years since quittin.9    Smokeless tobacco: Never    Tobacco comments:     Quit smoking: QUIT IN    Substance and Sexual Activity    Alcohol use: Not Currently    Drug use: No   Social History Narrative    PATIENT IS SINGLE. SHE WORKS FOR THE [employer], [employer].          Allergies: Codeine, Erythromycin, and Tetracycline    Previous Medications    AZELASTINE HCL 0.15 % SOLN    2 sprays by Nasal route as needed    BIOTIN 2.5 MG CAPS    Take by mouth    BUDESONIDE (PULMICORT) 0.5 MG/2ML NEBULIZER SUSPENSION    Inhale 500 mcg into the lungs 2 times daily as needed     CELECOXIB (CELEBREX) 200 MG CAPSULE    1 capsule    FORMOTEROL (PERFOROMIST) 20 MCG/2ML NEBULIZER SOLUTION    Inhale 20 mcg into the lungs 2 times daily as needed     FUROSEMIDE (LASIX) 20 MG TABLET    1 tablet    LEVOTHYROXINE (SYNTHROID) 100 MCG TABLET    Take 100 mcg by mouth daily    LISINOPRIL-HYDROCHLOROTHIAZIDE (PRINZIDE;ZESTORETIC) 10-12.5 MG PER TABLET    Take 1 tablet by mouth daily    MONTELUKAST (SINGULAIR) 10 MG TABLET    Take 10 mg by mouth daily    NALOXONE 4 MG/0.1ML LIQD NASAL SPRAY    1 spray by Nasal route as needed for Opioid Reversal    OLOPATADINE (PATANOL) 0.1 % OPHTHALMIC SOLUTION Apply 2 drops to eye as needed    OMEPRAZOLE PO    20 mg daily    POLYETHYLENE GLYCOL (GLYCOLAX) 17 GM/SCOOP POWDER    Take 17 g by mouth as needed    PREGABALIN (LYRICA) 50 MG CAPSULE    Take by mouth 2 times daily. SIMETHICONE (MYLICON) 80 MG CHEWABLE TABLET    Take 1 tablet by mouth every 6 hours as needed for Flatulence    SITAGLIPTIN (JANUVIA) 50 MG TABLET    Take 1 tablet by mouth in the morning. Vitals signs and nursing note reviewed. Patient Vitals for the past 4 hrs:   Pulse BP SpO2   12/18/22 1853 98 -- 97 %   12/18/22 1844 95 -- 97 %   12/18/22 1834 (!) 110 -- 94 %   12/18/22 1757 -- 115/60 --          Physical Exam  Vitals and nursing note reviewed. Constitutional:       General: She is not in acute distress. Appearance: Normal appearance. She is normal weight. She is not ill-appearing or toxic-appearing. Comments: Elevated BMI   HENT:      Mouth/Throat:      Mouth: Mucous membranes are moist.   Cardiovascular:      Rate and Rhythm: Normal rate. Pulmonary:      Effort: Pulmonary effort is normal.   Abdominal:      General: Abdomen is flat. Tenderness: There is no abdominal tenderness. There is no guarding or rebound. Skin:     General: Skin is warm and dry. Neurological:      General: No focal deficit present. Mental Status: She is alert and oriented to person, place, and time.    Psychiatric:         Mood and Affect: Mood normal.         Behavior: Behavior normal.        Procedures    Labs Reviewed   COMPREHENSIVE METABOLIC PANEL - Abnormal; Notable for the following components:       Result Value    Potassium 3.3 (*)     Glucose 164 (*)     All other components within normal limits   CBC WITH AUTO DIFFERENTIAL - Abnormal; Notable for the following components:    RBC 5.67 (*)     Hemoglobin 15.6 (*)     Hematocrit 48.2 (*)     Eosinophils % 0 (*)     All other components within normal limits   MAGNESIUM - Abnormal; Notable for the following components:    Magnesium 1.7 (*)     All other components within normal limits   URINALYSIS        No orders to display                      Nereyda Seats, DO  Emergency Medicine Attending Physician    Voice dictation software was used during the making of this note. This software is not perfect and grammatical and other typographical errors may be present. This note has not been completely proofread for errors.      [id number]  12/18/22 1545

## 2022-12-18 NOTE — ED NOTES
Pt requested pain medication and a muscle relaxer. Spoke with Dr. Shannan Cuevas given. Pt requesting to be admitted. MD duffy.      Calvin Mccabe RN  12/18/22 9423

## 2022-12-18 NOTE — ED TRIAGE NOTES
Pt presents via EMS from home because she feels like she cant take care of herself anymore. Pt reports hip pain and back pain that \"has left her immobile. \"

## 2022-12-18 NOTE — ED NOTES
Report given to [facility], transfer of care.      Daniela Sifuentes RN  12/18/22 1086

## 2022-12-19 NOTE — ED NOTES
I have reviewed discharge instructions with the patient. The patient verbalized understanding. Patient left ED via Discharge Method: stretcher to Home with [facility]. Opportunity for questions and clarification provided. Patient given 0 scripts. To continue your aftercare when you leave the hospital, you may receive an automated call from our care team to check in on how you are doing. This is a free service and part of our promise to provide the best care and service to meet your aftercare needs.  If you have questions, or wish to unsubscribe from this service please call [phone]. Thank you for Choosing our [facility] Emergency Department. Pt transported back to her place of residence via [facility].      Brittney Blankenship RN  12/18/22 4497

## 2022-12-19 NOTE — CARE COORDINATION
Face sheet left for CM to follow up. I called pt, introduced myself and she immediatly started telling her medical Hx. After a few minutes I inturrupted pt and wanted ot make her aware that I was calling for the ED and I am following up form her most recent visit. Pt continued to explain that it is all related, and that she has hip surgery scheduled ain Feb.  Pt states that \"my house is a mess and I can;t take care of herself\". Pt keeps talking to others while we are talking. It is very difficult to stay on topic. I mentioned HHC and she declined, that she needs to be admitted to a assisted living for a few months b/c she cannot care for herself. Pt placed me on hold again to speak to another person. When she returned I politely asked if she would like to call me back when she can to talk. She said \"yes\" I confirmed the ED # and hung up.

## 2022-12-22 DIAGNOSIS — M16.12 PRIMARY OSTEOARTHRITIS OF LEFT HIP: Primary | ICD-10-CM

## 2023-01-04 ENCOUNTER — TELEPHONE (OUTPATIENT)
Dept: ORTHOPEDIC SURGERY | Age: 78
End: 2023-01-04

## 2023-01-04 NOTE — TELEPHONE ENCOUNTER
Informed patient we have not cancelled her surgery and  can guide her with assisted living. Will call ASAP if any trouble with [facility]. Patient understands.

## 2023-01-04 NOTE — TELEPHONE ENCOUNTER
Pt wants to talk to you about her surgery  She is in a Nursing Home and still wants   The surgery  [phone]

## 2023-01-13 ENCOUNTER — TELEPHONE (OUTPATIENT)
Dept: ORTHOPEDIC SURGERY | Age: 78
End: 2023-01-13

## 2023-01-17 NOTE — TELEPHONE ENCOUNTER
Patient tested positive for covid last week and will need surgery pushed out.  Is aware she will be receiving a call about new surgery date

## 2023-01-19 ENCOUNTER — TELEPHONE (OUTPATIENT)
Dept: ORTHOPEDIC SURGERY | Age: 78
End: 2023-01-19

## 2023-01-20 ENCOUNTER — TELEPHONE (OUTPATIENT)
Dept: ORTHOPEDIC SURGERY | Age: 78
End: 2023-01-20

## 2023-01-20 NOTE — TELEPHONE ENCOUNTER
Pt has had covid , she is in a nursing home  And wants to know does she still need to go  To [facility]. Covid has finished but hosp  Said she has to wait 6 weeks before surg.   She needs to know today in time to cancel  [facility]

## 2023-01-20 NOTE — TELEPHONE ENCOUNTER
Informed patient that we have moved her sx out, she does not need to attend her TANESHA appt today & we have rescheduled that date which was confirmed with the patient.  All questions are answered

## 2023-01-26 ENCOUNTER — TELEPHONE (OUTPATIENT)
Dept: ORTHOPEDIC SURGERY | Age: 78
End: 2023-01-26

## 2023-01-26 NOTE — TELEPHONE ENCOUNTER
Left patient a VM that her appt is 2/13 at 12:00 for TANESHA.  It is also on her surgery letter mailed out &MyChart

## 2023-02-06 ENCOUNTER — HOSPITAL ENCOUNTER (EMERGENCY)
Age: 78
Discharge: HOME | End: 2023-02-06
Attending: EMERGENCY MEDICINE
Payer: MEDICARE

## 2023-02-06 ENCOUNTER — TELEPHONE (OUTPATIENT)
Dept: ORTHOPEDIC SURGERY | Age: 78
End: 2023-02-06

## 2023-02-06 ENCOUNTER — APPOINTMENT (OUTPATIENT)
Dept: GENERAL RADIOLOGY | Age: 78
End: 2023-02-06
Payer: MEDICARE

## 2023-02-06 VITALS
TEMPERATURE: 98.2 F | DIASTOLIC BLOOD PRESSURE: 73 MMHG | HEART RATE: 68 BPM | RESPIRATION RATE: 12 BRPM | OXYGEN SATURATION: 97 % | HEIGHT: 64 IN | SYSTOLIC BLOOD PRESSURE: 170 MMHG | WEIGHT: 250 LBS | BODY MASS INDEX: 42.68 KG/M2

## 2023-02-06 DIAGNOSIS — M53.3 SACROCOCCYGEAL DISORDERS, NOT ELSEWHERE CLASSIFIED: Primary | ICD-10-CM

## 2023-02-06 DIAGNOSIS — M51.36 LUMBAR DEGENERATIVE DISC DISEASE: ICD-10-CM

## 2023-02-06 DIAGNOSIS — G89.29 ACUTE EXACERBATION OF CHRONIC LOW BACK PAIN: Primary | ICD-10-CM

## 2023-02-06 DIAGNOSIS — M54.50 ACUTE EXACERBATION OF CHRONIC LOW BACK PAIN: Primary | ICD-10-CM

## 2023-02-06 PROCEDURE — 96372 THER/PROPH/DIAG INJ SC/IM: CPT

## 2023-02-06 PROCEDURE — 99284 EMERGENCY DEPT VISIT MOD MDM: CPT

## 2023-02-06 PROCEDURE — 6370000000 HC RX 637 (ALT 250 FOR IP): Performed by: EMERGENCY MEDICINE

## 2023-02-06 PROCEDURE — 72100 X-RAY EXAM L-S SPINE 2/3 VWS: CPT

## 2023-02-06 PROCEDURE — 6360000002 HC RX W HCPCS: Performed by: EMERGENCY MEDICINE

## 2023-02-06 RX ORDER — LIDOCAINE 4 G/G
1 PATCH TOPICAL
Status: COMPLETED | OUTPATIENT
Start: 2023-02-06 | End: 2023-02-06

## 2023-02-06 RX ORDER — METHOCARBAMOL 500 MG/1
500 TABLET, FILM COATED ORAL 3 TIMES DAILY
Qty: 15 TABLET | Refills: 0 | Status: SHIPPED | OUTPATIENT
Start: 2023-02-06 | End: 2023-02-11

## 2023-02-06 RX ORDER — LIDOCAINE 4 G/G
1 PATCH TOPICAL DAILY
Qty: 10 EACH | Refills: 0 | Status: SHIPPED | OUTPATIENT
Start: 2023-02-06 | End: 2023-02-16

## 2023-02-06 RX ORDER — HYDROCODONE BITARTRATE AND ACETAMINOPHEN 5; 325 MG/1; MG/1
1 TABLET ORAL
Status: COMPLETED | OUTPATIENT
Start: 2023-02-06 | End: 2023-02-06

## 2023-02-06 RX ORDER — KETOROLAC TROMETHAMINE 15 MG/ML
15 INJECTION, SOLUTION INTRAMUSCULAR; INTRAVENOUS
Status: COMPLETED | OUTPATIENT
Start: 2023-02-06 | End: 2023-02-06

## 2023-02-06 RX ADMIN — HYDROCODONE BITARTRATE AND ACETAMINOPHEN 1 TABLET: 5; 325 TABLET ORAL at 10:02

## 2023-02-06 RX ADMIN — KETOROLAC TROMETHAMINE 15 MG: 15 INJECTION, SOLUTION INTRAMUSCULAR; INTRAVENOUS at 11:41

## 2023-02-06 ASSESSMENT — PAIN DESCRIPTION - LOCATION
LOCATION: BACK
LOCATION: BACK

## 2023-02-06 ASSESSMENT — ENCOUNTER SYMPTOMS
NAUSEA: 0
VOMITING: 0
CONSTIPATION: 0
SHORTNESS OF BREATH: 0
BACK PAIN: 1
COUGH: 0
ABDOMINAL PAIN: 0

## 2023-02-06 ASSESSMENT — PAIN SCALES - GENERAL
PAINLEVEL_OUTOF10: 10
PAINLEVEL_OUTOF10: 10

## 2023-02-06 ASSESSMENT — LIFESTYLE VARIABLES
HOW MANY STANDARD DRINKS CONTAINING ALCOHOL DO YOU HAVE ON A TYPICAL DAY: PATIENT DOES NOT DRINK
HOW OFTEN DO YOU HAVE A DRINK CONTAINING ALCOHOL: NEVER

## 2023-02-06 NOTE — ED NOTES
I have reviewed discharge instructions with the patient. The patient verbalized understanding. Patient left ED via Discharge Method: stretcher to Nursing Home with [facility]. Opportunity for questions and clarification provided. Patient given 3 scripts. To continue your aftercare when you leave the hospital, you may receive an automated call from our care team to check in on how you are doing. This is a free service and part of our promise to provide the best care and service to meet your aftercare needs.  If you have questions, or wish to unsubscribe from this service please call [phone]. Thank you for Choosing our [facility] Emergency Department.       Sera Amin RN  02/06/23 8785

## 2023-02-06 NOTE — ED TRIAGE NOTES
Pt. Arrives via ems from [facility] living complaining of back pain. No fall no trauma. BP: 130/98, HR: 65, o2: 98%,  insulin dependent.

## 2023-02-06 NOTE — ED PROVIDER NOTES
Emergency Department Provider Note                   PCP:                Shira Ward MD               Age: 69 y.o. Sex: female       ICD-10-CM    1. Acute exacerbation of chronic low back pain  M54.50     G89.29       2. Lumbar degenerative disc disease  M51.36 [facility] MD, Orthopedic Surgery, [address] Jered Tim To Discharge 02/06/2023 01:30:36 PM        Medical Decision Making  15-year-old female with history of GERD, BELKIS on CPAP, chronic osteoarthritis of left hip scheduled for left total hip arthroplasty on 2/22/23, chronic lower back pain followed by pain management presents via EMS from assisted living facility with complaint of acute on chronic lower back pain. Denies any recent trauma or injury. Denies numbness, tingling, weakness, bowel or bladder incontinence, fever, chills. States she has not received home medications including lyrica, tramadol, celebrex. Hypertensive on arrival. Afebrile. Remainder of vital signs stable. States glucose was in 150s this morning. Orders placed for Lidocaine patch, Norco 5 mg po. Pt declined being taken to X-ray several times due to pain thus prolonging ED course. Toradol 15 mg IM given. X-ray L spine w/ multilevel degenerative change. No evidence of fracture or other acute abnormality in the lumbar spine. X-ray reviewed by myself. In agreement with radiologist's interpretation. Normal neuro exam. No red flag symptoms. UA was ordered but patient never provided sample. Plan for patient to be discharged home with Lidocaine patches and Robaxin. Instructed on need for close follow-up with Pain Management. Referral placed for patient to follow-up with [facility] in regards to acute on chronic lower back pain. Pt given return precautions. Pt in agreement w/ plan.      Problems Addressed:  Acute exacerbation of chronic low back pain: acute illness or injury    Amount and/or Complexity of Data Reviewed  External Data Reviewed: notes. Details: Note reviewed from visit with Dr. Salvador Steel for sacroiliac joint steroid injection on 11/08/2022. Labs: ordered. Radiology: ordered. Risk  OTC drugs. Prescription drug management. Complexity of Problem: 1 or more chronic illness with exacerbation. (4)    I have conducted an independent ordering and review of X-rays. I have reviewed records from an external source: provider visit notes from outside specialist.  Considerations: Shared decision making was utilized in the care of this patient. ED Course as of 02/06/23 2149   Mon Feb 06, 2023   1316 X-ray L spine    FINDINGS: There is multilevel degenerative change. Disc space narrowing is most  prominent at L4-5 and L5-S1. There is also extensive facet hypertrophy. There  is no evidence of fracture or subluxation. The vertebrae are well aligned. No  bony lesions are seen. IMPRESSION:  Multilevel degenerative change. No evidence of fracture or other acute abnormality in the lumbar spine. [DF]      ED Course User Index  [DF] Parth Singh MD        Orders Placed This Encounter   Procedures    XR LUMBAR SPINE (2-3 VIEWS)    Urinalysis w rflx microscopic    Hetal Hayes MD, Orthopedic Surgery, [address]        Medications   lidocaine 4 % external patch 1 patch (1 patch TransDERmal Patch Applied 2/6/23 1002)   HYDROcodone-acetaminophen (NORCO) 5-325 MG per tablet 1 tablet (1 tablet Oral Given 2/6/23 1002)   ketorolac (TORADOL) injection 15 mg (15 mg IntraMUSCular Given 2/6/23 1141)       New Prescriptions    LIDOCAINE 4 % EXTERNAL PATCH    Place 1 patch onto the skin daily for 10 days    METHOCARBAMOL (ROBAXIN) 500 MG TABLET    Take 1 tablet by mouth 3 times daily for 5 days        Macie Mccullough is a 69 y.o. female who presents to the Emergency Department with chief complaint of lower back pain.     Chief Complaint   Patient presents with    Back Pain 18-year-old female with history of GERD, BELKIS on CPAP, chronic osteoarthritis of left hip scheduled for left total hip arthroplasty on 2/22/23, chronic lower back pain followed by pain management presents via EMS from assisted living facility with complaint of acute on chronic lower back pain. Denies any recent trauma or injury. Denies numbness, tingling, weakness, bowel or bladder incontinence. Denies fever, chills, nausea, vomiting abdominal pain, chest pain, shortness of breath. Patient has mild to moderate. Denies any alleviating factors. States that pain is exacerbated with certain movements. Denies radiation of pain. Patient states she has not received her Lyrica or tramadol this morning. The history is provided by the patient. No  was used. Review of Systems   Constitutional:  Negative for chills, fatigue and fever. Respiratory:  Negative for cough and shortness of breath. Cardiovascular:  Negative for chest pain. Gastrointestinal:  Negative for abdominal pain, constipation, nausea and vomiting. Genitourinary:  Negative for dysuria, flank pain and hematuria. Musculoskeletal:  Positive for back pain. Negative for gait problem, joint swelling, neck pain and neck stiffness. Skin:  Negative for rash and wound. Neurological:  Negative for weakness, light-headedness, numbness and headaches. Hematological:  Does not bruise/bleed easily.      Past Medical History:   Diagnosis Date    Acute UTI 3/20/2021    Arthritis     Asthma     bronchio-asthma mostly seasonal    Chronic obstructive asthma, unspecified     Chronic pain     DDD, LOW BACK, SIATICA    DDD (degenerative disc disease)     WITH RADICULOPATHY    E-coli UTI 3/23/2021    Elevated serum creatinine 9/19/2017    Elevated WBC count 9/19/2017    Functional assessment declined 7/16/2022    Functional quadriplegia ([facility] [address] 7/13/2022    GERD (gastroesophageal reflux disease)     Hypertension     Hypothyroidism Incontinence of urine     Morbid obesity (HCC)     Neuropathy     BELKIS (obstructive sleep apnea) 2009    Osteoarthritis of left knee 2009    Osteoarthritis of right knee 3/2/2011    S/P total knee replacement using cement 2009    Sleep apnea     HAS CPAP    Spastic colon     Status post right hip replacement 10/9/2017    Thyroid disease     ON MEDS    Type II or unspecified type diabetes mellitus without mention of complication, not stated as uncontrolled     avg         Past Surgical History:   Procedure Laterality Date    HAND/FINGER SURGERY UNLISTED Right 'S    HERNIA REPAIR  84    UMBILICAL WITH MESH    HYSTERECTOMY (CERVIX STATUS UNKNOWN)  2000    TOTAL KNEE ARTHROPLASTY Left 2009    TOTAL KNEE ARTHROPLASTY Right         Family History   Problem Relation Age of Onset    Diabetes Father     Cancer Father         lung    COPD Father     Cancer Brother     Diabetes Mother     Heart Disease Father     Heart Disease Brother         CAD    Diabetes Brother     Cancer Mother         Social History     Socioeconomic History    Marital status: Single   Tobacco Use    Smoking status: Former     Packs/day: 1.00     Types: Cigarettes     Quit date: 1977     Years since quittin.1    Smokeless tobacco: Never    Tobacco comments:     Quit smoking: QUIT IN    Substance and Sexual Activity    Alcohol use: Not Currently    Drug use: No   Social History Narrative    PATIENT IS SINGLE. SHE WORKS FOR THE [employer], [employer] .           Codeine, Erythromycin, and Tetracycline     Previous Medications    AZELASTINE HCL 0.15 % SOLN    2 sprays by Nasal route as needed    BIOTIN 2.5 MG CAPS    Take by mouth    BUDESONIDE (PULMICORT) 0.5 MG/2ML NEBULIZER SUSPENSION    Inhale 500 mcg into the lungs 2 times daily as needed     CELECOXIB (CELEBREX) 200 MG CAPSULE    1 capsule    FORMOTEROL (PERFOROMIST) 20 MCG/2ML NEBULIZER SOLUTION    Inhale 20 mcg into the lungs 2 times daily as needed FUROSEMIDE (LASIX) 20 MG TABLET    1 tablet    LEVOTHYROXINE (SYNTHROID) 100 MCG TABLET    Take 100 mcg by mouth daily    LISINOPRIL-HYDROCHLOROTHIAZIDE (PRINZIDE;ZESTORETIC) 10-12.5 MG PER TABLET    Take 1 tablet by mouth daily    MONTELUKAST (SINGULAIR) 10 MG TABLET    Take 10 mg by mouth daily    NALOXONE 4 MG/0.1ML LIQD NASAL SPRAY    1 spray by Nasal route as needed for Opioid Reversal    OLOPATADINE (PATANOL) 0.1 % OPHTHALMIC SOLUTION    Apply 2 drops to eye as needed    OMEPRAZOLE PO    20 mg daily    POLYETHYLENE GLYCOL (GLYCOLAX) 17 GM/SCOOP POWDER    Take 17 g by mouth as needed    PREGABALIN (LYRICA) 50 MG CAPSULE    Take by mouth 2 times daily. SIMETHICONE (MYLICON) 80 MG CHEWABLE TABLET    Take 1 tablet by mouth every 6 hours as needed for Flatulence    SITAGLIPTIN (JANUVIA) 50 MG TABLET    Take 1 tablet by mouth in the morning. Vitals signs and nursing note reviewed. No data found. Physical Exam  Vitals and nursing note reviewed. Constitutional:       Appearance: Normal appearance. She is obese. HENT:      Head: Normocephalic. Eyes:      Extraocular Movements: Extraocular movements intact. Pupils: Pupils are equal, round, and reactive to light. Cardiovascular:      Rate and Rhythm: Normal rate. Pulses: Normal pulses. Pulmonary:      Effort: Pulmonary effort is normal.      Breath sounds: Normal breath sounds. Abdominal:      Palpations: Abdomen is soft. Tenderness: There is no abdominal tenderness. There is no guarding or rebound. Musculoskeletal:         General: No deformity. Normal range of motion. Cervical back: No rigidity. Comments: No midline T-spine or L-spine tenderness to palpation. No step-off. Full range of motion of bilateral lower extremity. No calf tenderness. Skin:     General: Skin is warm. Findings: No rash. Neurological:      General: No focal deficit present.       Mental Status: She is alert and oriented to person, place, and time. Cranial Nerves: No cranial nerve deficit. Sensory: No sensory deficit. Motor: No weakness. Comments: Strength 5 out of 5 throughout. Normal sensory exam.  No saddle anesthesia. Normal DTRs. Psychiatric:         Mood and Affect: Mood normal.         Behavior: Behavior normal.        Procedures    Results for orders placed or performed during the hospital encounter of 02/06/23   XR LUMBAR SPINE (2-3 VIEWS)    Narrative    Lumbar Spine    INDICATION:  Back pain     AP and lateral views of the lumbar spine were obtained    FINDINGS: There is multilevel degenerative change. Disc space narrowing is most  prominent at L4-5 and L5-S1. There is also extensive facet hypertrophy. There  is no evidence of fracture or subluxation. The vertebrae are well aligned. No  bony lesions are seen. Impression    Multilevel degenerative change. No evidence of fracture or other  acute abnormality in the lumbar spine. XR LUMBAR SPINE (2-3 VIEWS)   Final Result   Multilevel degenerative change. No evidence of fracture or other   acute abnormality in the lumbar spine. Voice dictation software was used during the making of this note. This software is not perfect and grammatical and other typographical errors may be present. This note has not been completely proofread for errors.      Parth Morris MD  02/06/23 9479

## 2023-02-06 NOTE — DISCHARGE INSTRUCTIONS
Continue home pain medications. Use lidocaine patches as directed. Schedule close follow-up with PCP, spine, pain management. Return to ED if symptoms worsen or progress in any way.

## 2023-02-07 NOTE — TELEPHONE ENCOUNTER
Returned call to patient and she is having surgery for a hip replacement on 02/22/2023 with Dr. Cesar Rubi. I will send a message to his team in regards to making sure she will be okay to be scheduled for a injection with Dr. Violtea Cid.

## 2023-02-08 NOTE — TELEPHONE ENCOUNTER
Spoke with patient after speaking with Dr. Jhon Alonzo MA and patient has been scheduled for a injection.

## 2023-02-09 ENCOUNTER — TELEPHONE (OUTPATIENT)
Dept: ORTHOPEDIC SURGERY | Age: 78
End: 2023-02-09

## 2023-02-09 ENCOUNTER — OFFICE VISIT (OUTPATIENT)
Dept: ORTHOPEDIC SURGERY | Age: 78
End: 2023-02-09

## 2023-02-09 DIAGNOSIS — M53.3 SACROCOCCYGEAL DISORDERS, NOT ELSEWHERE CLASSIFIED: Primary | ICD-10-CM

## 2023-02-09 RX ORDER — TIZANIDINE 4 MG/1
TABLET ORAL
Qty: 45 TABLET | Refills: 0 | Status: CANCELLED | OUTPATIENT
Start: 2023-02-09

## 2023-02-09 RX ORDER — TRIAMCINOLONE ACETONIDE 40 MG/ML
40 INJECTION, SUSPENSION INTRA-ARTICULAR; INTRAMUSCULAR ONCE
Status: COMPLETED | OUTPATIENT
Start: 2023-02-09 | End: 2023-02-09

## 2023-02-09 RX ADMIN — TRIAMCINOLONE ACETONIDE 40 MG: 40 INJECTION, SUSPENSION INTRA-ARTICULAR; INTRAMUSCULAR at 11:28

## 2023-02-09 NOTE — PROGRESS NOTES
Sacroiliac Joint Injection Procedure Note    Procedure: Bilateral sacroiliac joint steroid injection    Precautions: Jann Tidwell denies prior sensitivity to steroid, local anesthetic, iodine, or shellfish. Consent:  Consent was obtained prior to the procedure. The procedure was discussed at length with Jann Tidwell. She was given the opportunity to ask questions regarding the procedure and its associated risks. In addition to the potential risks associated with the procedure itself, the patient was informed both verbally and in writing of potential side effects of the use glucocorticoids. The patient appeared to comprehend the informed consent and desired to have the procedure performed, and informed consent was signed. She was placed in a prone position on the fluoroscopy table and the skin was prepped and draped in a routine sterile fashion. The areas to be injected were each anesthetized with 1 ml of 1% Lidocaine. A 22 gauge 3.5 inch spinal needle was carefully advanced under fluoroscopic guidance to the left sacroiliac joint space  0.5 ml of 70% of Omnipaque was injected to confirm proper needle placement and absence of subdural or vascular flow Once proper placement was confirmed, 2 ml of 0.25 marcaine and 40 mg of kenalog were injected through the spinal needle. The above procedure was then repeated at the right sacroiliac joint space. Fluoroscopic guidance was used intermittently over a 10-minute period to insure proper needle placement and her safety. A hard copy of the fluoroscopic image has been placed in her chart and is saved on the C-arm hard drive. She was monitored for 30 minutes after the procedure and discharged home in a stable fashion with a routine follow up. Procedural Diagnosis:     ICD-10-CM    1.  Sacrococcygeal disorders, not elsewhere classified  M53.3 XR INJECTION SI JT W WO ARTHROGRAM     triamcinolone acetonide (KENALOG-40) injection 40 mg         SAURABH MELENDEZ, MD  02/09/23

## 2023-02-09 NOTE — TELEPHONE ENCOUNTER
Pt wants to monika inj before or after she see [facility] with DR. Grant Plan  pt would like for you to call her so she can monika her transportation

## 2023-02-10 ENCOUNTER — TELEPHONE (OUTPATIENT)
Dept: ORTHOPEDIC SURGERY | Age: 78
End: 2023-02-10

## 2023-02-10 DIAGNOSIS — M48.061 SPINAL STENOSIS OF LUMBAR REGION, UNSPECIFIED WHETHER NEUROGENIC CLAUDICATION PRESENT: Primary | ICD-10-CM

## 2023-02-10 RX ORDER — SODIUM CHLORIDE 0.9 % (FLUSH) 0.9 %
5-40 SYRINGE (ML) INJECTION EVERY 12 HOURS SCHEDULED
OUTPATIENT
Start: 2023-02-10

## 2023-02-10 RX ORDER — ACETAMINOPHEN 500 MG
1000 TABLET ORAL ONCE
OUTPATIENT
Start: 2023-02-10 | End: 2023-02-10

## 2023-02-10 RX ORDER — SODIUM CHLORIDE 0.9 % (FLUSH) 0.9 %
5-40 SYRINGE (ML) INJECTION PRN
OUTPATIENT
Start: 2023-02-10

## 2023-02-10 RX ORDER — SODIUM CHLORIDE 9 MG/ML
INJECTION, SOLUTION INTRAVENOUS PRN
OUTPATIENT
Start: 2023-02-10

## 2023-02-10 NOTE — TELEPHONE ENCOUNTER
Reynaldo Trivedi from [facility] called stating that the patient informed her that a medication was supposed to be sent to the facility, but they have not received anything. She did not specify what medication.  She is requesting a call back at [phone]

## 2023-02-10 NOTE — TELEPHONE ENCOUNTER
Script can be escribed through [facility] in [place]. Script will be sent to Dr. Amada Coleman to review.

## 2023-02-13 ENCOUNTER — HOSPITAL ENCOUNTER (OUTPATIENT)
Dept: REHABILITATION | Age: 78
Discharge: HOME | End: 2023-02-16
Payer: MEDICARE

## 2023-02-13 ENCOUNTER — TELEPHONE (OUTPATIENT)
Dept: ORTHOPEDIC SURGERY | Age: 78
End: 2023-02-13

## 2023-02-13 ENCOUNTER — HOSPITAL ENCOUNTER (OUTPATIENT)
Dept: SURGERY | Age: 78
Discharge: HOME | End: 2023-02-16
Payer: MEDICARE

## 2023-02-13 DIAGNOSIS — Z91.14 NONCOMPLIANCE WITH CPAP TREATMENT: ICD-10-CM

## 2023-02-13 DIAGNOSIS — M16.12 PRIMARY OSTEOARTHRITIS OF LEFT HIP: Primary | ICD-10-CM

## 2023-02-13 LAB
ANION GAP SERPL CALC-SCNC: 5 MMOL/L (ref 2–11)
APTT PPP: 29.1 SEC (ref 24.5–34.2)
BACTERIA SPEC CULT: NORMAL
BASOPHILS # BLD: 0 K/UL (ref 0–0.2)
BASOPHILS NFR BLD: 0 % (ref 0–2)
BUN SERPL-MCNC: 32 MG/DL (ref 8–23)
CALCIUM SERPL-MCNC: 9.5 MG/DL (ref 8.3–10.4)
CHLORIDE SERPL-SCNC: 102 MMOL/L (ref 101–110)
CO2 SERPL-SCNC: 31 MMOL/L (ref 21–32)
CREAT SERPL-MCNC: 1.16 MG/DL (ref 0.6–1)
DIFFERENTIAL METHOD BLD: ABNORMAL
EKG ATRIAL RATE: 64 BPM
EKG DIAGNOSIS: NORMAL
EKG P AXIS: 57 DEGREES
EKG P-R INTERVAL: 186 MS
EKG Q-T INTERVAL: 384 MS
EKG QRS DURATION: 76 MS
EKG QTC CALCULATION (BAZETT): 396 MS
EKG R AXIS: 45 DEGREES
EKG T AXIS: 20 DEGREES
EKG VENTRICULAR RATE: 64 BPM
EOSINOPHIL # BLD: 0.1 K/UL (ref 0–0.8)
EOSINOPHIL NFR BLD: 1 % (ref 0.5–7.8)
ERYTHROCYTE [DISTWIDTH] IN BLOOD BY AUTOMATED COUNT: 15.5 % (ref 11.9–14.6)
EST. AVERAGE GLUCOSE BLD GHB EST-MCNC: 194 MG/DL
GLUCOSE SERPL-MCNC: 132 MG/DL (ref 65–100)
HBA1C MFR BLD: 8.4 % (ref 4.8–5.6)
HCT VFR BLD AUTO: 40.9 % (ref 35.8–46.3)
HGB BLD-MCNC: 13.1 G/DL (ref 11.7–15.4)
IMM GRANULOCYTES # BLD AUTO: 0 K/UL (ref 0–0.5)
IMM GRANULOCYTES NFR BLD AUTO: 0 % (ref 0–5)
INR PPP: 1
LYMPHOCYTES # BLD: 2.2 K/UL (ref 0.5–4.6)
LYMPHOCYTES NFR BLD: 18 % (ref 13–44)
MCH RBC QN AUTO: 27.6 PG (ref 26.1–32.9)
MCHC RBC AUTO-ENTMCNC: 32 G/DL (ref 31.4–35)
MCV RBC AUTO: 86.1 FL (ref 82–102)
MONOCYTES # BLD: 0.7 K/UL (ref 0.1–1.3)
MONOCYTES NFR BLD: 6 % (ref 4–12)
NEUTS SEG # BLD: 8.9 K/UL (ref 1.7–8.2)
NEUTS SEG NFR BLD: 75 % (ref 43–78)
NRBC # BLD: 0 K/UL (ref 0–0.2)
PLATELET # BLD AUTO: 243 K/UL (ref 150–450)
PMV BLD AUTO: 11.2 FL (ref 9.4–12.3)
POTASSIUM SERPL-SCNC: 4.1 MMOL/L (ref 3.5–5.1)
PROTHROMBIN TIME: 13.1 SEC (ref 12.6–14.3)
RBC # BLD AUTO: 4.75 M/UL (ref 4.05–5.2)
SERVICE CMNT-IMP: NORMAL
SODIUM SERPL-SCNC: 138 MMOL/L (ref 133–143)
WBC # BLD AUTO: 12 K/UL (ref 4.3–11.1)

## 2023-02-13 PROCEDURE — 85025 COMPLETE CBC W/AUTO DIFF WBC: CPT

## 2023-02-13 PROCEDURE — 83036 HEMOGLOBIN GLYCOSYLATED A1C: CPT

## 2023-02-13 PROCEDURE — 80048 BASIC METABOLIC PNL TOTAL CA: CPT

## 2023-02-13 PROCEDURE — 85730 THROMBOPLASTIN TIME PARTIAL: CPT

## 2023-02-13 PROCEDURE — 97161 PT EVAL LOW COMPLEX 20 MIN: CPT

## 2023-02-13 PROCEDURE — 94760 N-INVAS EAR/PLS OXIMETRY 1: CPT

## 2023-02-13 PROCEDURE — 85610 PROTHROMBIN TIME: CPT

## 2023-02-13 PROCEDURE — 87641 MR-STAPH DNA AMP PROBE: CPT

## 2023-02-13 RX ORDER — INSULIN GLARGINE 100 [IU]/ML
18 INJECTION, SOLUTION SUBCUTANEOUS DAILY
COMMUNITY

## 2023-02-13 RX ORDER — ACETAMINOPHEN 500 MG
500 TABLET ORAL 3 TIMES DAILY PRN
COMMUNITY

## 2023-02-13 RX ORDER — SACCHAROMYCES BOULARDII 250 MG
250 CAPSULE ORAL 2 TIMES DAILY
COMMUNITY

## 2023-02-13 RX ORDER — TRAMADOL HYDROCHLORIDE 50 MG/1
50 TABLET ORAL EVERY 12 HOURS PRN
COMMUNITY

## 2023-02-13 RX ORDER — FLUTICASONE PROPIONATE AND SALMETEROL 100; 50 UG/1; UG/1
1 POWDER RESPIRATORY (INHALATION) EVERY 12 HOURS
COMMUNITY

## 2023-02-13 RX ORDER — ZOLPIDEM TARTRATE 5 MG/1
5 TABLET ORAL NIGHTLY
COMMUNITY

## 2023-02-13 RX ORDER — CETIRIZINE HYDROCHLORIDE 10 MG/1
10 TABLET ORAL DAILY
COMMUNITY

## 2023-02-13 RX ORDER — TIZANIDINE 4 MG/1
TABLET ORAL
Qty: 45 TABLET | Refills: 1 | Status: SHIPPED | OUTPATIENT
Start: 2023-02-13

## 2023-02-13 RX ORDER — SENNA AND DOCUSATE SODIUM 50; 8.6 MG/1; MG/1
1 TABLET, FILM COATED ORAL AS NEEDED
COMMUNITY

## 2023-02-13 ASSESSMENT — HOOS JR
HOOS JR RAW SCORE: 15
SITTING: 1
HOOS JR RAW SCORE: 15
RISING FROM SITTING: 2
HOOS JR RAW SCORE: 15
GOING UP OR DOWN STAIRS: 3
HOOS JR RAW SCORE: 15
SITTING: 1
LYING IN BED (TURNING OVER, MAINTAINING HIP POSITION): 3
LYING IN BED (TURNING OVER, MAINTAINING HIP POSITION): 3
HOOS JR TOTAL INTERVAL SCORE: 43.335
BENDING TO THE FLOOR TO PICK UP OBJECT: 3
WALKING ON UNEVEN SURFACE: 3
HOOS JR RAW SCORE: 15
HOOS JR TOTAL INTERVAL SCORE: 43.335
BENDING TO THE FLOOR TO PICK UP OBJECT: 3
GOING UP OR DOWN STAIRS: 3
RISING FROM SITTING: 2
RISING FROM SITTING: 2
HOOS JR TOTAL INTERVAL SCORE: 43.335
HOOS JR RAW SCORE: 15
BENDING TO THE FLOOR TO PICK UP OBJECT: 3
GOING UP OR DOWN STAIRS: 3
SITTING: 1
LYING IN BED (TURNING OVER, MAINTAINING HIP POSITION): 3

## 2023-02-13 ASSESSMENT — PAIN DESCRIPTION - LOCATION: LOCATION: HIP

## 2023-02-13 ASSESSMENT — PAIN SCALES - GENERAL: PAINLEVEL_OUTOF10: 7

## 2023-02-13 ASSESSMENT — PAIN DESCRIPTION - ORIENTATION: ORIENTATION: LEFT

## 2023-02-13 ASSESSMENT — PULMONARY FUNCTION TESTS
FEV1 (LITERS): 1.51
FEV1 (%PREDICTED): 76

## 2023-02-13 NOTE — PROGRESS NOTES
Latest Reference Range & Units 2/13/23 12:12   Sodium 133 - 143 mmol/L 138   Potassium 3.5 - 5.1 mmol/L 4.1   Chloride 101 - 110 mmol/L 102   CO2 21 - 32 mmol/L 31   BUN,BUNPL 8 - 23 MG/DL 32 (H)   Creatinine 0.6 - 1.0 MG/DL 1.16 (H)   Anion Gap 2 - 11 mmol/L 5   Est, Glom Filt Rate >60 ml/min/1.73m2 49 (L)   Glucose, Random 65 - 100 mg/dL 132 (H)   CALCIUM, SERUM, 268551 8.3 - 10.4 MG/DL 9.5   Hemoglobin A1C 4.8 - 5.6 % 8.4 (H)   eAG (mg/dL) mg/dL 194   WBC 4.3 - 11.1 K/uL 12.0 (H)   RBC 4.05 - 5.2 M/uL 4.75   Hemoglobin Quant 11.7 - 15.4 g/dL 13.1   Hematocrit 35.8 - 46.3 % 40.9   MCV 82.0 - 102.0 FL 86.1   MCH 26.1 - 32.9 PG 27.6   MCHC 31.4 - 35.0 g/dL 32.0   MPV 9.4 - 12.3 FL 11.2   RDW 11.9 - 14.6 % 15.5 (H)   Platelet Count 690 - 450 K/uL 243   Absolute Mono # 0.1 - 1.3 K/UL 0.7   Eosinophils % 0.5 - 7.8 % 1   Basophils Absolute 0.0 - 0.2 K/UL 0.0   Differential Type -   AUTOMATED   Seg Neutrophils 43 - 78 % 75   Segs Absolute 1.7 - 8.2 K/UL 8.9 (H)   Lymphocytes 13 - 44 % 18   Absolute Lymph # 0.5 - 4.6 K/UL 2.2   Monocytes 4.0 - 12.0 % 6   Absolute Eos # 0.0 - 0.8 K/UL 0.1   Basophils 0.0 - 2.0 % 0   Immature Granulocytes 0.0 - 5.0 % 0   Nucleated Red Blood Cells 0.0 - 0.2 K/uL 0.00   Absolute Immature Granulocyte 0.0 - 0.5 K/UL 0.0   Prothrombin Time 12.6 - 14.3 sec 13.1   INR -   1.0   PTT 24.5 - 34.2 SEC 29.1   (H): Data is abnormally high  (L): Data is abnormally low

## 2023-02-13 NOTE — PROGRESS NOTES
PLEASE CONTINUE TAKING ALL PRESCRIPTION MEDICATIONS UP TO THE DAY OF SURGERY UNLESS OTHERWISE DIRECTED BELOW. DISCONTINUE all vitamins, herbals and supplements 21 days prior to surgery. DISCONTINUE Non-Steriodal Anti-Inflammatory (NSAIDS) such as Advil, Ibuprofen, Motrin, Naproxen and Aleve 5 days prior to surgery. Home Medications to take  the day of surgery    Cetirizine          Pregabalin       Tramadol, if needed   Levothyroxine        Saccharomycin   Glargine insulin 14 units on day of surgery   Omeprazole      Wixela inhub inhaler (use/bring)     Home Medications   to Hold   Avoid stool softeners for 24 hours prior to surgery        Comments   BRING inhalers, eye drops, nasal spray, CPAP    Day before surgery:  take 2 Extra Strength Tylenol in the morning and before bed. You may substitute for Tylenol 650 mg.    Bring Dynahex wash and Incentive Spirometer with you to hospital on the day of surgery. Please do not bring home medications with you on the day of surgery unless otherwise directed by your nurse. If you are instructed to bring home medications, please give them to your nurse as they will be administered by the nursing staff. If you have any questions, please call Karlo Crowley [phone]. A copy of this note was provided to the patient for reference.

## 2023-02-13 NOTE — TELEPHONE ENCOUNTER
The patient is living in a facility and was prescribed Zanaflex today but they can't have a range. It's written 1/2 to one and they need to know one or the other.  Please call Sarah Delgado

## 2023-02-13 NOTE — PROGRESS NOTES
Patient verified name and . Order for consent  found in EHR and matches case posting; patient verified. Type 3 surgery, joint camp assessment complete. Labs per surgeon: CBC,BMP, PT/PTT, Hgb A1c ; results WBC 12.0; Hgb A1c: 8.4~Eileen Cruz NP made aware; other results are within anesthesia guidelines; labs routed via fax to surgeon, Dr Herbert Jerez for review. Labs per anesthesia protocol: no additional  EKG:completed today per protocol and within anesthesia guidelines. MRSA/MSSA swab collected; pharmacy to review and dose antibiotic as appropriate. Hospital approved surgical skin cleanser and instructions to return bottle on DOS given per hospital policy. Patient provided with handouts including Guide to Surgery, Pain Management, Hand Hygiene, Blood Transfusion Education, and [facility] Brochure. Patient answered medical/surgical history questions at their best of ability. All prior to admission medications documented in [facility]. Original medication prescription bottle not visualized during patient appointment. Patient instructed to hold all vitamins 3 weeks prior to surgery and NSAIDS 5 days prior to surgery. Patient teach back successful and patient demonstrates knowledge of instruction.

## 2023-02-13 NOTE — TELEPHONE ENCOUNTER
Returned call to Paul Perry after speaking with Dr. Radha Bundy and the Zanaflex can be given 1/2 tablet by mouth tid, prn. She stated to understand.

## 2023-02-13 NOTE — PROGRESS NOTES
Luna Valle  : 1945  Primary: Medicare Part A And B  Secondary: [address] at [facility]  [phone]        Physical Therapy Prehab Evaluation Summary:2023   Time In/Out   PT Charge Capture  Episode     MEDICAL/REFERRING DIAGNOSIS: Unilateral primary osteoarthritis, left hip [M16.12]  REFERRING PHYSICIAN: Maryanne Pugh., *    ICD-10: Treatment Diagnosis:   Pain in left hip (M25.552)  Stiffness of Left Hip, Not elsewhere classified (M25.652)    DATE OF SURGERY: 23  Assessment:   COMMENTS:  Scheduled for L ISIAH. Reviewed hip precautions. Has had B TKA and R ISIAH. Arrived to department via [address]. Uses W/C for longer distances and RW for short distance ambulation. W/C belongs to [facility]. She states she is temporarily living in [facility] due to severe hip and back pain but she plans to return to her home after she recovers. She states she has severe LBP and has been getting injections. She is requesting rehab and has already contacted the [facility]. PROBLEM LIST:   (Impacting functional limitations):  Ms. Brayan Rogers presents with the following lower extremity(s) problems:  Transfers  Gait  Strength  Range of Motion  Home Exercise Program  Precautions  Pain INTERVENTIONS PLANNED:   (Benefits and precautions of physical therapy have been discussed with the patient.)  Home Exercise Program  Educational Discussion       GOALS: (Goals have been discussed and agreed upon with patient.)  Discharge Goals: Time Frame: 1 Day  Patient will demonstrate independence with a home exercise program designed to increase functional technique and pain control to minimize functional deficits and optimize patient for total joint replacement.     Subjective:   Past Medical History/Comorbidities:   Ms. Brayan Rogers  has a past medical history of Acute UTI, Alternating constipation and diarrhea, Arthritis, Asthma, Chronic obstructive asthma, unspecified, Chronic pain, COVID-19, DDD (degenerative disc disease), Disorder of sacrococcygeal spine, E-coli UTI, Elevated serum creatinine, Elevated WBC count, Functional assessment declined, Functional quadriplegia (HCC), Gas bloat syndrome, GERD (gastroesophageal reflux disease), Hyperlipidemia, Hypertension, Hypothyroidism, Incontinence of urine, Insomnia, Iron deficiency anemia, Morbid obesity ([address] Multiple environmental allergies, Murmur, cardiac, Neuropathy, BELKIS (obstructive sleep apnea), Osteoarthritis of left knee, Osteoarthritis of right knee, Prolonged emergence from general anesthesia, S/P total knee replacement using cement, Sleep apnea, Spastic colon, Spinal stenosis of lumbar region, Status post right hip replacement, Thyroid disease, and Type II or unspecified type diabetes mellitus without mention of complication, not stated as uncontrolled. Ms. Brayan Rogers  has a past surgical history that includes total knee arthroplasty (Left, 2009); Total knee arthroplasty (Right, 2011); hand/finger surgery unlisted (Right, 1990'S); Hysterectomy (2000); hernia repair (1/30/08); Total hip arthroplasty (Right); and Dilation and curettage of uterus.     Allergies:  Codeine, Erythromycin, and Tetracycline    Current Medications:  Refer to pre-assessment nursing note    Home Set-Up/Prior Level of Function:  Lives With:  (JORGE LUIS)  Type of Home: Assisted living  Home Layout: One level  Home Access: Level entry  Bathroom Shower/Tub: Tub/Shower unit  Home Equipment: Octavio Bio, rolling  ADL Assistance: Independent  Ambulation Assistance: Independent    Dominant Side:  Dominant Hand: : Right    Current Functional Status:   Ambulation:  [] Independent  [] Walk Indoors Only  [] Walk Outdoors  [x] Use Assistive Device  [] Use Wheelchair Only Dressing:  [x] Independent  Requires Assistance from Someone for:  [] Sock/Shoes  [] Pants  [] Everything   Bathing/Showering:   [x] Independent  [] Requires Assistance from Someone  [] Sponge Bath Only Household Activities:  [] Routine house and yard work  [x] Light Housework Only  [] None     Objective:   PAIN:   Pre-Treatment Pain  Pain Assessment: 0-10  Pain Level: 7  Pain Location: Hip  Pain Orientation: Left    Post Treatment: 7    Outcome Measure:   HOOS-JR:  Total Raw Score (0-24 Scale): 15    KOOS-JR:       LOWER EXTREMITY GROSS EVALUATION:  RIGHT LE   Within Functional Limits   Abnormal   Comments   Strength [] []  Generally decreased   Range of Motion [x] []        LEFT LE Within Functional Limits   Abnormal   Comments   Strength [] [] Strength LLE  L Hip Flexion: 2+/5   Range of Motion [] [] AROM LLE (degrees)  L Hip Flexion (0-125): 90  L Hip ABduction (0-45): 10     UPPER EXTREMITY GROSS EVALUATION:     Within Functional Limits   Abnormal   Comments   Strength [] []    Range of Motion [] []      SENSATION  Sensation [x]       COGNITION/  PERCEPTION: Intact Impaired (Comments):   Orientation [x]     Vision [x]     Hearing [x]     Cognition  [x]       TRANSFERS: I Mod I S SBA CGA Min Mod Max Total  NT x2 Comments:   Sit to Stand [] [x] [] [] [] [] [] [] [] [] []    Stand to Sit [] [x] [] [] [] [] [] [] [] [] []    Stand pivot [] [x] [] [] [] [] [] [] [] [] []     [] [] [] [] [] [] [] [] [] [] []    I=Independent, Mod I=Modified Independent, S=Supervision, SBA=Standby Assistance, CGA=Contact Guard Assistance,   Min=Minimal Assistance, Mod=Moderate Assistance, Max=Maximal Assistance, Total=Total Assistance, NT=Not Tested    BALANCE: Good Fair+ Fair Fair- Poor NT Comments   Sitting Static [x] [] [] [] [] []    Sitting Dynamic [x] [] [] [] [] []              Standing Static [] [x] [] [] [] []    Standing Dynamic [] [] [x] [x] [] []      Postural Assessment:    Forward Head  Rounded Shoulders    GAIT: I Mod I S SBA CGA Min Mod Max Total  NT x2 Comments:   Level of Assistance [] [x] [] [] [] [] [] [] [] [] []    Weightbearing Status       Distance  150 feet    Gait Quality Antalgic, Decreased gabriela , Decreased step clearance, Decreased step length, and Decreased stance    DME Rolling Walker     Stairs      Ramp     I=Independent, Mod I=Modified Independent, S=Supervision, SBA=Standby Assistance, CGA=Contact Guard Assistance,   Min=Minimal Assistance, Mod=Moderate Assistance, Max=Maximal Assistance, Total=Total Assistance, NT=Not Tested    TREATMENT:   EVALUATION: LOW COMPLEXITY: (Untimed Charge)    TREATMENT PLAN:   Effective Dates: 2/13/2023 TO 2/13/2023. Treatment/Session Assessment:  Patient was instructed in PT- HEP to increase strength and ROM in LEs. Answered all questions. Frequency/Duration: Patient to continue to perform home exercise program at least twice per day up until her surgery. EDUCATION: Education Given To: Patient  Education Provided: Role of Therapy, Plan of Care, Home Exercise Program, Precautions, Equipment  Education Method: Demonstration, Verbal, Printed Information/Hand-outs  Education Outcome: Verbalized understanding, Demonstrated understanding    MEDICAL NECESSITY: Ms. Kiki Zambrano is expected to optimize herlower extremity strength and ROM in preparation for joint replacement surgery. REASON FOR CONTINUED SERVICES: Achieve baseline assesment of musculoskeletal system, functional mobility and home environment. , educate in PT HEP in preparation for surgery, educate in hospital plan of care. COMPLIANCE WITH PROGRAM/EXERCISE: Will assess as treatment progresses. TOTAL TREATMENT DURATION:  Time In: 1345  Time Out: 1415  Minutes: 30    Regarding Eriberto Valle's therapy, I certify that the treatment plan above will be carried out by a therapist or under their direction.   Thank you for this referral,  Lorenzo Eng, PT

## 2023-02-14 ENCOUNTER — OFFICE VISIT (OUTPATIENT)
Dept: ORTHOPEDIC SURGERY | Age: 78
End: 2023-02-14

## 2023-02-14 VITALS
SYSTOLIC BLOOD PRESSURE: 151 MMHG | WEIGHT: 257.5 LBS | RESPIRATION RATE: 16 BRPM | HEART RATE: 68 BPM | HEIGHT: 64 IN | BODY MASS INDEX: 43.96 KG/M2 | DIASTOLIC BLOOD PRESSURE: 75 MMHG | OXYGEN SATURATION: 97 % | TEMPERATURE: 97.2 F

## 2023-02-14 DIAGNOSIS — Z01.818 ENCOUNTER FOR PRE-OPERATIVE EXAMINATION: ICD-10-CM

## 2023-02-14 DIAGNOSIS — M16.12 PRIMARY OSTEOARTHRITIS OF LEFT HIP: Primary | ICD-10-CM

## 2023-02-14 DIAGNOSIS — M48.061 SPINAL STENOSIS OF LUMBAR REGION, UNSPECIFIED WHETHER NEUROGENIC CLAUDICATION PRESENT: Primary | ICD-10-CM

## 2023-02-14 PROBLEM — Z91.199 NONCOMPLIANCE WITH CPAP TREATMENT: Status: ACTIVE | Noted: 2023-02-14

## 2023-02-14 PROBLEM — Z91.14 NONCOMPLIANCE WITH CPAP TREATMENT: Status: ACTIVE | Noted: 2023-02-14

## 2023-02-14 RX ORDER — BETAMETHASONE SODIUM PHOSPHATE AND BETAMETHASONE ACETATE 3; 3 MG/ML; MG/ML
12 INJECTION, SUSPENSION INTRA-ARTICULAR; INTRALESIONAL; INTRAMUSCULAR; SOFT TISSUE ONCE
Status: COMPLETED | OUTPATIENT
Start: 2023-02-14 | End: 2023-02-15

## 2023-02-14 NOTE — PROGRESS NOTES
Name: Denia Taylor  YOB: 1945  Gender: female  MRN: [id number]        Interlaminar ADIEL Procedure Note      Procedure: L3-L4 interlaminar epidural steroid injection    Precautions: Denia Taylor denies prior sensitivity to steroid, local anesthetic, iodine, or shellfish. Consent:  Consent was obtained prior to the procedure. The procedure was discussed at length with Denia Taylor. She was given the opportunity to ask questions regarding the procedure and its associated risks. In addition to the potential risks associated with the procedure itself, the patient was informed both verbally and in writing of potential side effects of the use glucocorticoids. The patient appeared to comprehend the informed consent and desired to have the procedure performed, and informed consent was signed. She was placed in a prone position on the fluoroscopy table and the skin was prepped and draped in a routine sterile fashion. The areas to be injected were each anesthetized with 1 ml of 1% Lidocaine. A 22 gauge 3.5 inch spinal needle was carefully advanced under fluoroscopic guidance to the L3-L4 interlaminar space. 0.5 ml of 70% of Omnipaque was injected to confirm proper needle placement and absence of subdural or vascular flow Once proper placement was confirmed, 2ml of sterile water and 12 mg of betamethasone were injected through the spinal needle. Fluoroscopic guidance was used intermittently over a 10-minute period to insure proper needle placement and her safety. A hard copy of the fluoroscopic image has been placed in her chart and is saved on the C-arm hard drive. She was monitored for 30 minutes after the procedure and discharged home in a stable fashion with a routine follow up. Procedural Diagnosis:     ICD-10-CM    1.  Spinal stenosis of lumbar region, unspecified whether neurogenic claudication present  M48.061 XR INJ SPINE THER SUBST LUM/SAC W IMG     betamethasone acetate-betamethasone sodium phosphate (CELESTONE) injection 12 mg         Hugo Ramirez MD  02/15/23

## 2023-02-14 NOTE — H&P (VIEW-ONLY)
[id number] [facility]  Pre Operative History and Physical Exam    Patient ID:  Nabila Bonds  [id number]  28 y.o.  1945    Today: February 14, 2023           CC:  Left hip pain    HPI:   The patient has end stage arthritis of the left hip. The patient was evaluated and examined during a consultation prior to this office visit. There have been no changes to the patient's orthopedic condition since the initial consultation. The patient has failed previous conservative treatment for this condition including antiinflammatories , and lifestyle modifications. The necessity for joint replacement is present. The patient will be admitted the day of surgery for left hip replacement    Past Medical/Surgical History:  Past Medical History:   Diagnosis Date    Acute UTI 03/20/2021    Alternating constipation and diarrhea     Arthritis     Asthma     bronchio-asthma mostly seasonal    Chronic obstructive asthma, unspecified     Chronic pain     DDD, LOW BACK, SIATICA    COVID-19 01/11/2023    cough~pt states tested positive in nursing home facility; had been coughing for a week before they tested her.     DDD (degenerative disc disease)     WITH RADICULOPATHY    Disorder of sacrococcygeal spine     E-coli UTI 03/23/2021    Elevated serum creatinine 09/19/2017    Elevated WBC count 09/19/2017    Functional assessment declined 07/16/2022    Functional quadriplegia ([facility] [address] 07/13/2022    Gas bloat syndrome     GERD (gastroesophageal reflux disease)     Hyperlipidemia     Hypertension     Hypothyroidism     Incontinence of urine     Insomnia     Iron deficiency anemia 11/2022    Morbid obesity ([address]     Multiple environmental allergies     Murmur, cardiac     2/6 early systolic flow murmur upper sternal borders no radiation; ECHO 2/9/22    Neuropathy     BELKIS (obstructive sleep apnea) 09/16/2009    Osteoarthritis of left knee 09/16/2009    Osteoarthritis of right knee 03/02/2011    Prolonged emergence from general anesthesia     trouble waking up from anesthesia once ~20 years ago~ \"out for 8 hours\"    S/P total knee replacement using cement 09/16/2009    Sleep apnea     HAS CPAP~has not used in the last 8 months    Spastic colon     Spinal stenosis of lumbar region     with neurogenic claudication    Status post right hip replacement 10/09/2017    Thyroid disease     ON MEDS    Type II or unspecified type diabetes mellitus without mention of complication, not stated as uncontrolled     avg FBS: 160-170; Hgb A1c 6.9 on 7/15/22; takes insulin     Past Surgical History:   Procedure Laterality Date    DILATION AND CURETTAGE OF UTERUS      HAND/FINGER SURGERY UNLISTED Right 1990'S    HERNIA REPAIR  8/80/64    UMBILICAL WITH MESH    HYSTERECTOMY (CERVIX STATUS UNKNOWN)  2000    TOTAL HIP ARTHROPLASTY Right     TOTAL KNEE ARTHROPLASTY Left 2009    TOTAL KNEE ARTHROPLASTY Right 2011        Allergies: Allergies   Allergen Reactions    Codeine Other (See Comments)     dizziness    Erythromycin Rash    Tetracycline Rash        Physical Exam:   General: NAD, Alert, Oriented, Appears their stated age     [de-identified]: NC/AT    Skin: No rashes, lesions or wounds seen      Psych: normal affect      Heart: Regular Rate, Rhythm     Lungs: unlabored respirations, no wheezing    Abdomen: Soft and non-distended     Ortho: Pain with limited ROM of the left hip    Neuro: no focal defects, moving extremities equally    Lymph: no lymphadenopathy     Meds:   Current Outpatient Medications   Medication Sig    tiZANidine (ZANAFLEX) 4 MG tablet Take 1/2 to 1 tablet by mouth TID, prn (Patient not taking: Reported on 2/13/2023)    cetirizine (ZYRTEC) 10 MG tablet Take 10 mg by mouth daily    Multiple Vitamins-Minerals (MULTIVITAMIN ADULTS PO) Take by mouth daily    zolpidem (AMBIEN) 5 MG tablet Take 5 mg by mouth at bedtime.     saccharomyces boulardii (SACCHAROMYCIN DF) 250 MG capsule Take 250 mg by mouth 2 times daily For diarrhea    fluticasone-salmeterol (WIXELA INHUB) 100-50 MCG/ACT AEPB diskus inhaler Inhale 1 puff into the lungs every 12 hours    insulin lispro (HUMALOG) 100 UNIT/ML injection vial Inject into the skin 3 times daily (before meals) Inject 5 units subcutaneously before meals for type 2 diabetes    acetaminophen (TYLENOL) 500 MG tablet Take 500 mg by mouth 3 times daily as needed for Pain    sennosides-docusate sodium (SENOKOT-S) 8.6-50 MG tablet Take 1 tablet by mouth as needed for Constipation    traMADol (ULTRAM) 50 MG tablet Take 50 mg by mouth every 12 hours as needed for Pain. insulin glargine (LANTUS) 100 UNIT/ML injection vial Inject 18 Units into the skin daily Take 14 units the day of surgery    lidocaine 4 % external patch Place 1 patch onto the skin daily for 10 days (Patient taking differently: Place 1 patch onto the skin at bedtime Apply to right shoulder topically at bedtime for pain~remove after 12 hours)    SITagliptin (JANUVIA) 50 MG tablet Take 1 tablet by mouth in the morning. (Patient not taking: Reported on 2/13/2023)    simethicone (MYLICON) 80 MG chewable tablet Take 1 tablet by mouth every 6 hours as needed for Flatulence    naloxone 4 MG/0.1ML LIQD nasal spray 1 spray by Nasal route as needed for Opioid Reversal    pregabalin (LYRICA) 50 MG capsule Take 50 mg by mouth 2 times daily.     celecoxib (CELEBREX) 200 MG capsule Take 200 mg by mouth 2 times daily    furosemide (LASIX) 20 MG tablet Take 20 mg by mouth daily    OMEPRAZOLE PO 20 mg daily    azelastine HCl 0.15 % SOLN 2 sprays by Nasal route at bedtime    Biotin 2.5 MG CAPS Take by mouth (Patient not taking: Reported on 2/13/2023)    budesonide (PULMICORT) 0.5 MG/2ML nebulizer suspension Inhale 500 mcg into the lungs 2 times daily as needed  (Patient not taking: Reported on 2/13/2023)    formoterol (PERFOROMIST) 20 MCG/2ML nebulizer solution Inhale 20 mcg into the lungs 2 times daily as needed  (Patient not taking: Reported on 2/13/2023)    levothyroxine (SYNTHROID) 100 MCG tablet Take 100 mcg by mouth daily    lisinopril-hydroCHLOROthiazide (PRINZIDE;ZESTORETIC) 10-12.5 MG per tablet Take 1 tablet by mouth daily    montelukast (SINGULAIR) 10 MG tablet Take 10 mg by mouth nightly    olopatadine (PATANOL) 0.1 % ophthalmic solution Place 2 drops into both eyes at bedtime    polyethylene glycol (GLYCOLAX) 17 GM/SCOOP powder Take 17 g by mouth as needed     No current facility-administered medications for this visit.          Labs:  Hospital Outpatient Visit on 02/13/2023   Component Date Value Ref Range Status    Protime 02/13/2023 13.1  12.6 - 14.3 sec Final    INR 02/13/2023 1.0    Final    Comment: Suggested therapeutic INR range:  Venous thrombosis and embolus  2.0-3.0  Prosthetic heart valve         2.5-3.5  ** Note new reference range and method **      Hemoglobin A1C 02/13/2023 8.4 (A)  4.8 - 5.6 % Final    eAG 02/13/2023 194  mg/dL Final    Comment: Reference Range  Normal: 4.8-5.6  Diabetic >=6.5%  Normal       <5.7%      WBC 02/13/2023 12.0 (A)  4.3 - 11.1 K/uL Final    RBC 02/13/2023 4.75  4.05 - 5.2 M/uL Final    Hemoglobin 02/13/2023 13.1  11.7 - 15.4 g/dL Final    Hematocrit 02/13/2023 40.9  35.8 - 46.3 % Final    MCV 02/13/2023 86.1  82.0 - 102.0 FL Final    MCH 02/13/2023 27.6  26.1 - 32.9 PG Final    MCHC 02/13/2023 32.0  31.4 - 35.0 g/dL Final    RDW 02/13/2023 15.5 (A)  11.9 - 14.6 % Final    Platelets 20/01/5607 243  150 - 450 K/uL Final    MPV 02/13/2023 11.2  9.4 - 12.3 FL Final    nRBC 02/13/2023 0.00  0.0 - 0.2 K/uL Final    **Note: Absolute NRBC parameter is now reported with Hemogram**    Differential Type 02/13/2023 AUTOMATED    Final    Seg Neutrophils 02/13/2023 75  43 - 78 % Final    Lymphocytes 02/13/2023 18  13 - 44 % Final    Monocytes 02/13/2023 6  4.0 - 12.0 % Final    Eosinophils % 02/13/2023 1  0.5 - 7.8 % Final    Basophils 02/13/2023 0  0.0 - 2.0 % Final    Immature Granulocytes 02/13/2023 0  0.0 - 5.0 % Final    Segs Absolute 02/13/2023 8.9 (A)  1.7 - 8.2 K/UL Final    Absolute Lymph # 02/13/2023 2.2  0.5 - 4.6 K/UL Final    Absolute Mono # 02/13/2023 0.7  0.1 - 1.3 K/UL Final    Absolute Eos # 02/13/2023 0.1  0.0 - 0.8 K/UL Final    Basophils Absolute 02/13/2023 0.0  0.0 - 0.2 K/UL Final    Absolute Immature Granulocyte 02/13/2023 0.0  0.0 - 0.5 K/UL Final    Sodium 02/13/2023 138  133 - 143 mmol/L Final    Potassium 02/13/2023 4.1  3.5 - 5.1 mmol/L Final    Chloride 02/13/2023 102  101 - 110 mmol/L Final    CO2 02/13/2023 31  21 - 32 mmol/L Final    Anion Gap 02/13/2023 5  2 - 11 mmol/L Final    Glucose 02/13/2023 132 (A)  65 - 100 mg/dL Final    BUN 02/13/2023 32 (A)  8 - 23 MG/DL Final    Creatinine 02/13/2023 1.16 (A)  0.6 - 1.0 MG/DL Final    Est, Glom Filt Rate 02/13/2023 49 (A)  >60 ml/min/1.73m2 Final    Comment:      Pediatric calculator link: [email or web address] [email or web address]/gfr_calculatorped       These results are not intended for use in patients <25years of age. eGFR results are calculated without a race factor using  the 2021 CKD-EPI equation. Careful clinical correlation is recommended, particularly when comparing to results calculated using previous equations. The CKD-EPI equation is less accurate in patients with extremes of muscle mass, extra-renal metabolism of creatinine, excessive creatine ingestion, or following therapy that affects renal tubular secretion.       Calcium 02/13/2023 9.5  8.3 - 10.4 MG/DL Final    PTT 02/13/2023 29.1  24.5 - 34.2 SEC Final    Comment: Heparin Therapeutic Range = 74 - 123 seconds  In addition to factor deficiency, monitoring heparin therapy, etc., evaluation of a prolonged aPTT result should include consideration of preanalytic variables such as heparin flush contamination, specimen integrity issues, etc.      Ventricular Rate 02/13/2023 64  BPM Preliminary    Atrial Rate 02/13/2023 64  BPM Preliminary    P-R Interval 02/13/2023 186  ms Preliminary    QRS Duration 02/13/2023 76  ms Preliminary    Q-T Interval 02/13/2023 384  ms Preliminary    QTc Calculation (Bazett) 02/13/2023 396  ms Preliminary    P [place] 02/13/2023 57  degrees Preliminary    R Axis 02/13/2023 45  degrees Preliminary    T Axis 02/13/2023 20  degrees Preliminary    Diagnosis 02/13/2023 Normal sinus rhythm with sinus arrhythmia   Preliminary    Special Requests 02/13/2023 NO SPECIAL REQUESTS    Final    Culture 02/13/2023 SA target not detected. A MRSA NEGATIVE, SA NEGATIVE test result does not preclude MRSA or SA nasal colonization. Final   Admission on 12/18/2022, Discharged on 12/18/2022   Component Date Value Ref Range Status    Sodium 12/18/2022 141  133 - 143 mmol/L Final    Potassium 12/18/2022 3.3 (A)  3.5 - 5.1 mmol/L Final    Chloride 12/18/2022 104  101 - 110 mmol/L Final    CO2 12/18/2022 31  21 - 32 mmol/L Final    Anion Gap 12/18/2022 6  2 - 11 mmol/L Final    Glucose 12/18/2022 164 (A)  65 - 100 mg/dL Final    BUN 12/18/2022 21  8 - 23 MG/DL Final    Creatinine 12/18/2022 0.96  0.6 - 1.0 MG/DL Final    Est, Glom Filt Rate 12/18/2022 >60  >60 ml/min/1.73m2 Final    Comment:      Pediatric calculator link: [employer].[email or web address] org/professionals/kdoqi/gfr_calculatorped       These results are not intended for use in patients <25years of age. eGFR results are calculated without a race factor using  the 2021 CKD-EPI equation. Careful clinical correlation is recommended, particularly when comparing to results calculated using previous equations. The CKD-EPI equation is less accurate in patients with extremes of muscle mass, extra-renal metabolism of creatinine, excessive creatine ingestion, or following therapy that affects renal tubular secretion.       Calcium 12/18/2022 9.9  8.3 - 10.4 MG/DL Final    Total Bilirubin 12/18/2022 0.4  0.2 - 1.1 MG/DL Final    ALT 12/18/2022 20  12 - 65 U/L Final    AST 12/18/2022 18  15 - 37 U/L Final    Alk Phosphatase 12/18/2022 119  50 - 130 U/L Final    Total Protein 12/18/2022 7.9  6.3 - 8.2 g/dL Final    Albumin 12/18/2022 3.7  3.2 - 4.6 g/dL Final    Globulin 12/18/2022 4.2  2.8 - 4.5 g/dL Final    Albumin/Globulin Ratio 12/18/2022 0.9  0.4 - 1.6   Final    WBC 12/18/2022 10.4  4.3 - 11.1 K/uL Final    RBC 12/18/2022 5.67 (A)  4.05 - 5.2 M/uL Final    Hemoglobin 12/18/2022 15.6 (A)  11.7 - 15.4 g/dL Final    Hematocrit 12/18/2022 48.2 (A)  35.8 - 46.3 % Final    MCV 12/18/2022 85.0  82.0 - 102.0 FL Final    MCH 12/18/2022 27.5  26.1 - 32.9 PG Final    MCHC 12/18/2022 32.4  31.4 - 35.0 g/dL Final    RDW 12/18/2022 13.3  11.9 - 14.6 % Final    Platelets 53/15/3534 243  150 - 450 K/uL Final    MPV 12/18/2022 11.6  9.4 - 12.3 FL Final    nRBC 12/18/2022 0.00  0.0 - 0.2 K/uL Final    **Note: Absolute NRBC parameter is now reported with Hemogram**    Differential Type 12/18/2022 AUTOMATED    Final    Seg Neutrophils 12/18/2022 72  43 - 78 % Final    Lymphocytes 12/18/2022 19  13 - 44 % Final    Monocytes 12/18/2022 7  4.0 - 12.0 % Final    Eosinophils % 12/18/2022 0 (A)  0.5 - 7.8 % Final    Basophils 12/18/2022 1  0.0 - 2.0 % Final    Immature Granulocytes 12/18/2022 0  0.0 - 5.0 % Final    Segs Absolute 12/18/2022 7.5  1.7 - 8.2 K/UL Final    Absolute Lymph # 12/18/2022 2.0  0.5 - 4.6 K/UL Final    Absolute Mono # 12/18/2022 0.7  0.1 - 1.3 K/UL Final    Absolute Eos # 12/18/2022 0.0  0.0 - 0.8 K/UL Final    Basophils Absolute 12/18/2022 0.1  0.0 - 0.2 K/UL Final    Absolute Immature Granulocyte 12/18/2022 0.0  0.0 - 0.5 K/UL Final    Magnesium 12/18/2022 1.7 (A)  1.8 - 2.4 mg/dL Final    Color, UA 12/18/2022 DARK YELLOW    Final    Color Reference Range: Straw, Yellow or Dark Yellow    Appearance 12/18/2022 CLEAR    Final    Specific Gravity, UA 12/18/2022 1.035 (A)  1.001 - 1.023   Final    pH, Urine 12/18/2022 6.0  5.0 - 9.0   Final    Protein, UA 12/18/2022 30 (A)  NEG mg/dL Final    Glucose, UA 12/18/2022 Negative  mg/dL Final    Ketones, Urine 12/18/2022 80 (A)  NEG mg/dL Final    Bilirubin Urine 12/18/2022 SMALL (A)  NEG   Final    Positive, unable to confirm with Ictotest.    Blood, Urine 12/18/2022 Negative  NEG   Final    Urobilinogen, Urine 12/18/2022 1.0  0.2 - 1.0 EU/dL Final    Nitrite, Urine 12/18/2022 Negative  NEG   Final    Leukocyte Esterase, Urine 12/18/2022 Negative  NEG   Final    WBC, UA 12/18/2022 0-4  U4 /hpf Final    RBC, UA 12/18/2022 0-5  U5 /hpf Final    Epithelial Cells UA 12/18/2022 0-5  U5 /hpf Final    BACTERIA, URINE 12/18/2022 Negative  NEG /hpf Final    Casts 12/18/2022 0-2  U2 /lpf Final                 Patient Active Problem List   Diagnosis    Osteoarthritis of right knee    Type 2 diabetes mellitus treated without insulin ([facility])    Class 3 severe obesity due to excess calories with serious comorbidity and body mass index (BMI) of 40.0 to 44.9 in adult [facility])    Status post right hip replacement    Hypertension    S/P total knee replacement using cement    Hypothyroidism    Pressure injury of contiguous region involving back and right buttock, stage 1    Shortness of breath    Neuropathy    Asthma    Osteoarthritis of left knee    Chronic pain    BELKIS (obstructive sleep apnea)    4th nerve palsy, left    Adie's tonic pupil, right    Irritable bowel syndrome    Gastroesophageal reflux disease without esophagitis    Hyperlipidemia    Hypothyroidism due to acquired atrophy of thyroid    Primary insomnia    Keratoconjunctivitis sicca, not specified as Sjogren's, bilateral    Polyneuropathy due to type 2 diabetes mellitus ([facility] [address]    Spondylosis without myelopathy    Type 2 diabetes mellitus with hyperglycemia, without long-term current use of insulin ([facility])    Vitamin D deficiency    Stress incontinence of urine    Primary osteoarthritis of left hip    Noncompliance with CPAP treatment         Assessment:   1. Arthritis of the left hip      Plan:    1.  Proceed with scheduled left hip replacement       All material risks, benefits, and reasonable alternatives including postponing the procedure were discussed. The patient does wish to proceed with the procedure at this time.          Signed By: SHABBIR Colon  February 14, 2023

## 2023-02-14 NOTE — PROGRESS NOTES
[id number] [facility]  Pre Operative History and Physical Exam    Patient ID:  Elsy Garland  [id number]  59 y.o.  1945    Today: February 14, 2023           CC:  Left hip pain    HPI:   The patient has end stage arthritis of the left hip. The patient was evaluated and examined during a consultation prior to this office visit. There have been no changes to the patient's orthopedic condition since the initial consultation. The patient has failed previous conservative treatment for this condition including antiinflammatories , and lifestyle modifications. The necessity for joint replacement is present. The patient will be admitted the day of surgery for left hip replacement    Past Medical/Surgical History:  Past Medical History:   Diagnosis Date    Acute UTI 03/20/2021    Alternating constipation and diarrhea     Arthritis     Asthma     bronchio-asthma mostly seasonal    Chronic obstructive asthma, unspecified     Chronic pain     DDD, LOW BACK, SIATICA    COVID-19 01/11/2023    cough~pt states tested positive in nursing home facility; had been coughing for a week before they tested her.     DDD (degenerative disc disease)     WITH RADICULOPATHY    Disorder of sacrococcygeal spine     E-coli UTI 03/23/2021    Elevated serum creatinine 09/19/2017    Elevated WBC count 09/19/2017    Functional assessment declined 07/16/2022    Functional quadriplegia ([address] 07/13/2022    Gas bloat syndrome     GERD (gastroesophageal reflux disease)     Hyperlipidemia     Hypertension     Hypothyroidism     Incontinence of urine     Insomnia     Iron deficiency anemia 11/2022    Morbid obesity ([address]     Multiple environmental allergies     Murmur, cardiac     2/6 early systolic flow murmur upper sternal borders no radiation; ECHO 2/9/22    Neuropathy     BELKIS (obstructive sleep apnea) 09/16/2009    Osteoarthritis of left knee 09/16/2009    Osteoarthritis of right knee 03/02/2011    Prolonged emergence from general anesthesia     trouble waking up from anesthesia once ~20 years ago~ \"out for 8 hours\"    S/P total knee replacement using cement 09/16/2009    Sleep apnea     HAS CPAP~has not used in the last 8 months    Spastic colon     Spinal stenosis of lumbar region     with neurogenic claudication    Status post right hip replacement 10/09/2017    Thyroid disease     ON MEDS    Type II or unspecified type diabetes mellitus without mention of complication, not stated as uncontrolled     avg FBS: 160-170; Hgb A1c 6.9 on 7/15/22; takes insulin     Past Surgical History:   Procedure Laterality Date    DILATION AND CURETTAGE OF UTERUS      HAND/FINGER SURGERY UNLISTED Right 1990'S    HERNIA REPAIR  4/37/73    UMBILICAL WITH MESH    HYSTERECTOMY (CERVIX STATUS UNKNOWN)  2000    TOTAL HIP ARTHROPLASTY Right     TOTAL KNEE ARTHROPLASTY Left 2009    TOTAL KNEE ARTHROPLASTY Right 2011        Allergies: Allergies   Allergen Reactions    Codeine Other (See Comments)     dizziness    Erythromycin Rash    Tetracycline Rash        Physical Exam:   General: NAD, Alert, Oriented, Appears their stated age     [de-identified]: NC/AT    Skin: No rashes, lesions or wounds seen      Psych: normal affect      Heart: Regular Rate, Rhythm     Lungs: unlabored respirations, no wheezing    Abdomen: Soft and non-distended     Ortho: Pain with limited ROM of the left hip    Neuro: no focal defects, moving extremities equally    Lymph: no lymphadenopathy     Meds:   Current Outpatient Medications   Medication Sig    tiZANidine (ZANAFLEX) 4 MG tablet Take 1/2 to 1 tablet by mouth TID, prn (Patient not taking: Reported on 2/13/2023)    cetirizine (ZYRTEC) 10 MG tablet Take 10 mg by mouth daily    Multiple Vitamins-Minerals (MULTIVITAMIN ADULTS PO) Take by mouth daily    zolpidem (AMBIEN) 5 MG tablet Take 5 mg by mouth at bedtime.     saccharomyces boulardii (SACCHAROMYCIN DF) 250 MG capsule Take 250 mg by mouth 2 times daily For diarrhea    fluticasone-salmeterol (WIXELA INHUB) 100-50 MCG/ACT AEPB diskus inhaler Inhale 1 puff into the lungs every 12 hours    insulin lispro (HUMALOG) 100 UNIT/ML injection vial Inject into the skin 3 times daily (before meals) Inject 5 units subcutaneously before meals for type 2 diabetes    acetaminophen (TYLENOL) 500 MG tablet Take 500 mg by mouth 3 times daily as needed for Pain    sennosides-docusate sodium (SENOKOT-S) 8.6-50 MG tablet Take 1 tablet by mouth as needed for Constipation    traMADol (ULTRAM) 50 MG tablet Take 50 mg by mouth every 12 hours as needed for Pain. insulin glargine (LANTUS) 100 UNIT/ML injection vial Inject 18 Units into the skin daily Take 14 units the day of surgery    lidocaine 4 % external patch Place 1 patch onto the skin daily for 10 days (Patient taking differently: Place 1 patch onto the skin at bedtime Apply to right shoulder topically at bedtime for pain~remove after 12 hours)    SITagliptin (JANUVIA) 50 MG tablet Take 1 tablet by mouth in the morning. (Patient not taking: Reported on 2/13/2023)    simethicone (MYLICON) 80 MG chewable tablet Take 1 tablet by mouth every 6 hours as needed for Flatulence    naloxone 4 MG/0.1ML LIQD nasal spray 1 spray by Nasal route as needed for Opioid Reversal    pregabalin (LYRICA) 50 MG capsule Take 50 mg by mouth 2 times daily.     celecoxib (CELEBREX) 200 MG capsule Take 200 mg by mouth 2 times daily    furosemide (LASIX) 20 MG tablet Take 20 mg by mouth daily    OMEPRAZOLE PO 20 mg daily    azelastine HCl 0.15 % SOLN 2 sprays by Nasal route at bedtime    Biotin 2.5 MG CAPS Take by mouth (Patient not taking: Reported on 2/13/2023)    budesonide (PULMICORT) 0.5 MG/2ML nebulizer suspension Inhale 500 mcg into the lungs 2 times daily as needed  (Patient not taking: Reported on 2/13/2023)    formoterol (PERFOROMIST) 20 MCG/2ML nebulizer solution Inhale 20 mcg into the lungs 2 times daily as needed  (Patient not taking: Reported on 2/13/2023)    levothyroxine (SYNTHROID) 100 MCG tablet Take 100 mcg by mouth daily    lisinopril-hydroCHLOROthiazide (PRINZIDE;ZESTORETIC) 10-12.5 MG per tablet Take 1 tablet by mouth daily    montelukast (SINGULAIR) 10 MG tablet Take 10 mg by mouth nightly    olopatadine (PATANOL) 0.1 % ophthalmic solution Place 2 drops into both eyes at bedtime    polyethylene glycol (GLYCOLAX) 17 GM/SCOOP powder Take 17 g by mouth as needed     No current facility-administered medications for this visit.          Labs:  Hospital Outpatient Visit on 02/13/2023   Component Date Value Ref Range Status    Protime 02/13/2023 13.1  12.6 - 14.3 sec Final    INR 02/13/2023 1.0    Final    Comment: Suggested therapeutic INR range:  Venous thrombosis and embolus  2.0-3.0  Prosthetic heart valve         2.5-3.5  ** Note new reference range and method **      Hemoglobin A1C 02/13/2023 8.4 (A)  4.8 - 5.6 % Final    eAG 02/13/2023 194  mg/dL Final    Comment: Reference Range  Normal: 4.8-5.6  Diabetic >=6.5%  Normal       <5.7%      WBC 02/13/2023 12.0 (A)  4.3 - 11.1 K/uL Final    RBC 02/13/2023 4.75  4.05 - 5.2 M/uL Final    Hemoglobin 02/13/2023 13.1  11.7 - 15.4 g/dL Final    Hematocrit 02/13/2023 40.9  35.8 - 46.3 % Final    MCV 02/13/2023 86.1  82.0 - 102.0 FL Final    MCH 02/13/2023 27.6  26.1 - 32.9 PG Final    MCHC 02/13/2023 32.0  31.4 - 35.0 g/dL Final    RDW 02/13/2023 15.5 (A)  11.9 - 14.6 % Final    Platelets 31/07/5208 243  150 - 450 K/uL Final    MPV 02/13/2023 11.2  9.4 - 12.3 FL Final    nRBC 02/13/2023 0.00  0.0 - 0.2 K/uL Final    **Note: Absolute NRBC parameter is now reported with Hemogram**    Differential Type 02/13/2023 AUTOMATED    Final    Seg Neutrophils 02/13/2023 75  43 - 78 % Final    Lymphocytes 02/13/2023 18  13 - 44 % Final    Monocytes 02/13/2023 6  4.0 - 12.0 % Final    Eosinophils % 02/13/2023 1  0.5 - 7.8 % Final    Basophils 02/13/2023 0  0.0 - 2.0 % Final    Immature Granulocytes 02/13/2023 0  0.0 - 5.0 % Final    Segs Absolute 02/13/2023 8.9 (A)  1.7 - 8.2 K/UL Final    Absolute Lymph # 02/13/2023 2.2  0.5 - 4.6 K/UL Final    Absolute Mono # 02/13/2023 0.7  0.1 - 1.3 K/UL Final    Absolute Eos # 02/13/2023 0.1  0.0 - 0.8 K/UL Final    Basophils Absolute 02/13/2023 0.0  0.0 - 0.2 K/UL Final    Absolute Immature Granulocyte 02/13/2023 0.0  0.0 - 0.5 K/UL Final    Sodium 02/13/2023 138  133 - 143 mmol/L Final    Potassium 02/13/2023 4.1  3.5 - 5.1 mmol/L Final    Chloride 02/13/2023 102  101 - 110 mmol/L Final    CO2 02/13/2023 31  21 - 32 mmol/L Final    Anion Gap 02/13/2023 5  2 - 11 mmol/L Final    Glucose 02/13/2023 132 (A)  65 - 100 mg/dL Final    BUN 02/13/2023 32 (A)  8 - 23 MG/DL Final    Creatinine 02/13/2023 1.16 (A)  0.6 - 1.0 MG/DL Final    Est, Glom Filt Rate 02/13/2023 49 (A)  >60 ml/min/1.73m2 Final    Comment:      Pediatric calculator link: [email or web address] [email or web address]/gfr_calculatorped       These results are not intended for use in patients <25years of age. eGFR results are calculated without a race factor using  the 2021 CKD-EPI equation. Careful clinical correlation is recommended, particularly when comparing to results calculated using previous equations. The CKD-EPI equation is less accurate in patients with extremes of muscle mass, extra-renal metabolism of creatinine, excessive creatine ingestion, or following therapy that affects renal tubular secretion.       Calcium 02/13/2023 9.5  8.3 - 10.4 MG/DL Final    PTT 02/13/2023 29.1  24.5 - 34.2 SEC Final    Comment: Heparin Therapeutic Range = 74 - 123 seconds  In addition to factor deficiency, monitoring heparin therapy, etc., evaluation of a prolonged aPTT result should include consideration of preanalytic variables such as heparin flush contamination, specimen integrity issues, etc.      Ventricular Rate 02/13/2023 64  BPM Preliminary    Atrial Rate 02/13/2023 64  BPM Preliminary    P-R Interval 02/13/2023 186  ms Preliminary    QRS Duration 02/13/2023 76  ms Preliminary    Q-T Interval 02/13/2023 384  ms Preliminary    QTc Calculation (Bazett) 02/13/2023 396  ms Preliminary    P [place] 02/13/2023 57  degrees Preliminary    R Axis 02/13/2023 45  degrees Preliminary    T Axis 02/13/2023 20  degrees Preliminary    Diagnosis 02/13/2023 Normal sinus rhythm with sinus arrhythmia   Preliminary    Special Requests 02/13/2023 NO SPECIAL REQUESTS    Final    Culture 02/13/2023 SA target not detected. A MRSA NEGATIVE, SA NEGATIVE test result does not preclude MRSA or SA nasal colonization. Final   Admission on 12/18/2022, Discharged on 12/18/2022   Component Date Value Ref Range Status    Sodium 12/18/2022 141  133 - 143 mmol/L Final    Potassium 12/18/2022 3.3 (A)  3.5 - 5.1 mmol/L Final    Chloride 12/18/2022 104  101 - 110 mmol/L Final    CO2 12/18/2022 31  21 - 32 mmol/L Final    Anion Gap 12/18/2022 6  2 - 11 mmol/L Final    Glucose 12/18/2022 164 (A)  65 - 100 mg/dL Final    BUN 12/18/2022 21  8 - 23 MG/DL Final    Creatinine 12/18/2022 0.96  0.6 - 1.0 MG/DL Final    Est, Glom Filt Rate 12/18/2022 >60  >60 ml/min/1.73m2 Final    Comment:      Pediatric calculator link: [employer].[email or web address] org/professionals/kdoqi/gfr_calculatorped       These results are not intended for use in patients <25years of age. eGFR results are calculated without a race factor using  the 2021 CKD-EPI equation. Careful clinical correlation is recommended, particularly when comparing to results calculated using previous equations. The CKD-EPI equation is less accurate in patients with extremes of muscle mass, extra-renal metabolism of creatinine, excessive creatine ingestion, or following therapy that affects renal tubular secretion.       Calcium 12/18/2022 9.9  8.3 - 10.4 MG/DL Final    Total Bilirubin 12/18/2022 0.4  0.2 - 1.1 MG/DL Final    ALT 12/18/2022 20  12 - 65 U/L Final    AST 12/18/2022 18  15 - 37 U/L Final    Alk Phosphatase 12/18/2022 119  50 - 130 U/L Final    Total Protein 12/18/2022 7.9  6.3 - 8.2 g/dL Final    Albumin 12/18/2022 3.7  3.2 - 4.6 g/dL Final    Globulin 12/18/2022 4.2  2.8 - 4.5 g/dL Final    Albumin/Globulin Ratio 12/18/2022 0.9  0.4 - 1.6   Final    WBC 12/18/2022 10.4  4.3 - 11.1 K/uL Final    RBC 12/18/2022 5.67 (A)  4.05 - 5.2 M/uL Final    Hemoglobin 12/18/2022 15.6 (A)  11.7 - 15.4 g/dL Final    Hematocrit 12/18/2022 48.2 (A)  35.8 - 46.3 % Final    MCV 12/18/2022 85.0  82.0 - 102.0 FL Final    MCH 12/18/2022 27.5  26.1 - 32.9 PG Final    MCHC 12/18/2022 32.4  31.4 - 35.0 g/dL Final    RDW 12/18/2022 13.3  11.9 - 14.6 % Final    Platelets 89/84/0359 243  150 - 450 K/uL Final    MPV 12/18/2022 11.6  9.4 - 12.3 FL Final    nRBC 12/18/2022 0.00  0.0 - 0.2 K/uL Final    **Note: Absolute NRBC parameter is now reported with Hemogram**    Differential Type 12/18/2022 AUTOMATED    Final    Seg Neutrophils 12/18/2022 72  43 - 78 % Final    Lymphocytes 12/18/2022 19  13 - 44 % Final    Monocytes 12/18/2022 7  4.0 - 12.0 % Final    Eosinophils % 12/18/2022 0 (A)  0.5 - 7.8 % Final    Basophils 12/18/2022 1  0.0 - 2.0 % Final    Immature Granulocytes 12/18/2022 0  0.0 - 5.0 % Final    Segs Absolute 12/18/2022 7.5  1.7 - 8.2 K/UL Final    Absolute Lymph # 12/18/2022 2.0  0.5 - 4.6 K/UL Final    Absolute Mono # 12/18/2022 0.7  0.1 - 1.3 K/UL Final    Absolute Eos # 12/18/2022 0.0  0.0 - 0.8 K/UL Final    Basophils Absolute 12/18/2022 0.1  0.0 - 0.2 K/UL Final    Absolute Immature Granulocyte 12/18/2022 0.0  0.0 - 0.5 K/UL Final    Magnesium 12/18/2022 1.7 (A)  1.8 - 2.4 mg/dL Final    Color, UA 12/18/2022 DARK YELLOW    Final    Color Reference Range: Straw, Yellow or Dark Yellow    Appearance 12/18/2022 CLEAR    Final    Specific Gravity, UA 12/18/2022 1.035 (A)  1.001 - 1.023   Final    pH, Urine 12/18/2022 6.0  5.0 - 9.0   Final    Protein, UA 12/18/2022 30 (A)  NEG mg/dL Final    Glucose, UA 12/18/2022 Negative  mg/dL Final    Ketones, Urine 12/18/2022 80 (A)  NEG mg/dL Final    Bilirubin Urine 12/18/2022 SMALL (A)  NEG   Final    Positive, unable to confirm with Ictotest.    Blood, Urine 12/18/2022 Negative  NEG   Final    Urobilinogen, Urine 12/18/2022 1.0  0.2 - 1.0 EU/dL Final    Nitrite, Urine 12/18/2022 Negative  NEG   Final    Leukocyte Esterase, Urine 12/18/2022 Negative  NEG   Final    WBC, UA 12/18/2022 0-4  U4 /hpf Final    RBC, UA 12/18/2022 0-5  U5 /hpf Final    Epithelial Cells UA 12/18/2022 0-5  U5 /hpf Final    BACTERIA, URINE 12/18/2022 Negative  NEG /hpf Final    Casts 12/18/2022 0-2  U2 /lpf Final                 Patient Active Problem List   Diagnosis    Osteoarthritis of right knee    Type 2 diabetes mellitus treated without insulin ([facility])    Class 3 severe obesity due to excess calories with serious comorbidity and body mass index (BMI) of 40.0 to 44.9 in adult [facility])    Status post right hip replacement    Hypertension    S/P total knee replacement using cement    Hypothyroidism    Pressure injury of contiguous region involving back and right buttock, stage 1    Shortness of breath    Neuropathy    Asthma    Osteoarthritis of left knee    Chronic pain    BELKIS (obstructive sleep apnea)    4th nerve palsy, left    Adie's tonic pupil, right    Irritable bowel syndrome    Gastroesophageal reflux disease without esophagitis    Hyperlipidemia    Hypothyroidism due to acquired atrophy of thyroid    Primary insomnia    Keratoconjunctivitis sicca, not specified as Sjogren's, bilateral    Polyneuropathy due to type 2 diabetes mellitus ([facility]    Spondylosis without myelopathy    Type 2 diabetes mellitus with hyperglycemia, without long-term current use of insulin ([facility])    Vitamin D deficiency    Stress incontinence of urine    Primary osteoarthritis of left hip    Noncompliance with CPAP treatment         Assessment:   1. Arthritis of the left hip      Plan:    1.  Proceed with scheduled left hip replacement       All material risks, benefits, and reasonable alternatives including postponing the procedure were discussed. The patient does wish to proceed with the procedure at this time.          Signed By: SHABBIR Freeman  February 14, 2023

## 2023-02-14 NOTE — PROGRESS NOTES
Total Joint Surgery Preoperative Chart Review      Patient ID:  Marco Antonio Ryan  [id number]  60 y.o.  1945  Surgeon: Dr. Clem Cortés  Date of Surgery: 2/22/2023  Procedure: Total Left Hip Arthroplasty  Primary Care Physician: Jeffry Amin [phone]  Specialty Physician(s):      Subjective:   Marco Antonio Ryan is a 69 y.o. White (non-) female who presents for preoperative evaluation for Total Left Hip arthroplasty. This is a preoperative chart review note based on data collected by the nurse at the surgical Pre-Assessment visit. Past Medical History:   Diagnosis Date    Acute UTI 03/20/2021    Alternating constipation and diarrhea     Arthritis     Asthma     bronchio-asthma mostly seasonal    Chronic obstructive asthma, unspecified     Chronic pain     DDD, LOW BACK, SIATICA    COVID-19 01/11/2023    cough~pt states tested positive in nursing home facility; had been coughing for a week before they tested her.     DDD (degenerative disc disease)     WITH RADICULOPATHY    Disorder of sacrococcygeal spine     E-coli UTI 03/23/2021    Elevated serum creatinine 09/19/2017    Elevated WBC count 09/19/2017    Functional assessment declined 07/16/2022    Functional quadriplegia ([address] 07/13/2022    Gas bloat syndrome     GERD (gastroesophageal reflux disease)     Hyperlipidemia     Hypertension     Hypothyroidism     Incontinence of urine     Insomnia     Iron deficiency anemia 11/2022    Morbid obesity ([address]     Multiple environmental allergies     Murmur, cardiac     2/6 early systolic flow murmur upper sternal borders no radiation; ECHO 2/9/22    Neuropathy     BELKIS (obstructive sleep apnea) 09/16/2009    Osteoarthritis of left knee 09/16/2009    Osteoarthritis of right knee 03/02/2011    Prolonged emergence from general anesthesia     trouble waking up from anesthesia once ~20 years ago~ \"out for 8 hours\"    S/P total knee replacement using cement 09/16/2009    Sleep apnea     HAS CPAP~has not used in the last 8 months    Spastic colon     Spinal stenosis of lumbar region     with neurogenic claudication    Status post right hip replacement 10/09/2017    Thyroid disease     ON MEDS    Type II or unspecified type diabetes mellitus without mention of complication, not stated as uncontrolled     avg FBS: 160-170; Hgb A1c 6.9 on 7/15/22; takes insulin      Past Surgical History:   Procedure Laterality Date    DILATION AND CURETTAGE OF UTERUS      HAND/FINGER SURGERY UNLISTED Right 'S    HERNIA REPAIR      UMBILICAL WITH MESH    HYSTERECTOMY (CERVIX STATUS UNKNOWN)  2000    TOTAL HIP ARTHROPLASTY Right     TOTAL KNEE ARTHROPLASTY Left 2009    TOTAL KNEE ARTHROPLASTY Right 2011     Family History   Problem Relation Age of Onset    Diabetes Father     Cancer Father         lung    COPD Father     Cancer Brother     Diabetes Mother     Heart Disease Father     Heart Disease Brother         CAD    Diabetes Brother     Cancer Mother       Social History     Tobacco Use    Smoking status: Former     Packs/day: 1.00     Years: 14.00     Pack years: 14.00     Types: Cigarettes     Quit date: 1977     Years since quittin.1    Smokeless tobacco: Never    Tobacco comments:     Quit smoking: QUIT IN    Substance Use Topics    Alcohol use: Not Currently       Prior to Admission medications    Medication Sig Start Date End Date Taking? Authorizing Provider   cetirizine (ZYRTEC) 10 MG tablet Take 10 mg by mouth daily   Yes Historical Provider, MD   Multiple Vitamins-Minerals (MULTIVITAMIN ADULTS PO) Take by mouth daily   Yes Historical Provider, MD   zolpidem (AMBIEN) 5 MG tablet Take 5 mg by mouth at bedtime.    Yes Historical Provider, MD   saccharomyces boulardii (SACCHAROMYCIN DF) 250 MG capsule Take 250 mg by mouth 2 times daily For diarrhea   Yes Historical Provider, MD   fluticasone-salmeterol (WIXELA INHUB) 100-50 MCG/ACT AEPB diskus inhaler Inhale 1 puff into the lungs every 12 hours   Yes Historical Provider, MD   insulin lispro (HUMALOG) 100 UNIT/ML injection vial Inject into the skin 3 times daily (before meals) Inject 5 units subcutaneously before meals for type 2 diabetes   Yes Historical Provider, MD   acetaminophen (TYLENOL) 500 MG tablet Take 500 mg by mouth 3 times daily as needed for Pain   Yes Historical Provider, MD   sennosides-docusate sodium (SENOKOT-S) 8.6-50 MG tablet Take 1 tablet by mouth as needed for Constipation   Yes Historical Provider, MD   traMADol (ULTRAM) 50 MG tablet Take 50 mg by mouth every 12 hours as needed for Pain. Yes Historical Provider, MD   insulin glargine (LANTUS) 100 UNIT/ML injection vial Inject 18 Units into the skin daily Take 14 units the day of surgery   Yes Historical Provider, MD   tiZANidine (ZANAFLEX) 4 MG tablet Take 1/2 to 1 tablet by mouth TID, prn  Patient not taking: Reported on 2/13/2023 2/13/23   Angelita Mancia MD   lidocaine 4 % external patch Place 1 patch onto the skin daily for 10 days  Patient taking differently: Place 1 patch onto the skin at bedtime Apply to right shoulder topically at bedtime for pain~remove after 12 hours 2/6/23 2/16/23  Parth Melo., MD   SITagliptin (JANUVIA) 50 MG tablet Take 1 tablet by mouth in the morning. Patient not taking: Reported on 2/13/2023 7/18/22   Momo Mayo MD   simethicone (MYLICON) 80 MG chewable tablet Take 1 tablet by mouth every 6 hours as needed for Flatulence 7/18/22   Momo Mayo MD   naloxone 4 MG/0.1ML LIQD nasal spray 1 spray by Nasal route as needed for Opioid Reversal 7/18/22   Momo Mayo MD   pregabalin (LYRICA) 50 MG capsule Take 50 mg by mouth 2 times daily.     Historical Provider, MD   celecoxib (CELEBREX) 200 MG capsule Take 200 mg by mouth 2 times daily    Historical Provider, MD   furosemide (LASIX) 20 MG tablet Take 20 mg by mouth daily    Historical Provider, MD   lisinopril (PRINIVIL;ZESTRIL) 2.5 MG tablet 1 tablet 4/11/22 7/18/22  Historical Provider, MD OMEPRAZOLE PO 20 mg daily    Ar Automatic Reconciliation   azelastine HCl 0.15 % SOLN 2 sprays by Nasal route at bedtime    Ar Automatic Reconciliation   Biotin 2.5 MG CAPS Take by mouth  Patient not taking: Reported on 2/13/2023    Ar Automatic Reconciliation   budesonide (PULMICORT) 0.5 MG/2ML nebulizer suspension Inhale 500 mcg into the lungs 2 times daily as needed   Patient not taking: Reported on 2/13/2023    Ar Automatic Reconciliation   formoterol (PERFOROMIST) 20 MCG/2ML nebulizer solution Inhale 20 mcg into the lungs 2 times daily as needed   Patient not taking: Reported on 2/13/2023    Ar Automatic Reconciliation   levothyroxine (SYNTHROID) 100 MCG tablet Take 100 mcg by mouth daily    Ar Automatic Reconciliation   lisinopril-hydroCHLOROthiazide (PRINZIDE;ZESTORETIC) 10-12.5 MG per tablet Take 1 tablet by mouth daily    Ar Automatic Reconciliation   montelukast (SINGULAIR) 10 MG tablet Take 10 mg by mouth nightly    Ar Automatic Reconciliation   olopatadine (PATANOL) 0.1 % ophthalmic solution Place 2 drops into both eyes at bedtime    Ar Automatic Reconciliation   polyethylene glycol (GLYCOLAX) 17 GM/SCOOP powder Take 17 g by mouth as needed    Ar Automatic Reconciliation   amLODIPine (NORVASC) 5 MG tablet Take 5 mg by mouth daily  7/18/22  Ar Automatic Reconciliation   chlordiazePOXIDE-clidinium (LIBRAX) 5-2.5 MG per capsule Take 1 capsule by mouth 4 times daily as needed. 2/8/11 7/18/22  Ar Automatic Reconciliation     Allergies   Allergen Reactions    Codeine Other (See Comments)     dizziness    Erythromycin Rash    Tetracycline Rash          Objective:     Physical Exam:   Patient Vitals for the past 24 hrs:   Temp Pulse Resp BP SpO2   02/13/23 1355 97.2 °F (36.2 °C) 68 16 (!) 151/75 97 %   02/13/23 1222 -- 68 16 (!) 151/75 97 %       ECG:    No results found for this or any previous visit (from the past 4464 hour(s)).     Data Review:   Labs:   Recent Results (from the past 24 hour(s)) Protime-INR    Collection Time: 02/13/23 12:12 PM   Result Value Ref Range    Protime 13.1 12.6 - 14.3 sec    INR 1.0     Hemoglobin A1c    Collection Time: 02/13/23 12:12 PM   Result Value Ref Range    Hemoglobin A1C 8.4 (H) 4.8 - 5.6 %    eAG 194 mg/dL   CBC with Auto Differential    Collection Time: 02/13/23 12:12 PM   Result Value Ref Range    WBC 12.0 (H) 4.3 - 11.1 K/uL    RBC 4.75 4.05 - 5.2 M/uL    Hemoglobin 13.1 11.7 - 15.4 g/dL    Hematocrit 40.9 35.8 - 46.3 %    MCV 86.1 82.0 - 102.0 FL    MCH 27.6 26.1 - 32.9 PG    MCHC 32.0 31.4 - 35.0 g/dL    RDW 15.5 (H) 11.9 - 14.6 %    Platelets 065 207 - 094 K/uL    MPV 11.2 9.4 - 12.3 FL    nRBC 0.00 0.0 - 0.2 K/uL    Differential Type AUTOMATED      Seg Neutrophils 75 43 - 78 %    Lymphocytes 18 13 - 44 %    Monocytes 6 4.0 - 12.0 %    Eosinophils % 1 0.5 - 7.8 %    Basophils 0 0.0 - 2.0 %    Immature Granulocytes 0 0.0 - 5.0 %    Segs Absolute 8.9 (H) 1.7 - 8.2 K/UL    Absolute Lymph # 2.2 0.5 - 4.6 K/UL    Absolute Mono # 0.7 0.1 - 1.3 K/UL    Absolute Eos # 0.1 0.0 - 0.8 K/UL    Basophils Absolute 0.0 0.0 - 0.2 K/UL    Absolute Immature Granulocyte 0.0 0.0 - 0.5 K/UL   Basic Metabolic Panel    Collection Time: 02/13/23 12:12 PM   Result Value Ref Range    Sodium 138 133 - 143 mmol/L    Potassium 4.1 3.5 - 5.1 mmol/L    Chloride 102 101 - 110 mmol/L    CO2 31 21 - 32 mmol/L    Anion Gap 5 2 - 11 mmol/L    Glucose 132 (H) 65 - 100 mg/dL    BUN 32 (H) 8 - 23 MG/DL    Creatinine 1.16 (H) 0.6 - 1.0 MG/DL    Est, Glom Filt Rate 49 (L) >60 ml/min/1.73m2    Calcium 9.5 8.3 - 10.4 MG/DL   APTT    Collection Time: 02/13/23 12:12 PM   Result Value Ref Range    PTT 29.1 24.5 - 34.2 SEC   MSSA/MRSA Screen BY PCR    Collection Time: 02/13/23 12:16 PM    Specimen: Nares; Swab   Result Value Ref Range    Special Requests NO SPECIAL REQUESTS      Culture        SA target not detected.                                  A MRSA NEGATIVE, SA NEGATIVE test result does not preclude MRSA or SA nasal colonization. EKG 12 Lead    Collection Time: 02/13/23 12:35 PM   Result Value Ref Range    Ventricular Rate 64 BPM    Atrial Rate 64 BPM    P-R Interval 186 ms    QRS Duration 76 ms    Q-T Interval 384 ms    QTc Calculation (Bazett) 396 ms    P Axis 57 degrees    R Axis 45 degrees    T Axis 20 degrees    Diagnosis Normal sinus rhythm with sinus arrhythmia          Problem List:  )  Patient Active Problem List   Diagnosis    Osteoarthritis of right knee    Type 2 diabetes mellitus treated without insulin ([facility])    Class 3 severe obesity due to excess calories with serious comorbidity and body mass index (BMI) of 40.0 to 44.9 in adult [facility])    Status post right hip replacement    Hypertension    S/P total knee replacement using cement    Hypothyroidism    Pressure injury of contiguous region involving back and right buttock, stage 1    Shortness of breath    Neuropathy    Asthma    Osteoarthritis of left knee    Chronic pain    BELKIS (obstructive sleep apnea)    4th nerve palsy, left    Adie's tonic pupil, right    Irritable bowel syndrome    Gastroesophageal reflux disease without esophagitis    Hyperlipidemia    Hypothyroidism due to acquired atrophy of thyroid    Primary insomnia    Keratoconjunctivitis sicca, not specified as Sjogren's, bilateral    Polyneuropathy due to type 2 diabetes mellitus ([facility]    Spondylosis without myelopathy    Type 2 diabetes mellitus with hyperglycemia, without long-term current use of insulin ([facility])    Vitamin D deficiency    Stress incontinence of urine    Primary osteoarthritis of left hip    Noncompliance with CPAP treatment       Total Joint Surgery Pre-Assessment Recommendations:           Patient in assisted living due to failure to thrive. She will DC from sx to rehab Continuous saturation monitoring UPON ARRIVAL TO FLOOR. Heated high flow lung expansion x 24 hours and prn.   IP RT consult for respiratory evaluation every shift    Albuterol every 6 hours as need during hospitalization.      Signed By: DOMINICK Marshall    February 14, 2023

## 2023-02-15 RX ADMIN — BETAMETHASONE SODIUM PHOSPHATE AND BETAMETHASONE ACETATE 12 MG: 3; 3 INJECTION, SUSPENSION INTRA-ARTICULAR; INTRALESIONAL; INTRAMUSCULAR; SOFT TISSUE at 15:47

## 2023-02-15 NOTE — PROGRESS NOTES
02/13/23 1200   Treatment   Treatment Type Bedside spirometry   Oxygen Therapy/Pulse Ox   O2 Therapy Room air   Heart Rate 67   SpO2 94 %   Pulse Oximeter Device Mode Intermittent   $Pulse Oximeter $Spot check (single)   Bedside Spirometry   FEV-1/Actual (Liters) 1.51 Liters   FEV-1/Predicted (Liters) 76 Liters   Initial respiratory Assessment completed with pt. Pt was interviewed and evaluated in Joint camp prior to surgery. Patient ID:  Denia Taylor  [id number]  33 y.o.  1945  Surgeon: Dr. Alex Hinton  Date of Surgery: [email or web address]  Procedure: Total Left Hip Arthroplasty  Primary Care Physician: Christina Gilbert [phone]  Specialists:     BS:  DIMINSHED    Pt taught proper COUGH technique  IS REVIEWED WITH PT AS WELL AS BENEFITS OF USING IS IN SEDENTARY PTS. DIAPHRAGMATIC BREATHING EXERCISE INSTRUCTIONS GIVEN    History of smoking:   FORMER SMOKER 15 YEARS                 Quit date:       1/1/1977  Secondhand smoke:DENIES    Past procedures with Oxygen desaturation or delayed awakening:DELAYED AWAKENING    Past Medical History:   Diagnosis Date    Acute UTI 03/20/2021    Alternating constipation and diarrhea     Arthritis     Asthma     bronchio-asthma mostly seasonal    Chronic obstructive asthma, unspecified     Chronic pain     DDD, LOW BACK, SIATICA    COVID-19 01/11/2023    cough~pt states tested positive in nursing home facility; had been coughing for a week before they tested her.     DDD (degenerative disc disease)     WITH RADICULOPATHY    Disorder of sacrococcygeal spine     E-coli UTI 03/23/2021    Elevated serum creatinine 09/19/2017    Elevated WBC count 09/19/2017    Functional assessment declined 07/16/2022    Functional quadriplegia ([address] 07/13/2022    Gas bloat syndrome     GERD (gastroesophageal reflux disease)     Hyperlipidemia     Hypertension     Hypothyroidism     Incontinence of urine     Insomnia     Iron deficiency anemia 11/2022    Morbid obesity ([address]     Multiple environmental allergies     Murmur, cardiac     2/6 early systolic flow murmur upper sternal borders no radiation; ECHO 2/9/22    Neuropathy     BELKIS (obstructive sleep apnea) 09/16/2009    Osteoarthritis of left knee 09/16/2009    Osteoarthritis of right knee 03/02/2011    Prolonged emergence from general anesthesia     trouble waking up from anesthesia once ~20 years ago~ \"out for 8 hours\"    S/P total knee replacement using cement 09/16/2009    Sleep apnea     HAS CPAP~has not used in the last 8 months    Spastic colon     Spinal stenosis of lumbar region     with neurogenic claudication    Status post right hip replacement 10/09/2017    Thyroid disease     ON MEDS    Type II or unspecified type diabetes mellitus without mention of complication, not stated as uncontrolled     avg FBS: 160-170; Hgb A1c 6.9 on 7/15/22; takes insulin    BELKIS- NO CPAP   HX OF PERSISTENT COUGH  HX OF / ASTHMA- HX OF WHEEZING  HX OF COVID JAN 2023  PERSISTENT COUGH-YELLOW SPUTUM  Respiratory history:DENIES SOB                                                                  Respiratory meds: ADVAIR BID-MAXAIR    FAMILY PRESENT:             NO    PAST SLEEP STUDY:        YES      AHI 72.7                HX OF BELKIS:                        YES   AHI 72.7- SEVERE                    BELKIS assessment:     DANGERS OF UNTREATED BELKIS EXPLAINED TO PT.                                          SLEEPS ON SIDE       &      BACK         &       STOMACH  PHYSICAL EXAM   Body mass index is 44.2 kg/m².   Vitals:    02/13/23 1355   BP: (!) 151/75   Pulse: 68   Resp: 16   Temp: 97.2 °F (36.2 °C)   SpO2: 97%     Neck circumference:      cm    Loud snoring:                                                 YES             Witnessed apnea or wakening gasping or choking:            APNEA  Awakens with headaches:                                               DENIES  Morning or daytime tiredness/ sleepiness:                            TIRED  Dry mouth or sore throat  in morning:            YES                                               Hernandez stage:  4                                   SACS score:  Stop Bang                                     PT NON-COMPLIANT with CPAP. Dangers of non-compliance with treatment of BELKIS explained to pt. BELKIS handouts given to pt. ALBUTEROL Q 6 PRN  CONT. SAT MONITOR HS after surgery. AIRVO FOR LUNG EXPANSION  RESPIRATORY ASSESSMENT Q SHIFT.                                         Referrals:    Pt. Phone Number:

## 2023-02-21 ENCOUNTER — ANESTHESIA EVENT (OUTPATIENT)
Dept: SURGERY | Age: 78
End: 2023-02-21
Payer: MEDICARE

## 2023-02-21 NOTE — DISCHARGE INSTRUCTIONS
[address] [facility]   Patient Discharge Instructions    Maliha Valdez / [id number] : 1945    Admitted (Not on file) Discharged: 2023     IF YOU HAVE ANY PROBLEMS ONCE YOU ARE AT HOME CALL THE FOLLOWING NUMBERS:   Nurse's line: [phone]  Main office number: [phone]      Medications    The medications you are to continue on are listed on the medication reconciliation sheet. Narcotic pain medications as well as supplemental iron can cause constipation. If this occurs, try over the counter stool softeners or try stopping the narcotic pain medication and/or the iron. It is important that you take the medication exactly as they are prescribed. Medications which increase your risk of blood clots are listed to stop for 5 weeks after surgery as well as medications or supplements which increase your risk of bleeding complications. Keep your medication in the bottles provided by the pharmacist and keep a list of the medication names, dosages, and times to be taken in your wallet. Do not take other medications without consulting your doctor. If you need a refill on your pain medication, please note our office is closed over the weekend. It is our office policy that on-call providers cannot refill narcotic pain medications over the weekend. If you will need a refill over the weekend, please call our office before 12pm on Friday or first thing Monday morning. Important Information    Do NOT smoke as this will greatly increase your risk of infection! Resume your prehospital diet. If you have excessive nausea or vomitting call your doctor's office     Leg swelling and warmth is normal for 6 months after surgery. If you experience swelling in your leg, elevate your leg while laying down with your toes above your heart. If you have sudden onset severe swelling with leg pain call our office.  Use Jarvis Hose stockings until we see you in the office for your follow up appointment. The stitches deep inside take approximately 6 months to dissolve. There will be sharp shooting, stinging and burning pain. This is normal and will resolve between 3-6 months after surgery. Difficulty sleeping is normal following total Knee and Hip replacement. You may try melatonin, an over-the-counter sleep aid or benadryl to help with sleep. Most patients will resume sleeping through the night 8 weeks after surgery. Home Physical Therapy is arranged. Home Health will contact you within 48 hrs of discharge that you have chosen. If you have not received a call within this time frame please contact that provider you chose. You should be given this information before you leave the hospital.     You are at a risk for falls. Use the rolling walker when walking. Patients who have had a joint replacement should not drive if they are still taking narcotic pain mediation during the daytime hours. Most patients wean themselves off of pain medication within 2-5 weeks after surgery. You may drive once you discontinue the narcotic pain medication and feel comfortable enough going from gas to break while driving with your right leg. When to Call the office    - If you have a temperature greater then 101 + other symptoms that suggest illness such as nausea/vomiting, altered mental status, extreme fatigue, wound drainage, etc.  - Uncontrolled vomiting   - Loose control of your bladder or bowel function  - Are unable to bear any wieght       Information obtained by :  I understand that if any problems occur once I am at home I am to contact my physician. I understand and acknowledge receipt of the instructions indicated above.                                                                                                                                            Physician's or R.N.'s Signature                                                                  Date/Time Patient or Representative Signature                                                          Date/Time

## 2023-02-22 ENCOUNTER — HOSPITAL ENCOUNTER (INPATIENT)
Age: 78
LOS: 3 days | Discharge: HOME | End: 2023-02-25
Attending: ORTHOPAEDIC SURGERY | Admitting: ORTHOPAEDIC SURGERY
Payer: MEDICARE

## 2023-02-22 ENCOUNTER — ANESTHESIA (OUTPATIENT)
Dept: SURGERY | Age: 78
End: 2023-02-22
Payer: MEDICARE

## 2023-02-22 ENCOUNTER — APPOINTMENT (OUTPATIENT)
Dept: GENERAL RADIOLOGY | Age: 78
End: 2023-02-22
Attending: ORTHOPAEDIC SURGERY
Payer: MEDICARE

## 2023-02-22 DIAGNOSIS — M16.12 PRIMARY OSTEOARTHRITIS OF LEFT HIP: ICD-10-CM

## 2023-02-22 DIAGNOSIS — Z96.642 STATUS POST LEFT HIP REPLACEMENT: Primary | ICD-10-CM

## 2023-02-22 LAB
GLUCOSE BLD STRIP.AUTO-MCNC: 168 MG/DL (ref 65–100)
GLUCOSE BLD STRIP.AUTO-MCNC: 184 MG/DL (ref 65–100)
GLUCOSE BLD STRIP.AUTO-MCNC: 285 MG/DL (ref 65–100)
GLUCOSE BLD STRIP.AUTO-MCNC: 332 MG/DL (ref 65–100)
POTASSIUM SERPL-SCNC: 4.5 MMOL/L (ref 3.5–5.1)
SERVICE CMNT-IMP: ABNORMAL

## 2023-02-22 PROCEDURE — 1100000000 HC RM PRIVATE

## 2023-02-22 PROCEDURE — 2500000003 HC RX 250 WO HCPCS: Performed by: NURSE ANESTHETIST, CERTIFIED REGISTERED

## 2023-02-22 PROCEDURE — C1713 ANCHOR/SCREW BN/BN,TIS/BN: HCPCS | Performed by: ORTHOPAEDIC SURGERY

## 2023-02-22 PROCEDURE — 6360000002 HC RX W HCPCS: Performed by: ANESTHESIOLOGY

## 2023-02-22 PROCEDURE — 97161 PT EVAL LOW COMPLEX 20 MIN: CPT

## 2023-02-22 PROCEDURE — 2580000003 HC RX 258: Performed by: ANESTHESIOLOGY

## 2023-02-22 PROCEDURE — 2500000003 HC RX 250 WO HCPCS: Performed by: ORTHOPAEDIC SURGERY

## 2023-02-22 PROCEDURE — 2580000003 HC RX 258: Performed by: ORTHOPAEDIC SURGERY

## 2023-02-22 PROCEDURE — 6370000000 HC RX 637 (ALT 250 FOR IP): Performed by: NURSE PRACTITIONER

## 2023-02-22 PROCEDURE — 97535 SELF CARE MNGMENT TRAINING: CPT

## 2023-02-22 PROCEDURE — 3700000001 HC ADD 15 MINUTES (ANESTHESIA): Performed by: ORTHOPAEDIC SURGERY

## 2023-02-22 PROCEDURE — 2580000003 HC RX 258: Performed by: NURSE ANESTHETIST, CERTIFIED REGISTERED

## 2023-02-22 PROCEDURE — 36415 COLL VENOUS BLD VENIPUNCTURE: CPT

## 2023-02-22 PROCEDURE — 27130 TOTAL HIP ARTHROPLASTY: CPT | Performed by: ORTHOPAEDIC SURGERY

## 2023-02-22 PROCEDURE — 3600000015 HC SURGERY LEVEL 5 ADDTL 15MIN: Performed by: ORTHOPAEDIC SURGERY

## 2023-02-22 PROCEDURE — 6360000002 HC RX W HCPCS: Performed by: PHYSICIAN ASSISTANT

## 2023-02-22 PROCEDURE — C1776 JOINT DEVICE (IMPLANTABLE): HCPCS | Performed by: ORTHOPAEDIC SURGERY

## 2023-02-22 PROCEDURE — 3700000000 HC ANESTHESIA ATTENDED CARE: Performed by: ORTHOPAEDIC SURGERY

## 2023-02-22 PROCEDURE — 97530 THERAPEUTIC ACTIVITIES: CPT

## 2023-02-22 PROCEDURE — 82962 GLUCOSE BLOOD TEST: CPT

## 2023-02-22 PROCEDURE — 2709999900 HC NON-CHARGEABLE SUPPLY: Performed by: ORTHOPAEDIC SURGERY

## 2023-02-22 PROCEDURE — 84132 ASSAY OF SERUM POTASSIUM: CPT

## 2023-02-22 PROCEDURE — 6370000000 HC RX 637 (ALT 250 FOR IP): Performed by: PHYSICIAN ASSISTANT

## 2023-02-22 PROCEDURE — 0SRB04A REPLACEMENT OF LEFT HIP JOINT WITH CERAMIC ON POLYETHYLENE SYNTHETIC SUBSTITUTE, UNCEMENTED, OPEN APPROACH: ICD-10-PCS | Performed by: ORTHOPAEDIC SURGERY

## 2023-02-22 PROCEDURE — 94761 N-INVAS EAR/PLS OXIMETRY MLT: CPT

## 2023-02-22 PROCEDURE — 6360000002 HC RX W HCPCS: Performed by: NURSE ANESTHETIST, CERTIFIED REGISTERED

## 2023-02-22 PROCEDURE — 2580000003 HC RX 258: Performed by: PHYSICIAN ASSISTANT

## 2023-02-22 PROCEDURE — 6360000002 HC RX W HCPCS: Performed by: ORTHOPAEDIC SURGERY

## 2023-02-22 PROCEDURE — 6370000000 HC RX 637 (ALT 250 FOR IP): Performed by: ANESTHESIOLOGY

## 2023-02-22 PROCEDURE — 7100000000 HC PACU RECOVERY - FIRST 15 MIN: Performed by: ORTHOPAEDIC SURGERY

## 2023-02-22 PROCEDURE — 7100000001 HC PACU RECOVERY - ADDTL 15 MIN: Performed by: ORTHOPAEDIC SURGERY

## 2023-02-22 PROCEDURE — 2700000000 HC OXYGEN THERAPY PER DAY

## 2023-02-22 PROCEDURE — 3600000005 HC SURGERY LEVEL 5 BASE: Performed by: ORTHOPAEDIC SURGERY

## 2023-02-22 PROCEDURE — 97165 OT EVAL LOW COMPLEX 30 MIN: CPT

## 2023-02-22 PROCEDURE — 72170 X-RAY EXAM OF PELVIS: CPT

## 2023-02-22 PROCEDURE — 2720000010 HC SURG SUPPLY STERILE: Performed by: ORTHOPAEDIC SURGERY

## 2023-02-22 DEVICE — TRIDENT II TRITANIUM CLUSTER 50D
Type: IMPLANTABLE DEVICE | Site: HIP | Status: FUNCTIONAL
Brand: TRIDENT II

## 2023-02-22 DEVICE — TRIDENT X3 0 DEGREE POLYETHYLENE INSERT
Type: IMPLANTABLE DEVICE | Site: HIP | Status: FUNCTIONAL
Brand: TRIDENT X3 INSERT

## 2023-02-22 DEVICE — COMPONENT TOT HIP CAPPED LNR POLYETH H2STRYKER] STRYKER CORP]: Type: IMPLANTABLE DEVICE | Site: HIP | Status: FUNCTIONAL

## 2023-02-22 DEVICE — HIP STEM - HIGH OFFSET
Type: IMPLANTABLE DEVICE | Site: HIP | Status: FUNCTIONAL
Brand: INSIGNIA

## 2023-02-22 DEVICE — CERAMIC V40 FEMORAL HEAD
Type: IMPLANTABLE DEVICE | Site: HIP | Status: FUNCTIONAL
Brand: BIOLOX

## 2023-02-22 DEVICE — SCREW BNE L25MM DIA6.5MM HEX LO PROF TRIDENT II: Type: IMPLANTABLE DEVICE | Site: HIP | Status: FUNCTIONAL

## 2023-02-22 RX ORDER — SENNA AND DOCUSATE SODIUM 50; 8.6 MG/1; MG/1
1 TABLET, FILM COATED ORAL AS NEEDED
Status: DISCONTINUED | OUTPATIENT
Start: 2023-02-22 | End: 2023-02-22 | Stop reason: SDUPTHER

## 2023-02-22 RX ORDER — DIPHENHYDRAMINE HCL 25 MG
25 CAPSULE ORAL EVERY 6 HOURS PRN
Status: DISCONTINUED | OUTPATIENT
Start: 2023-02-22 | End: 2023-02-25 | Stop reason: HOSPADM

## 2023-02-22 RX ORDER — GLYCOPYRROLATE 0.2 MG/ML
INJECTION INTRAMUSCULAR; INTRAVENOUS PRN
Status: DISCONTINUED | OUTPATIENT
Start: 2023-02-22 | End: 2023-02-22 | Stop reason: SDUPTHER

## 2023-02-22 RX ORDER — SODIUM CHLORIDE 9 MG/ML
INJECTION, SOLUTION INTRAVENOUS PRN
Status: DISCONTINUED | OUTPATIENT
Start: 2023-02-22 | End: 2023-02-25 | Stop reason: HOSPADM

## 2023-02-22 RX ORDER — CELECOXIB 200 MG/1
200 CAPSULE ORAL 2 TIMES DAILY
Status: DISCONTINUED | OUTPATIENT
Start: 2023-02-22 | End: 2023-02-25 | Stop reason: HOSPADM

## 2023-02-22 RX ORDER — EPHEDRINE SULFATE/0.9% NACL/PF 50 MG/5 ML
SYRINGE (ML) INTRAVENOUS PRN
Status: DISCONTINUED | OUTPATIENT
Start: 2023-02-22 | End: 2023-02-22 | Stop reason: SDUPTHER

## 2023-02-22 RX ORDER — VANCOMYCIN HYDROCHLORIDE 1 G/20ML
INJECTION, POWDER, LYOPHILIZED, FOR SOLUTION INTRAVENOUS PRN
Status: DISCONTINUED | OUTPATIENT
Start: 2023-02-22 | End: 2023-02-22 | Stop reason: HOSPADM

## 2023-02-22 RX ORDER — PREGABALIN 50 MG/1
50 CAPSULE ORAL 2 TIMES DAILY
Status: DISCONTINUED | OUTPATIENT
Start: 2023-02-22 | End: 2023-02-25 | Stop reason: HOSPADM

## 2023-02-22 RX ORDER — DIPHENHYDRAMINE HYDROCHLORIDE 50 MG/ML
25 INJECTION INTRAMUSCULAR; INTRAVENOUS EVERY 6 HOURS PRN
Status: DISCONTINUED | OUTPATIENT
Start: 2023-02-22 | End: 2023-02-25 | Stop reason: HOSPADM

## 2023-02-22 RX ORDER — FENTANYL CITRATE 50 UG/ML
100 INJECTION, SOLUTION INTRAMUSCULAR; INTRAVENOUS
Status: DISCONTINUED | OUTPATIENT
Start: 2023-02-22 | End: 2023-02-22 | Stop reason: HOSPADM

## 2023-02-22 RX ORDER — HYDRALAZINE HYDROCHLORIDE 20 MG/ML
10 INJECTION INTRAMUSCULAR; INTRAVENOUS
Status: DISCONTINUED | OUTPATIENT
Start: 2023-02-22 | End: 2023-02-22 | Stop reason: HOSPADM

## 2023-02-22 RX ORDER — LIDOCAINE HYDROCHLORIDE 10 MG/ML
1 INJECTION, SOLUTION INFILTRATION; PERINEURAL
Status: DISCONTINUED | OUTPATIENT
Start: 2023-02-22 | End: 2023-02-22 | Stop reason: HOSPADM

## 2023-02-22 RX ORDER — ASPIRIN 81 MG/1
81 TABLET ORAL 2 TIMES DAILY
Status: DISCONTINUED | OUTPATIENT
Start: 2023-02-22 | End: 2023-02-25 | Stop reason: HOSPADM

## 2023-02-22 RX ORDER — ALBUTEROL SULFATE 2.5 MG/3ML
2.5 SOLUTION RESPIRATORY (INHALATION) EVERY 6 HOURS PRN
Status: DISCONTINUED | OUTPATIENT
Start: 2023-02-22 | End: 2023-02-25 | Stop reason: HOSPADM

## 2023-02-22 RX ORDER — SODIUM CHLORIDE 0.9 % (FLUSH) 0.9 %
5-40 SYRINGE (ML) INJECTION EVERY 12 HOURS SCHEDULED
Status: DISCONTINUED | OUTPATIENT
Start: 2023-02-22 | End: 2023-02-25 | Stop reason: HOSPADM

## 2023-02-22 RX ORDER — SODIUM CHLORIDE 0.9 % (FLUSH) 0.9 %
5-40 SYRINGE (ML) INJECTION EVERY 12 HOURS SCHEDULED
Status: DISCONTINUED | OUTPATIENT
Start: 2023-02-22 | End: 2023-02-22 | Stop reason: SDUPTHER

## 2023-02-22 RX ORDER — LISINOPRIL AND HYDROCHLOROTHIAZIDE 12.5; 1 MG/1; MG/1
1 TABLET ORAL DAILY
Status: DISCONTINUED | OUTPATIENT
Start: 2023-02-23 | End: 2023-02-25 | Stop reason: HOSPADM

## 2023-02-22 RX ORDER — OXYCODONE HYDROCHLORIDE 5 MG/1
5 TABLET ORAL
Status: COMPLETED | OUTPATIENT
Start: 2023-02-22 | End: 2023-02-22

## 2023-02-22 RX ORDER — SODIUM CHLORIDE 9 MG/ML
INJECTION, SOLUTION INTRAVENOUS PRN
Status: DISCONTINUED | OUTPATIENT
Start: 2023-02-22 | End: 2023-02-22 | Stop reason: SDUPTHER

## 2023-02-22 RX ORDER — INSULIN LISPRO 100 [IU]/ML
0-4 INJECTION, SOLUTION INTRAVENOUS; SUBCUTANEOUS NIGHTLY
Status: DISCONTINUED | OUTPATIENT
Start: 2023-02-22 | End: 2023-02-25 | Stop reason: HOSPADM

## 2023-02-22 RX ORDER — INSULIN LISPRO 100 [IU]/ML
5 INJECTION, SOLUTION INTRAVENOUS; SUBCUTANEOUS
Status: DISCONTINUED | OUTPATIENT
Start: 2023-02-22 | End: 2023-02-25 | Stop reason: HOSPADM

## 2023-02-22 RX ORDER — MIDAZOLAM HYDROCHLORIDE 1 MG/ML
2 INJECTION INTRAMUSCULAR; INTRAVENOUS
Status: DISCONTINUED | OUTPATIENT
Start: 2023-02-22 | End: 2023-02-22 | Stop reason: HOSPADM

## 2023-02-22 RX ORDER — PANTOPRAZOLE SODIUM 40 MG/1
40 TABLET, DELAYED RELEASE ORAL DAILY
Status: DISCONTINUED | OUTPATIENT
Start: 2023-02-23 | End: 2023-02-25 | Stop reason: HOSPADM

## 2023-02-22 RX ORDER — PROPOFOL 10 MG/ML
INJECTION, EMULSION INTRAVENOUS PRN
Status: DISCONTINUED | OUTPATIENT
Start: 2023-02-22 | End: 2023-02-22 | Stop reason: SDUPTHER

## 2023-02-22 RX ORDER — ROPIVACAINE HYDROCHLORIDE 2 MG/ML
INJECTION, SOLUTION EPIDURAL; INFILTRATION; PERINEURAL PRN
Status: DISCONTINUED | OUTPATIENT
Start: 2023-02-22 | End: 2023-02-22 | Stop reason: HOSPADM

## 2023-02-22 RX ORDER — KETOROLAC TROMETHAMINE 30 MG/ML
INJECTION, SOLUTION INTRAMUSCULAR; INTRAVENOUS PRN
Status: DISCONTINUED | OUTPATIENT
Start: 2023-02-22 | End: 2023-02-22 | Stop reason: HOSPADM

## 2023-02-22 RX ORDER — INSULIN GLARGINE 100 [IU]/ML
18 INJECTION, SOLUTION SUBCUTANEOUS DAILY
Status: DISCONTINUED | OUTPATIENT
Start: 2023-02-23 | End: 2023-02-25 | Stop reason: HOSPADM

## 2023-02-22 RX ORDER — ONDANSETRON 4 MG/1
4 TABLET, ORALLY DISINTEGRATING ORAL EVERY 8 HOURS PRN
Status: DISCONTINUED | OUTPATIENT
Start: 2023-02-22 | End: 2023-02-25 | Stop reason: HOSPADM

## 2023-02-22 RX ORDER — SENNA AND DOCUSATE SODIUM 50; 8.6 MG/1; MG/1
1 TABLET, FILM COATED ORAL 2 TIMES DAILY
Status: DISCONTINUED | OUTPATIENT
Start: 2023-02-22 | End: 2023-02-25 | Stop reason: HOSPADM

## 2023-02-22 RX ORDER — ONDANSETRON 2 MG/ML
INJECTION INTRAMUSCULAR; INTRAVENOUS PRN
Status: DISCONTINUED | OUTPATIENT
Start: 2023-02-22 | End: 2023-02-22 | Stop reason: SDUPTHER

## 2023-02-22 RX ORDER — OXYCODONE HYDROCHLORIDE 5 MG/1
10 TABLET ORAL EVERY 4 HOURS PRN
Status: DISCONTINUED | OUTPATIENT
Start: 2023-02-22 | End: 2023-02-25 | Stop reason: HOSPADM

## 2023-02-22 RX ORDER — SODIUM CHLORIDE 9 MG/ML
INJECTION, SOLUTION INTRAVENOUS PRN
Status: DISCONTINUED | OUTPATIENT
Start: 2023-02-22 | End: 2023-02-22 | Stop reason: HOSPADM

## 2023-02-22 RX ORDER — SODIUM CHLORIDE, SODIUM LACTATE, POTASSIUM CHLORIDE, CALCIUM CHLORIDE 600; 310; 30; 20 MG/100ML; MG/100ML; MG/100ML; MG/100ML
INJECTION, SOLUTION INTRAVENOUS CONTINUOUS
Status: DISCONTINUED | OUTPATIENT
Start: 2023-02-22 | End: 2023-02-22 | Stop reason: HOSPADM

## 2023-02-22 RX ORDER — HYDROMORPHONE HYDROCHLORIDE 1 MG/ML
1 INJECTION, SOLUTION INTRAMUSCULAR; INTRAVENOUS; SUBCUTANEOUS
Status: DISCONTINUED | OUTPATIENT
Start: 2023-02-22 | End: 2023-02-25 | Stop reason: HOSPADM

## 2023-02-22 RX ORDER — SODIUM CHLORIDE 0.9 % (FLUSH) 0.9 %
5-40 SYRINGE (ML) INJECTION EVERY 12 HOURS SCHEDULED
Status: DISCONTINUED | OUTPATIENT
Start: 2023-02-22 | End: 2023-02-22 | Stop reason: HOSPADM

## 2023-02-22 RX ORDER — MONTELUKAST SODIUM 10 MG/1
10 TABLET ORAL NIGHTLY
Status: DISCONTINUED | OUTPATIENT
Start: 2023-02-22 | End: 2023-02-25 | Stop reason: HOSPADM

## 2023-02-22 RX ORDER — LIDOCAINE HYDROCHLORIDE 20 MG/ML
INJECTION, SOLUTION EPIDURAL; INFILTRATION; INTRACAUDAL; PERINEURAL PRN
Status: DISCONTINUED | OUTPATIENT
Start: 2023-02-22 | End: 2023-02-22 | Stop reason: SDUPTHER

## 2023-02-22 RX ORDER — SODIUM CHLORIDE 0.9 % (FLUSH) 0.9 %
5-40 SYRINGE (ML) INJECTION PRN
Status: DISCONTINUED | OUTPATIENT
Start: 2023-02-22 | End: 2023-02-22 | Stop reason: HOSPADM

## 2023-02-22 RX ORDER — CETIRIZINE HYDROCHLORIDE 10 MG/1
10 TABLET ORAL DAILY
Status: DISCONTINUED | OUTPATIENT
Start: 2023-02-23 | End: 2023-02-25 | Stop reason: HOSPADM

## 2023-02-22 RX ORDER — LEVOTHYROXINE SODIUM 112 UG/1
112 TABLET ORAL DAILY
Status: DISCONTINUED | OUTPATIENT
Start: 2023-02-23 | End: 2023-02-25 | Stop reason: HOSPADM

## 2023-02-22 RX ORDER — MIDAZOLAM HYDROCHLORIDE 1 MG/ML
INJECTION INTRAMUSCULAR; INTRAVENOUS PRN
Status: DISCONTINUED | OUTPATIENT
Start: 2023-02-22 | End: 2023-02-22 | Stop reason: SDUPTHER

## 2023-02-22 RX ORDER — TRANEXAMIC ACID 100 MG/ML
INJECTION, SOLUTION INTRAVENOUS PRN
Status: DISCONTINUED | OUTPATIENT
Start: 2023-02-22 | End: 2023-02-22 | Stop reason: SDUPTHER

## 2023-02-22 RX ORDER — ZOLPIDEM TARTRATE 5 MG/1
5 TABLET ORAL NIGHTLY
Status: DISCONTINUED | OUTPATIENT
Start: 2023-02-22 | End: 2023-02-25 | Stop reason: HOSPADM

## 2023-02-22 RX ORDER — ACETAMINOPHEN 325 MG/1
650 TABLET ORAL EVERY 6 HOURS
Status: DISCONTINUED | OUTPATIENT
Start: 2023-02-22 | End: 2023-02-25 | Stop reason: HOSPADM

## 2023-02-22 RX ORDER — ACETAMINOPHEN 500 MG
1000 TABLET ORAL ONCE
Status: COMPLETED | OUTPATIENT
Start: 2023-02-22 | End: 2023-02-22

## 2023-02-22 RX ORDER — LABETALOL HYDROCHLORIDE 5 MG/ML
10 INJECTION, SOLUTION INTRAVENOUS
Status: DISCONTINUED | OUTPATIENT
Start: 2023-02-22 | End: 2023-02-22 | Stop reason: HOSPADM

## 2023-02-22 RX ORDER — DEXAMETHASONE SODIUM PHOSPHATE 10 MG/ML
INJECTION INTRAMUSCULAR; INTRAVENOUS PRN
Status: DISCONTINUED | OUTPATIENT
Start: 2023-02-22 | End: 2023-02-22 | Stop reason: SDUPTHER

## 2023-02-22 RX ORDER — SODIUM CHLORIDE 0.9 % (FLUSH) 0.9 %
5-40 SYRINGE (ML) INJECTION PRN
Status: DISCONTINUED | OUTPATIENT
Start: 2023-02-22 | End: 2023-02-25 | Stop reason: HOSPADM

## 2023-02-22 RX ORDER — FENTANYL CITRATE 50 UG/ML
INJECTION, SOLUTION INTRAMUSCULAR; INTRAVENOUS PRN
Status: DISCONTINUED | OUTPATIENT
Start: 2023-02-22 | End: 2023-02-22 | Stop reason: SDUPTHER

## 2023-02-22 RX ORDER — POLYETHYLENE GLYCOL 3350 17 G/17G
17 POWDER, FOR SOLUTION ORAL DAILY PRN
Status: DISCONTINUED | OUTPATIENT
Start: 2023-02-22 | End: 2023-02-25 | Stop reason: HOSPADM

## 2023-02-22 RX ORDER — PROCHLORPERAZINE EDISYLATE 5 MG/ML
5 INJECTION INTRAMUSCULAR; INTRAVENOUS
Status: DISCONTINUED | OUTPATIENT
Start: 2023-02-22 | End: 2023-02-22 | Stop reason: HOSPADM

## 2023-02-22 RX ORDER — ONDANSETRON 2 MG/ML
4 INJECTION INTRAMUSCULAR; INTRAVENOUS EVERY 6 HOURS PRN
Status: DISCONTINUED | OUTPATIENT
Start: 2023-02-22 | End: 2023-02-25 | Stop reason: HOSPADM

## 2023-02-22 RX ORDER — ONDANSETRON 2 MG/ML
4 INJECTION INTRAMUSCULAR; INTRAVENOUS
Status: DISCONTINUED | OUTPATIENT
Start: 2023-02-22 | End: 2023-02-22 | Stop reason: HOSPADM

## 2023-02-22 RX ORDER — SODIUM CHLORIDE 0.9 % (FLUSH) 0.9 %
5-40 SYRINGE (ML) INJECTION PRN
Status: DISCONTINUED | OUTPATIENT
Start: 2023-02-22 | End: 2023-02-22 | Stop reason: SDUPTHER

## 2023-02-22 RX ORDER — FUROSEMIDE 20 MG/1
20 TABLET ORAL DAILY
Status: DISCONTINUED | OUTPATIENT
Start: 2023-02-23 | End: 2023-02-25

## 2023-02-22 RX ADMIN — Medication 10 MG: at 10:48

## 2023-02-22 RX ADMIN — SODIUM CHLORIDE, SODIUM LACTATE, POTASSIUM CHLORIDE, AND CALCIUM CHLORIDE: 600; 310; 30; 20 INJECTION, SOLUTION INTRAVENOUS at 08:33

## 2023-02-22 RX ADMIN — PHENYLEPHRINE HYDROCHLORIDE 100 MCG: 0.1 INJECTION, SOLUTION INTRAVENOUS at 11:00

## 2023-02-22 RX ADMIN — OXYCODONE HYDROCHLORIDE 10 MG: 5 TABLET ORAL at 22:50

## 2023-02-22 RX ADMIN — ACETAMINOPHEN 650 MG: 325 TABLET, FILM COATED ORAL at 18:34

## 2023-02-22 RX ADMIN — OXYCODONE HYDROCHLORIDE 10 MG: 5 TABLET ORAL at 18:34

## 2023-02-22 RX ADMIN — SODIUM CHLORIDE, SODIUM LACTATE, POTASSIUM CHLORIDE, AND CALCIUM CHLORIDE: 600; 310; 30; 20 INJECTION, SOLUTION INTRAVENOUS at 11:06

## 2023-02-22 RX ADMIN — Medication 2000 MG: at 10:15

## 2023-02-22 RX ADMIN — HYDROMORPHONE HYDROCHLORIDE 0.5 MG: 1 INJECTION, SOLUTION INTRAMUSCULAR; INTRAVENOUS; SUBCUTANEOUS at 12:31

## 2023-02-22 RX ADMIN — OXYCODONE HYDROCHLORIDE 5 MG: 5 TABLET ORAL at 12:32

## 2023-02-22 RX ADMIN — ACETAMINOPHEN 1000 MG: 500 TABLET, FILM COATED ORAL at 08:32

## 2023-02-22 RX ADMIN — OXYCODONE HYDROCHLORIDE 10 MG: 5 TABLET ORAL at 14:36

## 2023-02-22 RX ADMIN — ONDANSETRON 4 MG: 2 INJECTION INTRAMUSCULAR; INTRAVENOUS at 10:18

## 2023-02-22 RX ADMIN — PHENYLEPHRINE HYDROCHLORIDE 100 MCG: 0.1 INJECTION, SOLUTION INTRAVENOUS at 11:05

## 2023-02-22 RX ADMIN — DEXAMETHASONE SODIUM PHOSPHATE 10 MG: 10 INJECTION INTRAMUSCULAR; INTRAVENOUS at 10:24

## 2023-02-22 RX ADMIN — MEPIVACAINE HYDROCHLORIDE 60 MG: 20 INJECTION, SOLUTION EPIDURAL; INFILTRATION at 10:19

## 2023-02-22 RX ADMIN — FENTANYL CITRATE 50 MCG: 50 INJECTION, SOLUTION INTRAMUSCULAR; INTRAVENOUS at 10:09

## 2023-02-22 RX ADMIN — PHENYLEPHRINE HYDROCHLORIDE 100 MCG: 0.1 INJECTION, SOLUTION INTRAVENOUS at 10:51

## 2023-02-22 RX ADMIN — INSULIN LISPRO 5 UNITS: 100 INJECTION, SOLUTION INTRAVENOUS; SUBCUTANEOUS at 18:36

## 2023-02-22 RX ADMIN — ASPIRIN 81 MG: 81 TABLET, COATED ORAL at 20:58

## 2023-02-22 RX ADMIN — SODIUM CHLORIDE, PRESERVATIVE FREE 10 ML: 5 INJECTION INTRAVENOUS at 18:39

## 2023-02-22 RX ADMIN — PROPOFOL 20 MG: 10 INJECTION, EMULSION INTRAVENOUS at 11:12

## 2023-02-22 RX ADMIN — PREGABALIN 50 MG: 50 CAPSULE ORAL at 20:59

## 2023-02-22 RX ADMIN — MONTELUKAST 10 MG: 10 TABLET, FILM COATED ORAL at 20:58

## 2023-02-22 RX ADMIN — CEFAZOLIN SODIUM 2000 MG: 100 INJECTION, POWDER, LYOPHILIZED, FOR SOLUTION INTRAVENOUS at 18:36

## 2023-02-22 RX ADMIN — PHENYLEPHRINE HYDROCHLORIDE 40 MCG/MIN: 10 INJECTION INTRAVENOUS at 11:06

## 2023-02-22 RX ADMIN — HYDROMORPHONE HYDROCHLORIDE 0.5 MG: 1 INJECTION, SOLUTION INTRAMUSCULAR; INTRAVENOUS; SUBCUTANEOUS at 12:36

## 2023-02-22 RX ADMIN — PROPOFOL 50 MG: 10 INJECTION, EMULSION INTRAVENOUS at 10:45

## 2023-02-22 RX ADMIN — Medication 10 MG: at 11:05

## 2023-02-22 RX ADMIN — PHENYLEPHRINE HYDROCHLORIDE 100 MCG: 0.1 INJECTION, SOLUTION INTRAVENOUS at 10:35

## 2023-02-22 RX ADMIN — FENTANYL CITRATE 50 MCG: 50 INJECTION, SOLUTION INTRAMUSCULAR; INTRAVENOUS at 10:07

## 2023-02-22 RX ADMIN — MIDAZOLAM 2 MG: 1 INJECTION INTRAMUSCULAR; INTRAVENOUS at 10:07

## 2023-02-22 RX ADMIN — PROPOFOL 30 MG: 10 INJECTION, EMULSION INTRAVENOUS at 10:21

## 2023-02-22 RX ADMIN — LIDOCAINE HYDROCHLORIDE 50 MG: 20 INJECTION, SOLUTION EPIDURAL; INFILTRATION; INTRACAUDAL at 10:24

## 2023-02-22 RX ADMIN — TRANEXAMIC ACID 1000 MG: 1 INJECTION, SOLUTION INTRAVENOUS at 10:18

## 2023-02-22 RX ADMIN — TUBERCULIN PURIFIED PROTEIN DERIVATIVE 5 UNITS: 5 INJECTION, SOLUTION INTRADERMAL at 14:38

## 2023-02-22 RX ADMIN — ZOLPIDEM TARTRATE 5 MG: 5 TABLET ORAL at 20:59

## 2023-02-22 RX ADMIN — PHENYLEPHRINE HYDROCHLORIDE 100 MCG: 0.1 INJECTION, SOLUTION INTRAVENOUS at 10:57

## 2023-02-22 RX ADMIN — Medication 10 MG: at 10:57

## 2023-02-22 RX ADMIN — PROPOFOL 50 MCG/KG/MIN: 10 INJECTION, EMULSION INTRAVENOUS at 10:24

## 2023-02-22 RX ADMIN — SODIUM CHLORIDE, PRESERVATIVE FREE 10 ML: 5 INJECTION INTRAVENOUS at 20:58

## 2023-02-22 RX ADMIN — Medication 10 MG: at 10:51

## 2023-02-22 RX ADMIN — CELECOXIB 200 MG: 200 CAPSULE ORAL at 20:59

## 2023-02-22 RX ADMIN — INSULIN LISPRO 4 UNITS: 100 INJECTION, SOLUTION INTRAVENOUS; SUBCUTANEOUS at 22:55

## 2023-02-22 RX ADMIN — PHENYLEPHRINE HYDROCHLORIDE 100 MCG: 0.1 INJECTION, SOLUTION INTRAVENOUS at 10:48

## 2023-02-22 RX ADMIN — GLYCOPYRROLATE 0.1 MG: 0.2 INJECTION INTRAMUSCULAR; INTRAVENOUS at 10:34

## 2023-02-22 ASSESSMENT — HOOS JR
WALKING ON UNEVEN SURFACE: 3
LYING IN BED (TURNING OVER, MAINTAINING HIP POSITION): 3
BENDING TO THE FLOOR TO PICK UP OBJECT: 3
GOING UP OR DOWN STAIRS: 3
SITTING: 3
RISING FROM SITTING: 3
HOOS JR RAW SCORE: 18
HOOS JR TOTAL INTERVAL SCORE: 32.735
HOOS JR RAW SCORE: 18

## 2023-02-22 ASSESSMENT — PAIN DESCRIPTION - LOCATION
LOCATION: BACK
LOCATION: HIP
LOCATION: HIP
LOCATION: BACK
LOCATION: BACK
LOCATION: BACK;HIP
LOCATION: HIP

## 2023-02-22 ASSESSMENT — PAIN DESCRIPTION - ORIENTATION
ORIENTATION: LOWER;RIGHT
ORIENTATION: LEFT

## 2023-02-22 ASSESSMENT — PAIN SCALES - GENERAL
PAINLEVEL_OUTOF10: 10
PAINLEVEL_OUTOF10: 6
PAINLEVEL_OUTOF10: 7
PAINLEVEL_OUTOF10: 4
PAINLEVEL_OUTOF10: 5
PAINLEVEL_OUTOF10: 6
PAINLEVEL_OUTOF10: 3

## 2023-02-22 ASSESSMENT — PAIN DESCRIPTION - DESCRIPTORS
DESCRIPTORS: ACHING

## 2023-02-22 ASSESSMENT — PAIN - FUNCTIONAL ASSESSMENT: PAIN_FUNCTIONAL_ASSESSMENT: 0-10

## 2023-02-22 NOTE — ANESTHESIA PRE PROCEDURE
Department of Anesthesiology  Preprocedure Note       Name:  Rebeca Chamberlain   Age:  69 y.o.  :  1945                                          MRN:  [id number]         Date:  2023      Surgeon: Nasir Sim):  Litzy Ivan MD    Procedure: Procedure(s):  lt HIP TOTAL ARTHROPLASTY    Medications prior to admission:   Prior to Admission medications    Medication Sig Start Date End Date Taking? Authorizing Provider   tiZANidine (ZANAFLEX) 4 MG tablet Take 1/2 to 1 tablet by mouth TID, prn  Patient not taking: No sig reported 23   Ede Taylor MD   cetirizine (ZYRTEC) 10 MG tablet Take 10 mg by mouth daily    Historical Provider, MD   Multiple Vitamins-Minerals (MULTIVITAMIN ADULTS PO) Take by mouth daily    Historical Provider, MD   zolpidem (AMBIEN) 5 MG tablet Take 5 mg by mouth at bedtime. Historical Provider, MD   saccharomyces boulardii (FLORASTOR) 250 MG capsule Take 250 mg by mouth 2 times daily For diarrhea    Historical Provider, MD   fluticasone-salmeterol (ADVAIR) 100-50 MCG/ACT AEPB diskus inhaler Inhale 1 puff into the lungs every 12 hours    Historical Provider, MD   insulin lispro (HUMALOG) 100 UNIT/ML injection vial Inject into the skin 3 times daily (before meals) Inject 5 units subcutaneously before meals for type 2 diabetes    Historical Provider, MD   acetaminophen (TYLENOL) 500 MG tablet Take 500 mg by mouth 3 times daily as needed for Pain    Historical Provider, MD   sennosides-docusate sodium (SENOKOT-S) 8.6-50 MG tablet Take 1 tablet by mouth as needed for Constipation    Historical Provider, MD   traMADol (ULTRAM) 50 MG tablet Take 50 mg by mouth every 12 hours as needed for Pain. Historical Provider, MD   insulin glargine (LANTUS) 100 UNIT/ML injection vial Inject 18 Units into the skin daily Take 14 units the day of surgery    Historical Provider, MD   SITagliptin (JANUVIA) 50 MG tablet Take 1 tablet by mouth in the morning.   Patient not taking: No sig reported 7/18/22   Annabel Chaudhari MD   simethicone (MYLICON) 80 MG chewable tablet Take 1 tablet by mouth every 6 hours as needed for Flatulence 7/18/22   Annabel Chaudhari MD   naloxone 4 MG/0.1ML LIQD nasal spray 1 spray by Nasal route as needed for Opioid Reversal  Patient not taking: Reported on 2/22/2023 7/18/22   Annabel Chaudhari MD   pregabalin (LYRICA) 50 MG capsule Take 50 mg by mouth 2 times daily.     Historical Provider, MD   celecoxib (CELEBREX) 200 MG capsule Take 200 mg by mouth 2 times daily    Historical Provider, MD   furosemide (LASIX) 20 MG tablet Take 20 mg by mouth daily    Historical Provider, MD   lisinopril (PRINIVIL;ZESTRIL) 2.5 MG tablet 1 tablet 4/11/22 7/18/22  Historical Provider, MD   OMEPRAZOLE PO 20 mg daily    Ar Automatic Reconciliation   azelastine HCl 0.15 % SOLN 2 sprays by Nasal route at bedtime    Ar Automatic Reconciliation   Biotin 2.5 MG CAPS Take by mouth  Patient not taking: No sig reported    Ar Automatic Reconciliation   budesonide (PULMICORT) 0.5 MG/2ML nebulizer suspension Inhale 500 mcg into the lungs 2 times daily as needed   Patient not taking: No sig reported    Ar Automatic Reconciliation   formoterol (PERFOROMIST) 20 MCG/2ML nebulizer solution Inhale 20 mcg into the lungs 2 times daily as needed   Patient not taking: No sig reported    Ar Automatic Reconciliation   levothyroxine (SYNTHROID) 100 MCG tablet Take 100 mcg by mouth daily    Ar Automatic Reconciliation   lisinopril-hydroCHLOROthiazide (PRINZIDE;ZESTORETIC) 10-12.5 MG per tablet Take 1 tablet by mouth daily    Ar Automatic Reconciliation   montelukast (SINGULAIR) 10 MG tablet Take 10 mg by mouth nightly    Ar Automatic Reconciliation   olopatadine (PATANOL) 0.1 % ophthalmic solution Place 2 drops into both eyes at bedtime    Ar Automatic Reconciliation   polyethylene glycol (GLYCOLAX) 17 GM/SCOOP powder Take 17 g by mouth as needed    Ar Automatic Reconciliation   amLODIPine (NORVASC) 5 MG tablet Take 5 mg by mouth daily  7/18/22  Ar Automatic Reconciliation   chlordiazePOXIDE-clidinium (LIBRAX) 5-2.5 MG per capsule Take 1 capsule by mouth 4 times daily as needed. 2/8/11 7/18/22  Ar Automatic Reconciliation       Current medications:    Current Facility-Administered Medications   Medication Dose Route Frequency Provider Last Rate Last Admin    lidocaine 1 % injection 1 mL  1 mL IntraDERmal Once PRN [facility], DO        fentaNYL (SUBLIMAZE) injection 100 mcg  100 mcg IntraVENous Once PRN [facility], DO        lactated ringers IV soln infusion   IntraVENous Continuous [facility],  mL/hr at 02/22/23 0904 NoRateChange at 02/22/23 0904    sodium chloride flush 0.9 % injection 5-40 mL  5-40 mL IntraVENous 2 times per day [facility], DO        sodium chloride flush 0.9 % injection 5-40 mL  5-40 mL IntraVENous PRN [facility], DO        0.9 % sodium chloride infusion   IntraVENous PRN [facility], DO        midazolam (VERSED) injection 2 mg  2 mg IntraVENous Once PRN [facility], DO        ceFAZolin (ANCEF) 2000 mg in sterile water 20 mL IV syringe  2,000 mg IntraVENous On Call to [address] [place] [address]           Allergies:     Allergies   Allergen Reactions    Codeine Other (See Comments)     dizziness    Erythromycin Rash    Tetracycline Rash       Problem List:    Patient Active Problem List   Diagnosis Code    Osteoarthritis of right knee M17.11    Type 2 diabetes mellitus [facility] E11.9    Class 3 severe obesity due to excess calories with serious comorbidity and body mass index (BMI) of 40.0 to 44.9 in adult ([facility] E66.01, Z68.41    Status post right hip replacement Z96.641    Hypertension I10    S/P total knee replacement using cement Z96.659    Hypothyroidism E03.9    Pressure injury of contiguous region involving back and right buttock, stage 1 L89.41    Shortness of breath R06.02    Neuropathy G62.9    Asthma J45.909    Osteoarthritis of left knee M17.12    Chronic pain G89.29    BELKIS (obstructive sleep apnea) G47.33    4th nerve palsy, 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65    Vitamin D deficiency E55.9    Stress incontinence of urine N39.3    Primary osteoarthritis of left hip M16.12    Noncompliance with CPAP treatment Z91.14    Unilateral primary osteoarthritis, left hip M16.12       Past Medical History:        Diagnosis Date    Acute UTI 03/20/2021    Alternating constipation and diarrhea     Arthritis     Asthma     bronchio-asthma mostly seasonal    Chronic obstructive asthma, unspecified     Chronic pain     DDD, LOW BACK, SIATICA    COVID-19 01/11/2023    cough~pt states tested positive in nursing home facility; had been coughing for a week before they tested her.     DDD (degenerative disc disease)     WITH RADICULOPATHY    Disorder of sacrococcygeal spine     E-coli UTI 03/23/2021    Elevated serum creatinine 09/19/2017    Elevated WBC count 09/19/2017    Functional assessment declined 07/16/2022    Functional quadriplegia (HCC) 07/13/2022    Gas bloat syndrome     GERD (gastroesophageal reflux disease)     Hyperlipidemia     Hypertension     Hypothyroidism     Incontinence of urine     Insomnia     Iron deficiency anemia 11/2022    Morbid obesity (HCC)     Multiple environmental allergies     Murmur, cardiac     2/6 early systolic flow murmur upper sternal borders no radiation; ECHO 22    Neuropathy     BELKIS (obstructive sleep apnea) 2009    Osteoarthritis of left knee 2009    Osteoarthritis of right knee 2011    Prolonged emergence from general anesthesia     trouble waking up from anesthesia once ~20 years ago~ \"out for 8 hours\"    S/P total knee replacement using cement 2009    Sleep apnea     HAS CPAP~has not used in the last 8 months    Spastic colon     Spinal stenosis of lumbar region     with neurogenic claudication    Status post right hip replacement 10/09/2017    Thyroid disease     ON MEDS    Type II or unspecified type diabetes mellitus without mention of complication, not stated as uncontrolled     avg FBS: 160-170; Hgb A1c 6.9 on 7/15/22; takes insulin       Past Surgical History:        Procedure Laterality Date    DILATION AND CURETTAGE OF UTERUS      HAND/FINGER SURGERY UNLISTED Right     HERNIA REPAIR      UMBILICAL WITH MESH    HYSTERECTOMY (CERVIX STATUS UNKNOWN)  2000    TOTAL HIP ARTHROPLASTY Right     TOTAL KNEE ARTHROPLASTY Left 2009    TOTAL KNEE ARTHROPLASTY Right        Social History:    Social History     Tobacco Use    Smoking status: Former     Packs/day: 1.00     Years: 14.00     Pack years: 14.00     Types: Cigarettes     Quit date: 1977     Years since quittin.1    Smokeless tobacco: Never    Tobacco comments:     Quit smoking: QUIT IN    Substance Use Topics    Alcohol use: Not Currently                                Counseling given: Not Answered  Tobacco comments: Quit smoking: QUIT IN       Vital Signs (Current):   Vitals:    23 0812   BP: 136/80   Pulse: 78   Resp: 20   Temp: 97 °F (36.1 °C)   TempSrc: Temporal   SpO2: 98%   Weight: 259 lb (117.5 kg)   Height: 5' 4\" (1.626 m)                                              BP Readings from Last 3 Encounters:   23 136/80   23 (!) 151/75   23 (!) 170/73       NPO Status: Time of last liquid consumption: 0500 (meds with sips water )                        Time of last solid consumption: 2300                        Date of last liquid consumption: 02/22/23                        Date of last solid food consumption: 02/21/23    BMI:   Wt Readings from Last 3 Encounters:   02/22/23 259 lb (117.5 kg)   02/13/23 257 lb 8 oz (116.8 kg)   02/06/23 250 lb (113.4 kg)     Body mass index is 44.46 kg/m².     CBC:   Lab Results   Component Value Date/Time    WBC 12.0 02/13/2023 12:12 PM    RBC 4.75 02/13/2023 12:12 PM    HGB 13.1 02/13/2023 12:12 PM    HCT 40.9 02/13/2023 12:12 PM    MCV 86.1 02/13/2023 12:12 PM    RDW 15.5 02/13/2023 12:12 PM     02/13/2023 12:12 PM       CMP:   Lab Results   Component Value Date/Time     02/13/2023 12:12 PM    K 4.1 02/13/2023 12:12 PM     02/13/2023 12:12 PM    CO2 31 02/13/2023 12:12 PM    BUN 32 02/13/2023 12:12 PM    CREATININE 1.16 02/13/2023 12:12 PM    GFRAA >60 07/18/2022 04:52 AM    AGRATIO 1.0 03/20/2021 01:06 PM    LABGLOM 49 02/13/2023 12:12 PM    GLUCOSE 132 02/13/2023 12:12 PM    PROT 7.9 12/18/2022 03:53 PM    CALCIUM 9.5 02/13/2023 12:12 PM    BILITOT 0.4 12/18/2022 03:53 PM    ALKPHOS 119 12/18/2022 03:53 PM    ALKPHOS 113 03/20/2021 01:06 PM    AST 18 12/18/2022 03:53 PM    ALT 20 12/18/2022 03:53 PM       POC Tests:   Recent Labs     02/22/23  0825   POCGLU 168*       Coags:   Lab Results   Component Value Date/Time    PROTIME 13.1 02/13/2023 12:12 PM    INR 1.0 02/13/2023 12:12 PM    APTT 29.1 02/13/2023 12:12 PM       HCG (If Applicable): No results found for: PREGTESTUR, PREGSERUM, HCG, HCGQUANT     ABGs: No results found for: PHART, PO2ART, PUX9OXV, GLC2CVD, BEART, L8MAXZOO     Type & Screen (If Applicable):  No results found for: LABABO, LABRH    Drug/Infectious Status (If Applicable):  No results found for: HIV, HEPCAB    COVID-19 Screening (If Applicable):   Lab Results   Component Value Date/Time    COVID19 Not detected 07/18/2022 01:36 PM           Anesthesia Evaluation    Airway: Mallampati: II          Dental: normal exam         Pulmonary: breath sounds clear to auscultation  (+) sleep apnea: on noncompliant,  asthma:                            Cardiovascular:  Exercise tolerance: poor (<4 METS),   (+) hypertension:,         Rhythm: regular  Rate: normal                    Neuro/Psych:               GI/Hepatic/Renal:   (+) GERD: well controlled, morbid obesity          Endo/Other:    (+) DiabetesType II DM, , hypothyroidism: arthritis: OA., .                 Abdominal:             Vascular: Other Findings: Pt complains of sig back issues sec to hip pain and having to walk differently. No radicular symptoms described, sounds more muscular in nature. Has had a couple of different types of injections (epidural steroid and sacroiliac joint) which she reports have had limited effectiveness          Anesthesia Plan      spinal     ASA 3     (Discussed r/b/i of spinal anesthetic. In particular given her recent back pain, discussed that a spinal injection would likely not change her back discomfort but could make worse. Discussed option for GETA as well with pt. Given pts recent COVID infection as well as her known lung disease, feel that spinal anesthesia still is her best option for anesthetic. Pt agreeable, expresses understanding. Questions answered)  Induction: intravenous. Anesthetic plan and risks discussed with patient.                         Kamlesh Ask, DO   2/22/2023

## 2023-02-22 NOTE — PROGRESS NOTES
TRANSFER - IN REPORT:    Verbal report received from [address] on [employer]  being received from PACU for routine post-op      Report consisted of patient's Situation, Background, Assessment and   Recommendations(SBAR). Information from the following report(s) Nurse Handoff Report, Index, Adult Overview, Surgery Report, Intake/Output, MAR, and Recent Results was reviewed with the receiving nurse. Opportunity for questions and clarification was provided. Assessment completed upon patient's arrival to unit and care assumed.

## 2023-02-22 NOTE — PROGRESS NOTES
ACUTE PHYSICAL THERAPY GOALS:   (Developed with and agreed upon by patient and/or caregiver.)  GOALS (1-4 days):  (1.)Ms. Valle will move from supine to sit and sit to supine  in bed with STAND BY ASSIST.    (2.)Ms. Valle will transfer from bed to chair and chair to bed with STAND BY ASSIST using the least restrictive device. (3.)Ms. Valle will ambulate with STAND BY ASSIST for 30-40 feet with the least restrictive device. (4.)Ms. Valle will state/observe ISIAH precautions with no verbal cues.   ________________________________________________________________________________________________     PHYSICAL THERAPY JOINT CAMP: TOTAL HIP ARTHROPLASTY Initial Assessment and PM  (Link to Caseload Tracking: PT Visit Days : 1  Acknowledge Orders  Time In/Out  PT Charge Capture  Rehab Caseload Tracker  Episode   Mariaa Onofre is a 69 y.o. female   PRIMARY DIAGNOSIS: Status post left hip replacement  Primary osteoarthritis of left hip [M16.12]  Unilateral primary osteoarthritis, left hip [M16.12]  Procedure(s) (LRB):  lt HIP TOTAL ARTHROPLASTY (Left)  Day of Surgery  Reason for Referral: Pain in left hip (M25.552)  Stiffness of Left Hip, Not elsewhere classified (M25.652)  Difficulty in walking, Not elsewhere classified (R26.2)  Outpatient in a bed: Payor: MEDICARE / Plan: MEDICARE PART A AND B / Product Type: *No Product type* /     REHAB RECOMMENDATIONS:   Recommendation to date pending progress:  Setting:  Short-term Rehab    Equipment:    To Be Determined     GAIT: I Mod I S SBA CGA Min Mod Max Total  NT x2 Comments:   Level of Assistance [] [] [] [] [x] [] [] [] [] [] []    Weightbearing Status  Left Lower Extremity Weight Bearing: Weight Bearing As Tolerated    Distance  20 feet    Gait Quality Antalgic, Decreased gabriela , Decreased step clearance, Decreased step length, and Decreased stance    DME Rolling Walker     Stairs  N/A    Ramp N/A    I=Independent, Mod I=Modified Independent, S=Supervision, SBA=Standby Assistance, CGA=Contact [employer],   Min=Minimal Assistance, Mod=Moderate Assistance, Max=Maximal Assistance, Total=Total Assistance, NT=Not Tested      ASSESSMENT:   ASSESSMENT:  Ms. Terri Cline presented with impaired strength & mobility s/p left ISIAH. Patient also showed decreased stability during out of bed activity. This pt was at an JORGE LUIS, ambulating 20-30 ft with rolling walker & longer distances with a WC. Pt has no assist at home on DC & will require additional rehab time at a SNF to achieve a level of function that will allow her to return to an detention. This patient will benefit from follow up therapy to help restore safe function prior to eventually returning home at a supervision level of care. MSW aware of DC concerns & needs. Outcome Measure:   HOOS-JR:  Total Raw Score (0-24 Scale): 18    SUBJECTIVE:   Ms. Terri Cline states, that she is agreeable to therapy activity    Home Environment/Prior Level of Function Lives With:  (detention)  Type of Home: Assisted living  Home Layout: One level  Home Access: Level entry  Bathroom Shower/Tub: Tub/Shower unit  Home Equipment: Ulice Bimler, rolling  ADL Assistance: Independent  Ambulation Assistance: Independent  Transfer Assistance:  (with walker & supervision)    OBJECTIVE:     PAIN: VITAL SIGNS: LINES/DRAINS:   Pre Treatment:  Pain Assessment: 0-10  Pain Level: 3  Pain Location: Hip  Pain Orientation: Left  Non-Pharmaceutical Pain Intervention(s): Ambulation/Increased Activity; Exercise      Post Treatment: 3/10 Vitals NT       Oxygen NT RA        IV    RESTRICTIONS/PRECAUTIONS:  Restrictions/Precautions: Weight Bearing, Fall Risk  Left Lower Extremity Weight Bearing: Weight Bearing As Tolerated      Hip Precautions          LOWER EXTREMITY GROSS EVALUATION:  RIGHT LE   Within Functional Limits   Abnormal   Comments   Strength [x] []     Range of Motion [x] []        LEFT LE Within Functional Limits   Abnormal   Comments   Strength [] [x]  Decreased but functional   Range of Motion [] [x]  Decreased but functional     UPPER EXTREMITY GROSS EVALUATION:     Within Functional Limits   Abnormal   Comments   Strength [x] []    Range of Motion [x] []      SENSATION  Sensation [x]  grossly     COGNITION/  PERCEPTION: Intact Impaired (Comments):   Orientation [x]     Vision [x]     Hearing [x]     Cognition  [x]       MOBILITY: I Mod I S SBA CGA Min Mod Max Total  NT x2 Comments:   Bed Mobility    Rolling [] [] [] [] [] [x] [] [] [] [] []    Supine to Sit [] [] [] [] [] [x] [] [] [] [] []    Scooting [] [] [] [] [] [x] [] [] [] [] []    Sit to Supine [] [] [] [] [] [] [] [] [] [] []    Transfers    Sit to Stand [] [] [] [] [x] [] [] [] [] [] []    Bed to Chair [] [] [] [] [x] [] [] [] [] [] [] With walker   Stand to Sit [] [] [] [] [x] [] [] [] [] [] []    Stand Pivot [] [] [] [] [x] [] [] [] [] [] []    Toilet [] [] [] [] [] [] [] [] [] [] []     [] [] [] [] [] [] [] [] [] [] []    I=Independent, Mod I=Modified Independent, S=Supervision, SBA=Standby Assistance, CGA=Contact Guard Assistance,   Min=Minimal Assistance, Mod=Moderate Assistance, Max=Maximal Assistance, Total=Total Assistance, NT=Not Tested    BALANCE: Good Fair+ Fair Fair- Poor NT Comments   Sitting Static [] [] [x] [] [] []    Sitting Dynamic [] [] [x] [] [] []              Standing Static [] [] [x] [x] [] [] With walker   Standing Dynamic [] [] [x] [x] [] [] With walker     PLAN:   FREQUENCY AND DURATION: BID for duration of hospital stay or until stated goals are met, whichever comes first.    THERAPY PROGNOSIS: Good    PROBLEM LIST:   (Skilled intervention is medically necessary to address:)  Decreased ADL/Functional Activities, Decreased Activity Tolerance, Decreased AROM/PROM, Decreased Gait Ability, Decreased Strength, Decreased Transfer Abilities, and Increased Pain   INTERVENTIONS PLANNED:   (Benefits and precautions of physical therapy have been discussed with the patient.)  Therapeutic Activity, Therapeutic Exercise/HEP, Gait Training, Modalities, and Education       TREATMENT:   EVALUATION: LOW COMPLEXITY: (Untimed Charge)    TREATMENT:   Therapeutic Activity (27 Minutes): Therapeutic activity included ISIAH exercises, reviewed PT role, POC & PT expectations for progression & requirement for transfer to SNF, sitting balance with wt shift, repeated scooting, standing diagonal wt shift as warm up to gait with walker& review of hip precautions to improve functional Activity tolerance, Balance, Coordination, Mobility, and Strength. TREATMENT GRID:  THERAPEUTIC  EXERCISES: DATE:  2/22 DATE:   DATE:      AM PM AM PM AM PM    [] [] [] [] [] []   Ankle Pumps  20       Quad Sets  20       Gluteal Sets  20       Hip Abd/ADduction  20       Knee Slides  20       Short Arc Quads  20       Long Arc Quads  20                                  B = bilateral; AA = active assistive; A = active; P = passive      EDUCATION: Education Given To: Patient  Education Provided: Role of Therapy, Plan of Care, Home Exercise Program, Precautions, Transfer Training, IADL Safety, Equipment, Fall Prevention Strategies  Education Method: Demonstration, Verbal, Teach Back  Education Outcome: Continued education needed  EDUCATION:  [x] Home Exercises  [x] Fall Precautions  [x] Hip Precautions  [] D/C Instruction Review [] Hip Prosthesis Review  [x] Walker Management/Safety  [] Adaptive Equipment as Needed     AFTER TREATMENT PRECAUTIONS: Bed/Chair Locked, Call light within reach, Chair, Needs within reach, and RN notified    INTERDISCIPLINARY COLLABORATION:  RN/ PCT and OT/ [facility]    Compliance with Program/Exercises: Will assess as treatment progresses. Recommendations/Intent for next treatment session:  Treatment next visit will focus on increasing Ms. Valle's independence with bed mobility, transfers, gait training, strength/ROM exercises, modalities for pain, and patient education.      TIME IN/OUT:  Time In: 1354  Time Out: 1428  Minutes: Maldonado Cruz

## 2023-02-22 NOTE — INTERVAL H&P NOTE
Update History & Physical    The patient's History and Physical of February 14, H&P was reviewed with the patient and I examined the patient. There was no change. The surgical site was confirmed by the patient and me. Plan: The risks, benefits, expected outcome, and alternative to the recommended procedure have been discussed with the patient. Patient understands and wants to proceed with the procedure.      Electronically signed by Teagan Enrique MD on 2/22/2023 at 8:52 AM

## 2023-02-22 NOTE — CARE COORDINATION
Pt s/p left [facility]. Pt states she has made arrangements to go to [facility] for rehab on d/c. She is temporarily living in [facility] due to hip and back pain but she plans to return to her home after she recovers. She was using W/C for longer distances. W/C belongs to [facility]. Referral sent to [facility] for review. They require a 3 night Inpt stay but are unsure they will have a bed available. PPD requested. Pt is currently outpt status. May need to obtain other choices for rehab- will wait to hear back from [facility]. 02/22/23 9163   Service Assessment   Patient Orientation Alert and Oriented   Cognition Alert   History Provided By Patient   Primary Caregiver Self   Can patient return to prior living arrangement No   Ability to make needs known: Good   Family able to assist with home care needs: No   Would you like for me to discuss the discharge plan with any other family members/significant others, and if so, who? No   Services At/After Discharge   Transition of Care Consult (CM Consult) SNF   Partner SNF No   Reason Why Partner SNF Not Chosen Friend/family recommendation   Services At/After Discharge [facility]   Mode of Transport at Discharge BLS   Condition of Participation: Discharge Planning   The Plan for Transition of Care is related to the following treatment goals: improve mobility   The Patient and/or Patient Representative was provided with a Choice of Provider? Patient   The Patient and/Or Patient Representative agree with the Discharge Plan? Yes   Freedom of Choice list was provided with basic dialogue that supports the patient's individualized plan of care/goals, treatment preferences, and shares the quality data associated with the providers?   Yes

## 2023-02-22 NOTE — RT PROTOCOL NOTE
Respiratory Care Services     Policy Number: [id number]    Title: Oxygen Protocol    Effective Date: 01/1996    Revised Date: 06/2013, 02/29/2016, 4/2018, 7/2019    Reviewed Date: 05/2014, 03/2015, 06/2017, 11/2020        I. Policy: The Oxygen Protocol will be initiated for all patients upon written order from physician for administration of oxygen therapy or if a patient is found to have an oxygen saturation of 88% or less. Special consideration: the goal of oxygen therapy for COPD patients is to maintain oxygen saturation between 88% - 92% to comply with GOLD Guidelines. II. Purpose: To provide protocol driven respiratory therapy for the administration of oxygen at concentrations greater than that in ambient air with the intent of treating or preventing the symptoms and manifestations of hypoxia. III. Responsibility: Director Respiratory Care Services, all Respiratory Care Practitioners     IV. Indications:   Implement this protocol for patients when physician orders oxygen to be administered or when patient is found to have an oxygen saturation of 88% or less. To assure routine monitoring of patient's oxygen saturation b.i.d. and to make appropriate adjustments in accordance with ordered oxygen saturation parameters. To assure continuity of respiratory care that meets [facility] Clinical Practice Guidelines and GOLD Guidelines. Hb < 8  Sickle Cell anemia crisis    V. Assessment:  Assess the following parameters to determine the need to adjust oxygen:  Measurement of patient's oxygen saturation via pulse oximetry. Observation of patient's color, respiratory effort, and responsiveness. Measurement of heart rate and respiratory rate. Complete a three-step ambulatory oxygen saturation when ordered. VI. Initiation:  Upon receipt of an order for oxygen, the RCP will:   Verify order in the patient's EMR, which should include the desired oxygen saturation to be maintained.   The patient shall be placed on oxygen with humidity (except for those oxygen delivery devices that do not require humidity, i.e. venturi masks and non-rebreather masks) as ordered by the physician to achieve the prescribed oxygen saturation. In the event that no saturation is specified, a saturation of 90% will be maintained. Patients, who are found to have a SaO2 of 88% or less, may be started on supplemental oxygen as described above. Patients admitted with cardiac problems/disease shall be maintained at 92% per Chest Committee recommendation. The patient will be informed of the \"no smoking policy\" and instructed in the proper use of oxygen therapy. Once desired oxygen saturation has been achieved, the RCP will document FIO2 and oxygen saturation in the respiratory section of the patient's EMR. VII. Maintenance:   30-second oxygen saturation check will be taken to maintain the saturation ordered by the physician each day. Patients will be assessed each shift and as needed by pulse oximetry to determine if oxygen needs to be decreased, increased or discontinued. If changes in FIO2 are indicated, all changes will be documented in the respiratory section of the patient's EMR. If no changes in FIO2 are required, the patient's oxygen flow rate and saturation will be recorded in the respiratory section of the patient's EMR. Per [facility] Pulmonary, patients who are receiving oxygen therapy but are not on oxygen at home, should be weaned off oxygen as soon as possible or when anticipated discharge becomes evident. Oxygen will be discontinued after oxygen saturation has been maintained for 24 hours on room air and documented in the patient's EMR. Patients on the Inpatient Rehabilitation area on 9th floor will be exempt from having their oxygen discontinued per protocol. Oxygen may be weaned but will be changed to prn to meet the needs of the patient when exercising and participating in therapy.   The goal of oxygen therapy is to maintain patients with a diagnosis of COPD at oxygen saturation between 88% - 92% to comply with GOLD Guidelines. VIII. Safety: RCP will address the following safety issues:  Identify patient using the two patient identifiers name and birth date via ID bracelet. Perform hand hygiene per hospital policy utilizing Standard Precautions for all patients and following transmission-based isolation as indicated per hospital policy. Cardiac patients will be maintained at an oxygen saturation of 92%. If a patient's FIO2 requirements necessitate changing oxygen delivery devices to a high concentration of oxygen, documentation indicating the change must be included in the progress notes, as well as in the respiratory flowsheet. If a patient has a hemoglobin level <8 mg. RCP will consult physician before discontinuing oxygen. IX. Interventions:   RCP will assess patient for signs of respiratory distress or suspicion of CO2 retention. An ABG may be obtained for patients exhibiting respiratory distress or sickle cell crisis. An order should be entered into patient's EMR for ABG under per protocol. X. Documentation  Document assessment findings in the respiratory section of the patient's EMR. Document changes in therapy per protocol in the respiratory orders section and in the care plan section of the patient's EMR. Document patient education in the patient education section of the patient's EMR. XI. Reportable Conditions:  Report to the physician immediately:  Acute changes in patient's respiratory status. An oxygen saturation <85%. A change in oxygen delivery device to provide a high concentration of oxygen. XII.  Patient Instructions: Review with Patient  Purpose of oxygen therapy  Proper technique for using oxygen  No smoking policy    Approval: Pulmonary Committee (1-25-96)  Revision: Chest Committee (4-28-05)    L - Respiratory Care Department Policy, Procedure and Protocol Guideline Manual, [place],         J. Tietsort. L - Therapist Driven Respiratory Care Protocols - A Practitioner's Guide for Criteria-Based       Respiratory Care by Gerson Quinn M.D., and JENNIFER Paulino RRT. L - The rationale for therapist-driven protocols: an update. Respiratory Care 1998. [facility] Clinical Practice Guidelines. Respiratory Care Services       Policy Number: [id number]    Title: Patient Care Assessment Protocol    Effective Date: 01/1999    Revised Date: 05/2014, 04/2018, 12/2018, 07/2019    Reviewed Date: 06/2013/ 03/2015, 03/2016, 06/2017, 11/2020        Overview  In an effort to improve quality and reduce costs of respiratory care at [facility], the Respiratory Department has developed a number of Patient Care Protocols. These protocols have been developed according to Kendrick 3 and are utilized for those patients who are ordered respiratory therapy using therapeutic indications and standardized approaches for accomplishing objectives. Patient Care Protocols are intended to improve care by:  Defining the indications and standards of care agreed upon by the Pulmonary Medicine and [address] [place] [address] of [facility]. Training respiratory care practitioners to apply those criteria to individual patients and modify therapy as indicated by the protocols. Documenting the indication and care plan as part of the initial ordering process. Tapering or discontinuing treatments once the indication for therapy changes. The Patient Care Protocols shall be universally applied throughout the [facility] as determined by   the Pulmonary Medicine and [address] [place] [address].     Rationale for Patient Care Assessment Protocols:  Continuous Quality Improvement  Cost containment  Standardization of care  Enhanced continuity of care  Utilization review  Timely intervention    The following patient care assessment protocols have been developed:  Aerosolized Medication Protocol   Bronchial Hygiene Protocol   Oxygen Protocol  CVRU Fast Track Weaning Protocol   Asthma Treatment Protocol ER  Pediatric Asthma Treatment Protocol ER  Alpha-1 Antitrypsin Deficiency Protocol  Prone Positioning Protocol   COPD Protocol   Home Oxygen Assessment Protocol  Ventilator Weaning Protocol   Lung Volume Expansion Protocol    The Director of Respiratory Care Services oversees the Patient Care Assessment Program. The Respiratory Educator is responsible for protocol development and training. The Supervisor is responsible for implementation and  activities. Each patient with an order for respiratory treatments will receive an evaluation. Respiratory Care Practitioners (RCP's) will perform the evaluations. The same evaluation tool will be utilized for initial and follow-up assessments. If the patient does not meet criteria for ordered therapy, the therapy will be discontinued. If the patient demonstrates an adverse response to initially ordered therapy, the therapy will be discontinued and the physician will be contacted. Specific physician's orders that deviate from protocols and are deemed \"inappropriate\" or \"unsafe\" will be addressed with ordering physician and/or medical director as required. Respiratory Patient Care Assessment Protocols    I. Policy: In an effort to provide quality patient care and effective utilization of services, physicians who order respiratory therapy will have their patients treated via the protocols established (see attached) Respiratory Care Practitioners (RCP's) will complete the initial assessment which will indicate patient needs,  the care plan developed and will performed within 24 hours of admission. Frequency of the therapy will be set according to the results of the respiratory therapy evaluation and frequency guidelines policy.  Reassessment will be continued every 48 hours and more frequently as needed for the individual patient. II. Purpose: To provide a process that will allow for ongoing assessment and care plan modification for patients receiving respiratory services based on both objective and or subjective patient responses to interventions. This process of protocol utilization will assist in patient care progression while eliminating the need for the physician to continually update respiratory therapy orders. To assure continuity of respiratory care that meets [facility] Clinical Practice Guidelines. III. Initiation:  Implement Respiratory Care Protocols for patients who are ordered by physician          to receive respiratory therapy procedures or for ventilator management. IV. Protocol:  Upon receiving an order for therapy the RCP will review the patient's EMR (electronic medical record) for all pertinent information including:  [de-identified] order for therapy  Patient history and physical examination  Physician progress notes  Diagnostic. X-rays, PFT's, arterial blood gases etc.  The RCP will perform a respiratory assessment in the following manner:  General observations: color, pattern and effort of breathing, chest expansion, (symmetrical and bilateral), level of consciousness and the ability to ambulate. The RCP will assess patient's cough ability and determine if bronchial hygiene is needed. If patient is unable to produce sputum, at that time, the RCP should question the patient with regard to their sputum: production, color consistency, frequency and amount. Auscultation: Using a stethoscope, the RCP will listen and note quality of breath sounds and presence or absence of adventitious breath sounds in all lung fields, both anteriorly and posteriorly. Upon completing the EMR review and physical assessment, the RCP will document findings in the RT Assessment section of the EMR.  The score level will be provided and will be used to determine the frequency of therapy. V. Indications:   A. Bronchial Hygiene Protocol indications:   Potential for or presence of atelectasis. Need for hydration and removal of retained secretions. Need for improvement of cough effectiveness. Presence of conditions associated with disorder of pulmonary clearance:  Cystic fibrosis  Bronchiectasis  Neuromuscular disease  Obstructive lung diseases  Restrictive lung diseases   Aerosolized Medication(s) Protocol indications:Treatment of bronchospasm/wheezing  Improvement of mucociliary clearance  Treatment of stridor  History of Bronchiectasis, Asthma or COPD  Oxygen Therapy Protocol indications:   Documented hypoxemia  Severe trauma  Acute myocardial infarction  Short-term therapy (e.g. post anesthesia recovery)  CVRU Ventilator Weaning Protocol indications:  1. All mechanically ventilated surgical patients unless they have a no wean order. E. Asthma Treatment Protocol ER indications:  1. Patients 15years of age and older that have been triaged or diagnosed with   asthma exacerbation shall be indicated for the ER Asthma Treatment Protocol. A physician order will be required to initiate the protocol. F. Pediatric Asthma protocol in the ER indications:  1. Patients less than 15years old that have been triaged or diagnosed with asthma exacerbation shall be indicated for the Pediatric Asthma protocol. A physician order will be required to initiate the protocol. G. Alpha-1 Antitrypsin Deficiency Testing protocol indications:         1. Patients admitted and diagnosed with COPD. H. Prone Positioning Protocol indications:         1. Acute lung injury         2. Acute respiratory distress syndrome (ARDS)   I. Respiratory Care COPD Protocol indications:         1. History of COPD in patient's records         2. Smoking history   J. Home Oxygen Assessment Protocol indications:         1. Chronic lung disease         2. Cor pulmonale         3.  Unable to wean to room air 48 hours prior to anticipated discharge. K.  Ventilator Weaning Protocol indications:         1. Patient's mechanically ventilated          2. Managed by intensivist  L. Lung Volume Expansion Protocol indications:        1. Any patient at risk for pulmonary complications. VI. Maintenance:    Timely patient assessment is an integral part of this protocol therefore the following will be applied: All non- critical care patients will be evaluated upon receiving initial respiratory care orders within 24 hours and re-evaluated within 48 hours (or more as needed). Orders requesting a Respiratory Consult will be responded to in the following manner: In patient emergency situations, the RCP assigned to the floor will respond immediately to the patient, provide an initial respiratory assessment, and contact the patient's physician as necessary for appropriate orders. In non-emergent situations, the RCP assigned to the floor will respond to the patient within 90 minutes and provide an initial respiratory assessment and contact patient's physician as necessary for appropriate orders. An RCP will provide a comprehensive assessment as soon as possible. Upon completion of an evaluation, the RCP will complete documentation in the patient's EMR in the RT Assessment section. The RCP who completes the assessment will document orders for therapy in the orders section of the patient's EMR selecting new order. Next, per protocol should be selected indicating it is a protocol order and sign orders should be selected to complete the process. The applicable protocol must be added to the progress note per Joint Commission guidelines. The Pharmacy and Therapeutics (P&T) Committee has mandated that the medication Xopenex may be changed to unit dose albuterol without an order, except for those patients receiving Xopenex due to cardiac arrhythmias.    The dosage for these patients should be 0.63 mg. and may be changed from 1.25 mg. to 0.63 mg per P & T Committee by the RCP completing the assessment. Patients who are not experiencing cardiac arrhythmias, and are ordered Xopenex and Atrovent may be changed to Duoneb. VII. Safety: The following safety issues shall be monitored: The RCP will perform hand hygiene per hospital policy utilizing Standard Precautions for all patients and following transmission-based isolation as indicated per hospital policy. The RCP must exercise professional judgment in classifying the patient for frequency of therapy. Appropriate classification of the patient will require an evaluation utilizing the Therapy Assessment Protocol Guidelines. The RCP will confer with the physician concerning the care of the patient at any time questions or problems arise. If during therapy, the patient exhibits no improvement, or deterioration in clinical status the RCP will notify the physician and the patient's nurse. VIII. Interventions: The patient's nurse is responsible concerning all items related to his/her care. Ongoing communication with nursing is essential to successful protocol management. The RCP recognizes the value of the team approach in meeting the patient's needs. Nursing input regarding the patient's pulmonary condition will be sought as needed. IX. Reportable conditions: The RCP will inform the physician if:  There are acute changes in patient's respiratory status. The therapist is unable to determine appropriate care plan upon assessment. The patient fails to reach therapeutic objective. A change or additional medication is needed. X.  Patient Education:    Patient will receive instruction on the following: The treatment modality, including objectives and proper technique of therapy  Respiratory medications  Documentation shall occur in the patient education section of the patient's EMR. XI. Documentation: Record all findings as described above in the patient's EMR.     Related Protocols: A. Aerosolized Medication Protocol  Bronchial Hygiene   Oxygen Protocol   [facility] Fast Track Weaning Protocol  Asthma Treatment protocol ER  Alpha-1 antitrypsin Deficiency Protocol  Prone Positioning Protocol  Respiratory Care COPD Protocol  Home Oxygen assessment Protocol  Ventilator Weaning Protocols   Volume Expansion protocol    Indications      Frequency          Level  A. Aerosol therapy   1.  q4h     Severe SOB, wheezing, unable to sleep 1   2.  qid, q4 wa or q6h   Moderate SOB, wheezing   2   3.  tid      Hx of asthma, or COPD mild wheezing,         or facilitate secretion removal              3   4.  bid      Asthma, or COPD, Intermittent wheezing 4   5. PRN, i.e. tid PRN, qid PRN Asthma, or COPD, occasional wheezing 5       B. Bronchopulmonary Hygiene    1. q4h             Copious secretions, SOB, unable to sleep 1   2. qid & PRN            Moderate amounts of secretions   2   3. tid           Small amounts of secretions and poor cough,               history of secretions    3    4. PRN, i.e. tid PRN, qid PRN     Breathing exercises, encourage cough only 4      C. Oxygen Therapy     Follow hospital approved Oxygen Protocol      Note:  qid treatments are due 0800, 1200, 1600, and 2000. tid treatments are due 0800, 1400, and 2000  Q6h treatments are due 0800, 1400, 2000, 0200  Q4 wa teatments are due 0800, 1200, 1600, and 2000. Q4h treatments are due  0800, 1200, 1600, 2000, 0000, and 0400. The Level 1-5 rating system is only to be used as criteria for determining appropriate frequency of therapy. References:   N   Joint Commission Consolidated [place] Standard   L    Respiratory Care Department Policy, Procedure and Protocol Guideline Manual, 1995, JENNIFER Sheffield. L  Therapist Driven Respiratory Care Protocols - A Practitioner's Guide for Criteria-Based Respiratory Care by Bart Hernandez M.D., and JENNIFER Sahu, RRT. L  The rationale for therapist-driven protocols: an update.  Respiratory Care 1998; 85:460-620   L Therapist Driven Respiratory Care Protocols - A Practitioner's Guide for Criteria-Based Respiratory Care by Robina Singh M.D., and RAFAEL Larsen The rationale for therapist-driven protocols: an update. Respiratory Care 1998; [id number]. N   [facility] Clinical Practice Guidelines. The RCP will perform a respiratory assessment in the following manner:  Perform hand hygiene per hospital policy utilizing Standard Precautions for all patients and following transmission-based isolation as indicated per policy. Identify patient via ID bracelet verifying patient name and birth date. General observations: color, pattern and effort of breathing, chest expansion, (symmetrical and bilateral), level of consciousness and the ability to ambulate. The RCP will assess patients cough ability and determine if Nasotracheal suctioning is needed. If patient is unable to produce sputum, at that time, the RCP should question the patient with regard to their sputum: production, color consistency, frequency and amount. Auscultation: Using a stethoscope, the RCP will listen and note quality of breath sounds and presence or absence of adventitious breath sounds in all lung fields, both anteriorly and posteriorly. Upon completing the EMR review and physical assessment, the RCP will document findings in the RT Assessment section of the EMR. The score level will be provided and will be used to determine the frequency of therapy. V. Indications:   A. Indications for Bronchial Hygiene Protocol will include:  Potential for or presence of atelectasis. Need for hydration and removal of retained secretions. Need for improvement of cough effectiveness.   Presence of conditions associated with disorder of pulmonary clearance:  Cystic fibrosis  Bronchiectasis   Indications for Aerosolized Medication(s) Protocol should include:  Treatment of bronchospasm/wheezing  Improvement of mucociliary clearance  Treatment of stridor  History of Asthma or COPD             C.  Indications for Oxygen Therapy Protocol should include:  Documented hypoxemia  Severe trauma  Acute myocardial infarction  Short-term therapy (e.g. post anesthesia recovery)    VI. Maintenance:    Timely patient assessment is an integral part of this protocol therefore the following will be applied: All non- critical care patients will be evaluated upon receiving initial respiratory care orders within 24 hours and re-evaluated within 48 hours (or more as needed). Orders requesting a Respiratory Consult will be responded to in the following manner: In patient emergency situations, the RCP assigned to the floor will respond immediately to the patient, provide an initial respiratory assessment, and contact the patients physician as necessary for appropriate orders. In non-emergent situations, the RCP assigned to the floor will respond to the patient within 90 minutes and provide an initial respiratory assessment and contact patients physician as necessary for appropriate orders. An RCP will provide a comprehensive assessment as soon as possible. Upon completion of an evaluation, the RCP will complete documentation in the patients EMR in the RT Assessment section. The RCP who completes the assessment will document orders for therapy in the orders section of the patients EMR selecting new order. Next, per protocol should be selected indicating it is a protocol order and sign orders should be selected to complete the process. The Pharmacy and Therapeutics (P&T) Committee has mandated that the medication Xopenex may be changed to unit dose albuterol without an order, except for those patients receiving Xopenex due to cardiac arrhythmias. The dosage for these patients should be 0.63 mg. and may be changed from 1.25 mg. to 0.63 mg per P & T Committee by the RCP completing the assessment.   Patients who are not experiencing cardiac arrhythmias, and are ordered Xopenex and Atrovent may be changed to Duoneb. VII. Safety: The following safety issues shall be monitored: The RCP will perform hand hygiene per hospital policy utilizing Standard Precautions for all patients and following transmission-based isolation as indicated per hospital policy. The RCP must exercise professional judgment in classifying the patient for frequency of therapy. Appropriate classification of the patient will require an evaluation utilizing the Therapy Assessment Protocol Guidelines. The RCP will confer with the physician concerning the care of the patient at any time questions or problems arise. If during therapy, the patient exhibits no improvement or deterioration in clinical status the RCP will notify the physician and the patients nurse. VIII. Interventions: The patients nurse is responsible concerning all items related to his/her care. Ongoing communication with nursing is essential to successful protocol management. The RCP recognizes the value of the team approach in meeting the patients needs. Nursing input regarding the patients pulmonary condition will be sought as needed. IX. Reportable conditions: The RCP will inform the physician if:  There are acute changes in patients respiratory status. The therapist is unable to determine appropriate care plan upon assessment. The patient fails to reach therapeutic objective. A change or additional medication is needed. X.  Patient Education:    Patient will receive instruction on the following: The treatment modality, including objectives and proper technique of therapy  Respiratory medications  Documentation shall occur in the patient education section of the patients EMR. XI. Documentation: Record all findings as described above in the patients EMR. Related Protocols: A.  Aerosolized Medication Protocol  Bronchial Hygiene  Oxygen Protocol   Volume Expansion/Secretion Clearance  Ventilator Weaning Protocols    References:  N   Joint Commission Consolidated [place] Standard   L    Respiratory Care Department Policy, Procedure and Protocol Guideline Manual, 1995, JENNIFER RENE  Therapist Driven Respiratory Care Protocols - A Practitioners Guide for Criteria-Based Respiratory Care by Irvin Perrin M.D., and RAFAEL Carrillo  The rationale for therapist-driven protocols: an update. Respiratory Care 1998; [facility] Guidelines. Respiratory Care Services     Policy Number: [id number]    Title: Bronchial Hygiene Protocol    Effective Date: 01/1999    Revised Date: 12/2014, 11/2017, 7/2019    Reviewed Date: 06/2013, 05/2014, 03/2015, 03/2016, 06/2017, 4/2018, 11/2020     I. Purpose: The Respiratory Care Practitioner (RCP) will utilize the following protocol to select and initiate bronchial hygiene therapy to open and maintain obstructed airways when indicated. II. Patients: All patients who are ordered bronchial hygiene therapy. III. Clinical Area: All general patient floors     IV. Protocol: The following conditions or diseases are indications for bronchial hygiene                           therapy. Oscillating PEP Therapy        Indications should include: Atelectasis caused by mucus plugging or foreign body  Chronic mucociliary clearance disorders  Retained secretions which may be associated with the following conditions:  Bronchitis  Bronchiectasis  Pneumonia  PAP- Positive airway pressure therapy  Indications include:  Patients with post-operative atelectasis or to prevent post operative atelectasis. Patients who cannot perform deep breathing exercises due to pain. Patients requiring lung expansion therapy who cannot follow instructions. Patients requiring lung expansion therapy with poor inspiratory capacity <10cc/kg.   Patients requiring aerosol therapy in conjunction with opening their airways. Vibratory / Acoustical Airway Clearance Therapy (ACT)- i.e. (Vibralung, Vest, or Percussor)  Indications should include  Patient conditions  that involve retained secretions, increased mucus production and defective mucociliary clearance such as:  Cystic fibrosis  Chronic bronchitis  Bronchiectasis  Pneumonia  Asthma  Muscular dystrophy  Post-operative atelectasis  Neuromuscular respiratory impairments  ACT may be considered in patients with COPD with  symptomatic secretion retention, guided by patient preference,  toleration, and effectiveness of therapy Major Baum et al., 2013). Nasotracheal suctioning indications should include:  Inability to cough effectively  Excessive secretions  Artificial airway      V. Equipment:   A. PEP therapy device   B. Vest therapy equipment   Orvilla Proud   D. AccuPap   Brea Jam NT suction equipment       VI. Guidelines:   Monitor patient's vital signs and evaluate patient's clinical status. The need to                                change therapy modality may be indicated by:  Change in patient's sensorium (patient now confused or obtunded, and unable to follow directions). A significant deterioration is evident on patient's chest radiograph or increased sputum production. Increased thickening of secretions (e.g. mucolytic therapy may be indicated.)  Development of wheezing  Decrease in oxygen saturation  Development of chest pain. VII. Clinical Responsibility: The Therapy Assessment Protocol guidelines will be used to                re-evaluate all patients on bronchial hygiene therapy (See Therapy Assessment Protocol). RCP's will perform changes in therapy according to protocol. .  Bronchial hygiene therapy may be discontinued when goals of therapy are met, e.g., secretions easily expectorated for 48 hours, atelectasis is resolved, etc.  PAP Therapy may be utilized in place of IPPB therapy per discretion of the RCP, as approved by the [facility]monary [facility] and [address] [place] [address]. VIII. Outcome Criteria:  Outcome criteria for bronchial hygiene therapy should include:  Decrease in sputum production  Improved breath sounds  Improved arterial oxygen tension and/or SaO2  Improved chest X-ray  Subjective response to therapy    IX. Documentation  Document assessment findings in the respiratory assessment section of the patient's EMR. Document changes in therapy per protocol in the respiratory orders section and in the care plan section of the patient's EMR. Document patient education in the patient education section of the patient's EMR. X. Related Protocols:  Respiratory Patient Care Assessment Protocols  Aerosolized Medication Protocol  Oxygen Therapy Protocol        Reference:    L - Respiratory Care Department Policy, Procedure and Protocol Guideline Manual, 1995, JENNIFER Sheffield. L - Therapist Driven Respiratory Care Protocols - A Practitioner's Guide for Criteria-Based         Respiratory Care by Yunior Barfield M.D., and JENNIFER Connell, LUIS F. N - [facility] Clinical Practice Guidelines. [facility]., Rafa Shi., Kalin Quevedo., Christ Haywood., Madelyn Carrillo., . . . ANASTACIO Silvestre (2013, December). [facility] Clinical Practice Guideline: Effectiveness of Nonpharmacologic Airway Clearance Therapies in Hospitalized Patients. Respiratory Care, 58(12), 1418-7989. Retrieved June 28, 2019        Respiratory Care Services Policy Number: [id number]    Title: Aerosolized Medication Protocol    Effective Date: 10/1998    Revised Date: 06/13, 03/16, 11/17, 07/19     Reviewed Date: 05/14/ 03/15 , 06/17, 5/18, 11/2020   I. Policy: The Aerosolized Medication Protocol shall by implemented by Respiratory Care Practitioners (RCP) for patients with orders to receive aerosol therapy with medication. II. Purpose:   To open and maintain obstructed airways, the RCP, will utilize the following   protocol to select the indicated aerosolized medication(s) and determine the most effective method of delivery to the patient. III. Patient Type: All patients who are determined to meet aerosolized medication criteria as          outlined in this protocol. IV. Responsibility: Director, [address], registered Respiratory Care Practitioners (RCP's) with documented competency in the performance of                                     respiratory therapeutic techniques. V. Equipment needed:  Stethoscope  Pulse oximeter  AeroEclipse nebulizer  Meter Dose Inhaler (MDI)    VI. Protocol: The following conditions are accepted indications for aerosolized medication therapy. Bronchospasm/wheezing  Impaired mucociliary clearance  Tracheobronchial mucosal congestion/and laryngeal stridor  Diseases which commonly require aerosolized medication therapy include, but are not limited to:  Asthma/reactive airway disease  Bronchitis/emphysema (COPD)  Cystic fibrosis  Severe laryngitis/tracheitis  Bronchiectasis  Smoke inhalation or chemical trauma to the lung or upper airway  Physical trauma to the upper airway  Laryngotracheobronchitis  Bronchiolitis  Non-specific wheezing              B. Indications for bronchodilator medications will include:  Bronchospasm/ wheezing  Asthma/reactive airway disease  Chronic obstructive pulmonary disease  Obstructive defect on pulmonary function testing  Administration of medications  If a bronchodilator or any other type of respiratory medication is needed, a physician order must be indicated in the medication section in the patient's EMR. When the physician specifies the medication and dosage at the time of request, the ordered medication will be used as part of the care plan. The following guidelines will be utilized in the evaluation and selection of the appropriate delivery device for indicated medication(s):  MDI is the preferred method of medication delivery via common canister.   Patient should be alert/cooperative  Able to perform 3 second breath hold. Patient may be changed to self-administer as appropriate. Patients in isolation will be provided an individual inhaler. Unassisted aerosol (UA) is indicated if  Ventilation is inadequate  Patient is unable to perform MDI appropriately     VII. Guidelines:   Monitor patient's vital signs and evaluate patient's clinical status. The need to change medication and/or modality may be indicated by:  A pulse greater than 120 bpm, or if a pulse increase of 20 bpm occurs with bronchodilator medications. Significant worsening of dyspnea or wheezing occurring during or within 30 minutes of discontinuing therapy. Worsening of patient's sensorium (e.g. patient becomes confused or obtunded, and unable to follow directions). Worsening of patient's chest x-ray. Change in sputum (e.g. increased pulmonary infiltrate, which might indicate need for volume expansion therapy). Patient has difficulty coughing up secretions, which might indicate need for acetylcysteine and/or bronchial hygiene therapy. Call physician immediately if dyspnea worsens and is not responsive to modifications allowed by protocol. VIII. Clinical Responsibility:  The therapy assessment guidelines will be used to evaluate all patients receiving aerosolized medications with the exception of critical care areas. RCP's will perform changes in therapy per protocol. It will be the responsibility of RCP to provide instruction regarding respiratory medications, possible side effects, aerosol therapy and proper MDI technique, as well as, spacer usage to patients ordered MDI therapy. Current therapy that is part of a patient's home regimen will not be discontinued. Provide spacer and educate patient on proper inhaler technique if needed. IX. Documentation  Document assessment findings in the respiratory assessment section of the patient's EMR.   Document changes in therapy per protocol in the respiratory orders section and in the care plan section of the patient's EMR. Document patient education in the patient education section of the patient's EMR. X. Outcome Criteria:  Relief of wheezes and obstruction  Improved cough and sputum color and consistency  Improved chest x-ray  Improved arterial oxygen tension and or SaO2  Improved Peak Flow on asthmatic patients        XI. Related Policy/Protocols:  Respiratory Patient Care Protocols  Bronchial Hygiene Therapy  Oxygen Protocol  [facility] Administration of Metered Dose Inhalers via a Common Canister  [facility] Clinical Resources   [facility] Aerosol Generating Procedures  Reference:  [address] [place] [address], Procedure and Protocol Guideline Manual, , JENNIFER Sheffield. L -  Therapist Driven Respiratory Care Protocols - A Practitioner's Guide for Criteria-Based Respiratory Care by Yunior Barfield M.D., and JENNIFER Connell, LUIS F. L - The rationale for therapist-driven protocols: an update. Respiratory Care 1998; [id number]. [facility] Clinical Practice Guidelines. Respiratory Care Services     Policy Number: [id number]    Title: Noninvasive Positive Pressure Ventilation, (NIPPV)    Effective Date: 2005, 2017    Revised Date: 2012, 2014, 2015, 2016, 2018, 2019    Reviewed: 2019, 2020   Description: Non Invasive Ventilation (NIV) is a ventilatory assist technique used as an alternative to endotracheal intubation. NIV is pressure ventilation delivered as BIPAP® (Bi-level Positive Airway Pressure) which is a low pressure electronically driven device intended for use as a ventilatory support system for patients who have an intact respiratory drive. The device provides non-invasive ventilatory assistance through the use of a nasal or full face mask. Bi-Level  (two levels of ventilatory support) known as inspired positive airway pressure (IPAP) and  positive airway support (EPAP).   One level of support can also be delivered which is delivered as EPAP or CPAP which is delivered throughout the respiratory cycle. The aim is to maintain adequate ventilation and minimize the effort of breathing. The BREAS Vivo 50, BIPAP® Vision System and the Respironics V60 are the systems available for non-invasive ventilation. These devices are also capable of being used for invasive ventilation in the critical care units. BIPAP Vision: This device uses an electronic pressure control sensing monitor pressure differential in the patient circuit. This feedback allows for adjustment of the flow and pressure output  to assist in inhalation or exhalation through the administration at two distinct levels of positive pressure. During inspiration, the level is variably positive and is always higher than the expiratory level. During exhalation, pressure is variably positive or near ambient. In addition, this device has the ability to compensate for leaks through automatic adjustment of the trigger threshold. This capability allows for the application of BIPAP®  for mask-applied ventilation assistance. Respironics V60  The Respironics V60 uses Auto-Trak technology to help ensure patient synchrony and therapy acceptance and is designed to address the specific challenges of NIV. By providing auto-adaptive leak compensation, inspiratory triggering, and expiratory cycling, Auto-Trak delivers optimal synchrony in the face of dynamic leak and changing patient demand. The Respironics V60 is designed to include pediatric use and is equipped with several modes, which allows the practitioner to meet the specific needs of the patients. See Appendix 1 for definitions of settings. BREAS Vivo 50   The Vivo 50 ventilator (with or without the SpO2 and CO2 sensor) is intended to provide continuous or intermittent ventilatory support for the care of individuals who require mechanical ventilation.  The Vivo 50 with the SpO2 sensor is intended to measure functional oxygen saturation of arterial hemoglobin (%SpO2) and pulse rate. The Vivo 50 with the CO2 sensor is intended to measure CO2 in the inspiratory and expiratory gas. The device is intended to be used in home, institution, hospitals and portable applications such as wheelchairs and gurneys. It may be used for both invasive and non-invasive ventilation. Suitable for a wide range of pathologies and for patients with changing requirements over time. Multiple circuits: dual limb with exhaled volume measurements, single limb with exhalation valve or leakage port. Multiple modes - Pressure and Volume Modes with Target Volume and SIMV  Policy: The administration of non-invasive positive pressure ventilation (NPPV) will be the responsibility of the Respiratory Care Practitioner (RCP). Upon receipt of a physician order, proper patient instruction, set up and monitoring will be provided to ensure patient understanding, compliance and proper utilization of prescribed therapy. A patient may be allowed to use their own NPPV machine after having their equipment checked and approved by [employer]. A consent and Release for Home Ventilator form must be signed by the patient and witnessed by a health care provider if the patient chooses to use his home ventilator. A patient that is being treated for acute respiratory failure and placed on non-invasive ventilation must be placed in a critical care unit, per Medical Director. D.  A patient who is being treated for sleep apnea may be treated on the floors. E.   A patient has the option of using a hospital owned NPPV unit. F.   A patient may use a hospital unit and their own mask if they choose. G. Patients may be placed on full face mask with NPPV units on the hospital floors under the following conditions:  Patient has an end stage disease process. Patient was placed on full face mask and NPPV unit in the Critical Care Units and it has been established as safe and effective therapy.   Patient is ordered full face mask with NPPV unit for home use. In the event patient is to be set up or transitioned to home on a NPPV unit, the Respironics V60 (in the AVAPS mode) shall be utilized while the patient is in the hospital. If a Shirin Ply is unavailable, a home NPPV unit may be provided by the DME provider for no more than 48 hours. III. Responsibility: Director, Brenda Lindsay, and all Respiratory Care Practitioners with documented competency. IV. Indications for non-invasive ventilation:  Acute respiratory failure (Patient must be in Critical Care Unit)  Chronic respiratory failure  Alveolar hypoventilation  Documented sleep apnea  V. Contraindications:  Patients with severe respiratory failure without a spontaneous respiratory drive  Noninvasive ventilation may be contraindicated for patients with the following:  Inability to maintain a patent airway or adequately clear secretions  Acute sinusitis or otitis media  Risk for aspiration of gastric contents  Hypotension  Pre-existing pneumothorax or pneumomediastinum  Epistaxis  Recent facial, oral or skull surgery or trauma  history of allergy or sensitivity to mask materials where the risk from allergic reaction outweighs the benefit of ventilatory assistance  C. Potential Complications:  Cardiovascular compromise  Skin breakdown and discomfort from mask  Gastric distention  Increased intracranial pressure  Pulmonary barotraumas    VI. Mask fit  It is essential that the patient be correctly fitted with an appropriate size mask. Choose mask according to sizing template on disposable setups. The mask should fit comfortably on the patient's nose, not occluding the nares and the base of the mask should fit comfortably between the chin and bottom lip see picture on setup guide. Headgear should fit comfortably not tight.  The bottom edge of the headgear should sit at the base of the nape of the neck with side straps underneath the earlobes. The face masks are better for patients who are dyspneic and tachypneic as they tend to mouth breathe with a nasal mask and reduce the effectiveness of the ventilation. Masks that are too tight are uncomfortable and may cause pressure areas. Masks that are too loose leak which reduce the efficiency of the system. When using a nasal mask the patient must keep their mouth closed to obtain the desired effect of the ventilation. May place an Allevyn Gentle Border dressing over bridge of nose to avoid skin breakdown. VII. Procedure for Non-invasive ventilation via Vivo 50:   Refer to 's recommendations. VIII. Procedure for non-invasive ventilation using the V60 or BiPAP ® Vision:            Refer to [facility] recommendations. IX. Monitoring:  While in use, the RCP should check the patient and non-invasive unit no less than every four hours. Failure of NIV should be suspected if:   The patient is unable to maintain adequate oxygenation, decreasing 02 saturations despite increases in 02. There is a reduction in neurological or conscious state. The patient has excessive secretions or increasing respiratory rate. Failure of PaCO2 or PH to improve on ABG sample. The patient has poorly compliant lungs. X. Weaning               A. Weaning or cessation of non-invasive ventilation should occur under the following                        conditions:  PaC02 returns to normal and patient maintains O2 saturations with minimal                oxygen. Respiratory rate is returned within normal limits. Patient is unable to tolerate non-invasive ventilation. Patient's dyspnea is reduced. Patient exhibits normal overnight ventilation. Patient is receiving palliative treatment. XI. Documentation:  Documentation should include the following:  Ventilator settings comply with physician order.   Ventilator is functioning properly as evidenced by a check of measured volumes, rates, pressures and FIO2. Alarms are set appropriately. Measured inspired gas temperature (invasive mode only)  Vital signs (pulse, respiratory rate, oxygen saturation, breath sounds)  Patient tolerance to therapy. Manual resuscitator and appropriate size mask at patient bedside      REFERENCES  Respironics V60 user Manual .. \. [place] [employer] \Equipment\[email or web address].pdf    VIVO 50 clinical Guide . Davon Parisian \. Davon Parisian \Equipment\VIVO 50 [email or web address]. pdf     [address] [place] and Respiratory Care Section. TEJAS Hardy 2001. Introducing non-invasive positive pressure ventilation. Nursing Standard. 15,26, 42-45. Yolis Matthews. 2003. Using non-invasive ventilation in acute wards: part 2. Nursing Standard. 18,1, 41-44. Onel 47. Use of continuous positive airway pressure (CPAP) in the critically ill-physiological principles. Critical Care 12 [phone]     OMAYRA Baer2002. Bi-level positive airway pressure (BiPAP) and acute cardiogenicpulmonary edema   ( ACPO) in the emergency department. Critical Care 15 (2):51-63        DEFINITIONS AND USUAL SETTINGS                                                            APPENDIX 1                      Settings   Settings in  Active   CPAP Settings in  Active   BiPAP Description Range Usual Setting  Used in NIV         CPAP Mode                     Continuous Positive Airway  Pressure   4-24 cmH20 8-14     ST or  BiPAP MODE                           Spontaneous Timed or BiLevel Positive Airway Pressure Ventilation. Two level system of alternating during non- invasive ventilation in sync with breathing-set IPAP and EPAP      __     __   AVAPS Mode    (only available  on V60)          The volume-targeted AVAPS (average volume-assured pressure support) mode combines the attributes of pressure-controlled and volume-targeted ventilation.       __     __       EPAP                                       Positive Airway Pressure. Recruits under ventilated alveoli to remain open during expiration by providing a constant pressure throughout resp. cycle. Must be less than or equal to IPAP    4-25 cmH20 5-14                 Settings Settings in Active CPAP Settings in Active BiPAP Description Range Usual Setting Used in NIV     IPAP                           Inspired Positive Airway Pressure provides pressure throughout the inspiratory phase to support patient ventilation  4-40 cmH20 10-20               I-time                    Inspiratory time- time taken to inspire in seconds  0.3 - 3.0 sec 1:3   FIO2                   Oxygen delivered  21- 100% As ordered         Ramp time                                      The Ramp Time function helps your patient adapt to ventilation by gradually increasing inspiratory and expiratory pressure over a set interval (minutes). This time gradually delivers pressures so to reduce patient anxiety and increases comfort. Off  or  5-45 minutes 10 minutes                   Rate (RR)          Patient's respiratory rate 4- 60 BPM 4     Rise time          Speed at which the inspiratory pressure rises to the set pressure. 1-5  (1 is the fastest) Set at 3         Patient Data  Data Description Range Usual setting in NIV   Breath phase/trigger Indicator  Bar in left hand corner. Colored according to breath trigger. n/a n/a   PIP Positive Inspiratory pressure  0-50 n/a   Patient total leak Est. or unintentional leak  0-200 n/a   Patient trigger Patient triggered breaths as a percentage  0-100% Should be 100%   Respiratory rate Respiratory rate 0-90 n/a   Ti/Ttot Inspiratory duty cycle or inspiratory time divided by total cycle time  0-91% n/a   Minute volume Est. minute ventilation.  TV x rate=MV  0-99 LPM n/a   Tidal volume Est.  tidal volume  0-3000 ml n/a     Alarms  Alarm Description Range Set  at   [facility] Rate High respiratory rate 5-90 10 breaths above patient's breath rate   Low Rate Low respiratory rate alarm 1-89 BPM 5 breaths below patient's breath rate   Hi VT High tidal volume alarm 200-2500 ml 200 ml above patient's own   Low VT Low tidal volume alarm OFF - 1500 ml OFF   HIP High Inspiratory pressure alarm 5-50 cm H20 10 cm above IPAP   LIP Low inspiratory pressure alarm OFF, 1-40 cmH20 OFF   Lo VE Low minute vent alarm OFF, 0.1 to 99L/min OFF   LIP T Low inspiratory delay time 5-60 seconds 5 seconds       CPAP Settings BiPAP Settings     Set CPAP level as ordered Set breath rate as ordered     Set FIO2 as ordered Set I-time as 1.3     Set Ramp time as ordered Set EPAP as ordered     Set C-Flex at 3 Set IPAP as ordered      Set Rise time as ordered      Set FIO2 as ordered           Respiratory Care Services  Consent and Release for Home Ventilator      Patient Name: _________________________________________ Room: ___________    SSN: _______________________________           Account Number:  __________________    Use and care of my personal home ventilator, (invasive or non-invasive) mechanical life support device while at [facility] system has been discussed with me by my physician and I have been provided with an opportunity to ask questions which have been answered to my satisfaction. I also understand a respiratory care practitioner is not expected to remain in my room or general vicinity at all times. It is understood that every precaution consistent with the best medical practice will be taken and I give Respiratory Care Services permission to monitor my ventilator during my hospital stay. I hereby release [employer] system, it's agents, employees and medical staff from liability arising from any and all claims, demands, losses and damages to person and/or property as a result of the above mentioned requests.  I certify that I have read and understand this consent agreement and release and that I am legally qualified to kristen this authorization. ___________________  Date    _____________________________________        ________________________  Signature of Patient or Parent (of minor patient,                  Relationship (if other than Patient)  USP parent) if applicable. Closest Relative,  Guardian or legal Representative    _____________________________________  Witness  Legal representative is defined as person named by the court as a guardian, executor or , or having power of  specifically set forth to authorize this action.     [phone]3/02, 7/14[facility]   Consent and Release for Home Ventilator

## 2023-02-22 NOTE — ANESTHESIA POSTPROCEDURE EVALUATION
Department of Anesthesiology  Postprocedure Note    Patient: Donovan Melendez  MRN: [id number]  YOB: 1945  Date of evaluation: 2/22/2023      Procedure Summary     Date: 02/22/23 Room / Location: [facility] OR 08 / [facility] MA[facility] OR    Anesthesia Start: 7579 Anesthesia Stop: 4219    Procedure: lt HIP TOTAL ARTHROPLASTY (Left: Hip) Diagnosis:       Primary osteoarthritis of left hip      (Primary osteoarthritis of left hip [M16.12])    Surgeons: Avinash Ohcoa MD Responsible Provider: Ryan Villalobos DO    Anesthesia Type: Spinal ASA Status: 3          Anesthesia Type: Spinal    Sina Phase I: Sina Score: 10    Sina Phase II:        Anesthesia Post Evaluation    Patient location during evaluation: PACU  Level of consciousness: awake and alert  Airway patency: patent  Nausea & Vomiting: no nausea  Complications: no  Cardiovascular status: hemodynamically stable  Respiratory status: acceptable  Hydration status: euvolemic

## 2023-02-22 NOTE — OP NOTE
Total Hip Procedure Note    Pia Rosario,  [id number],  1945    Pre-operative Diagnosis:  Primary osteoarthritis of left hip [M16.12]  Post-operative Diagnosis: same    Procedure: Procedure(s) (LRB):  lt HIP TOTAL ARTHROPLASTY (Left)  CPT code: [address] [place]  Location: [address] [place] [address]  Surgeon: Demetra Coronado MD  Assistant: Elijah Hill  Anesthesia: Spinal    Indications: Patient has end stage arthritis. They have tried and failed conservative management. Findings: severe degenerative arthritis, acetabular osteophytes and bone spurs around the femoral head. EBL: 300cc  BMI: Body mass index is 44.46 kg/m². Procedure: The complexity of total joint surgery requires the use of a first assistant for positioning, retraction and expertise in closure. Pia Rosario was brought to the operating room and positioned on the operating table. Anesthesia was administered. IV antibiotics were administered. A time out identifying the patient, procedure, operative side and surgeon was administered and charted by the OR Nurse. The patient was positioned on the contralateral decubitus position. The limb was prepped and draped in the usual sterile fashion. A posterior approach was utilized to expose the hip. The incision was carried through the subcutaneous tissue and underlying fascia. Hemostasis obtained using the bovie cauterization. A Charnley retractor was inserted. The short external rotators were divided at their insertion. The sciatic nerve was palpated and identified. Next, a L-shaped capsular incision was accomplished and femoral head was dislocated. The neck was osteotomized a measured distance above the lesser trochanter. The neck and head cut was measured with a caliper. .    Acetabular retractors were placed and the appropriate capsulotomy performed. Soft tissue was removed from the acetabulum. The acetabulum was sequentially reamed. Using trial components the acetabular component was sized.  The acetabular component was impacted at approximately 40 degrees horizontal tilt and 20 degrees anteversion. One 6.5 mm screw was used to fixate the cup. The real polyethylene liner was impacted into position. Utilizing the femoral retractor, the canal was prepared with appropriate lateralization and reamed with the starter reamer. The canal was then broached progressively to accommodate the Strykerstem. The broach was positioned with the anatomic femoral anteversion. A calcar planar was utilized. Trials were preformed using various neck length heads until leg length had been restored. The hip was carried through a range of motion and was found to be stable to flexion greater than 90 degrees and on internal and external rotation. Limb lengths were thought to be equilibrated and with appropriate stability as mentioned above. All trial components were removed. The cementless permanent stem was impacted into place. A trial reduction was performed and the above neck length was selected. The permanent ceramic femoral head was impacted into place. The hip was reduced and the stability was as mentioned as above. Copious irrigation was accomplished about the surgical site. The sciatic nerve was palpated and noted to be intact. The capsule was repaired with an absorbable suture and the external rotators were reattached with a #5 Mersilene. The operative hip was injected with 60 cc of Neuropin, 1cc of 10 mg of morphine, and 1 cc of 30 mg of Toradol. The Hip was then soaked with a diluted betadine solution for approximately 3 minutes and then thoroughly irrigated. A #1 PDS suture was used to re-approximate the fascia. Then, 1 gram (100 mg/ml) of Transexamic Acid was injected into the joint space. An insorb stapler with absorbable sutures were used to approximate the subcutaneous layers. Staples were applied in an occlusive fashion and a sterile bandage was placed. The patient was then rolled to a supine position.  The sponge, needle and instrument counts were correct. The patient tolerated the procedure without difficulty and left the operating room in satisfactory condition. Implants:   Implant Name Type Inv. Item Serial No.  Lot No. LRB No. Used Action   SHELL ACET SZ D [id number] 3 CLUS H TRITANIUM PRESSFIT YAKELIN - [id number]  SHELL ACET SZ D [id number] 3 CLUS H TRITANIUM PRESSFIT YAKELIN [id number] [employer] [facility] [id number] Left 1 Implanted   SCREW BNE L25MM DIA6. 5MM HEX LO PROF TRIDENT II - [id number]  SCREW BNE L25MM DIA6. 5MM HEX LO PROF TRIDENT II [id number] [employer] [facility] [id number] Left 1 Implanted   INSERT ACET D 0 DEG 36 MM HIP X3 TRIDENT - [id number]  INSERT ACET D 0 DEG 36 MM HIP X3 TRIDENT [id number] [employer] [facility] [id number] Left 1 Implanted   STEM FEM HI OFFSET 3 HIP CLLRD INSIGNIA - [id number]  STEM FEM HI OFFSET 3 HIP CLLRD INSIGNIA [id number] [employer] [facility] [id number] Left 1 Implanted   HEAD FEM [id number] +0MM OFFSET HIP Lima Dilling [id number]  HEAD FEM [id number] +0MM OFFSET HIP Lima Dilling [id number] [employer] [facility] [id number] Left 1 Implanted           Signed By: Cristina Gilman MD  2/22/2023,  11:39 AM

## 2023-02-22 NOTE — PERIOP NOTE
TRANSFER - OUT REPORT:    Verbal report given to [address] [place] [address] on [employer]  being transferred to [address] [place] [address] for routine progression of patient care       Report consisted of patient's Situation, Background, Assessment and   Recommendations(SBAR). Information from the following report(s) Nurse Handoff Report, Adult Overview, Surgery Report, MAR, and Cardiac Rhythm SR  was reviewed with the receiving nurse. [place] Assessment: No data recorded  Lines:   Peripheral IV 02/22/23 Left Hand (Active)   Site Assessment Clean, dry & intact 02/22/23 1305   Line Status Infusing 02/22/23 [address] Connections checked and tightened 02/22/23 1305   Phlebitis Assessment No symptoms 02/22/23 1305   Infiltration Assessment 0 02/22/23 1305   Alcohol Cap Used No 02/22/23 1305   Dressing Status Clean, dry & intact 02/22/23 1305   Dressing Type Transparent 02/22/23 1305        Opportunity for questions and clarification was provided.       Patient transported with:  O2 @ 0lpm

## 2023-02-22 NOTE — PROGRESS NOTES
Admission assessment complete. Patient in bed. Instructed on use of lighting, menu, fall risk and incentive spirometer 10x hourly while awake. Dressing dry and intact. Neurovascular status WDL, warm with palpable pulses bilaterally. Positive dorsiflexion and plantar flexion. Patient denies needs at present. Instructed not to get up by themselves and call for assistance or any needs. Patient verbalized understanding. Call bell within reach. Side rails up x3. Bed low and locked. No distress noted.

## 2023-02-22 NOTE — PROGRESS NOTES
02/22/23 1517   Oxygen Therapy/Pulse Ox   O2 Therapy Oxygen   $Oxygen $Daily Charge   O2 Device Nasal cannula   O2 Flow Rate (L/min) 2 L/min   Heart Rate 80   Resp 16   SpO2 93 %   Pulse Oximeter Device Mode Continuous   Pulse Oximeter Device Location Finger   $Pulse Oximeter $Spot check (multiple/continuous)   Incentive Spirometry Tx   Treatment Effort Assisted by RT   Achieved Volume (mL) 1750 mL   Attempted to place pt on Airvo for lung expansion, pt refused Airvo due to it being heated. Placed pt on 2L NC due to O2 sat 89% on room air and pt refusing Airvo at this time. Pt in no distress.

## 2023-02-22 NOTE — PERIOP NOTE
Patient does not have anyone here, Surgeon can call her cousin Meera Trujillo. His cell # is [phone]    4 belonging bags and patient wheelchair IN PREOP, everything is labeled and her wheelchair has to go back with her to the facility!

## 2023-02-22 NOTE — CONSULTS
[facility] Hospitalist Consult   Admit Date:  2023  7:28 AM   Name:  Dc Richard   Age:  69 y.o. Sex:  female  :  1945   MRN:  [id number]   Room:  [facility]/    Presenting Complaint: No chief complaint on file. Reason(s) for Admission: Primary osteoarthritis of left hip [M16.12]  Unilateral primary osteoarthritis, left hip [M16.12]     Hospitalists consulted by Candis Mcclelland MD for: medical management    History of Presenting Illness:   Dc Richard is a 69 y.o. female with history of COPD, GERD, HTN, hypothyroidism, DARCIE, BELKIS, DM II who was admitted for left ISIAH. Hospitalitis consulted for medical management. Pt doing well post op. Denies CP, SOB, n/v/d, abd pain. Assessment & Plan:     Principal Problem:    Status post left hip replacement  Ortho primary    HTN  Home meds    Hypothyroidism  Home meds    DM II  Home lantus and humalog    GERD  Protonix    Neuropathy  Lyrica    Thank you for this consult. We will sign off. Call with questions. Principal Problem:    Status post left hip replacement  Active Problems:    Unilateral primary osteoarthritis, left hip    Primary osteoarthritis of left hip  Resolved Problems:    * No resolved hospital problems. *      Past History:  Past Medical History:   Diagnosis Date    Acute UTI 2021    Alternating constipation and diarrhea     Arthritis     Asthma     bronchio-asthma mostly seasonal    Chronic obstructive asthma, unspecified     Chronic pain     DDD, LOW BACK, SIATICA    COVID-19 2023    cough~pt states tested positive in nursing home facility; had been coughing for a week before they tested her.     DDD (degenerative disc disease)     WITH RADICULOPATHY    Disorder of sacrococcygeal spine     E-coli UTI 2021    Elevated serum creatinine 2017    Elevated WBC count 2017    Functional assessment declined 2022    Functional quadriplegia ([facility]) 2022    Gas bloat syndrome     GERD (gastroesophageal reflux disease)     Hyperlipidemia     Hypertension     Hypothyroidism     Incontinence of urine     Insomnia     Iron deficiency anemia 2022    Morbid obesity ([address]     Multiple environmental allergies     Murmur, cardiac      early systolic flow murmur upper sternal borders no radiation; ECHO 22    Neuropathy     BELKIS (obstructive sleep apnea) 2009    Osteoarthritis of left knee 2009    Osteoarthritis of right knee 2011    Prolonged emergence from general anesthesia     trouble waking up from anesthesia once ~20 years ago~ \"out for 8 hours\"    S/P total knee replacement using cement 2009    Sleep apnea     HAS CPAP~has not used in the last 8 months    Spastic colon     Spinal stenosis of lumbar region     with neurogenic claudication    Status post right hip replacement 10/09/2017    Thyroid disease     ON MEDS    Type II or unspecified type diabetes mellitus without mention of complication, not stated as uncontrolled     avg FBS: 160-170; Hgb A1c 6.9 on 7/15/22; takes insulin       Past Surgical History:   Procedure Laterality Date    DILATION AND CURETTAGE OF UTERUS      HAND/FINGER SURGERY UNLISTED Right     HERNIA REPAIR  65    UMBILICAL WITH MESH    HYSTERECTOMY (CERVIX STATUS UNKNOWN)  2000    TOTAL HIP ARTHROPLASTY Right     TOTAL KNEE ARTHROPLASTY Left 2009    TOTAL KNEE ARTHROPLASTY Right         Social History     Tobacco Use    Smoking status: Former     Packs/day: 1.00     Years: 14.00     Pack years: 14.00     Types: Cigarettes     Quit date: 1977     Years since quittin.1    Smokeless tobacco: Never    Tobacco comments:     Quit smoking: QUIT IN    Substance Use Topics    Alcohol use: Not Currently      Social History     Substance and Sexual Activity   Drug Use No       Family History   Problem Relation Age of Onset    Diabetes Father     Cancer Father         lung    COPD Father     Cancer Brother     Diabetes Mother Heart Disease Father     Heart Disease Brother         CAD    Diabetes Brother     Cancer Mother         Immunization History   Administered Date(s) Administered    COVID-19, PFIZER PURPLE top, DILUTE for use, (age 15 y+), 30mcg/0.3mL 04/26/2021, 05/17/2021    PPD Test 10/09/2017, 07/15/2022, 02/22/2023    Pneumococcal Conjugate 13-valent (Ymiidbf01) 01/28/2015    Pneumococcal Polysaccharide (Voorttavb91) 03/11/2015    Zoster Live (Zostavax) 03/11/2015     Allergies   Allergen Reactions    Codeine Other (See Comments)     dizziness    Erythromycin Rash    Tetracycline Rash      Current Facility-Administered Medications   Medication Dose Route Frequency    albuterol (PROVENTIL) nebulizer solution 2.5 mg  2.5 mg Nebulization Q6H PRN    sodium chloride flush 0.9 % injection 5-40 mL  5-40 mL IntraVENous 2 times per day    sodium chloride flush 0.9 % injection 5-40 mL  5-40 mL IntraVENous PRN    0.9 % sodium chloride infusion   IntraVENous PRN    ceFAZolin (ANCEF) 2000 mg in sterile water 20 mL IV syringe  2,000 mg IntraVENous Q8H    acetaminophen (TYLENOL) tablet 650 mg  650 mg Oral Q6H    oxyCODONE (ROXICODONE) immediate release tablet 10 mg  10 mg Oral Q4H PRN    HYDROmorphone HCl PF (DILAUDID) injection 1 mg  1 mg IntraVENous Q3H PRN    sennosides-docusate sodium (SENOKOT-S) 8.6-50 MG tablet 1 tablet  1 tablet Oral BID    ondansetron (ZOFRAN-ODT) disintegrating tablet 4 mg  4 mg Oral Q8H PRN    Or    ondansetron (ZOFRAN) injection 4 mg  4 mg IntraVENous Q6H PRN    diphenhydrAMINE (BENADRYL) capsule 25 mg  25 mg Oral Q6H PRN    Or    diphenhydrAMINE (BENADRYL) injection 25 mg  25 mg IntraVENous Q6H PRN    aspirin EC tablet 81 mg  81 mg Oral BID    celecoxib (CELEBREX) capsule 200 mg  200 mg Oral BID    [START ON 2/23/2023] cetirizine (ZYRTEC) tablet 10 mg  10 mg Oral Daily    [START ON 2/23/2023] furosemide (LASIX) tablet 20 mg  20 mg Oral Daily    [START ON 2/23/2023] insulin glargine (LANTUS) injection vial 18 Units 18 Units SubCUTAneous Daily    insulin lispro (HUMALOG) injection vial 5 Units  5 Units SubCUTAneous TID AC    [START ON 2/23/2023] levothyroxine (SYNTHROID) tablet 112 mcg  112 mcg Oral Daily    [START ON 2/23/2023] lisinopril-hydroCHLOROthiazide (PRINZIDE;ZESTORETIC) 10-12.5 MG per tablet 1 tablet  1 tablet Oral Daily    montelukast (SINGULAIR) tablet 10 mg  10 mg Oral Nightly    [START ON 2/23/2023] pantoprazole (PROTONIX) tablet 40 mg  40 mg Oral Daily    pregabalin (LYRICA) capsule 50 mg  50 mg Oral BID    polyethylene glycol (GLYCOLAX) packet 17 g  17 g Oral Daily PRN    zolpidem (AMBIEN) tablet 5 mg  5 mg Oral Nightly    tuberculin injection 5 Units  5 Units IntraDERmal Once       Objective:   Patient Vitals for the past 24 hrs:   Temp Pulse Resp BP SpO2   02/22/23 1517 -- 80 16 -- 93 %   02/22/23 1324 -- 76 16 107/73 96 %   02/22/23 1305 -- 82 16 (!) 145/74 94 %   02/22/23 1300 -- 82 16 135/66 92 %   02/22/23 1255 -- 78 16 131/63 90 %   02/22/23 1250 -- 83 18 (!) 141/67 92 %   02/22/23 1245 -- 83 18 137/61 90 %   02/22/23 1240 -- 97 18 (!) 134/94 96 %   02/22/23 1235 -- 86 16 135/76 94 %   02/22/23 1231 -- 82 -- 120/64 91 %   02/22/23 1227 97.8 °F (36.6 °C) 84 18 (!) 138/91 95 %   02/22/23 0812 97 °F (36.1 °C) 78 20 136/80 98 %       Oxygen Therapy  SpO2: 93 %  Pulse via Oximetry: 82 beats per minute  Pulse Oximeter Device Mode: Continuous  Pulse Oximeter Device Location: Finger  O2 Device: Nasal cannula  O2 Flow Rate (L/min): 2 L/min    Estimated body mass index is 44.46 kg/m² as calculated from the following:    Height as of this encounter: 5' 4\" (1.626 m). Weight as of this encounter: 259 lb (117.5 kg). Intake/Output Summary (Last 24 hours) at 2/22/2023 1529  Last data filed at 2/22/2023 1219  Gross per 24 hour   Intake 1100 ml   Output 300 ml   Net 800 ml         Physical Exam:    Blood pressure 107/73, pulse 80, temperature 97.8 °F (36.6 °C), temperature source Temporal, resp.  rate 16, height 5' 4\" (1.626 m), weight 259 lb (117.5 kg), SpO2 93 %. General:    Well nourished. Head:  Normocephalic, atraumatic  Eyes:  Sclerae appear normal.  Pupils equally round. ENT:  Nares appear normal.  Moist oral mucosa  Neck:  No restricted ROM. Trachea midline   CV:   RRR. No m/r/g. No jugular venous distension. Lungs:   CTAB. No wheezing, rhonchi, or rales. Symmetric expansion. Abdomen:   Soft, nontender, nondistended. Extremities: No cyanosis or clubbing. No edema. Left hip dressing c/d/i  Skin:     No rashes and normal coloration. Warm and dry. Neuro:  CN II-XII grossly intact. Sensation intact. Psych:  Normal mood and affect. I have personally reviewed labs and tests showing:  Recent Labs:  Recent Results (from the past 24 hour(s))   POCT Glucose    Collection Time: 02/22/23  8:25 AM   Result Value Ref Range    POC Glucose 168 (H) 65 - 100 mg/dL    Performed by: Wali    POCT Glucose    Collection Time: 02/22/23 12:44 PM   Result Value Ref Range    POC Glucose 184 (H) 65 - 100 mg/dL    Performed by: Brandon    Potassium w/ Reflex to Magnesium    Collection Time: 02/22/23  2:15 PM   Result Value Ref Range    Potassium 4.5 3.5 - 5.1 mmol/L       I have personally reviewed imaging studies showing:  XR PELVIS (1-2 VIEWS)    Result Date: 2/22/2023  History: Postop left hip arthroplasty EXAM: Single view pelvis FINDINGS: There is a left total hip arthroplasty present without periprosthetic fracture. There is also a right total hip arthroplasty. Postsurgical change as described. Echocardiogram:  02/09/22    TRANSTHORACIC ECHOCARDIOGRAM (TTE) COMPLETE (CONTRAST/BUBBLE/3D PRN) 02/10/2022  5:07 PM, 02/10/2022 12:00 AM (Final)    Narrative  This is a summary report. The complete report is available in the patient's medical record. If you cannot access the medical record, please contact the sending organization for a detailed fax or copy.       Left Ventricle: Left ventricle is mildly dilated. Increased wall thickness. Normal wall motion. Normal left ventricular systolic function. EF by 2D Simpsons Biplane is 58%. Normal diastolic function. Mitral Valve: Mildly thickened leaflets. Tricuspid Valve: RVSP is 33 mmHg.     Signed by: Rickey Gupta MD on 2/10/2022  5:07 PM, Signed by: Unknown Provider Result on 2/10/2022 12:00 AM        Signed:  DOMINICK Morris - CNP    Notes, labs, VS, diagnostic testing reviewed  Case discussed with pt, care team ,Dr. Silvestre Miller

## 2023-02-22 NOTE — PROGRESS NOTES
OCCUPATIONAL THERAPY Initial Assessment, Daily Note, and PM      (Link to Caseload Tracking:    OT Orders   Time  OT Charge Capture  Rehab Caseload Tracker  Episode     Latasha Amaya is a 69 y.o. female   PRIMARY DIAGNOSIS: Status post left hip replacement  Primary osteoarthritis of left hip [M16.12]  Unilateral primary osteoarthritis, left hip [M16.12]  Procedure(s) (LRB):  lt HIP TOTAL ARTHROPLASTY (Left)  Day of Surgery  Reason for Referral: Pain in left hip (M25.552)  Stiffness of Left Hip, Not elsewhere classified (M25.652)  Outpatient in a bed: Payor: MEDICARE / Plan: MEDICARE PART A AND B / Product Type: *No Product type* /     ASSESSMENT:     REHAB RECOMMENDATIONS:   Recommendation to date pending progress:  Setting:  Short-term Rehab pt requesting short term rehab as she is living in [facility] now    Equipment:    Rolling Walker     ASSESSMENT:  Ms. Crystal Mcgowan is s/p left ISIAH and presents with decreased independence with functional mobility and activities of daily living as compared to baseline level of function and safety. Patient would benefit from skilled Occupational Therapy to maximize independence and safety with self-care task and functional mobility. Per prehab note: Has had B TKA and R ISIAH. Arrived to department via [address]. Uses W/C for longer distances and RW for short distance ambulation. W/C belongs to [facility]. She states she is temporarily living in [facility] due to severe hip and back pain but she plans to return to her home after she recovers. She states she has severe LBP and has been getting injections. She is requesting rehab and [facility] Patient seen in room with PT. Pt at edge of bed upon arrival with complaint of pain and concerns that she could not walk. Pt assisted to sameer shoes and was able to ambulate a very short household distance with much encouragement and extra time. Patient is hopeful to spend the night to better prepare for safe discharge to [facility]. [address] [id number] AM-PAC 6 Clicks Daily Activity Inpatient Short Form:    AM-PAC Daily Activity - Inpatient   How much help is needed for putting on and taking off regular lower body clothing?: A Lot  How much help is needed for bathing (which includes washing, rinsing, drying)?: A Lot  How much help is needed for toileting (which includes using toilet, bedpan, or urinal)?: A Lot  How much help is needed for putting on and taking off regular upper body clothing?: None  How much help is needed for taking care of personal grooming?: None  How much help for eating meals?: None  AM-[facility] Inpatient Daily Activity Raw Score: 18  AM-PAC Inpatient ADL T-Scale Score : 38.66  ADL Inpatient CMS 0-100% Score: 46.65  ADL Inpatient CMS G-Code Modifier : CK     SUBJECTIVE:     Ms. Claritza Santana states, \"I don't think I can do it\"      Social/Functional   Lives With:  ([facility])  Type of Home: Assisted living  Home Layout: One level  Home Access: Level entry  Bathroom Shower/Tub: Tub/Shower unit  Home Equipment: Marilyne Rodriguez, rolling  ADL Assistance: Independent  Ambulation Assistance: Independent  Transfer Assistance:  (with walker & supervision)    OBJECTIVE:     Lencho Heart / Suzi Menard / Aric Saba: None    RESTRICTIONS/PRECAUTIONS:  Restrictions/Precautions: Weight Bearing, Fall Risk  Left Lower Extremity Weight Bearing: Weight Bearing As Tolerated    PAIN: VITALS / O2:   Pre Treatment:          Post Treatment: pt with complaint of pain, RN notified and bringing pain medication  Vitals          Oxygen        GROSS EVALUATION: INTACT IMPAIRED   (See Comments)   UE AROM [x] []   UE PROM [x] []   Strength [x]       Posture / Balance []  Decreased standing balance    Sensation [x]     Coordination [x]       Tone [x]       Edema []    Activity Tolerance [x]       Hand Dominance R [] L []      COGNITION/  PERCEPTION: INTACT IMPAIRED   (See Comments)   Orientation [x]     Vision [x]     Hearing [x]     Cognition  [x]     Perception [x]       MOBILITY: I Mod I S SBA CGA Min Mod Max Total  NT x2 Comments:   Bed Mobility    Rolling [] [] [] [] [] [] [] [] [] [] []    Supine to Sit [] [] [] [] [] [] [] [] [] [] []    Scooting [] [] [] [] [] [] [] [] [] [] []    Sit to Supine [] [] [] [] [] [] [] [] [] [] []    Transfers    Sit to Stand [] [] [] [] [] [x] [] [] [] [] []    Bed to Chair [] [] [] [] [] [x] [] [] [] [] []    Stand to Sit [] [] [] [] [] [x] [] [] [] [] []    Tub/Shower [] [] [] [] [] [x] [] [] [] [] []       Toilet [] [] [] [] [] [x] [] [] [] [] []        [] [] [] [] [] [] [] [] [] [] []    I=Independent, Mod I=Modified Independent, S=Supervision/Setup, SBA=Standby Assistance, CGA=Contact Guard Assistance, Min=Minimal Assistance, Mod=Moderate Assistance, Max=Maximal Assistance, Total=Total Assistance, NT=Not Tested    ACTIVITIES OF DAILY LIVING: I Mod I S SBA CGA Min Mod Max Total NT Comments   BASIC ADLs:              Upper Body Bathing [] [] [] [x] [] [] [] [] [] []    Lower Body Bathing [] [] [] [] [] [] [x] [] [] []    Toileting [] [] [] [] [] [] [x] [] [] []    Upper Body Dressing [] [] [] [x] [] [] [] [] [] []    Lower Body Dressing [] [] [] [] [] [] [x] [] [] []    Feeding [x] [] [] [] [] [] [] [] [] []    Grooming [] [] [] [x] [] [] [] [] [] []    Personal Device Care [] [] [] [] [] [] [] [] [] []    Functional Mobility [] [] [] [] [] [x] [] [] [] [] RW   I=Independent, Mod I=Modified Independent, S=Supervision/Setup, SBA=Standby Assistance, CGA=Contact Guard Assistance, Min=Minimal Assistance, Mod=Moderate Assistance, Max=Maximal Assistance, Total=Total Assistance, NT=Not Tested    PLAN:     FREQUENCY/DURATION   OT Plan of Care: 2 times/week for duration of hospital stay or until stated goals are met, whichever comes first.    ACUTE OCCUPATIONAL THERAPY GOALS:   (Developed with and agreed upon by patient and/or caregiver.)    GOALS:   DISCHARGE GOALS (in preparation for going home/rehab):  3 days  1.   Ms. Genaro Lopez will perform lower body dressing activity with minimal assist required to demonstrate improved functional mobility and safety. 2.  Ms. Med Reddy will perform bathing activity with minimal assist required to demonstrate improved functional mobility and safety. 3.  Ms. Med Reddy will perform toileting activity with  minimal assist to demonstrate improved functional mobility and safety. 4.  Ms. Med Reddy will perform all functional transfers transfer with minimal assist to demonstrate improved functional mobility and safety. PROBLEM LIST:   (Skilled intervention is medically necessary to address:)  Decreased ADL/Functional Activities  Decreased Balance  Increased Pain   INTERVENTIONS PLANNED:   (Benefits and precautions of occupational therapy have been discussed with the patient.)  Self Care Training  Education         TREATMENT:     EVALUATION: LOW COMPLEXITY: (Untimed Charge)    TREATMENT:   Co-Treatment PT/OT necessary due to patient's decreased overall endurance/tolerance levels, as well as need for high level skilled assistance to complete functional transfers/mobility and functional tasks  Self Care: (25 min): Procedure(s) (per grid) utilized to improve and/or restore self-care/home management as related to dressing and functional mobility . Required moderate verbal, manual, and   cueing to facilitate activities of daily living skills and compensatory activities.   AFTER TREATMENT PRECAUTIONS: Bed/Chair Locked, Call light within reach, Chair, Needs within reach, and RN notified    INTERDISCIPLINARY COLLABORATION:  RN/ PCT, PT/ PTA, and OT/ FRANKLIN    EDUCATION:  Education Given To: Patient  Education Provided: Role of Therapy, Plan of Care, Transfer Training, Energy Conservation, Fall Prevention Strategies  Education Outcome: Verbalized understanding, Demonstrated understanding, Continued education needed  [] Safe And Effective Hygiene  [x] Fall Precautions  [x] Hip Precautions  [] D/C Instruction Review [] Prosthesis Review  [x] Ruma Gardner Management/Safety  [x] Adaptive Equipment as Needed  [x] Therapeutic Resting Position of Joint       TOTAL TREATMENT DURATION AND TIME:  Time In: [facility]  Time Out: [address] [facility]  Minutes: 0898 Anuja Sauer, OT

## 2023-02-22 NOTE — ANESTHESIA PROCEDURE NOTES
Spinal Block    Patient location during procedure: OR  End time: 2/22/2023 10:19 AM  Reason for block: primary anesthetic and at surgeon's request  Staffing  Performed: anesthesiologist   Anesthesiologist: Scout Zaidi,   Spinal Block  Patient position: sitting  Prep: ChloraPrep  Patient monitoring: cardiac monitor, continuous pulse ox, continuous capnometry, frequent blood pressure checks and oxygen  Approach: midline  Location: L3/L4  Provider prep: mask and sterile gloves  Local infiltration: lidocaine  Needle  Needle type:  Sunday   Needle gauge: 25 G  Needle length: 3.5 in  Assessment  Swirl obtained: Yes  CSF: clear  Attempts: 1  Hemodynamics: stable  Additional Notes  Uneventful spinal placement  Preanesthetic Checklist  Completed: patient identified, IV checked, site marked, risks and benefits discussed, surgical/procedural consents, equipment checked, pre-op evaluation, timeout performed, anesthesia consent given, oxygen available and monitors applied/VS acknowledged

## 2023-02-23 LAB
GLUCOSE BLD STRIP.AUTO-MCNC: 140 MG/DL (ref 65–100)
GLUCOSE BLD STRIP.AUTO-MCNC: 197 MG/DL (ref 65–100)
GLUCOSE BLD STRIP.AUTO-MCNC: 264 MG/DL (ref 65–100)
GLUCOSE BLD STRIP.AUTO-MCNC: 306 MG/DL (ref 65–100)
HCT VFR BLD AUTO: 33.2 % (ref 35.8–46.3)
HGB BLD-MCNC: 10.6 G/DL (ref 11.7–15.4)
SERVICE CMNT-IMP: ABNORMAL

## 2023-02-23 PROCEDURE — 36415 COLL VENOUS BLD VENIPUNCTURE: CPT

## 2023-02-23 PROCEDURE — 6360000002 HC RX W HCPCS: Performed by: PHYSICIAN ASSISTANT

## 2023-02-23 PROCEDURE — 97530 THERAPEUTIC ACTIVITIES: CPT

## 2023-02-23 PROCEDURE — 2700000000 HC OXYGEN THERAPY PER DAY

## 2023-02-23 PROCEDURE — 94760 N-INVAS EAR/PLS OXIMETRY 1: CPT

## 2023-02-23 PROCEDURE — 2580000003 HC RX 258: Performed by: FAMILY MEDICINE

## 2023-02-23 PROCEDURE — 2580000003 HC RX 258: Performed by: PHYSICIAN ASSISTANT

## 2023-02-23 PROCEDURE — 1100000000 HC RM PRIVATE

## 2023-02-23 PROCEDURE — 6370000000 HC RX 637 (ALT 250 FOR IP): Performed by: PHYSICIAN ASSISTANT

## 2023-02-23 PROCEDURE — 82962 GLUCOSE BLOOD TEST: CPT

## 2023-02-23 PROCEDURE — 85014 HEMATOCRIT: CPT

## 2023-02-23 RX ORDER — SODIUM CHLORIDE 9 MG/ML
INJECTION, SOLUTION INTRAVENOUS CONTINUOUS
Status: DISCONTINUED | OUTPATIENT
Start: 2023-02-23 | End: 2023-02-24

## 2023-02-23 RX ORDER — 0.9 % SODIUM CHLORIDE 0.9 %
500 INTRAVENOUS SOLUTION INTRAVENOUS ONCE
Status: COMPLETED | OUTPATIENT
Start: 2023-02-23 | End: 2023-02-24

## 2023-02-23 RX ADMIN — OXYCODONE HYDROCHLORIDE 10 MG: 5 TABLET ORAL at 12:16

## 2023-02-23 RX ADMIN — LEVOTHYROXINE SODIUM 112 MCG: 0.11 TABLET ORAL at 05:45

## 2023-02-23 RX ADMIN — MONTELUKAST 10 MG: 10 TABLET, FILM COATED ORAL at 20:28

## 2023-02-23 RX ADMIN — INSULIN LISPRO 5 UNITS: 100 INJECTION, SOLUTION INTRAVENOUS; SUBCUTANEOUS at 17:21

## 2023-02-23 RX ADMIN — SODIUM CHLORIDE, PRESERVATIVE FREE 10 ML: 5 INJECTION INTRAVENOUS at 20:34

## 2023-02-23 RX ADMIN — LISINOPRIL AND HYDROCHLOROTHIAZIDE 1 TABLET: 12.5; 1 TABLET ORAL at 08:03

## 2023-02-23 RX ADMIN — ACETAMINOPHEN 650 MG: 325 TABLET, FILM COATED ORAL at 17:21

## 2023-02-23 RX ADMIN — ACETAMINOPHEN 650 MG: 325 TABLET, FILM COATED ORAL at 05:45

## 2023-02-23 RX ADMIN — SODIUM CHLORIDE 500 ML: 9 INJECTION, SOLUTION INTRAVENOUS at 22:00

## 2023-02-23 RX ADMIN — CELECOXIB 200 MG: 200 CAPSULE ORAL at 20:28

## 2023-02-23 RX ADMIN — CELECOXIB 200 MG: 200 CAPSULE ORAL at 08:01

## 2023-02-23 RX ADMIN — PANTOPRAZOLE SODIUM 40 MG: 40 TABLET, DELAYED RELEASE ORAL at 08:01

## 2023-02-23 RX ADMIN — ASPIRIN 81 MG: 81 TABLET, COATED ORAL at 20:28

## 2023-02-23 RX ADMIN — CEFAZOLIN SODIUM 2000 MG: 100 INJECTION, POWDER, LYOPHILIZED, FOR SOLUTION INTRAVENOUS at 01:24

## 2023-02-23 RX ADMIN — ACETAMINOPHEN 650 MG: 325 TABLET, FILM COATED ORAL at 12:16

## 2023-02-23 RX ADMIN — ACETAMINOPHEN 650 MG: 325 TABLET, FILM COATED ORAL at 00:28

## 2023-02-23 RX ADMIN — ACETAMINOPHEN 650 MG: 325 TABLET, FILM COATED ORAL at 23:22

## 2023-02-23 RX ADMIN — INSULIN LISPRO 5 UNITS: 100 INJECTION, SOLUTION INTRAVENOUS; SUBCUTANEOUS at 12:16

## 2023-02-23 RX ADMIN — SODIUM CHLORIDE: 9 INJECTION, SOLUTION INTRAVENOUS at 20:34

## 2023-02-23 RX ADMIN — PREGABALIN 50 MG: 50 CAPSULE ORAL at 08:01

## 2023-02-23 RX ADMIN — PREGABALIN 50 MG: 50 CAPSULE ORAL at 20:28

## 2023-02-23 RX ADMIN — OXYCODONE HYDROCHLORIDE 10 MG: 5 TABLET ORAL at 05:45

## 2023-02-23 RX ADMIN — OXYCODONE HYDROCHLORIDE 10 MG: 5 TABLET ORAL at 17:21

## 2023-02-23 RX ADMIN — INSULIN LISPRO 5 UNITS: 100 INJECTION, SOLUTION INTRAVENOUS; SUBCUTANEOUS at 07:31

## 2023-02-23 RX ADMIN — ASPIRIN 81 MG: 81 TABLET, COATED ORAL at 08:02

## 2023-02-23 RX ADMIN — INSULIN GLARGINE 18 UNITS: 100 INJECTION, SOLUTION SUBCUTANEOUS at 08:01

## 2023-02-23 RX ADMIN — ZOLPIDEM TARTRATE 5 MG: 5 TABLET ORAL at 21:50

## 2023-02-23 ASSESSMENT — PAIN SCALES - GENERAL
PAINLEVEL_OUTOF10: 3
PAINLEVEL_OUTOF10: 7
PAINLEVEL_OUTOF10: 0
PAINLEVEL_OUTOF10: 3
PAINLEVEL_OUTOF10: 7
PAINLEVEL_OUTOF10: 5

## 2023-02-23 NOTE — PROGRESS NOTES
2023         Post Op day: 1 Day Post-Op     Admit Date: 2023    Admit Diagnosis: Primary osteoarthritis of left hip [M16.12]  Unilateral primary osteoarthritis, left hip [M16.12]        Subjective: Patient stable. No acute events.       Objective:     Vitals:    23 0327   BP: 126/64   Pulse: 67   Resp:    Temp: 97.9 °F (36.6 °C)   SpO2: 98%    Temp (24hrs), Av.8 °F (36.6 °C), Min:97 °F (36.1 °C), Max:98.1 °F (36.7 °C)      Lab Results   Component Value Date/Time    HGB 10.6 2023 03:58 AM       Patient Active Problem List   Diagnosis    Osteoarthritis of right knee    Type 2 diabetes mellitus treated without insulin ([facility])    Class 3 severe obesity due to excess calories with serious comorbidity and body mass index (BMI) of 40.0 to 44.9 in adult [facility])    Status post right hip replacement    Hypertension    S/P total knee replacement using cement    Hypothyroidism    Pressure injury of contiguous region involving back and right buttock, stage 1    Shortness of breath    Neuropathy    Asthma    Osteoarthritis of left knee    Chronic pain    BELKIS (obstructive sleep apnea)    4th nerve palsy, left    Adie's tonic pupil, right    Irritable bowel syndrome    Gastroesophageal reflux disease without esophagitis    Hyperlipidemia    Hypothyroidism due to acquired atrophy of thyroid    Primary insomnia    Keratoconjunctivitis sicca, not specified as Sjogren's, bilateral    Polyneuropathy due to type 2 diabetes mellitus ([facility]    Spondylosis without myelopathy    Type 2 diabetes mellitus with hyperglycemia, without long-term current use of insulin ([facility])    Vitamin D deficiency    Stress incontinence of urine    Primary osteoarthritis of left hip    Noncompliance with CPAP treatment    Unilateral primary osteoarthritis, left hip    Status post left hip replacement       Current Facility-Administered Medications   Medication Dose Route Frequency    albuterol (PROVENTIL) nebulizer solution 2.5 mg 2.5 mg Nebulization Q6H PRN    sodium chloride flush 0.9 % injection 5-40 mL  5-40 mL IntraVENous 2 times per day    sodium chloride flush 0.9 % injection 5-40 mL  5-40 mL IntraVENous PRN    0.9 % sodium chloride infusion   IntraVENous PRN    acetaminophen (TYLENOL) tablet 650 mg  650 mg Oral Q6H    oxyCODONE (ROXICODONE) immediate release tablet 10 mg  10 mg Oral Q4H PRN    HYDROmorphone HCl PF (DILAUDID) injection 1 mg  1 mg IntraVENous Q3H PRN    sennosides-docusate sodium (SENOKOT-S) 8.6-50 MG tablet 1 tablet  1 tablet Oral BID    ondansetron (ZOFRAN-ODT) disintegrating tablet 4 mg  4 mg Oral Q8H PRN    Or    ondansetron (ZOFRAN) injection 4 mg  4 mg IntraVENous Q6H PRN    diphenhydrAMINE (BENADRYL) capsule 25 mg  25 mg Oral Q6H PRN    Or    diphenhydrAMINE (BENADRYL) injection 25 mg  25 mg IntraVENous Q6H PRN    aspirin EC tablet 81 mg  81 mg Oral BID    celecoxib (CELEBREX) capsule 200 mg  200 mg Oral BID    cetirizine (ZYRTEC) tablet 10 mg  10 mg Oral Daily    furosemide (LASIX) tablet 20 mg  20 mg Oral Daily    insulin glargine (LANTUS) injection vial 18 Units  18 Units SubCUTAneous Daily    insulin lispro (HUMALOG) injection vial 5 Units  5 Units SubCUTAneous TID AC    levothyroxine (SYNTHROID) tablet 112 mcg  112 mcg Oral Daily    lisinopril-hydroCHLOROthiazide (PRINZIDE;ZESTORETIC) 10-12.5 MG per tablet 1 tablet  1 tablet Oral Daily    montelukast (SINGULAIR) tablet 10 mg  10 mg Oral Nightly    pantoprazole (PROTONIX) tablet 40 mg  40 mg Oral Daily    pregabalin (LYRICA) capsule 50 mg  50 mg Oral BID    polyethylene glycol (GLYCOLAX) packet 17 g  17 g Oral Daily PRN    zolpidem (AMBIEN) tablet 5 mg  5 mg Oral Nightly    tuberculin injection 5 Units  5 Units IntraDERmal Once    insulin lispro (HUMALOG) injection vial 0-4 Units  0-4 Units SubCUTAneous Nightly       Extremity Exam  Dressing clean and dry   Tibialis Anterior and Gastroc-Soleus functioning normally left lower extremity  Sensation intact to light touch on operative limb  Extremity perfused  TEDS/SCDS in place  No sign of DVT     Assessment / Plan :  WBAT LLE  Continue PT/OT  Continue current DVT prophylaxis in house. Discharge on ASA  DIspo-HH         Signed By: Juan Pablo Toledo MD     I have reviewed the patients controlled substance prescription history, as maintained in the [place] prescription monitoring program, so that the prescription(s) for a  controlled substance can be given.

## 2023-02-23 NOTE — PROGRESS NOTES
02/22/23 2002   Oxygen Therapy/Pulse Ox   O2 Therapy Oxygen humidified   O2 Device Nasal cannula   O2 Flow Rate (L/min) 3 L/min   Heart Rate 80   Resp 16   SpO2 98 %   Pulse Oximeter Device Mode Continuous   Pulse Oximeter Device Location Left;Finger   Pt on continuous monitor for tonight. Alarm limits set. Pt working on IS.

## 2023-02-23 NOTE — PROGRESS NOTES
Occupational Therapy Note:    Attempted to see patient this AM for occupational therapy treatment  session. Patient declined OT for any morning therapy. Pt agreeable to OT at 10:30 tomorrow but not earlier. Will follow and re-attempt as schedule permits/patient available.  Thank you,    Kaushal Reich, OT    Rehab Caseload Tracker

## 2023-02-23 NOTE — PROGRESS NOTES
ACUTE PHYSICAL THERAPY GOALS:   (Developed with and agreed upon by patient and/or caregiver.)  GOALS (1-4 days):  (1.)Ms. Valle will move from supine to sit and sit to supine  in bed with STAND BY ASSIST.    (2.)Ms. Valle will transfer from bed to chair and chair to bed with STAND BY ASSIST using the least restrictive device. (3.)Ms. Valle will ambulate with STAND BY ASSIST for 30-40 feet with the least restrictive device. (4.)Ms. Valle will state/observe ISIAH precautions with no verbal cues.   ________________________________________________________________________________________________     PHYSICAL THERAPY JOINT CAMP: TOTAL HIP ARTHROPLASTY Daily Note and PM  (Link to Caseload Tracking: PT Visit Days : 2  Acknowledge Orders  Time In/Out  PT Charge Capture  Rehab Caseload Tracker  Episode   Geni Montes is a 69 y.o. female   PRIMARY DIAGNOSIS: Status post left hip replacement  Primary osteoarthritis of left hip [M16.12]  Unilateral primary osteoarthritis, left hip [M16.12]  Procedure(s) (LRB):  lt HIP TOTAL ARTHROPLASTY (Left)  1 Day Post-Op  Reason for Referral: Pain in left hip (M25.552)  Stiffness of Left Hip, Not elsewhere classified (M25.652)  Difficulty in walking, Not elsewhere classified (R26.2)  Inpatient: Payor: MEDICARE / Plan: MEDICARE PART A AND B / Product Type: *No Product type* /     REHAB RECOMMENDATIONS:   Recommendation to date pending progress:  Setting:  Short-term Rehab    Equipment:    To Be Determined     GAIT: I Mod I S SBA CGA Min Mod Max Total  NT x2 Comments:   Level of Assistance [] [] [] [] [] [x] [] [] [] [] []    Weightbearing Status  Left Lower Extremity Weight Bearing: Weight Bearing As Tolerated    Distance  20 (+ 20) feet    Gait Quality Antalgic, Decreased gabriela , Decreased step clearance, Decreased step length, and Decreased stance    DME Rolling Walker     Stairs  N/A    Ramp N/A    I=Independent, Mod I=Modified Independent, S=Supervision, SBA=Standby Assistance, CGA=Contact Guard Assistance,   Min=Minimal Assistance, Mod=Moderate Assistance, Max=Maximal Assistance, Total=Total Assistance, NT=Not Tested      ASSESSMENT:   ASSESSMENT:  Ms. Med Reddy presented with impaired strength & mobility s/p left ISIAH. Patient also showed decreased stability during out of bed activity. This pt was at an correction, ambulating 20-30 ft with rolling walker & longer distances with a WC. Pt has no assist at home on DC & will require additional rehab time at a SNF to achieve a level of function that will allow her to return to an [facility]. This patient will benefit from follow up therapy to help restore safe function prior to eventually returning home at a [facility] level of care. MSW aware of DC concerns & needs. 2/23 sitting in the chair upon arrival.  Sit>stand with Min A. Ambulated 15 ft to [facility], independent with hygiene. Sit>stand again with Min A and ambulated 15 ft back to the [facility]. Performs and review L hip HEP  (see below) and verbal cues to stay within hip precaution. Review hip precaution. . remain in the chair with needs in reach, ice to L hip and knee and instructed to call for assists, before getting up. 2/23 pm sitting in the chair upon arrival.  Pt needs encouragement to walk with therapy, especially with using the restroom. Therapist told pt needs to be walking to the restroom not using BSC. Ambulated 20 ft to the [facility] using RW with Min A, while working on reciprocal gait pattern. Independent with hygiene and washing hands. Ambulated another 20 ft back to the chair. Performs and review L hip HEP ( see below) with guidance. Review hip precaution again. Remain in the chair with needs in reach, ice to L hip and knee and instructed to call for assists, before getting up. Outcome Measure:   HOOS-JR:  Total Raw Score (0-24 Scale): 18    SUBJECTIVE:   Ms. Med Reddy I feel I will leak if I try to go to the restroom, therapist told pt she has on a brief.     Home Environment/Prior Level of Function Lives With:  ([facility])  Type of Home: Assisted living  Home Layout: One level  Home Access: Level entry  Bathroom Shower/Tub: Tub/Shower unit  Home Equipment: edgar Mojica  ADL Assistance: Independent  Ambulation Assistance: Independent  Transfer Assistance:  (with walker & supervision)    OBJECTIVE:     PAIN: VITAL SIGNS: LINES/DRAINS:   Pre Treatment:0/10         Post Treatment: sore ice to L hip and knee Vitals NT       Oxygen 3 L        IV    RESTRICTIONS/PRECAUTIONS:  Restrictions/Precautions: Weight Bearing, Fall Risk  Left Lower Extremity Weight Bearing: Weight Bearing As Tolerated      Hip Precautions          LOWER EXTREMITY GROSS EVALUATION:  RIGHT LE   Within Functional Limits   Abnormal   Comments   Strength [x] []     Range of Motion [x] []        LEFT LE Within Functional Limits   Abnormal   Comments   Strength [] [x]  Decreased but functional   Range of Motion [] [x]  Decreased but functional     UPPER EXTREMITY GROSS EVALUATION:     Within Functional Limits   Abnormal   Comments   Strength [x] []    Range of Motion [x] []      SENSATION  Sensation [x]  grossly     COGNITION/  PERCEPTION: Intact Impaired (Comments):   Orientation [x]     Vision [x]     Hearing [x]     Cognition  [x]       MOBILITY: I Mod I S SBA CGA Min Mod Max Total  NT x2 Comments:   Bed Mobility    Rolling [] [] [] [] [] [] [] [] [] [] []    Supine to Sit [] [] [] [] [] [] [] [] [] [] []    Scooting [] [] [] [] [] [] [] [] [] [] []    Sit to Supine [] [] [] [] [] [] [] [] [] [] []    Transfers    Sit to Stand [] [] [] [] [] [x] [] [] [] [] []    Bed to Chair [] [] [] [] [] [x] [] [] [] [] [] With walker   Stand to Sit [] [] [] [] [] [x] [] [] [] [] []    Stand Pivot [] [] [] [] [] [x] [] [] [] [] []    Toilet [] [] [] [] [] [] [] [] [] [] []     [] [] [] [] [] [] [] [] [] [] []    I=Independent, Mod I=Modified Independent, S=Supervision, SBA=Standby Assistance, CGA=Contact Guard Assistance,   Min=Minimal Assistance, Mod=Moderate Assistance, Max=Maximal Assistance, Total=Total Assistance, NT=Not Tested    BALANCE: Good Fair+ Fair Fair- Poor NT Comments   Sitting Static [] [] [x] [] [] []    Sitting Dynamic [] [] [x] [] [] []              Standing Static [] [] [x] [x] [] [] With walker   Standing Dynamic [] [] [x] [x] [] [] With walker     PLAN:   FREQUENCY AND DURATION: BID for duration of hospital stay or until stated goals are met, whichever comes first.    THERAPY PROGNOSIS: Good    PROBLEM LIST:   (Skilled intervention is medically necessary to address:)  Decreased ADL/Functional Activities, Decreased Activity Tolerance, Decreased AROM/PROM, Decreased Gait Ability, Decreased Strength, Decreased Transfer Abilities, and Increased Pain   INTERVENTIONS PLANNED:   (Benefits and precautions of physical therapy have been discussed with the patient.)  Therapeutic Activity, Therapeutic Exercise/HEP, Gait Training, Modalities, and Education       TREATMENT:   EVALUATION: LOW COMPLEXITY: (Untimed Charge)    TREATMENT:   Therapeutic Activity (38 Minutes): Therapeutic activity included Transfer Training, Ambulation on level ground, Sitting balance , and Standing balance to improve functional Activity tolerance, Balance, Coordination, Mobility, and Strength.     TREATMENT GRID:  THERAPEUTIC  EXERCISES: DATE:  2/22 DATE:  2/23   DATE:      AM PM AM PM AM PM    [] [] [] [] [] []   Ankle Pumps  20 15 15     Quad Sets  20 15 15     Gluteal Sets  20 15 15     Hip Abd/ADduction  20 15 15     Knee Slides  20 15 15     Short Arc Quads  20 15 15     Long Arc Quads  20 15 15                                B = bilateral; AA = active assistive; A = active; P = passive      EDUCATION: Education Given To: Patient  Education Provided: Role of Therapy, Home Exercise Program  Education Method: Demonstration, Verbal  EDUCATION:  [x] Home Exercises  [x] Fall Precautions  [x] Hip Precautions  [] D/C Instruction Review [] Hip Prosthesis Review  [x] Jewell Garcia Management/Safety  [] Adaptive Equipment as Needed     AFTER TREATMENT PRECAUTIONS: Bed/Chair Locked, Call light within reach, Chair, Needs within reach, and RN notified    INTERDISCIPLINARY COLLABORATION:  RN/ PCT    Compliance with Program/Exercises: Will assess as treatment progresses. Recommendations/Intent for next treatment session:  Treatment next visit will focus on increasing Ms. Valle's independence with bed mobility, transfers, gait training, strength/ROM exercises, modalities for pain, and patient education.      TIME IN/OUT:  Time In: 1130  Time Out: [address]  Minutes: 736 Octaviano Garcia, PTA

## 2023-02-23 NOTE — PROGRESS NOTES
ACUTE PHYSICAL THERAPY GOALS:   (Developed with and agreed upon by patient and/or caregiver.)  GOALS (1-4 days):  (1.)Ms. Valle will move from supine to sit and sit to supine  in bed with STAND BY ASSIST.    (2.)Ms. Valle will transfer from bed to chair and chair to bed with STAND BY ASSIST using the least restrictive device. (3.)Ms. Valle will ambulate with STAND BY ASSIST for 30-40 feet with the least restrictive device. (4.)Ms. Valle will state/observe ISIAH precautions with no verbal cues.   ________________________________________________________________________________________________     PHYSICAL THERAPY JOINT CAMP: TOTAL HIP ARTHROPLASTY Daily Note and AM  (Link to Caseload Tracking: PT Visit Days : 2  Acknowledge Orders  Time In/Out  PT Charge Capture  Rehab Caseload Tracker  Episode   Irvin Herrera is a 69 y.o. female   PRIMARY DIAGNOSIS: Status post left hip replacement  Primary osteoarthritis of left hip [M16.12]  Unilateral primary osteoarthritis, left hip [M16.12]  Procedure(s) (LRB):  lt HIP TOTAL ARTHROPLASTY (Left)  1 Day Post-Op  Reason for Referral: Pain in left hip (M25.552)  Stiffness of Left Hip, Not elsewhere classified (M25.652)  Difficulty in walking, Not elsewhere classified (R26.2)  Inpatient: Payor: MEDICARE / Plan: MEDICARE PART A AND B / Product Type: *No Product type* /     REHAB RECOMMENDATIONS:   Recommendation to date pending progress:  Setting:  Short-term Rehab    Equipment:    To Be Determined     GAIT: I Mod I S SBA CGA Min Mod Max Total  NT x2 Comments:   Level of Assistance [] [] [] [] [] [x] [] [] [] [] []    Weightbearing Status  Left Lower Extremity Weight Bearing: Weight Bearing As Tolerated    Distance  15 (+ 15) feet    Gait Quality Antalgic, Decreased gabriela , Decreased step clearance, Decreased step length, and Decreased stance    DME Rolling Walker     Stairs  N/A    Ramp N/A    I=Independent, Mod I=Modified Independent, S=Supervision, SBA=Standby Assistance, CGA=Contact Guard Assistance,   Min=Minimal Assistance, Mod=Moderate Assistance, Max=Maximal Assistance, Total=Total Assistance, NT=Not Tested      ASSESSMENT:   ASSESSMENT:  Ms. Jimmie Chavez presented with impaired strength & mobility s/p left ISIAH. Patient also showed decreased stability during out of bed activity. This pt was at an [facility], ambulating 20-30 ft with rolling walker & longer distances with a WC. Pt has no assist at home on DC & will require additional rehab time at a SNF to achieve a level of function that will allow her to return to an [facility]. This patient will benefit from follow up therapy to help restore safe function prior to eventually returning home at a supervision level of care. MSW aware of DC concerns & needs. 2/23 sitting in the chair upon arrival.  Sit>stand with Min A. Ambulated 15 ft to [facility], independent with hygiene. Sit>stand again with Min A and ambulated 15 ft back to the [facility]. Performs and review L hip HEP  (see below) and verbal cues to stay within hip precaution. Review hip precaution. . remain in the chair with needs in reach, ice to L hip and knee and instructed to call for assists, before getting up.      Outcome Measure:   HOOS-JR:  Total Raw Score (0-24 Scale): 18    SUBJECTIVE:   Ms. Jimmie Chavez said I need BSC, therapist tried to encourage pt to go to the restroom and stated NO    Home Environment/Prior Level of Function Lives With:  ([facility])  Type of Home: Assisted living  Home Layout: One level  Home Access: Level entry  Bathroom Shower/Tub: Tub/Shower unit  Home Equipment: Massimo Shank, rolling  ADL Assistance: Independent  Ambulation Assistance: Independent  Transfer Assistance:  (with walker & supervision)    OBJECTIVE:     PAIN: VITAL SIGNS: LINES/DRAINS:   Pre Treatment:2/10         Post Treatment: sore ice to L hip Vitals NT       Oxygen 3 L        IV    RESTRICTIONS/PRECAUTIONS:  Restrictions/Precautions: Weight Bearing, Fall Risk  Left Lower Extremity Weight Bearing: Weight Bearing As Tolerated      Hip Precautions          LOWER EXTREMITY GROSS EVALUATION:  RIGHT LE   Within Functional Limits   Abnormal   Comments   Strength [x] []     Range of Motion [x] []        LEFT LE Within Functional Limits   Abnormal   Comments   Strength [] [x]  Decreased but functional   Range of Motion [] [x]  Decreased but functional     UPPER EXTREMITY GROSS EVALUATION:     Within Functional Limits   Abnormal   Comments   Strength [x] []    Range of Motion [x] []      SENSATION  Sensation [x]  grossly     COGNITION/  PERCEPTION: Intact Impaired (Comments):   Orientation [x]     Vision [x]     Hearing [x]     Cognition  [x]       MOBILITY: I Mod I S SBA CGA Min Mod Max Total  NT x2 Comments:   Bed Mobility    Rolling [] [] [] [] [] [] [] [] [] [] []    Supine to Sit [] [] [] [] [] [] [] [] [] [] []    Scooting [] [] [] [] [] [] [] [] [] [] []    Sit to Supine [] [] [] [] [] [] [] [] [] [] []    Transfers    Sit to Stand [] [] [] [] [] [x] [] [] [] [] []    Bed to Chair [] [] [] [] [] [x] [] [] [] [] [] With walker   Stand to Sit [] [] [] [] [] [x] [] [] [] [] []    Stand Pivot [] [] [] [] [] [x] [] [] [] [] []    Toilet [] [] [] [] [] [] [] [] [] [] []     [] [] [] [] [] [] [] [] [] [] []    I=Independent, Mod I=Modified Independent, S=Supervision, SBA=Standby Assistance, CGA=Contact Guard Assistance,   Min=Minimal Assistance, Mod=Moderate Assistance, Max=Maximal Assistance, Total=Total Assistance, NT=Not Tested    BALANCE: Good Fair+ Fair Fair- Poor NT Comments   Sitting Static [] [] [x] [] [] []    Sitting Dynamic [] [] [x] [] [] []              Standing Static [] [] [x] [x] [] [] With walker   Standing Dynamic [] [] [x] [x] [] [] With walker     PLAN:   FREQUENCY AND DURATION: BID for duration of hospital stay or until stated goals are met, whichever comes first.    THERAPY PROGNOSIS: Good    PROBLEM LIST:   (Skilled intervention is medically necessary to address:)  Decreased ADL/Functional Activities, Decreased Activity Tolerance, Decreased AROM/PROM, Decreased Gait Ability, Decreased Strength, Decreased Transfer Abilities, and Increased Pain   INTERVENTIONS PLANNED:   (Benefits and precautions of physical therapy have been discussed with the patient.)  Therapeutic Activity, Therapeutic Exercise/HEP, Gait Training, Modalities, and Education       TREATMENT:   EVALUATION: LOW COMPLEXITY: (Untimed Charge)    TREATMENT:   Therapeutic Activity (38 Minutes): Therapeutic activity included Transfer Training, Ambulation on level ground, Sitting balance , and Standing balance to improve functional Activity tolerance, Balance, Coordination, Mobility, and Strength. TREATMENT GRID:  THERAPEUTIC  EXERCISES: DATE:  2/22 DATE:  2/23   DATE:      AM PM AM PM AM PM    [] [] [] [] [] []   Ankle Pumps  20 15      Quad Sets  20 15      Gluteal Sets  20 15      Hip Abd/ADduction  20 15      Knee Slides  20 15      Short Arc Quads  20 15      Long Arc Quads  20 15                                 B = bilateral; AA = active assistive; A = active; P = passive      EDUCATION: Education Given To: Patient  Education Provided: Role of Therapy, Home Exercise Program  Education Method: Demonstration, Verbal  EDUCATION:  [x] Home Exercises  [x] Fall Precautions  [x] Hip Precautions  [] D/C Instruction Review [] Hip Prosthesis Review  [x] Walker Management/Safety  [] Adaptive Equipment as Needed     AFTER TREATMENT PRECAUTIONS: Bed/Chair Locked, Call light within reach, Chair, Needs within reach, and RN notified    INTERDISCIPLINARY COLLABORATION:  RN/ PCT    Compliance with Program/Exercises: Will assess as treatment progresses. Recommendations/Intent for next treatment session:  Treatment next visit will focus on increasing MsArron Valle's independence with bed mobility, transfers, gait training, strength/ROM exercises, modalities for pain, and patient education.      TIME IN/OUT:  Time In: 0800  Time Out: [address]  Minutes: 676 Octaviano Robbins PTA

## 2023-02-24 LAB
25(OH)D3 SERPL-MCNC: 24 NG/ML (ref 30–100)
ANION GAP SERPL CALC-SCNC: 2 MMOL/L (ref 2–11)
BUN SERPL-MCNC: 32 MG/DL (ref 8–23)
CALCIUM SERPL-MCNC: 8.9 MG/DL (ref 8.3–10.4)
CHLORIDE SERPL-SCNC: 106 MMOL/L (ref 101–110)
CO2 SERPL-SCNC: 29 MMOL/L (ref 21–32)
CREAT SERPL-MCNC: 1.16 MG/DL (ref 0.6–1)
ERYTHROCYTE [DISTWIDTH] IN BLOOD BY AUTOMATED COUNT: 16.2 % (ref 11.9–14.6)
GLUCOSE BLD STRIP.AUTO-MCNC: 131 MG/DL (ref 65–100)
GLUCOSE BLD STRIP.AUTO-MCNC: 177 MG/DL (ref 65–100)
GLUCOSE BLD STRIP.AUTO-MCNC: 181 MG/DL (ref 65–100)
GLUCOSE BLD STRIP.AUTO-MCNC: 217 MG/DL (ref 65–100)
GLUCOSE SERPL-MCNC: 236 MG/DL (ref 65–100)
HCT VFR BLD AUTO: 29.9 % (ref 35.8–46.3)
HCT VFR BLD AUTO: 34.3 % (ref 35.8–46.3)
HGB BLD-MCNC: 10.9 G/DL (ref 11.7–15.4)
HGB BLD-MCNC: 9.6 G/DL (ref 11.7–15.4)
MCH RBC QN AUTO: 27.9 PG (ref 26.1–32.9)
MCHC RBC AUTO-ENTMCNC: 31.8 G/DL (ref 31.4–35)
MCV RBC AUTO: 87.7 FL (ref 82–102)
MM INDURATION, POC: 0 MM (ref 0–5)
NRBC # BLD: 0 K/UL (ref 0–0.2)
PLATELET # BLD AUTO: 186 K/UL (ref 150–450)
PMV BLD AUTO: 10.8 FL (ref 9.4–12.3)
POTASSIUM SERPL-SCNC: 5 MMOL/L (ref 3.5–5.1)
PPD, POC: NEGATIVE
RBC # BLD AUTO: 3.91 M/UL (ref 4.05–5.2)
SARS-COV-2 RDRP RESP QL NAA+PROBE: NOT DETECTED
SERVICE CMNT-IMP: ABNORMAL
SODIUM SERPL-SCNC: 137 MMOL/L (ref 133–143)
SOURCE: NORMAL
WBC # BLD AUTO: 12.7 K/UL (ref 4.3–11.1)

## 2023-02-24 PROCEDURE — 94760 N-INVAS EAR/PLS OXIMETRY 1: CPT

## 2023-02-24 PROCEDURE — 80048 BASIC METABOLIC PNL TOTAL CA: CPT

## 2023-02-24 PROCEDURE — 2580000003 HC RX 258: Performed by: PHYSICIAN ASSISTANT

## 2023-02-24 PROCEDURE — 82962 GLUCOSE BLOOD TEST: CPT

## 2023-02-24 PROCEDURE — 2580000003 HC RX 258: Performed by: FAMILY MEDICINE

## 2023-02-24 PROCEDURE — 87635 SARS-COV-2 COVID-19 AMP PRB: CPT

## 2023-02-24 PROCEDURE — 97535 SELF CARE MNGMENT TRAINING: CPT

## 2023-02-24 PROCEDURE — 99024 POSTOP FOLLOW-UP VISIT: CPT | Performed by: ORTHOPAEDIC SURGERY

## 2023-02-24 PROCEDURE — 85027 COMPLETE CBC AUTOMATED: CPT

## 2023-02-24 PROCEDURE — 82306 VITAMIN D 25 HYDROXY: CPT

## 2023-02-24 PROCEDURE — 6370000000 HC RX 637 (ALT 250 FOR IP): Performed by: PHYSICIAN ASSISTANT

## 2023-02-24 PROCEDURE — 85014 HEMATOCRIT: CPT

## 2023-02-24 PROCEDURE — 97530 THERAPEUTIC ACTIVITIES: CPT

## 2023-02-24 PROCEDURE — 2700000000 HC OXYGEN THERAPY PER DAY

## 2023-02-24 PROCEDURE — 1100000000 HC RM PRIVATE

## 2023-02-24 PROCEDURE — 36415 COLL VENOUS BLD VENIPUNCTURE: CPT

## 2023-02-24 RX ORDER — PREGABALIN 50 MG/1
50 CAPSULE ORAL 2 TIMES DAILY
Qty: 10 CAPSULE | Refills: 0 | Status: SHIPPED | OUTPATIENT
Start: 2023-02-24 | End: 2023-03-01

## 2023-02-24 RX ORDER — LIDOCAINE 4 G/G
1 PATCH TOPICAL DAILY
Status: DISCONTINUED | OUTPATIENT
Start: 2023-02-24 | End: 2023-02-25 | Stop reason: HOSPADM

## 2023-02-24 RX ORDER — OXYCODONE HYDROCHLORIDE 5 MG/1
5-10 TABLET ORAL
Qty: 60 TABLET | Refills: 0 | Status: SHIPPED | OUTPATIENT
Start: 2023-02-24 | End: 2023-03-01

## 2023-02-24 RX ORDER — ZOLPIDEM TARTRATE 5 MG/1
5 TABLET ORAL NIGHTLY PRN
Qty: 5 TABLET | Refills: 0 | Status: SHIPPED | OUTPATIENT
Start: 2023-02-24 | End: 2023-03-01

## 2023-02-24 RX ORDER — 0.9 % SODIUM CHLORIDE 0.9 %
500 INTRAVENOUS SOLUTION INTRAVENOUS ONCE
Status: COMPLETED | OUTPATIENT
Start: 2023-02-24 | End: 2023-02-24

## 2023-02-24 RX ORDER — ASPIRIN 81 MG/1
81 TABLET ORAL EVERY 12 HOURS
Qty: 70 TABLET | Refills: 0 | Status: SHIPPED | OUTPATIENT
Start: 2023-02-24 | End: 2023-03-31

## 2023-02-24 RX ORDER — METHOCARBAMOL 500 MG/1
500 TABLET, FILM COATED ORAL 3 TIMES DAILY PRN
Qty: 30 TABLET | Refills: 0 | Status: SHIPPED | OUTPATIENT
Start: 2023-02-24 | End: 2023-03-06

## 2023-02-24 RX ORDER — PROMETHAZINE HYDROCHLORIDE 12.5 MG/1
12.5 TABLET ORAL 4 TIMES DAILY PRN
Qty: 20 TABLET | Refills: 2 | Status: SHIPPED | OUTPATIENT
Start: 2023-02-24

## 2023-02-24 RX ADMIN — ZOLPIDEM TARTRATE 5 MG: 5 TABLET ORAL at 21:32

## 2023-02-24 RX ADMIN — LEVOTHYROXINE SODIUM 112 MCG: 0.11 TABLET ORAL at 06:23

## 2023-02-24 RX ADMIN — PANTOPRAZOLE SODIUM 40 MG: 40 TABLET, DELAYED RELEASE ORAL at 08:29

## 2023-02-24 RX ADMIN — ACETAMINOPHEN 650 MG: 325 TABLET, FILM COATED ORAL at 08:27

## 2023-02-24 RX ADMIN — SENNOSIDES AND DOCUSATE SODIUM 1 TABLET: 50; 8.6 TABLET ORAL at 08:28

## 2023-02-24 RX ADMIN — ASPIRIN 81 MG: 81 TABLET, COATED ORAL at 21:30

## 2023-02-24 RX ADMIN — CETIRIZINE HYDROCHLORIDE 10 MG: 10 TABLET, FILM COATED ORAL at 08:28

## 2023-02-24 RX ADMIN — ASPIRIN 81 MG: 81 TABLET, COATED ORAL at 08:28

## 2023-02-24 RX ADMIN — INSULIN LISPRO 5 UNITS: 100 INJECTION, SOLUTION INTRAVENOUS; SUBCUTANEOUS at 16:11

## 2023-02-24 RX ADMIN — PREGABALIN 50 MG: 50 CAPSULE ORAL at 08:28

## 2023-02-24 RX ADMIN — INSULIN GLARGINE 18 UNITS: 100 INJECTION, SOLUTION SUBCUTANEOUS at 07:35

## 2023-02-24 RX ADMIN — ACETAMINOPHEN 650 MG: 325 TABLET, FILM COATED ORAL at 17:53

## 2023-02-24 RX ADMIN — INSULIN LISPRO 5 UNITS: 100 INJECTION, SOLUTION INTRAVENOUS; SUBCUTANEOUS at 11:59

## 2023-02-24 RX ADMIN — OXYCODONE HYDROCHLORIDE 10 MG: 5 TABLET ORAL at 17:03

## 2023-02-24 RX ADMIN — MONTELUKAST 10 MG: 10 TABLET, FILM COATED ORAL at 21:32

## 2023-02-24 RX ADMIN — SODIUM CHLORIDE 500 ML: 9 INJECTION, SOLUTION INTRAVENOUS at 03:40

## 2023-02-24 RX ADMIN — FUROSEMIDE 20 MG: 20 TABLET ORAL at 08:28

## 2023-02-24 RX ADMIN — OXYCODONE HYDROCHLORIDE 10 MG: 5 TABLET ORAL at 10:57

## 2023-02-24 RX ADMIN — PREGABALIN 50 MG: 50 CAPSULE ORAL at 21:32

## 2023-02-24 RX ADMIN — CELECOXIB 200 MG: 200 CAPSULE ORAL at 21:31

## 2023-02-24 RX ADMIN — SODIUM CHLORIDE, PRESERVATIVE FREE 10 ML: 5 INJECTION INTRAVENOUS at 08:30

## 2023-02-24 RX ADMIN — CELECOXIB 200 MG: 200 CAPSULE ORAL at 08:29

## 2023-02-24 ASSESSMENT — PAIN DESCRIPTION - ORIENTATION
ORIENTATION: LEFT
ORIENTATION: LEFT

## 2023-02-24 ASSESSMENT — PAIN SCALES - WONG BAKER
WONGBAKER_NUMERICALRESPONSE: 2
WONGBAKER_NUMERICALRESPONSE: 2

## 2023-02-24 ASSESSMENT — PAIN DESCRIPTION - LOCATION
LOCATION: HIP
LOCATION: HIP

## 2023-02-24 ASSESSMENT — PAIN SCALES - GENERAL
PAINLEVEL_OUTOF10: 4
PAINLEVEL_OUTOF10: 6
PAINLEVEL_OUTOF10: 5
PAINLEVEL_OUTOF10: 6

## 2023-02-24 ASSESSMENT — PAIN - FUNCTIONAL ASSESSMENT: PAIN_FUNCTIONAL_ASSESSMENT: PREVENTS OR INTERFERES SOME ACTIVE ACTIVITIES AND ADLS

## 2023-02-24 ASSESSMENT — PAIN DESCRIPTION - DESCRIPTORS: DESCRIPTORS: ACHING

## 2023-02-24 NOTE — PLAN OF CARE
BP 88/41 HR 88/41 , MD notified. Additional 500 ml Bolus initiated. Blood Pressure 73/50 then BP 86/40, patient is asymptomatic. Messaged Ortho on call provider and received orders to initiate fluids. After an hour of fluids BP steffen to 94/57. Contacted Hospitalist, received order for 500 ml normal saline bolus. MD stated it was okay to give Ambien 5 mg.   _____________________________________________    Shift assessment complete. Patient is alert and oriented, in recliner. Respirations are even and unlabored. Patient is on 2L/NC. Bowel sounds are present. Left lower extremity is appropriate in color and warm to touch. Full sensation present. Left hip is dressed with Aquacel dressing. Dressing is clean, dry and intact. No needs expressed at this time. Recliner wheels locked. Call light within reach.   _________________________________________      Problem: Pain  Goal: Verbalizes/displays adequate comfort level or baseline comfort level  Outcome: Progressing     Problem: Safety - Adult  Goal: Free from fall injury  Outcome: Progressing     Problem: Skin/Tissue Integrity  Goal: Absence of new skin breakdown  Description: 1. Monitor for areas of redness and/or skin breakdown  2. Assess vascular access sites hourly  3. Every 4-6 hours minimum:  Change oxygen saturation probe site  4. Every 4-6 hours:  If on nasal continuous positive airway pressure, respiratory therapy assess nares and determine need for appliance change or resting period.   Outcome: Progressing

## 2023-02-24 NOTE — DISCHARGE SUMMARY
[address] [place] [address]      IF YOU HAVE ANY PROBLEMS ONCE YOU ARE AT HOME CALL THE FOLLOWING NUMBERS:   Main office number: [phone] ask for [facility] (medical assistant with Dr. Reji Malone)  Office Address: [place] [address], [address] [facility]      Patient Discharge Instructions    Gabriele Connelly / [id number] : 1945    Admitted 2023           To be given to [address] on Admission         Weight bearing status: As tolerated with walker and assistance    Activity  Continue Physical Therapy and Occupational Therapy   Fall precautions     Wound Care  Keep waterproof bandage on incision   Staples are to be left in and removed in 2 weeks postop    Diet  Resume regular or diabetic diet      Medications    Patient medications are listed on the medication reconciliation sheet. Follow up   Follow up in our office in 5 weeks postop   All patients are to be transported via stretcher unless they are able to independently get out of a chair and stand without assistance. Information obtained by :  I understand that if any problems occur once I am at home I am to contact my physician. I understand and acknowledge receipt of the instructions indicated above.                                                                                                                                            Physician's or R.N.'s Signature                                                                  Date/Time                                                                                                                                              Patient or Representative Signature                                                          Date/Time

## 2023-02-24 NOTE — CARE COORDINATION
Arrangements made for transfer to [facility] on Sat 2-25.  11am Pati García  arranged as Irena requested a morning transfer. Pt made arrangements with Boneta Patience at [facility] to pay initial copay as patient had used 33 Medicare skilled days and is currently in her co-pay days due to a stay in Dec/Jan at [place]. Pt's secondary policy does not have complete copay coverage.   Will update weekend SW

## 2023-02-24 NOTE — PROGRESS NOTES
ACUTE PHYSICAL THERAPY GOALS:   (Developed with and agreed upon by patient and/or caregiver.)  GOALS (1-4 days):  (1.)Ms. Valle will move from supine to sit and sit to supine  in bed with STAND BY ASSIST.    (2.)Ms. Valle will transfer from bed to chair and chair to bed with STAND BY ASSIST using the least restrictive device. (3.)Ms. Valle will ambulate with STAND BY ASSIST for 30-40 feet with the least restrictive device. (4.)Ms. Valle will state/observe ISIAH precautions with no verbal cues.   ________________________________________________________________________________________________     PHYSICAL THERAPY JOINT CAMP: TOTAL HIP ARTHROPLASTY Daily Note and AM  (Link to Caseload Tracking: PT Visit Days : 3  Acknowledge Orders  Time In/Out  PT Charge Capture  Rehab Caseload Tracker  Episode   Dc Richard is a 69 y.o. female   PRIMARY DIAGNOSIS: Status post left hip replacement  Primary osteoarthritis of left hip [M16.12]  Unilateral primary osteoarthritis, left hip [M16.12]  Procedure(s) (LRB):  lt HIP TOTAL ARTHROPLASTY (Left)  2 Days Post-Op  Reason for Referral: Pain in left hip (M25.552)  Stiffness of Left Hip, Not elsewhere classified (M25.652)  Difficulty in walking, Not elsewhere classified (R26.2)  Inpatient: Payor: MEDICARE / Plan: MEDICARE PART A AND B / Product Type: *No Product type* /     REHAB RECOMMENDATIONS:   Recommendation to date pending progress:  Setting:  Short-term Rehab    Equipment:    To Be Determined     GAIT: I Mod I S SBA CGA Min Mod Max Total  NT x2 Comments:   Level of Assistance [] [] [] [] [x] [] [] [] [] [] []    Weightbearing Status  Left Lower Extremity Weight Bearing: Weight Bearing As Tolerated    Distance    feet    Gait Quality Antalgic, Decreased gabriela , Decreased step clearance, Decreased step length, and Decreased stance    DME Rolling Walker     Stairs  N/A    Ramp N/A    I=Independent, Mod I=Modified Independent, S=Supervision, SBA=Standby Assistance, CGA=Contact Guard Assistance,   Min=Minimal Assistance, Mod=Moderate Assistance, Max=Maximal Assistance, Total=Total Assistance, NT=Not Tested      ASSESSMENT:   ASSESSMENT:  Ms. Ismael Nava presented with impaired strength & mobility s/p left ISIAH. Patient also showed decreased stability during out of bed activity. This pt was at an [facility], ambulating 20-30 ft with rolling walker & longer distances with a WC. Pt has no assist at home on DC & will require additional rehab time at a SNF to achieve a level of function that will allow her to return to an [facility]. This patient will benefit from follow up therapy to help restore safe function prior to eventually returning home at a supervision level of care. MSW aware of DC concerns & needs. 2/23 sitting in the chair upon arrival.  Sit>stand with Min A. Ambulated 15 ft to [facility], independent with hygiene. Sit>stand again with Min A and ambulated 15 ft back to the [facility]. Performs and review L hip HEP  (see below) and verbal cues to stay within hip precaution. Review hip precaution. . remain in the chair with needs in reach, ice to L hip and knee and instructed to call for assists, before getting up. 2/23 pm sitting in the chair upon arrival.  Pt needs encouragement to walk with therapy, especially with using the restroom. Therapist told pt needs to be walking to the restroom not using BSC. Ambulated 20 ft to the [facility] using RW with Min A, while working on reciprocal gait pattern. Independent with hygiene and washing hands. Ambulated another 20 ft back to the chair. Performs and review L hip HEP ( see below) with guidance. Review hip precaution again. Remain in the chair with needs in reach, ice to L hip and knee and instructed to call for assists, before getting up. 2/24  sitting in the chair upon arrival. Pt is on 2 L of 02 throughout tx. Sit>stand with CGA with RW in front. Ambulated 30 ft to the [facility] right outside the restroom door, dependent with hygiene and donning brief.   Pt sat back down in the chair and rested few min. Sit>stand and ambulated another 30 ft back with CGA and RW, while working on reciprocal gait pattern. Sat in chair and rested few min. Then bath her self off with help. Then stood again and wash her bottom and new brief donned. Sat back down and performs and review L hip HEP (see below) with AA. Review hip precaution. Remain in the chair with needs in reach, ice to L hip and knee and instructed to call for assists, before getting up. Pt needs encouragement to walk further each tx. Outcome Measure:   HOOS-JR:  Total Raw Score (0-24 Scale): 18    SUBJECTIVE:   Ms. Crystal Mcgowan I need to use [facility], therapist told pt she need to walk into the restroom,  with a lot of encouragement from the therapist, but still did not . Therapist and pt came to agreement [facility] right out side the bathroom door.     Home Environment/Prior Level of Function Lives With:  ([facility])  Type of Home: Assisted living  Home Layout: One level  Home Access: Level entry  Bathroom Shower/Tub: Tub/Shower unit  Home Equipment: [place], rolling  ADL Assistance: Independent  Ambulation Assistance: Independent  Transfer Assistance:  (with walker & supervision)    OBJECTIVE:     PAIN: VITAL SIGNS: LINES/DRAINS:   Pre Treatment:0/10         Post Treatment: sore ice to L hip and knee Vitals NT       Oxygen 3 L        IV    RESTRICTIONS/PRECAUTIONS:  Restrictions/Precautions: Weight Bearing, Fall Risk  Left Lower Extremity Weight Bearing: Weight Bearing As Tolerated      Hip Precautions          LOWER EXTREMITY GROSS EVALUATION:  RIGHT LE   Within Functional Limits   Abnormal   Comments   Strength [x] []     Range of Motion [x] []        LEFT LE Within Functional Limits   Abnormal   Comments   Strength [] [x]  Decreased but functional   Range of Motion [] [x]  Decreased but functional     UPPER EXTREMITY GROSS EVALUATION:     Within Functional Limits   Abnormal   Comments   Strength [x] []    Range of Motion [x] [] SENSATION  Sensation [x]  grossly     COGNITION/  PERCEPTION: Intact Impaired (Comments):   Orientation [x]     Vision [x]     Hearing [x]     Cognition  [x]       MOBILITY: I Mod I S SBA CGA Min Mod Max Total  NT x2 Comments:   Bed Mobility    Rolling [] [] [] [] [] [] [] [] [] [x] []    Supine to Sit [] [] [] [] [] [] [] [] [] [x] []    Scooting [] [] [] [] [] [] [] [] [] [x] []    Sit to Supine [] [] [] [] [] [] [] [] [] [] []    Transfers    Sit to Stand [] [] [] [] [x] [] [] [] [] [] []    Bed to Chair [] [] [] [] [x] [] [] [] [] [] [] With walker   Stand to Sit [] [] [] [] [x] [] [] [] [] [] []    Stand Pivot [] [] [] [] [] [] [] [] [] [x] []    Toilet [] [] [] [] [x] [] [] [] [] [] []     [] [] [] [] [] [] [] [] [] [] []    I=Independent, Mod I=Modified Independent, S=Supervision, SBA=Standby Assistance, CGA=Contact Guard Assistance,   Min=Minimal Assistance, Mod=Moderate Assistance, Max=Maximal Assistance, Total=Total Assistance, NT=Not Tested    BALANCE: Good Fair+ Fair Fair- Poor NT Comments   Sitting Static [] [] [x] [] [] []    Sitting Dynamic [] [] [x] [] [] []              Standing Static [] [] [x] [x] [] [] With walker   Standing Dynamic [] [] [x] [x] [] [] With walker     PLAN:   FREQUENCY AND DURATION: BID for duration of hospital stay or until stated goals are met, whichever comes first.    THERAPY PROGNOSIS: Good    PROBLEM LIST:   (Skilled intervention is medically necessary to address:)  Decreased ADL/Functional Activities, Decreased Activity Tolerance, Decreased AROM/PROM, Decreased Gait Ability, Decreased Strength, Decreased Transfer Abilities, and Increased Pain   INTERVENTIONS PLANNED:   (Benefits and precautions of physical therapy have been discussed with the patient.)  Therapeutic Activity, Therapeutic Exercise/HEP, Gait Training, Modalities, and Education       TREATMENT:   EVALUATION: LOW COMPLEXITY: (Untimed Charge)    TREATMENT:   Therapeutic Activity (55 Minutes):  Therapeutic activity included Transfer Training, Ambulation on level ground, Sitting balance , and Standing balance to improve functional Activity tolerance, Balance, Coordination, Mobility, and Strength. TREATMENT GRID:  THERAPEUTIC  EXERCISES: DATE:  2/22 DATE:  2/23   DATE:  2/24      AM PM AM PM AM PM    [] [] [] [] [] []   Ankle Pumps  20 15 15 15    Quad Sets  20 15 15 15    Gluteal Sets  20 15 15 15    Hip Abd/ADduction  20 15 15 15 aa    Knee Slides  20 15 15 15 aa    Short Arc Quads  20 15 15 15    Long Arc Quads  20 15 15 15                               B = bilateral; AA = active assistive; A = active; P = passive      EDUCATION: Education Given To: Patient  Education Provided: Role of Therapy, Home Exercise Program  Education Method: Demonstration, Verbal  EDUCATION:  [x] Home Exercises  [x] Fall Precautions  [x] Hip Precautions  [] D/C Instruction Review [] Hip Prosthesis Review  [x] Walker Management/Safety  [] Adaptive Equipment as Needed     AFTER TREATMENT PRECAUTIONS: Bed/Chair Locked, Call light within reach, Chair, Needs within reach, and RN notified    INTERDISCIPLINARY COLLABORATION:  RN/ PCT    Compliance with Program/Exercises: Will assess as treatment progresses. Recommendations/Intent for next treatment session:  Treatment next visit will focus on increasing MsArron Valle's independence with bed mobility, transfers, gait training, strength/ROM exercises, modalities for pain, and patient education.      TIME IN/OUT:  Time In: 0830  [facility]  Minutes: Castro Anders PTA

## 2023-02-24 NOTE — PROGRESS NOTES
2023         Post Op day: 2 Days Post-Op     Admit Date: 2023    Admit Diagnosis: Primary osteoarthritis of left hip [M16.12]  Unilateral primary osteoarthritis, left hip [M16.12]        Subjective: Patient stable. Low BP. No acute events.       Objective:     Vitals:    23 0615   BP: (!) 104/54   Pulse: 74   Resp:    Temp:    SpO2:     Temp (24hrs), Av.4 °F (36.9 °C), Min:97.9 °F (36.6 °C), Max:98.8 °F (37.1 °C)      Lab Results   Component Value Date/Time    HGB 9.6 2023 04:48 AM       Patient Active Problem List   Diagnosis    Osteoarthritis of right knee    Type 2 diabetes mellitus treated without insulin ([facility])    Class 3 severe obesity due to excess calories with serious comorbidity and body mass index (BMI) of 40.0 to 44.9 in adult [facility])    Status post right hip replacement    Hypertension    S/P total knee replacement using cement    Hypothyroidism    Pressure injury of contiguous region involving back and right buttock, stage 1    Shortness of breath    Neuropathy    Asthma    Osteoarthritis of left knee    Chronic pain    BELKIS (obstructive sleep apnea)    4th nerve palsy, left    Adie's tonic pupil, right    Irritable bowel syndrome    Gastroesophageal reflux disease without esophagitis    Hyperlipidemia    Hypothyroidism due to acquired atrophy of thyroid    Primary insomnia    Keratoconjunctivitis sicca, not specified as Sjogren's, bilateral    Polyneuropathy due to type 2 diabetes mellitus ([address]    Spondylosis without myelopathy    Type 2 diabetes mellitus with hyperglycemia, without long-term current use of insulin ([facility])    Vitamin D deficiency    Stress incontinence of urine    Primary osteoarthritis of left hip    Noncompliance with CPAP treatment    Unilateral primary osteoarthritis, left hip    Status post left hip replacement       Current Facility-Administered Medications   Medication Dose Route Frequency    0.9 % sodium chloride infusion   IntraVENous Continuous    albuterol (PROVENTIL) nebulizer solution 2.5 mg  2.5 mg Nebulization Q6H PRN    sodium chloride flush 0.9 % injection 5-40 mL  5-40 mL IntraVENous 2 times per day    sodium chloride flush 0.9 % injection 5-40 mL  5-40 mL IntraVENous PRN    0.9 % sodium chloride infusion   IntraVENous PRN    acetaminophen (TYLENOL) tablet 650 mg  650 mg Oral Q6H    oxyCODONE (ROXICODONE) immediate release tablet 10 mg  10 mg Oral Q4H PRN    HYDROmorphone HCl PF (DILAUDID) injection 1 mg  1 mg IntraVENous Q3H PRN    sennosides-docusate sodium (SENOKOT-S) 8.6-50 MG tablet 1 tablet  1 tablet Oral BID    ondansetron (ZOFRAN-ODT) disintegrating tablet 4 mg  4 mg Oral Q8H PRN    Or    ondansetron (ZOFRAN) injection 4 mg  4 mg IntraVENous Q6H PRN    diphenhydrAMINE (BENADRYL) capsule 25 mg  25 mg Oral Q6H PRN    Or    diphenhydrAMINE (BENADRYL) injection 25 mg  25 mg IntraVENous Q6H PRN    aspirin EC tablet 81 mg  81 mg Oral BID    celecoxib (CELEBREX) capsule 200 mg  200 mg Oral BID    cetirizine (ZYRTEC) tablet 10 mg  10 mg Oral Daily    furosemide (LASIX) tablet 20 mg  20 mg Oral Daily    insulin glargine (LANTUS) injection vial 18 Units  18 Units SubCUTAneous Daily    insulin lispro (HUMALOG) injection vial 5 Units  5 Units SubCUTAneous TID AC    levothyroxine (SYNTHROID) tablet 112 mcg  112 mcg Oral Daily    lisinopril-hydroCHLOROthiazide (PRINZIDE;ZESTORETIC) 10-12.5 MG per tablet 1 tablet  1 tablet Oral Daily    montelukast (SINGULAIR) tablet 10 mg  10 mg Oral Nightly    pantoprazole (PROTONIX) tablet 40 mg  40 mg Oral Daily    pregabalin (LYRICA) capsule 50 mg  50 mg Oral BID    polyethylene glycol (GLYCOLAX) packet 17 g  17 g Oral Daily PRN    zolpidem (AMBIEN) tablet 5 mg  5 mg Oral Nightly    insulin lispro (HUMALOG) injection vial 0-4 Units  0-4 Units SubCUTAneous Nightly       Extremity Exam  Dressing clean and dry   Tibialis Anterior and Gastroc-Soleus functioning normally left lower extremity  Sensation intact to light touch on operative limb  Extremity perfused  SCDS in place  No sign of DVT     Assessment / Plan :  WBAT LLE  Continue PT/OT  Continue current DVT prophylaxis in house. Discharge on ASA  DIspo-SNF tomorrow         Signed By: Vadim Cordero MD     I have reviewed the patients controlled substance prescription history, as maintained in the [facility] prescription monitoring program, so that the prescription(s) for a  controlled substance can be given.

## 2023-02-24 NOTE — PROGRESS NOTES
[facility] Hospitalist Consult   Admit Date:  2023  7:28 AM   Name:  Shilpa Guerrero   Age:  69 y.o. Sex:  female  :  1945   MRN:  [id number]   Room:  [facility], [facility]    Presenting Complaint: No chief complaint on file. Reason(s) for Admission: Primary osteoarthritis of left hip [M16.12]  Unilateral primary osteoarthritis, left hip [M16.12]     Hospitalists consulted by Yovana Tony MD for: medical management     History of Presenting Illness:   Shilpa Guerrero is a 69 y.o. female with an extensive past medical history including OA, DDD, Hypothyroid, GERD, BELKIS, DM II quadriplegia who is s/p left total hip arthroplasty after failing conservative management for severe degenerative arthritis, acetabular osteophytes and bone spurs around the femoral head with Dr. Lili Arnett. Hospitalist consulted for medical management of chronic conditions       Assessment & Plan:     Principal Problem:    Status post left hip replacement    Unilateral primary osteoarthritis, left hip  Plan:   -Management per primary team  -Prn analgesia  -Pans are for rehab    Hypothyroid  Plan:  -Levothyroxine    DM II  Plan:  -Accu checks with SSI  -A1c 8.4 23  -Ranges acceptable  -Lantus    HTN  Plan:   -BP soft. Will hold home Norvasc and monitor   -Hold lasix and stop IV fluids. Encourage oral intake. Patient euvolemic  -Labs pending     BELKIS  Plan:   -CPAP qhs    Vitamin D Deficiency  Plan:  -will check level    GERD  Plan:  -PPI    Class 3 Obesity  Plan:  -negatively impacts overall mortality  -diet and exercise advised    Insomnia  -Ambien       Diet:  ADULT DIET; Regular; 5 carb choices (75 gm/meal)  VTE prophylaxis: Defer to primary team  Code status: Full Code    Principal Problem:    Status post left hip replacement  Active Problems:    Unilateral primary osteoarthritis, left hip    Primary osteoarthritis of left hip  Resolved Problems:    * No resolved hospital problems.  *      Past History:  Past Medical History: Diagnosis Date    Acute UTI 03/20/2021    Alternating constipation and diarrhea     Arthritis     Asthma     bronchio-asthma mostly seasonal    Chronic obstructive asthma, unspecified     Chronic pain     DDD, LOW BACK, SIATICA    COVID-19 01/11/2023    cough~pt states tested positive in nursing home facility; had been coughing for a week before they tested her.     DDD (degenerative disc disease)     WITH RADICULOPATHY    Disorder of sacrococcygeal spine     E-coli UTI 03/23/2021    Elevated serum creatinine 09/19/2017    Elevated WBC count 09/19/2017    Functional assessment declined 07/16/2022    Functional quadriplegia ([facility] [address] 07/13/2022    Gas bloat syndrome     GERD (gastroesophageal reflux disease)     Hyperlipidemia     Hypertension     Hypothyroidism     Incontinence of urine     Insomnia     Iron deficiency anemia 11/2022    Morbid obesity ([facility] [address]     Multiple environmental allergies     Murmur, cardiac     2/6 early systolic flow murmur upper sternal borders no radiation; ECHO 2/9/22    Neuropathy     BELKIS (obstructive sleep apnea) 09/16/2009    Osteoarthritis of left knee 09/16/2009    Osteoarthritis of right knee 03/02/2011    Prolonged emergence from general anesthesia     trouble waking up from anesthesia once ~20 years ago~ \"out for 8 hours\"    S/P total knee replacement using cement 09/16/2009    Sleep apnea     HAS CPAP~has not used in the last 8 months    Spastic colon     Spinal stenosis of lumbar region     with neurogenic claudication    Status post right hip replacement 10/09/2017    Thyroid disease     ON MEDS    Type II or unspecified type diabetes mellitus without mention of complication, not stated as uncontrolled     avg FBS: 160-170; Hgb A1c 6.9 on 7/15/22; takes insulin       Past Surgical History:   Procedure Laterality Date    DILATION AND CURETTAGE OF UTERUS      HAND/FINGER SURGERY UNLISTED Right 1990'S    HERNIA REPAIR  8/69/81    UMBILICAL WITH MESH    HYSTERECTOMY (CERVIX STATUS UNKNOWN)      TOTAL HIP ARTHROPLASTY Right     TOTAL HIP ARTHROPLASTY Left 2023    lt HIP TOTAL ARTHROPLASTY performed by Jose Morgan MD at [address] ARTHROPLASTY Left 2009    TOTAL KNEE ARTHROPLASTY Right         Social History     Tobacco Use    Smoking status: Former     Packs/day: 1.00     Years: 14.00     Pack years: 14.00     Types: Cigarettes     Quit date: 1977     Years since quittin.1    Smokeless tobacco: Never    Tobacco comments:     Quit smoking: QUIT IN    Substance Use Topics    Alcohol use: Not Currently      Social History     Substance and Sexual Activity   Drug Use No       Family History   Problem Relation Age of Onset    Diabetes Father     Cancer Father         lung    COPD Father     Cancer Brother     Diabetes Mother     Heart Disease Father     Heart Disease Brother         CAD    Diabetes Brother     Cancer Mother         Immunization History   Administered Date(s) Administered    COVID-19, PFIZER PURPLE top, DILUTE for use, (age 15 y+), 30mcg/0.3mL 2021, 2021    PPD Test 10/09/2017, 07/15/2022, 2023    Pneumococcal Conjugate 13-valent ([id number]) 2015    Pneumococcal Polysaccharide ([id number]) 2015    Zoster Live (Zostavax) 2015     Allergies   Allergen Reactions    Codeine Other (See Comments)     dizziness    Erythromycin Rash    Tetracycline Rash      Current Facility-Administered Medications   Medication Dose Route Frequency    lidocaine 4 % external patch 1 patch  1 patch TransDERmal Daily    0.9 % sodium chloride infusion   IntraVENous Continuous    albuterol (PROVENTIL) nebulizer solution 2.5 mg  2.5 mg Nebulization Q6H PRN    sodium chloride flush 0.9 % injection 5-40 mL  5-40 mL IntraVENous 2 times per day    sodium chloride flush 0.9 % injection 5-40 mL  5-40 mL IntraVENous PRN    0.9 % sodium chloride infusion   IntraVENous PRN    acetaminophen (TYLENOL) tablet 650 mg  650 mg Oral Q6H oxyCODONE (ROXICODONE) immediate release tablet 10 mg  10 mg Oral Q4H PRN    HYDROmorphone HCl PF (DILAUDID) injection 1 mg  1 mg IntraVENous Q3H PRN    sennosides-docusate sodium (SENOKOT-S) 8.6-50 MG tablet 1 tablet  1 tablet Oral BID    ondansetron (ZOFRAN-ODT) disintegrating tablet 4 mg  4 mg Oral Q8H PRN    Or    ondansetron (ZOFRAN) injection 4 mg  4 mg IntraVENous Q6H PRN    diphenhydrAMINE (BENADRYL) capsule 25 mg  25 mg Oral Q6H PRN    Or    diphenhydrAMINE (BENADRYL) injection 25 mg  25 mg IntraVENous Q6H PRN    aspirin EC tablet 81 mg  81 mg Oral BID    celecoxib (CELEBREX) capsule 200 mg  200 mg Oral BID    cetirizine (ZYRTEC) tablet 10 mg  10 mg Oral Daily    furosemide (LASIX) tablet 20 mg  20 mg Oral Daily    insulin glargine (LANTUS) injection vial 18 Units  18 Units SubCUTAneous Daily    insulin lispro (HUMALOG) injection vial 5 Units  5 Units SubCUTAneous TID AC    levothyroxine (SYNTHROID) tablet 112 mcg  112 mcg Oral Daily    lisinopril-hydroCHLOROthiazide (PRINZIDE;ZESTORETIC) 10-12.5 MG per tablet 1 tablet  1 tablet Oral Daily    montelukast (SINGULAIR) tablet 10 mg  10 mg Oral Nightly    pantoprazole (PROTONIX) tablet 40 mg  40 mg Oral Daily    pregabalin (LYRICA) capsule 50 mg  50 mg Oral BID    polyethylene glycol (GLYCOLAX) packet 17 g  17 g Oral Daily PRN    zolpidem (AMBIEN) tablet 5 mg  5 mg Oral Nightly    insulin lispro (HUMALOG) injection vial 0-4 Units  0-4 Units SubCUTAneous Nightly       Objective:   Patient Vitals for the past 24 hrs:   Temp Pulse Resp BP SpO2   02/24/23 0745 97.9 °F (36.6 °C) 69 20 (!) 127/46 99 %   02/24/23 0615 -- 74 -- (!) 104/54 --   02/24/23 0312 98.5 °F (36.9 °C) 53 17 (!) 88/41 100 %   02/23/23 2324 98.6 °F (37 °C) 63 16 (!) 65/50 99 %   02/23/23 2130 -- -- -- (!) 94/57 --   02/23/23 2015 -- 62 -- (!) 86/40 --   02/23/23 2004 -- 66 18 -- 98 %   02/23/23 1937 98.6 °F (37 °C) 65 17 (!) 73/50 100 %   02/23/23 1530 98 °F (36.7 °C) 81 20 (!) 107/45 97 % 02/23/23 1132 98.8 °F (37.1 °C) 67 18 (!) 117/55 100 %       Oxygen Therapy  SpO2: 99 %  Pulse via Oximetry: 82 beats per minute  Pulse Oximeter Device Mode: Intermittent  Pulse Oximeter Device Location: Finger  O2 Device: Nasal cannula  Skin Assessment: Clean, dry, & intact  O2 Flow Rate (L/min): 2 L/min    Estimated body mass index is 44.46 kg/m² as calculated from the following:    Height as of this encounter: 5' 4\" (1.626 m). Weight as of this encounter: 259 lb (117.5 kg). No intake or output data in the 24 hours ending 02/24/23 0956      Physical Exam:    Blood pressure (!) 127/46, pulse 69, temperature 97.9 °F (36.6 °C), resp. rate 20, height 5' 4\" (1.626 m), weight 259 lb (117.5 kg), SpO2 99 %. General:    Well nourished. Head:  Normocephalic, atraumatic  Eyes:  Sclerae appear normal.  Pupils equally round. ENT:  Nares appear normal.  Moist oral mucosa  Neck:  No restricted ROM. Trachea midline   CV:   RRR. No m/r/g. No jugular venous distension. Lungs:   CTAB. No wheezing, rhonchi, or rales. Symmetric expansion. Abdomen:   Soft, nontender, nondistended. Extremities: No cyanosis or clubbing. No edema  Skin:     No rashes and normal coloration. Warm and dry. Neuro:  CN II-XII grossly intact. Sensation intact. Psych:  Normal mood and affect.       I have personally reviewed labs and tests showing:  Recent Labs:  Recent Results (from the past 24 hour(s))   POCT Glucose    Collection Time: 02/23/23 11:34 AM   Result Value Ref Range    POC Glucose 264 (H) 65 - 100 mg/dL    Performed by: Renetta    POCT Glucose    Collection Time: 02/23/23  4:48 PM   Result Value Ref Range    POC Glucose 197 (H) 65 - 100 mg/dL    Performed by: SuePCERI    POCT Glucose    Collection Time: 02/23/23  7:38 PM   Result Value Ref Range    POC Glucose 140 (H) 65 - 100 mg/dL    Performed by: Myla    Hemoglobin and Hematocrit    Collection Time: 02/24/23  4:48 AM   Result Value Ref Range    Hemoglobin 9.6 (L) 11.7 - 15.4 g/dL    Hematocrit 29.9 (L) 35.8 - 46.3 %   POCT Glucose    Collection Time: 02/24/23  6:25 AM   Result Value Ref Range    POC Glucose 131 (H) 65 - 100 mg/dL    Performed by: Joi Chi        I have personally reviewed imaging studies showing:  XR PELVIS (1-2 VIEWS)    Result Date: 2/22/2023  History: Postop left hip arthroplasty EXAM: Single view pelvis FINDINGS: There is a left total hip arthroplasty present without periprosthetic fracture. There is also a right total hip arthroplasty. Postsurgical change as described. Echocardiogram:  02/09/22    TRANSTHORACIC ECHOCARDIOGRAM (TTE) COMPLETE (CONTRAST/BUBBLE/3D PRN) 02/10/2022  5:07 PM, 02/10/2022 12:00 AM (Final)    Narrative  This is a summary report. The complete report is available in the patient's medical record. If you cannot access the medical record, please contact the sending organization for a detailed fax or copy. Left Ventricle: Left ventricle is mildly dilated. Increased wall thickness. Normal wall motion. Normal left ventricular systolic function. EF by 2D Simpsons Biplane is 58%. Normal diastolic function. Mitral Valve: Mildly thickened leaflets. Tricuspid Valve: RVSP is 33 mmHg. Signed by: Vamshi Keane MD on 2/10/2022  5:07 PM, Signed by: Unknown Provider Result on 2/10/2022 12:00 AM        Signed:  DOMINICK Venegas CNP    Part of this note may have been written by using a voice dictation software. The note has been proof read but may still contain some grammatical/other typographical errors.

## 2023-02-24 NOTE — PROGRESS NOTES
OCCUPATIONAL THERAPY Daily Note and AM      (Link to Caseload Tracking: OT Visit Days: 2  OT Orders   Time  OT Charge Capture  Rehab Caseload Tracker  Episode     Shilpa Guerrero is a 69 y.o. female   PRIMARY DIAGNOSIS: Status post left hip replacement  Primary osteoarthritis of left hip [M16.12]  Unilateral primary osteoarthritis, left hip [M16.12]  Procedure(s) (LRB):  lt HIP TOTAL ARTHROPLASTY (Left)  2 Days Post-Op  Reason for Referral: Pain in left hip (M25.552)  Stiffness of Left Hip, Not elsewhere classified (M25.652)  Inpatient: Payor: MEDICARE / Plan: MEDICARE PART A AND B / Product Type: *No Product type* /     ASSESSMENT:     REHAB RECOMMENDATIONS:   Recommendation to date pending progress:  Setting:  Short-term Rehab pt requesting short term rehab as she is living in [facility] now    Equipment:    Rolling Walker     ASSESSMENT:  Ms. Melanie Soto is s/p left ISIAH and presents with decreased independence with functional mobility and activities of daily living as compared to baseline level of function and safety. Patient would benefit from skilled Occupational Therapy to maximize independence and safety with self-care task and functional mobility. Per prehab note: Has had B TKA and R ISIAH. Arrived to department via [address]. Uses W/C for longer distances and RW for short distance ambulation. W/C belongs to [facility]. She states she is temporarily living in [facility] due to severe hip and back pain but she plans to return to her home after she recovers. She states she has severe LBP and has been getting injections. She is requesting rehab and has already contacted the [facility]. Patient seen in room with PT. Pt at edge of bed upon arrival with complaint of pain and concerns that she could not walk. Pt assisted to sameer shoes and was able to ambulate a very short household distance with much encouragement and extra time.   Patient is hopeful to spend the night to better prepare for safe discharge to [facility].    2/24/23: Patient upon arrival seated in chair with PTA. Patient declined shower this date and changing into her own clothes, she was agreeable to sponge bath. Patient needed max encouragement this date for participation and initiation of tasks. Patient educated on posterior hip precautions and adaptive equipment, patient declined practicing with dressing adaptive equipment this date. Patient completed upper body bathing with stand by assist and lower body bathing with mod assist for assist with washing lower legs/feet. Patient donned gown with stand by assist. Patient is completing functional transfers with min assist with rolling walker. Will continue to follow.         [facility] Daily Activity Inpatient Short Form:    AM-PAC Daily Activity - Inpatient   How much help is needed for putting on and taking off regular lower body clothing?: A Lot  How much help is needed for bathing (which includes washing, rinsing, drying)?: A Lot  How much help is needed for toileting (which includes using toilet, bedpan, or urinal)?: A Lot  How much help is needed for putting on and taking off regular upper body clothing?: None  How much help is needed for taking care of personal grooming?: None  How much help for eating meals?: None  [facility] Inpatient Daily Activity Raw Score: 18  AM-PAC Inpatient ADL T-Scale Score : 38.66  ADL Inpatient CMS 0-100% Score: 46.65  ADL Inpatient CMS G-Code Modifier : CK     SUBJECTIVE:     Ms. Med Reddy states, \"I don't think I can do it\"      Social/Functional   Lives With:  ([facility]IS)  Type of Home: Assisted living  Home Layout: One level  Home Access: Level entry  Bathroom Shower/Tub: Tub/Shower unit  Home Equipment: Joseph Leger, edgar  ADL Assistance: Independent  Ambulation Assistance: Independent  Transfer Assistance:  (with walker & supervision)    OBJECTIVE:     Christian Lancaster / Eleazar Logan / Yifan Apple: None    RESTRICTIONS/PRECAUTIONS:  Restrictions/Precautions: Weight Bearing, Fall Risk  Left Lower Extremity Weight Bearing: Weight Bearing As Tolerated    PAIN: VITALS / O2:   Pre Treatment: Patient did not report pain.           Post Treatment:  Vitals          Oxygen        GROSS EVALUATION: INTACT IMPAIRED   (See Comments)   UE AROM [x] []   UE PROM [x] []   Strength [x]       Posture / Balance []  Decreased standing balance    Sensation [x]     Coordination [x]       Tone [x]       Edema []    Activity Tolerance [] Patient limited by fatigue     Hand Dominance R [] L []      COGNITION/  PERCEPTION: INTACT IMPAIRED   (See Comments)   Orientation [x]     Vision [x]     Hearing [x]     Cognition  [x]     Perception [x]       MOBILITY: I Mod I S SBA CGA Min Mod Max Total  NT x2 Comments:   Bed Mobility    Rolling [] [] [] [] [] [] [] [] [] [] []    Supine to Sit [] [] [] [] [] [] [] [] [] [] []    Scooting [] [] [] [] [] [] [] [] [] [] []    Sit to Supine [] [] [] [] [] [] [] [] [] [] []    Transfers    Sit to Stand [] [] [] [] [] [x] [] [] [] [] []    Bed to Chair [] [] [] [] [] [x] [] [] [] [] []    Stand to Sit [] [] [] [] [] [x] [] [] [] [] []    Tub/Shower [] [] [] [] [] [x] [] [] [] [] []       Toilet [] [] [] [] [] [x] [] [] [] [] []        [] [] [] [] [] [] [] [] [] [] []    I=Independent, Mod I=Modified Independent, S=Supervision/Setup, SBA=Standby Assistance, CGA=Contact Guard Assistance, Min=Minimal Assistance, Mod=Moderate Assistance, Max=Maximal Assistance, Total=Total Assistance, NT=Not Tested    ACTIVITIES OF DAILY LIVING: I Mod I S SBA CGA Min Mod Max Total NT Comments   BASIC ADLs:              Upper Body Bathing [] [] [] [x] [] [] [] [] [] []    Lower Body Bathing [] [] [] [] [] [] [x] [] [] []    Toileting [] [] [] [] [] [] [x] [] [] []    Upper Body Dressing [] [] [] [x] [] [] [] [] [] []    Lower Body Dressing [] [] [] [] [] [] [] [] [] []    Feeding [x] [] [] [] [] [] [] [] [] []    Grooming [] [] [] [] [] [] [] [] [] []    Personal Device Care [] [] [] [] [] [] [] [] [] []    Functional Mobility [] [] [] [] [] [x] [] [] [] [] RW   I=Independent, Mod I=Modified Independent, S=Supervision/Setup, SBA=Standby Assistance, CGA=Contact Guard Assistance, Min=Minimal Assistance, Mod=Moderate Assistance, Max=Maximal Assistance, Total=Total Assistance, NT=Not Tested    PLAN:     FREQUENCY/DURATION     for duration of hospital stay or until stated goals are met, whichever comes first.    ACUTE OCCUPATIONAL THERAPY GOALS:   (Developed with and agreed upon by patient and/or caregiver.)    GOALS:   DISCHARGE GOALS (in preparation for going home/rehab):  3 days  1.  Ms. Valle will perform lower body dressing activity with minimal assist required to demonstrate improved functional mobility and safety.     2.  Ms. Valle will perform bathing activity with minimal assist required to demonstrate improved functional mobility and safety.  3.  Ms. Valle will perform toileting activity with  minimal assist to demonstrate improved functional mobility and safety.  4.  Ms. Valle will perform all functional transfers transfer with minimal assist to demonstrate improved functional mobility and safety. -GOAL MET 2/24/23        PROBLEM LIST:   (Skilled intervention is medically necessary to address:)  Decreased ADL/Functional Activities  Decreased Balance  Increased Pain   INTERVENTIONS PLANNED:   (Benefits and precautions of occupational therapy have been discussed with the patient.)  Self Care Training  Education         TREATMENT:       TREATMENT:   Co-Treatment PT/OT necessary due to patient's decreased overall endurance/tolerance levels, as well as need for high level skilled assistance to complete functional transfers/mobility and functional tasks  Self Care: ( 30 min): Procedure(s) (per grid) utilized to improve and/or restore self-care/home management as related to dressing, bathing, and functional mobility . Required moderate verbal, manual, and tactile cueing to facilitate activities of daily living skills and compensatory  activities.   AFTER TREATMENT PRECAUTIONS: Bed/Chair Locked, Call light within reach, Chair, Needs within reach, and RN notified    INTERDISCIPLINARY COLLABORATION:  RN/ PCT, PT/ PTA, and OT/ FRANKLIN    EDUCATION:     [x] Safe And Effective Hygiene  [x] Fall Precautions  [x] Hip Precautions  [] D/C Instruction Review [] Prosthesis Review  [x] Walker Management/Safety  [x] Adaptive Equipment as Needed  [x] Therapeutic Resting Position of Joint       TOTAL TREATMENT DURATION AND TIME:  Time In: 0845  Time Out: 0915  Minutes: 30    Venita Manley, OT

## 2023-02-24 NOTE — PROGRESS NOTES
ACUTE PHYSICAL THERAPY GOALS:   (Developed with and agreed upon by patient and/or caregiver.)  GOALS (1-4 days):  (1.)Ms. Valle will move from supine to sit and sit to supine  in bed with STAND BY ASSIST.    (2.)Ms. Valle will transfer from bed to chair and chair to bed with STAND BY ASSIST using the least restrictive device. (3.)Ms. Valle will ambulate with STAND BY ASSIST for 30-40 feet with the least restrictive device. (4.)Ms. Valle will state/observe ISIAH precautions with no verbal cues.   ________________________________________________________________________________________________     PHYSICAL THERAPY JOINT CAMP: TOTAL HIP ARTHROPLASTY Daily Note and PM  (Link to Caseload Tracking: PT Visit Days : 3  Acknowledge Orders  Time In/Out  PT Charge Capture  Rehab Caseload Tracker  Episode   Artemio Campbell is a 69 y.o. female   PRIMARY DIAGNOSIS: Status post left hip replacement  Primary osteoarthritis of left hip [M16.12]  Unilateral primary osteoarthritis, left hip [M16.12]  Procedure(s) (LRB):  lt HIP TOTAL ARTHROPLASTY (Left)  2 Days Post-Op  Reason for Referral: Pain in left hip (M25.552)  Stiffness of Left Hip, Not elsewhere classified (M25.652)  Difficulty in walking, Not elsewhere classified (R26.2)  Inpatient: Payor: MEDICARE / Plan: MEDICARE PART A AND B / Product Type: *No Product type* /     REHAB RECOMMENDATIONS:   Recommendation to date pending progress:  Setting:  Short-term Rehab    Equipment:    To Be Determined     GAIT: I Mod I S SBA CGA Min Mod Max Total  NT x2 Comments:   Level of Assistance [] [] [] [] [x] [] [] [] [] [] []    Weightbearing Status  Left Lower Extremity Weight Bearing: Weight Bearing As Tolerated    Distance  40 feet    Gait Quality Antalgic, Decreased gabriela , Decreased step clearance, Decreased step length, and Decreased stance    DME Rolling Walker     Stairs  N/A    Ramp N/A    I=Independent, Mod I=Modified Independent, S=Supervision, SBA=Standby Assistance, CGA=Contact Guard Assistance,   Min=Minimal Assistance, Mod=Moderate Assistance, Max=Maximal Assistance, Total=Total Assistance, NT=Not Tested      ASSESSMENT:   ASSESSMENT:  Ms. Dulce Maria Morris presented with impaired strength & mobility s/p left ISIAH. Patient also showed decreased stability during out of bed activity. This pt was at an [facility], ambulating 20-30 ft with rolling walker & longer distances with a WC. Pt has no assist at home on DC & will require additional rehab time at a SNF to achieve a level of function that will allow her to return to an [facility]. This patient will benefit from follow up therapy to help restore safe function prior to eventually returning home at a supervision level of care. MSW aware of DC concerns & needs. 2/23 sitting in the chair upon arrival.  Sit>stand with Min A. Ambulated 15 ft to [facility], independent with hygiene. Sit>stand again with Min A and ambulated 15 ft back to the [facility]. Performs and review L hip HEP  (see below) and verbal cues to stay within hip precaution. Review hip precaution. . remain in the chair with needs in reach, ice to L hip and knee and instructed to call for assists, before getting up. 2/23 pm sitting in the chair upon arrival.  Pt needs encouragement to walk with therapy, especially with using the restroom. Therapist told pt needs to be walking to the restroom not using BSC. Ambulated 20 ft to the [facility] using RW with Min A, while working on reciprocal gait pattern. Independent with hygiene and washing hands. Ambulated another 20 ft back to the chair. Performs and review L hip HEP ( see below) with guidance. Review hip precaution again. Remain in the chair with needs in reach, ice to L hip and knee and instructed to call for assists, before getting up. 2/24  sitting in the chair upon arrival. Pt is on 2 L of 02 throughout tx. Sit>stand with CGA with RW in front. Ambulated 30 ft to the [facility] right outside the restroom door, dependent with hygiene and donning brief.   Pt sat back down in the chair and rested few min. Sit>stand and ambulated another 30 ft back with CGA and RW, while working on reciprocal gait pattern. Sat in chair and rested few min. Then bath her self off with help. Then stood again and wash her bottom and new brief donned. Sat back down and performs and review L hip HEP (see below) with AA. Review hip precaution. Remain in the chair with needs in reach, ice to L hip and knee and instructed to call for assists, before getting up. Pt needs encouragement to walk further each tx.  2/24  pm SW stated she is leaving around 11:00 am  tomorrow. Sitting on the [facility] upon arrival.    Stood up from [facility] with CGA,  ambulated 40 ft using RW with SBA, with verbal cues to stand tall. Return to the chair and performs and review L hip HEP (see below)  with verbal cues for hip precaution. Remain in the chair with needs in reach, ice to L hip and knee and instructed to call for assist, before getting up.      Outcome Measure:   HOOS-JR:  Total Raw Score (0-24 Scale): 18    SUBJECTIVE:   Ms. Edie Dela Cruz agreeable    Home Environment/Prior Level of Function Lives With:  (J[facility] LUIS)  Type of Home: Assisted living  Home Layout: One level  Home Access: Level entry  Bathroom Shower/Tub: Tub/Shower unit  Home Equipment: [place], rolling  ADL Assistance: Independent  Ambulation Assistance: Independent  Transfer Assistance:  (with walker & supervision)    OBJECTIVE:     PAIN: VITAL SIGNS: LINES/DRAINS:   Pre Treatment:0/10         Post Treatment: sore ice to L hip and knee Vitals NT       Oxygen 3 L        IV    RESTRICTIONS/PRECAUTIONS:  Restrictions/Precautions: Weight Bearing, Fall Risk  Left Lower Extremity Weight Bearing: Weight Bearing As Tolerated      Hip Precautions          LOWER EXTREMITY GROSS EVALUATION:  RIGHT LE   Within Functional Limits   Abnormal   Comments   Strength [x] []     Range of Motion [x] []        LEFT LE Within Functional Limits   Abnormal   Comments   Strength [] [x] Decreased but functional   Range of Motion [] [x]  Decreased but functional     UPPER EXTREMITY GROSS EVALUATION:     Within Functional Limits   Abnormal   Comments   Strength [x] []    Range of Motion [x] []      SENSATION  Sensation [x]  grossly     COGNITION/  PERCEPTION: Intact Impaired (Comments):   Orientation [x]     Vision [x]     Hearing [x]     Cognition  [x]       MOBILITY: I Mod I S SBA CGA Min Mod Max Total  NT x2 Comments:   Bed Mobility    Rolling [] [] [] [] [] [] [] [] [] [x] []    Supine to Sit [] [] [] [] [] [] [] [] [] [x] []    Scooting [] [] [] [] [] [] [] [] [] [x] []    Sit to Supine [] [] [] [] [] [] [] [] [] [] []    Transfers    Sit to Stand [] [] [] [] [x] [] [] [] [] [] []    Bed to Chair [] [] [] [] [x] [] [] [] [] [] [] With walker   Stand to Sit [] [] [] [] [x] [] [] [] [] [] []    Stand Pivot [] [] [] [] [] [] [] [] [] [x] []    Toilet [] [] [] [] [x] [] [] [] [] [] []     [] [] [] [] [] [] [] [] [] [] []    I=Independent, Mod I=Modified Independent, S=Supervision, SBA=Standby Assistance, CGA=Contact Guard Assistance,   Min=Minimal Assistance, Mod=Moderate Assistance, Max=Maximal Assistance, Total=Total Assistance, NT=Not Tested    BALANCE: Good Fair+ Fair Fair- Poor NT Comments   Sitting Static [] [] [x] [] [] []    Sitting Dynamic [] [] [x] [] [] []              Standing Static [] [] [x] [x] [] [] With walker   Standing Dynamic [] [] [x] [x] [] [] With walker     PLAN:   FREQUENCY AND DURATION: BID for duration of hospital stay or until stated goals are met, whichever comes first.    THERAPY PROGNOSIS: Good    PROBLEM LIST:   (Skilled intervention is medically necessary to address:)  Decreased ADL/Functional Activities, Decreased Activity Tolerance, Decreased AROM/PROM, Decreased Gait Ability, Decreased Strength, Decreased Transfer Abilities, and Increased Pain   INTERVENTIONS PLANNED:   (Benefits and precautions of physical therapy have been discussed with the patient.)  Therapeutic Activity, Therapeutic Exercise/HEP, Gait Training, Modalities, and Education       TREATMENT:   EVALUATION: LOW COMPLEXITY: (Untimed Charge)    TREATMENT:   Therapeutic Activity (30 Minutes): Therapeutic activity included Transfer Training, Ambulation on level ground, Sitting balance , and Standing balance to improve functional Activity tolerance, Balance, Coordination, Mobility, and Strength. TREATMENT GRID:  THERAPEUTIC  EXERCISES: DATE:  2/22 DATE:  2/23   DATE:  2/24      AM PM AM PM AM PM    [] [] [] [] [] []   Ankle Pumps  20 15 15 20 20   Quad Sets  20 15 15 20 20   Gluteal Sets  20 15 15 20 20   Hip Abd/ADduction  20 15 15 20 aa 20   Knee Slides  20 15 15 20 aa 20   Short Arc Quads  20 15 15 20 20   Long Arc Quads  20 15 15 20 20                              B = bilateral; AA = active assistive; A = active; P = passive      EDUCATION: Education Given To: Patient  Education Provided: Role of Therapy, Home Exercise Program  Education Method: Demonstration, Verbal  EDUCATION:  [x] Home Exercises  [x] Fall Precautions  [x] Hip Precautions  [] D/C Instruction Review [] Hip Prosthesis Review  [x] Walker Management/Safety  [] Adaptive Equipment as Needed     AFTER TREATMENT PRECAUTIONS: Bed/Chair Locked, Call light within reach, Chair, Needs within reach, and RN notified    INTERDISCIPLINARY COLLABORATION:  RN/ PCT    Compliance with Program/Exercises: Will assess as treatment progresses. Recommendations/Intent for next treatment session:  Treatment next visit will focus on increasing Ms. Valle's independence with bed mobility, transfers, gait training, strength/ROM exercises, modalities for pain, and patient education.      TIME IN/OUT:  Time In: 1300  Time Out: 1330  Minutes: [facility]

## 2023-02-25 VITALS
DIASTOLIC BLOOD PRESSURE: 40 MMHG | BODY MASS INDEX: 44.22 KG/M2 | SYSTOLIC BLOOD PRESSURE: 130 MMHG | WEIGHT: 259 LBS | HEIGHT: 64 IN | HEART RATE: 72 BPM | OXYGEN SATURATION: 98 % | RESPIRATION RATE: 12 BRPM | TEMPERATURE: 98.8 F

## 2023-02-25 LAB
GLUCOSE BLD STRIP.AUTO-MCNC: 204 MG/DL (ref 65–100)
HCT VFR BLD AUTO: 29.5 % (ref 35.8–46.3)
HGB BLD-MCNC: 9.4 G/DL (ref 11.7–15.4)
SERVICE CMNT-IMP: ABNORMAL

## 2023-02-25 PROCEDURE — 82962 GLUCOSE BLOOD TEST: CPT

## 2023-02-25 PROCEDURE — 85014 HEMATOCRIT: CPT

## 2023-02-25 PROCEDURE — 36415 COLL VENOUS BLD VENIPUNCTURE: CPT

## 2023-02-25 PROCEDURE — 6370000000 HC RX 637 (ALT 250 FOR IP): Performed by: FAMILY MEDICINE

## 2023-02-25 PROCEDURE — 6370000000 HC RX 637 (ALT 250 FOR IP): Performed by: PHYSICIAN ASSISTANT

## 2023-02-25 RX ORDER — FLUCONAZOLE 100 MG/1
200 TABLET ORAL ONCE
Status: COMPLETED | OUTPATIENT
Start: 2023-02-25 | End: 2023-02-25

## 2023-02-25 RX ADMIN — INSULIN LISPRO 5 UNITS: 100 INJECTION, SOLUTION INTRAVENOUS; SUBCUTANEOUS at 11:34

## 2023-02-25 RX ADMIN — FLUCONAZOLE 200 MG: 100 TABLET ORAL at 06:26

## 2023-02-25 RX ADMIN — LEVOTHYROXINE SODIUM 112 MCG: 0.11 TABLET ORAL at 06:27

## 2023-02-25 RX ADMIN — OXYCODONE HYDROCHLORIDE 10 MG: 5 TABLET ORAL at 11:34

## 2023-02-25 RX ADMIN — PANTOPRAZOLE SODIUM 40 MG: 40 TABLET, DELAYED RELEASE ORAL at 09:33

## 2023-02-25 RX ADMIN — INSULIN LISPRO 5 UNITS: 100 INJECTION, SOLUTION INTRAVENOUS; SUBCUTANEOUS at 08:36

## 2023-02-25 RX ADMIN — CETIRIZINE HYDROCHLORIDE 10 MG: 10 TABLET, FILM COATED ORAL at 09:33

## 2023-02-25 RX ADMIN — ASPIRIN 81 MG: 81 TABLET, COATED ORAL at 09:33

## 2023-02-25 RX ADMIN — ACETAMINOPHEN 650 MG: 325 TABLET, FILM COATED ORAL at 06:26

## 2023-02-25 RX ADMIN — INSULIN GLARGINE 18 UNITS: 100 INJECTION, SOLUTION SUBCUTANEOUS at 08:35

## 2023-02-25 RX ADMIN — PREGABALIN 50 MG: 50 CAPSULE ORAL at 09:33

## 2023-02-25 RX ADMIN — OXYCODONE HYDROCHLORIDE 10 MG: 5 TABLET ORAL at 06:29

## 2023-02-25 RX ADMIN — CELECOXIB 200 MG: 200 CAPSULE ORAL at 09:33

## 2023-02-25 ASSESSMENT — PAIN SCALES - GENERAL
PAINLEVEL_OUTOF10: 4
PAINLEVEL_OUTOF10: 6

## 2023-02-25 ASSESSMENT — PAIN DESCRIPTION - ORIENTATION: ORIENTATION: LEFT

## 2023-02-25 ASSESSMENT — PAIN DESCRIPTION - DESCRIPTORS: DESCRIPTORS: ACHING

## 2023-02-25 ASSESSMENT — PAIN DESCRIPTION - LOCATION: LOCATION: HIP

## 2023-02-25 NOTE — PROGRESS NOTES
2023         Post Op day: 3 Days Post-Op     Admit Date: 2023  Admit Diagnosis: Primary osteoarthritis of left hip [M16.12]  Unilateral primary osteoarthritis, left hip [M16.12]        Subjective: Patient is status-post Procedure(s) (LRB):  lt HIP TOTAL ARTHROPLASTY (Left). Patient doing well. No concerns/complaints. No shortness of breath, chest pain or nausea/vomiting. Objective:   Vitals:    23 0728   BP: (!) 130/40   Pulse: 72   Resp: 18   Temp: 98.8 °F (37.1 °C)   SpO2: 98%    Temp (24hrs), Av.7 °F (37.1 °C), Min:98 °F (36.7 °C), Max:99.5 °F (37.5 °C)    Lab Results   Component Value Date/Time    HGB 9.4 2023 03:44 AM     Extremity Exam  Dressing Clean, dry, intact.   Neuro intact and unchanged left lower extremity  Sensation intact to light touch  Extremity perfused  No sign of DVT     Assessment / Plan :  S/p Procedure(s) (LRB):  lt HIP TOTAL ARTHROPLASTY (Left)  Continue current postoperative plan  PT/OT-Continue current weightbearing status and restrictions of involved extremities  Continue current VTE prophylaxis  DIspo-SNF  Patient Active Problem List   Diagnosis    Osteoarthritis of right knee    Type 2 diabetes mellitus treated without insulin (HCC)    Class 3 severe obesity due to excess calories with serious comorbidity and body mass index (BMI) of 40.0 to 44.9 in adult [facility])    Status post right hip replacement    Hypertension    S/P total knee replacement using cement    Hypothyroidism    Pressure injury of contiguous region involving back and right buttock, stage 1    Shortness of breath    Neuropathy    Asthma    Osteoarthritis of left knee    Chronic pain    BELKIS (obstructive sleep apnea)    4th nerve palsy, left    Adie's tonic pupil, right    Irritable bowel syndrome    Gastroesophageal reflux disease without esophagitis    Hyperlipidemia    Hypothyroidism due to acquired atrophy of thyroid    Primary insomnia    Keratoconjunctivitis sicca, not specified as Sjogren's, bilateral    Polyneuropathy due to type 2 diabetes mellitus ([address]    Spondylosis without myelopathy    Type 2 diabetes mellitus with hyperglycemia, without long-term current use of insulin ([facility])    Vitamin D deficiency    Stress incontinence of urine    Primary osteoarthritis of left hip    Noncompliance with CPAP treatment    Unilateral primary osteoarthritis, left hip    Status post left hip replacement          Signed By: Froylan Perry MD

## 2023-02-25 NOTE — PROGRESS NOTES
[facility] Hospitalist Consult   Admit Date:  2023  7:28 AM   Name:  Nabila Bonds   Age:  69 y.o. Sex:  female  :  1945   MRN:  [id number]   Room:  [facility]/01    Presenting Complaint: No chief complaint on file. Reason(s) for Admission: Primary osteoarthritis of left hip [M16.12]  Unilateral primary osteoarthritis, left hip [M16.12]     Hospitalists consulted by Martita Marroquin MD for: medical management     History of Presenting Illness:   Nabila Bonds is a 69 y.o. female with an extensive past medical history including OA, DDD, Hypothyroid, GERD, BELKIS, DM II quadriplegia who is s/p left total hip arthroplasty after failing conservative management for severe degenerative arthritis, acetabular osteophytes and bone spurs around the femoral head with Dr. Jeffrey Butt. Hospitalist consulted for medical management of chronic conditions. Patient A&O x3, voices no co. Dressing dry/intact left hip. Plans to dc to [facility] today. Stable condition. Assessment & Plan:     Principal Problem:    Status post left hip replacement    Unilateral primary osteoarthritis, left hip  Plan:   -Management per primary team  -Prn analgesia  -Pans are for rehab-[place] today. Hypothyroid  Plan:  -Levothyroxine    DM II  Plan:  -Accu checks with SSI  -A1c 8.4 23  -Ranges acceptable  -Lantus    HTN  Plan:   Currently goal. ACE/HCTZ ongoing    BELKIS  Plan:   -CPAP qhs    Vitamin D Deficiency  Plan:  -will check level    GERD  Plan:  -PPI    Class 3 Obesity  Plan:  -negatively impacts overall mortality  -diet and exercise advised    Insomnia  -Ambien       Diet:  ADULT DIET; Regular; 5 carb choices (75 gm/meal)  VTE prophylaxis: Defer to primary team  Code status: Full Code    Principal Problem:    Status post left hip replacement  Active Problems:    Unilateral primary osteoarthritis, left hip    Primary osteoarthritis of left hip  Resolved Problems:    * No resolved hospital problems.  *      Past History:  Past Medical History:   Diagnosis Date    Acute UTI 03/20/2021    Alternating constipation and diarrhea     Arthritis     Asthma     bronchio-asthma mostly seasonal    Chronic obstructive asthma, unspecified     Chronic pain     DDD, LOW BACK, SIATICA    COVID-19 01/11/2023    cough~pt states tested positive in nursing home facility; had been coughing for a week before they tested her.     DDD (degenerative disc disease)     WITH RADICULOPATHY    Disorder of sacrococcygeal spine     E-coli UTI 03/23/2021    Elevated serum creatinine 09/19/2017    Elevated WBC count 09/19/2017    Functional assessment declined 07/16/2022    Functional quadriplegia ([facility] [address] 07/13/2022    Gas bloat syndrome     GERD (gastroesophageal reflux disease)     Hyperlipidemia     Hypertension     Hypothyroidism     Incontinence of urine     Insomnia     Iron deficiency anemia 11/2022    Morbid obesity ([facility] [address]     Multiple environmental allergies     Murmur, cardiac     2/6 early systolic flow murmur upper sternal borders no radiation; ECHO 2/9/22    Neuropathy     BELKIS (obstructive sleep apnea) 09/16/2009    Osteoarthritis of left knee 09/16/2009    Osteoarthritis of right knee 03/02/2011    Prolonged emergence from general anesthesia     trouble waking up from anesthesia once ~20 years ago~ \"out for 8 hours\"    S/P total knee replacement using cement 09/16/2009    Sleep apnea     HAS CPAP~has not used in the last 8 months    Spastic colon     Spinal stenosis of lumbar region     with neurogenic claudication    Status post right hip replacement 10/09/2017    Thyroid disease     ON MEDS    Type II or unspecified type diabetes mellitus without mention of complication, not stated as uncontrolled     avg FBS: 160-170; Hgb A1c 6.9 on 7/15/22; takes insulin       Past Surgical History:   Procedure Laterality Date    DILATION AND CURETTAGE OF UTERUS      HAND/FINGER SURGERY UNLISTED Right 1990'S    HERNIA REPAIR  1/88/60    UMBILICAL WITH MESH HYSTERECTOMY (CERVIX STATUS UNKNOWN)      TOTAL HIP ARTHROPLASTY Right     TOTAL HIP ARTHROPLASTY Left 2023    lt HIP TOTAL ARTHROPLASTY performed [place] Ronaldo Franco Jr., [place] [facility] [place]    TOTAL KNEE ARTHROPLASTY Left 2009    TOTAL KNEE ARTHROPLASTY Right 2011        Social History     Tobacco Use    Smoking status: Former     Packs/day: 1.00     Years: 14.00     Pack years: 14.00     Types: Cigarettes     Quit date: 1977     Years since quittin.1    Smokeless tobacco: Never    Tobacco comments:     Quit smoking: QUIT IN    Substance Use Topics    Alcohol use: Not Currently      Social History     Substance and Sexual Activity   Drug Use No       Family History   Problem Relation Age of Onset    Diabetes Father     Cancer Father         lung    COPD Father     Cancer Brother     Diabetes Mother     Heart Disease Father     Heart Disease Brother         CAD    Diabetes Brother     Cancer Mother         Immunization History   Administered Date(s) Administered    COVID-19, PFIZER PURPLE top, DILUTE for use, (age 12 y+), 30mcg/0.3mL 2021, 2021    PPD Test 10/09/2017, 07/15/2022, 2023    Pneumococcal Conjugate 13-valent ([id number]) 2015    Pneumococcal Polysaccharide (Huolfxisl11) 2015    Zoster Live (Zostavax) 2015     Allergies   Allergen Reactions    Codeine Other (See Comments)     dizziness    Erythromycin Rash    Tetracycline Rash      Current Facility-Administered Medications   Medication Dose Route Frequency    lidocaine 4 % external patch 1 patch  1 patch TransDERmal Daily    albuterol (PROVENTIL) nebulizer solution 2.5 mg  2.5 mg Nebulization Q6H PRN    sodium chloride flush 0.9 % injection 5-40 mL  5-40 mL IntraVENous 2 times per day    sodium chloride flush 0.9 % injection 5-40 mL  5-40 mL IntraVENous PRN    0.9 % sodium chloride infusion   IntraVENous PRN    acetaminophen (TYLENOL) tablet 650 mg  650 mg Oral Q6H    oxyCODONE (ROXICODONE) immediate  release tablet 10 mg  10 mg Oral Q4H PRN    HYDROmorphone HCl PF (DILAUDID) injection 1 mg  1 mg IntraVENous Q3H PRN    sennosides-docusate sodium (SENOKOT-S) 8.6-50 MG tablet 1 tablet  1 tablet Oral BID    ondansetron (ZOFRAN-ODT) disintegrating tablet 4 mg  4 mg Oral Q8H PRN    Or    ondansetron (ZOFRAN) injection 4 mg  4 mg IntraVENous Q6H PRN    diphenhydrAMINE (BENADRYL) capsule 25 mg  25 mg Oral Q6H PRN    Or    diphenhydrAMINE (BENADRYL) injection 25 mg  25 mg IntraVENous Q6H PRN    aspirin EC tablet 81 mg  81 mg Oral BID    celecoxib (CELEBREX) capsule 200 mg  200 mg Oral BID    cetirizine (ZYRTEC) tablet 10 mg  10 mg Oral Daily    [Held by provider] furosemide (LASIX) tablet 20 mg  20 mg Oral Daily    insulin glargine (LANTUS) injection vial 18 Units  18 Units SubCUTAneous Daily    insulin lispro (HUMALOG) injection vial 5 Units  5 Units SubCUTAneous TID AC    levothyroxine (SYNTHROID) tablet 112 mcg  112 mcg Oral Daily    lisinopril-hydroCHLOROthiazide (PRINZIDE;ZESTORETIC) 10-12.5 MG per tablet 1 tablet  1 tablet Oral Daily    montelukast (SINGULAIR) tablet 10 mg  10 mg Oral Nightly    pantoprazole (PROTONIX) tablet 40 mg  40 mg Oral Daily    pregabalin (LYRICA) capsule 50 mg  50 mg Oral BID    polyethylene glycol (GLYCOLAX) packet 17 g  17 g Oral Daily PRN    zolpidem (AMBIEN) tablet 5 mg  5 mg Oral Nightly    insulin lispro (HUMALOG) injection vial 0-4 Units  0-4 Units SubCUTAneous Nightly       Objective:   Patient Vitals for the past 24 hrs:   Temp Pulse Resp BP SpO2   02/25/23 0728 98.8 °F (37.1 °C) 72 18 (!) 130/40 98 %   02/25/23 0343 98.8 °F (37.1 °C) 69 16 (!) 125/55 97 %   02/25/23 0011 99.5 °F (37.5 °C) 96 16 (!) 127/45 98 %   02/24/23 2113 -- 83 -- -- 98 %   02/24/23 1900 98.4 °F (36.9 °C) 83 20 (!) 101/54 98 %   02/24/23 1127 -- -- 20 -- --   02/24/23 1112 98 °F (36.7 °C) 73 20 (!) 125/47 99 %   02/24/23 1057 -- -- 12 -- --       Oxygen Therapy  SpO2: 98 %  Pulse via Oximetry: 82 beats per minute  Pulse Oximeter Device Mode: Intermittent  Pulse Oximeter Device Location: Right, Finger  O2 Device: Nasal cannula  Skin Assessment: Clean, dry, & intact  O2 Flow Rate (L/min): 2 L/min    Estimated body mass index is 44.46 kg/m² as calculated from the following:    Height as of this encounter: 5' 4\" (1.626 m). Weight as of this encounter: 259 lb (117.5 kg). No intake or output data in the 24 hours ending 02/25/23 0857      Physical Exam:    Blood pressure (!) 130/40, pulse 72, temperature 98.8 °F (37.1 °C), temperature source Oral, resp. rate 18, height 5' 4\" (1.626 m), weight 259 lb (117.5 kg), SpO2 98 %. General:    Well nourished. Head:  Normocephalic, atraumatic  Neck:  No restricted ROM. Trachea midline   CV:   RRR. No m/r/g. No jugular venous distension. Lungs:   CTAB. No wheezing, rhonchi, or rales. Symmetric expansion. Abdomen:   Soft, nontender, nondistended. Extremities: No cyanosis or clubbing. No edema, left hip dressing dry/intact. Skin:     No rashes and normal coloration. Warm and dry. Neuro:  CN II-XII grossly intact. Sensation intact. Psych:  Normal mood and affect.       I have personally reviewed labs and tests showing:  Recent Labs:  Recent Results (from the past 24 hour(s))   CBC    Collection Time: 02/24/23 10:53 AM   Result Value Ref Range    WBC 12.7 (H) 4.3 - 11.1 K/uL    RBC 3.91 (L) 4.05 - 5.2 M/uL    Hemoglobin 10.9 (L) 11.7 - 15.4 g/dL    Hematocrit 34.3 (L) 35.8 - 46.3 %    MCV 87.7 82.0 - 102.0 FL    MCH 27.9 26.1 - 32.9 PG    MCHC 31.8 31.4 - 35.0 g/dL    RDW 16.2 (H) 11.9 - 14.6 %    Platelets 267 131 - 907 K/uL    MPV 10.8 9.4 - 12.3 FL    nRBC 0.00 0.0 - 0.2 K/uL   Basic Metabolic Panel w/ Reflex to MG    Collection Time: 02/24/23 10:53 AM   Result Value Ref Range    Sodium 137 133 - 143 mmol/L    Potassium 5.0 3.5 - 5.1 mmol/L    Chloride 106 101 - 110 mmol/L    CO2 29 21 - 32 mmol/L    Anion Gap 2 2 - 11 mmol/L    Glucose 236 (H) 65 - 100 mg/dL BUN 32 (H) 8 - 23 MG/DL    Creatinine 1.16 (H) 0.6 - 1.0 MG/DL    Est, Glom Filt Rate 49 (L) >60 ml/min/1.73m2    Calcium 8.9 8.3 - 10.4 MG/DL   Vitamin D 25 Hydroxy    Collection Time: 02/24/23 10:53 AM   Result Value Ref Range    Vit D, 25-Hydroxy 24.0 (L) 30.0 - 100.0 ng/mL   POCT Glucose    Collection Time: 02/24/23 11:12 AM   Result Value Ref Range    POC Glucose 177 (H) 65 - 100 mg/dL    Performed by: Temo. POCT Glucose    Collection Time: 02/24/23  4:25 PM   Result Value Ref Range    POC Glucose 181 (H) 65 - 100 mg/dL    Performed by: Kwame JUÁREZ, Rapid    Collection Time: 02/24/23  4:43 PM    Specimen: Nasopharyngeal   Result Value Ref Range    Source Nasopharyngeal      SARS-CoV-2, Rapid Not detected NOTD     POCT Glucose    Collection Time: 02/24/23  9:19 PM   Result Value Ref Range    POC Glucose 217 (H) 65 - 100 mg/dL    Performed by: Kath    Hemoglobin and Hematocrit    Collection Time: 02/25/23  3:44 AM   Result Value Ref Range    Hemoglobin 9.4 (L) 11.7 - 15.4 g/dL    Hematocrit 29.5 (L) 35.8 - 46.3 %   POCT Glucose    Collection Time: 02/25/23  6:24 AM   Result Value Ref Range    POC Glucose 204 (H) 65 - 100 mg/dL    Performed by: Conner        I have personally reviewed imaging studies showing:  XR PELVIS (1-2 VIEWS)    Result Date: 2/22/2023  History: Postop left hip arthroplasty EXAM: Single view pelvis FINDINGS: There is a left total hip arthroplasty present without periprosthetic fracture. There is also a right total hip arthroplasty. Postsurgical change as described. Echocardiogram:  02/09/22    TRANSTHORACIC ECHOCARDIOGRAM (TTE) COMPLETE (CONTRAST/BUBBLE/3D PRN) 02/10/2022  5:07 PM, 02/10/2022 12:00 AM (Final)    Narrative  This is a summary report. The complete report is available in the patient's medical record. If you cannot access the medical record, please contact the sending organization for a detailed fax or copy.       Left Ventricle: Left ventricle is mildly dilated. Increased wall thickness. Normal wall motion. Normal left ventricular systolic function. EF by 2D Simpsons Biplane is 58%. Normal diastolic function. Mitral Valve: Mildly thickened leaflets. Tricuspid Valve: RVSP is 33 mmHg.     Signed by: Jacob Jiménez MD on 2/10/2022  5:07 PM, Signed by: Unknown Provider Result on 2/10/2022 12:00 AM        Signed:  DOMINICK Tate - JOE

## 2023-02-25 NOTE — PROGRESS NOTES
Patient c/o of urinary tract issues and feeling like she is getting a yeast infection. Ordered given by MD to administer diflucan 200 mg x1.

## 2023-02-25 NOTE — PROGRESS NOTES
02/24/23 2113   Oxygen Therapy/Pulse Ox   O2 Therapy Oxygen   $Oxygen $Daily Charge   O2 Device Nasal cannula   O2 Flow Rate (L/min) 2 L/min   Heart Rate 83   SpO2 98 %   Skin Assessment Clean, dry, & intact   Pulse Oximeter Device Mode Intermittent   Pulse Oximeter Device Location Right;Finger   $Pulse Oximeter $Spot check (single)     Pt does not have home CPAP.  Pt on 2L NC for tonight

## 2023-03-30 ENCOUNTER — OFFICE VISIT (OUTPATIENT)
Dept: ORTHOPEDIC SURGERY | Age: 78
End: 2023-03-30

## 2023-03-30 DIAGNOSIS — Z96.642 STATUS POST LEFT HIP REPLACEMENT: Primary | ICD-10-CM

## 2023-03-30 RX ORDER — SULFAMETHOXAZOLE AND TRIMETHOPRIM 800; 160 MG/1; MG/1
1 TABLET ORAL 2 TIMES DAILY
Qty: 14 TABLET | Refills: 0 | Status: SHIPPED | OUTPATIENT
Start: 2023-03-30 | End: 2023-04-06

## 2023-03-30 NOTE — PROGRESS NOTES
lisinopril-hydroCHLOROthiazide (PRINZIDE;ZESTORETIC) 10-12.5 MG per tablet Take 1 tablet by mouth daily      montelukast (SINGULAIR) 10 MG tablet Take 10 mg by mouth nightly      olopatadine (PATANOL) 0.1 % ophthalmic solution Place 2 drops into both eyes at bedtime      polyethylene glycol (GLYCOLAX) 17 GM/SCOOP powder Take 17 g by mouth as needed      [DISCONTINUED] amLODIPine (NORVASC) 5 MG tablet Take 5 mg by mouth daily      [DISCONTINUED] chlordiazePOXIDE-clidinium (LIBRAX) 5-2.5 MG per capsule Take 1 capsule by mouth 4 times daily as needed. No current facility-administered medications on file prior to visit. 5 weeks Status Post left ISIAH    History: The patient returns today for post-op visit following ISIAH. They have minimal pain. They are ambulating with walker walking aid. Physical Exam left Hip: The patient's incision is well healed portion of the incision. There is no purulence. There is mild swelling and mild increased warmth around the incision. The leg lengths are equal. There is minimal pain with ROM. Calves are soft and non-tender. Neurologic and vascular exams are intact. X-Rays: AP pelvis and Lateral left hip reveal a cementless  [place] ISIAH with a Insignia stem and a ceramic on plastic articulation. There is good position of the acetabular and femoral components. X-Ray Diagnosis: Satisfactory appearance left total hip arthroplasty. Disposition: The patient is doing well following left ISIAH. The patient is redness over the incision. This may be a stitch reaction. I have recommended Bactrim DS 1 p.o. twice daily. They were advised about hip precautions. They were advised about fall precautions and dental prophylaxis. They will follow up as scheduled or sooner if needed.

## 2023-04-17 ENCOUNTER — TELEPHONE (OUTPATIENT)
Dept: ORTHOPEDIC SURGERY | Age: 78
End: 2023-04-17

## 2023-04-17 NOTE — TELEPHONE ENCOUNTER
Left a VM to return call. Patient is a wound check so can be seen sooner if needed but will help her find a good time to rinku.

## 2023-04-20 ENCOUNTER — TELEPHONE (OUTPATIENT)
Dept: ORTHOPEDIC SURGERY | Age: 78
End: 2023-04-20

## 2023-04-20 NOTE — TELEPHONE ENCOUNTER
Pt needs to reschedule 3 week recheck. Pt had transportation issues and has available on 04/25in the afternoon. Please call pt.

## 2023-04-20 NOTE — TELEPHONE ENCOUNTER
Left voicemail to reschedule appointment. April 25th is fully booked. April 27th or May 9th are our next available appointments.

## 2023-08-15 ENCOUNTER — OFFICE VISIT (OUTPATIENT)
Dept: ORTHOPEDIC SURGERY | Age: 78
End: 2023-08-15

## 2023-08-15 DIAGNOSIS — M25.512 BILATERAL SHOULDER PAIN, UNSPECIFIED CHRONICITY: Primary | ICD-10-CM

## 2023-08-15 DIAGNOSIS — M25.511 BILATERAL SHOULDER PAIN, UNSPECIFIED CHRONICITY: Primary | ICD-10-CM

## 2023-08-15 RX ORDER — METHYLPREDNISOLONE ACETATE 40 MG/ML
80 INJECTION, SUSPENSION INTRA-ARTICULAR; INTRALESIONAL; INTRAMUSCULAR; SOFT TISSUE ONCE
Status: COMPLETED | OUTPATIENT
Start: 2023-08-15 | End: 2023-08-15

## 2023-08-15 RX ADMIN — METHYLPREDNISOLONE ACETATE 80 MG: 40 INJECTION, SUSPENSION INTRA-ARTICULAR; INTRALESIONAL; INTRAMUSCULAR; SOFT TISSUE at 15:52

## 2023-08-15 NOTE — PROGRESS NOTES
upper sternal borders no radiation; ECHO 22    Neuropathy     BELKIS (obstructive sleep apnea) 2009    Osteoarthritis of left knee 2009    Osteoarthritis of right knee 2011    Prolonged emergence from general anesthesia     trouble waking up from anesthesia once ~20 years ago~ \"out for 8 hours\"    S/P total knee replacement using cement 2009    Sleep apnea     HAS CPAP~has not used in the last 8 months    Spastic colon     Spinal stenosis of lumbar region     with neurogenic claudication    Status post right hip replacement 10/09/2017    Thyroid disease     ON MEDS    Type II or unspecified type diabetes mellitus without mention of complication, not stated as uncontrolled     avg FBS: 160-170; Hgb A1c 6.9 on 7/15/22; takes insulin     Past Surgical History:   Procedure Laterality Date    DILATION AND CURETTAGE OF UTERUS      HAND/FINGER SURGERY UNLISTED Right     HERNIA REPAIR  34    UMBILICAL WITH MESH    HYSTERECTOMY (CERVIX STATUS UNKNOWN)  2000    TOTAL HIP ARTHROPLASTY Right     TOTAL HIP ARTHROPLASTY Left 2023    lt HIP TOTAL ARTHROPLASTY performed by Emma Ramírez MD at [address] ARTHROPLASTY Left     TOTAL KNEE ARTHROPLASTY Right      Family History   Problem Relation Age of Onset    Diabetes Father     Cancer Father         lung    COPD Father     Cancer Brother     Diabetes Mother     Heart Disease Father     Heart Disease Brother         CAD    Diabetes Brother     Cancer Mother      Social History     Socioeconomic History    Marital status: Single     Spouse name: Not on file    Number of children: Not on file    Years of education: Not on file    Highest education level: Not on file   Occupational History    Not on file   Tobacco Use    Smoking status: Former     Packs/day: 1.00     Years: 14.00     Pack years: 14.00     Types: Cigarettes     Quit date: 1977     Years since quittin.6    Smokeless tobacco: Never    Tobacco

## 2024-03-07 ENCOUNTER — TELEPHONE (OUTPATIENT)
Dept: REHABILITATION | Age: 79
End: 2024-03-07

## 2024-03-07 ASSESSMENT — HOOS JR
HOOS JR RAW SCORE: 8
RISING FROM SITTING: 2
WALKING ON UNEVEN SURFACE: 1
HOOS JR TOTAL INTERVAL SCORE: 64.664
HOOS JR RAW SCORE: 8
SITTING: 1
BENDING TO THE FLOOR TO PICK UP OBJECT: 1
LYING IN BED (TURNING OVER, MAINTAINING HIP POSITION): 1
GOING UP OR DOWN STAIRS: 2

## 2024-03-07 NOTE — TELEPHONE ENCOUNTER
I have tried to call the 4 times to perform 1 yr post op HOOS survey, and she does not answer nor return my call. I called her contact and he informed me that patient is a long term resident in a nursing home and doesn't return  calls.

## 2024-07-16 DIAGNOSIS — R06.83 SNORING: Primary | ICD-10-CM

## 2024-07-29 ENCOUNTER — INITIAL CONSULT (OUTPATIENT)
Age: 79
End: 2024-07-29
Payer: MEDICARE

## 2024-07-29 VITALS
HEIGHT: 64 IN | BODY MASS INDEX: 47.12 KG/M2 | DIASTOLIC BLOOD PRESSURE: 70 MMHG | WEIGHT: 276 LBS | SYSTOLIC BLOOD PRESSURE: 118 MMHG | HEART RATE: 76 BPM

## 2024-07-29 DIAGNOSIS — R06.02 SHORTNESS OF BREATH: ICD-10-CM

## 2024-07-29 DIAGNOSIS — M79.89 LEG SWELLING: Primary | ICD-10-CM

## 2024-07-29 PROCEDURE — 99204 OFFICE O/P NEW MOD 45 MIN: CPT | Performed by: INTERNAL MEDICINE

## 2024-07-29 PROCEDURE — 3074F SYST BP LT 130 MM HG: CPT | Performed by: INTERNAL MEDICINE

## 2024-07-29 PROCEDURE — G8427 DOCREV CUR MEDS BY ELIG CLIN: HCPCS | Performed by: INTERNAL MEDICINE

## 2024-07-29 PROCEDURE — 1090F PRES/ABSN URINE INCON ASSESS: CPT | Performed by: INTERNAL MEDICINE

## 2024-07-29 PROCEDURE — 1036F TOBACCO NON-USER: CPT | Performed by: INTERNAL MEDICINE

## 2024-07-29 PROCEDURE — 1123F ACP DISCUSS/DSCN MKR DOCD: CPT | Performed by: INTERNAL MEDICINE

## 2024-07-29 PROCEDURE — 3078F DIAST BP <80 MM HG: CPT | Performed by: INTERNAL MEDICINE

## 2024-07-29 PROCEDURE — G8400 PT W/DXA NO RESULTS DOC: HCPCS | Performed by: INTERNAL MEDICINE

## 2024-07-29 PROCEDURE — G8417 CALC BMI ABV UP PARAM F/U: HCPCS | Performed by: INTERNAL MEDICINE

## 2024-07-29 RX ORDER — FUROSEMIDE 20 MG/1
20 TABLET ORAL DAILY
Qty: 30 TABLET | Refills: 0 | Status: SHIPPED | OUTPATIENT
Start: 2024-07-29

## 2024-07-29 RX ORDER — SEMAGLUTIDE 2.68 MG/ML
INJECTION, SOLUTION SUBCUTANEOUS
COMMUNITY
Start: 2024-07-15

## 2024-07-30 ASSESSMENT — ENCOUNTER SYMPTOMS
ABDOMINAL PAIN: 0
SHORTNESS OF BREATH: 0

## 2024-07-30 NOTE — PROGRESS NOTES
disc disease)     WITH RADICULOPATHY    Disorder of sacrococcygeal spine     E-coli UTI 03/23/2021    Elevated serum creatinine 09/19/2017    Elevated WBC count 09/19/2017    Functional assessment declined 07/16/2022    Functional quadriplegia (HCC) 07/13/2022    Gas bloat syndrome     GERD (gastroesophageal reflux disease)     Hyperlipidemia     Hypertension     Hypothyroidism     Incontinence of urine     Insomnia     Iron deficiency anemia 11/2022    Morbid obesity (HCC)     Multiple environmental allergies     Murmur, cardiac     2/6 early systolic flow murmur upper sternal borders no radiation; ECHO 2/9/22    Neuropathy     BELKIS (obstructive sleep apnea) 09/16/2009    Osteoarthritis of left knee 09/16/2009    Osteoarthritis of right knee 03/02/2011    Prolonged emergence from general anesthesia     trouble waking up from anesthesia once ~20 years ago~ \"out for 8 hours\"    S/P total knee replacement using cement 09/16/2009    Sleep apnea     HAS CPAP~has not used in the last 8 months    Spastic colon     Spinal stenosis of lumbar region     with neurogenic claudication    Status post right hip replacement 10/09/2017    Thyroid disease     ON MEDS    Type II or unspecified type diabetes mellitus without mention of complication, not stated as uncontrolled     avg FBS: 160-170; Hgb A1c 6.9 on 7/15/22; takes insulin     Past Surgical History:   Procedure Laterality Date    DILATION AND CURETTAGE OF UTERUS      HAND/FINGER SURGERY UNLISTED Right 1990'S    HERNIA REPAIR  1/30/08    UMBILICAL WITH MESH    HYSTERECTOMY (CERVIX STATUS UNKNOWN)  2000    TOTAL HIP ARTHROPLASTY Right     TOTAL HIP ARTHROPLASTY Left 2/22/2023    lt HIP TOTAL ARTHROPLASTY performed by Ronaldo Franco Jr., MD at [facility]    TOTAL KNEE ARTHROPLASTY Left 2009    TOTAL KNEE ARTHROPLASTY Right 2011     Family History   Problem Relation Age of Onset    Diabetes Father     Cancer Father         lung    COPD Father     Cancer Brother     Diabetes

## 2024-09-16 ENCOUNTER — HOSPITAL ENCOUNTER (OUTPATIENT)
Dept: SLEEP CENTER | Age: 79
Discharge: HOME | End: 2024-09-19
Payer: MEDICARE

## 2024-09-16 PROCEDURE — 95810 POLYSOM 6/> YRS 4/> PARAM: CPT

## 2024-09-17 ENCOUNTER — TELEPHONE (OUTPATIENT)
Age: 79
End: 2024-09-17

## 2024-09-23 ENCOUNTER — OFFICE VISIT (OUTPATIENT)
Age: 79
End: 2024-09-23
Payer: MEDICARE

## 2024-09-23 VITALS
WEIGHT: 280 LBS | SYSTOLIC BLOOD PRESSURE: 122 MMHG | DIASTOLIC BLOOD PRESSURE: 64 MMHG | BODY MASS INDEX: 49.61 KG/M2 | HEIGHT: 63 IN

## 2024-09-23 DIAGNOSIS — M79.89 LEG SWELLING: ICD-10-CM

## 2024-09-23 DIAGNOSIS — R06.02 SHORTNESS OF BREATH: Primary | ICD-10-CM

## 2024-09-23 PROCEDURE — 99214 OFFICE O/P EST MOD 30 MIN: CPT | Performed by: INTERNAL MEDICINE

## 2024-09-23 PROCEDURE — G8400 PT W/DXA NO RESULTS DOC: HCPCS | Performed by: INTERNAL MEDICINE

## 2024-09-23 PROCEDURE — 3074F SYST BP LT 130 MM HG: CPT | Performed by: INTERNAL MEDICINE

## 2024-09-23 PROCEDURE — G8417 CALC BMI ABV UP PARAM F/U: HCPCS | Performed by: INTERNAL MEDICINE

## 2024-09-23 PROCEDURE — 1036F TOBACCO NON-USER: CPT | Performed by: INTERNAL MEDICINE

## 2024-09-23 PROCEDURE — 1123F ACP DISCUSS/DSCN MKR DOCD: CPT | Performed by: INTERNAL MEDICINE

## 2024-09-23 PROCEDURE — 1090F PRES/ABSN URINE INCON ASSESS: CPT | Performed by: INTERNAL MEDICINE

## 2024-09-23 PROCEDURE — 3078F DIAST BP <80 MM HG: CPT | Performed by: INTERNAL MEDICINE

## 2024-09-23 PROCEDURE — G8428 CUR MEDS NOT DOCUMENT: HCPCS | Performed by: INTERNAL MEDICINE

## 2024-09-23 RX ORDER — FUROSEMIDE 20 MG
20 TABLET ORAL
Qty: 36 TABLET | Refills: 1 | Status: SHIPPED | OUTPATIENT
Start: 2024-09-23 | End: 2024-10-11 | Stop reason: SDUPTHER

## 2024-09-23 RX ORDER — TRAMADOL HYDROCHLORIDE 50 MG/1
TABLET ORAL
COMMUNITY
Start: 2024-08-15

## 2024-09-23 RX ORDER — PREGABALIN 150 MG/1
CAPSULE ORAL
COMMUNITY
Start: 2024-07-07

## 2024-09-23 RX ORDER — INSULIN DEGLUDEC 200 U/ML
INJECTION, SOLUTION SUBCUTANEOUS
COMMUNITY
Start: 2024-08-03

## 2024-09-23 RX ORDER — LINACLOTIDE 72 UG/1
CAPSULE, GELATIN COATED ORAL
COMMUNITY
Start: 2024-07-31

## 2024-09-23 RX ORDER — ROSUVASTATIN CALCIUM 10 MG/1
TABLET, COATED ORAL
COMMUNITY
Start: 2024-07-28

## 2024-09-23 NOTE — PROGRESS NOTES
[facility] CARDIOLOGY  [address] [place] [address], [address]  [place] [address]  PHONE: [phone]      24    NAME:  Lu Valle  : 1945  MRN: [id number]         SUBJECTIVE:   Lu Valle is a 78 y.o. female seen for a follow up visit regarding the following:     Chief Complaint   Patient presents with    Shortness of Breath    Edema            HPI:  Follow up  Shortness of Breath and Edema   .    Ms. Valle presents today for follow-up.  We reviewed her echo which showed mild pulm hypertension, normal biventricular function.  Most previously on Lasix, this was stopped due to concerns about hypokalemia.  She denies orthopnea, PND, palpitations, syncope or lower extremity swelling.  No recent hospitalizations.        Shortness of Breath  Pertinent negatives include no abdominal pain or fever.   Edema  Pertinent negatives include no abdominal pain, chills, diaphoresis or fever.           Cardiac Medications       ACE Inhibitors       lisinopril (PRINIVIL;ZESTRIL) 2.5 MG tablet (Discontinued) 1 tablet       Calcium Channel Blockers       amLODIPine (NORVASC) 5 MG tablet (Discontinued) Take 5 mg by mouth daily       Antihypertensive Combinations       lisinopril-hydroCHLOROthiazide (PRINZIDE;ZESTORETIC) 10-12.5 MG per tablet Take 1 tablet by mouth daily       Loop Diuretics       furosemide (LASIX) 20 MG tablet Take 1 tablet by mouth three times a week       HMG CoA Reductase Inhibitors       rosuvastatin (CRESTOR) 10 MG tablet        Salicylates       aspirin EC 81 MG EC tablet Take 1 tablet by mouth in the morning and 1 tablet in the evening.          Diabetic Medications       Insulin       TRESIBA FLEXTOUCH 200 UNIT/ML SOPN      insulin lispro (HUMALOG) 100 UNIT/ML injection vial Inject into the skin 3 times daily (before meals) Inject 5 units subcutaneously before meals for type 2 diabetes     insulin glargine (LANTUS) 100 UNIT/ML injection vial Inject 18 Units into the skin daily Take 14 units

## 2024-10-01 NOTE — PROCEDURE: COUNSELING
Medication: 2 pended  Last office visit date: 8/16/2024  Medication Refill Protocol Failed.  Failed criteria: controlled. Sent to clinician to review.   
Detail Level: Detailed

## 2024-10-10 ENCOUNTER — TELEPHONE (OUTPATIENT)
Dept: SLEEP MEDICINE | Age: 79
End: 2024-10-10

## 2024-10-11 DIAGNOSIS — M79.89 LEG SWELLING: ICD-10-CM

## 2024-10-11 DIAGNOSIS — R06.02 SHORTNESS OF BREATH: ICD-10-CM

## 2024-10-11 DIAGNOSIS — J45.909 MILD ASTHMA WITHOUT COMPLICATION, UNSPECIFIED WHETHER PERSISTENT: Primary | ICD-10-CM

## 2024-10-11 NOTE — TELEPHONE ENCOUNTER
Requested Prescriptions     Pending Prescriptions Disp Refills    furosemide (LASIX) 20 MG tablet 36 tablet 1     Sig: Take 1 tablet by mouth three times a week

## 2024-10-11 NOTE — TELEPHONE ENCOUNTER
MEDICATION REFILL REQUEST      Name of Medication:  Furosemide  Dose:  20 mg  Frequency:  3 pills a week   Quantity:  90  Days' supply:  90 with 3 refills      [phone]

## 2024-10-14 RX ORDER — FUROSEMIDE 20 MG
20 TABLET ORAL
Qty: 36 TABLET | Refills: 1 | Status: SHIPPED | OUTPATIENT
Start: 2024-10-14 | End: 2025-04-12

## 2024-10-15 ASSESSMENT — ENCOUNTER SYMPTOMS: ABDOMINAL PAIN: 0

## 2024-10-15 NOTE — PROGRESS NOTES
Hypertension     Hypothyroidism     Incontinence of urine     Insomnia     Iron deficiency anemia 11/2022    Morbid obesity     Multiple environmental allergies     Murmur, cardiac     2/6 early systolic flow murmur upper sternal borders no radiation; ECHO 2/9/22    Neuropathy     BELKIS (obstructive sleep apnea) 09/16/2009    Osteoarthritis of left knee 09/16/2009    Osteoarthritis of right knee 03/02/2011    Prolonged emergence from general anesthesia     trouble waking up from anesthesia once ~20 years ago~ \"out for 8 hours\"    S/P total knee replacement using cement 09/16/2009    Sleep apnea     HAS CPAP~has not used in the last 8 months    Spastic colon     Spinal stenosis of lumbar region     with neurogenic claudication    Status post right hip replacement 10/09/2017    Thyroid disease     ON MEDS    Type II or unspecified type diabetes mellitus without mention of complication, not stated as uncontrolled     avg FBS: 160-170; Hgb A1c 6.9 on 7/15/22; takes insulin     Allergies   Allergen Reactions    Codeine Other (See Comments)     dizziness    Erythromycin Rash    Tetracycline Rash     Current Outpatient Medications   Medication Instructions    acetaminophen (TYLENOL) 500 mg, Oral, DAILY PRN    albuterol sulfate HFA (VENTOLIN HFA) 108 (90 Base) MCG/ACT inhaler 2 puffs, Inhalation, 4 TIMES DAILY PRN    aspirin EC 81 mg, Oral, EVERY 12 HOURS    celecoxib (CELEBREX) 200 mg, Oral, 2 TIMES DAILY    fluticasone-salmeterol (WIXELA INHUB) 250-50 MCG/ACT AEPB diskus inhaler 1 puff, Inhalation, EVERY 12 HOURS    furosemide (LASIX) 20 mg, Oral, THREE TIMES WEEKLY    hydroCHLOROthiazide (HYDRODIURIL) 25 mg, Oral, DAILY    insulin lispro (HUMALOG) 100 UNIT/ML injection vial SubCUTAneous, 3 TIMES DAILY BEFORE MEALS, Inject 5 units subcutaneously before meals for type 2 diabetes    levothyroxine (SYNTHROID) 100 mcg, Oral, DAILY    LINZESS 72 MCG CAPS capsule     lisinopril-hydroCHLOROthiazide (PRINZIDE;ZESTORETIC)

## 2024-10-16 ENCOUNTER — HOSPITAL ENCOUNTER (OUTPATIENT)
Dept: GENERAL RADIOLOGY | Age: 79
Discharge: HOME | End: 2024-10-19
Payer: MEDICARE

## 2024-10-16 ENCOUNTER — OFFICE VISIT (OUTPATIENT)
Dept: PULMONOLOGY | Age: 79
End: 2024-10-16
Payer: MEDICARE

## 2024-10-16 VITALS
WEIGHT: 277 LBS | HEIGHT: 63 IN | DIASTOLIC BLOOD PRESSURE: 72 MMHG | RESPIRATION RATE: 18 BRPM | BODY MASS INDEX: 49.08 KG/M2 | TEMPERATURE: 97.2 F | SYSTOLIC BLOOD PRESSURE: 132 MMHG | HEART RATE: 78 BPM | OXYGEN SATURATION: 95 %

## 2024-10-16 DIAGNOSIS — J45.909 MILD ASTHMA WITHOUT COMPLICATION, UNSPECIFIED WHETHER PERSISTENT: ICD-10-CM

## 2024-10-16 DIAGNOSIS — R06.09 DYSPNEA ON EXERTION: Primary | ICD-10-CM

## 2024-10-16 DIAGNOSIS — Z23 ENCOUNTER FOR IMMUNIZATION: ICD-10-CM

## 2024-10-16 DIAGNOSIS — J45.909 UNCOMPLICATED ASTHMA, UNSPECIFIED ASTHMA SEVERITY, UNSPECIFIED WHETHER PERSISTENT: ICD-10-CM

## 2024-10-16 LAB
EXPIRATORY TIME: NORMAL
FEF 25-75% %PRED-PRE: NORMAL
FEF 25-75% PRED: NORMAL
FEF 25-75-PRE: NORMAL
FEV1 %PRED-PRE: 76 %
FEV1 PRED: 1.92 L
FEV1/FVC %PRED-PRE: NORMAL
FEV1/FVC PRED: 77 %
FEV1/FVC: 74 %
FEV1: 1.45 L
FVC %PRED-PRE: 78 %
FVC PRED: 2.51 L
FVC: 1.97 L
PEF %PRED-PRE: NORMAL
PEF PRED: NORMAL
PEF-PRE: NORMAL

## 2024-10-16 PROCEDURE — 90662 IIV NO PRSV INCREASED AG IM: CPT | Performed by: INTERNAL MEDICINE

## 2024-10-16 PROCEDURE — 1090F PRES/ABSN URINE INCON ASSESS: CPT | Performed by: INTERNAL MEDICINE

## 2024-10-16 PROCEDURE — 3075F SYST BP GE 130 - 139MM HG: CPT | Performed by: INTERNAL MEDICINE

## 2024-10-16 PROCEDURE — 94010 BREATHING CAPACITY TEST: CPT | Performed by: INTERNAL MEDICINE

## 2024-10-16 PROCEDURE — G0009 ADMIN PNEUMOCOCCAL VACCINE: HCPCS | Performed by: INTERNAL MEDICINE

## 2024-10-16 PROCEDURE — G8400 PT W/DXA NO RESULTS DOC: HCPCS | Performed by: INTERNAL MEDICINE

## 2024-10-16 PROCEDURE — G0008 ADMIN INFLUENZA VIRUS VAC: HCPCS | Performed by: INTERNAL MEDICINE

## 2024-10-16 PROCEDURE — G8417 CALC BMI ABV UP PARAM F/U: HCPCS | Performed by: INTERNAL MEDICINE

## 2024-10-16 PROCEDURE — 90677 PCV20 VACCINE IM: CPT | Performed by: INTERNAL MEDICINE

## 2024-10-16 PROCEDURE — G8427 DOCREV CUR MEDS BY ELIG CLIN: HCPCS | Performed by: INTERNAL MEDICINE

## 2024-10-16 PROCEDURE — 99204 OFFICE O/P NEW MOD 45 MIN: CPT | Performed by: INTERNAL MEDICINE

## 2024-10-16 PROCEDURE — 3078F DIAST BP <80 MM HG: CPT | Performed by: INTERNAL MEDICINE

## 2024-10-16 PROCEDURE — 71046 X-RAY EXAM CHEST 2 VIEWS: CPT

## 2024-10-16 PROCEDURE — G8482 FLU IMMUNIZE ORDER/ADMIN: HCPCS | Performed by: INTERNAL MEDICINE

## 2024-10-16 PROCEDURE — 1036F TOBACCO NON-USER: CPT | Performed by: INTERNAL MEDICINE

## 2024-10-16 PROCEDURE — 1123F ACP DISCUSS/DSCN MKR DOCD: CPT | Performed by: INTERNAL MEDICINE

## 2024-10-16 RX ORDER — ALBUTEROL SULFATE 90 UG/1
2 INHALANT RESPIRATORY (INHALATION) 4 TIMES DAILY PRN
Qty: 18 G | Refills: 5 | Status: SHIPPED | OUTPATIENT
Start: 2024-10-16

## 2024-10-16 RX ORDER — FLUTICASONE PROPIONATE AND SALMETEROL 250; 50 UG/1; UG/1
1 POWDER RESPIRATORY (INHALATION) EVERY 12 HOURS
COMMUNITY

## 2024-10-16 RX ORDER — HYDROCHLOROTHIAZIDE 25 MG/1
25 TABLET ORAL DAILY
COMMUNITY
Start: 2024-10-04

## 2024-10-16 RX ORDER — LIFITEGRAST 50 MG/ML
SOLUTION/ DROPS OPHTHALMIC
COMMUNITY
Start: 2024-08-21

## 2024-10-16 ASSESSMENT — PULMONARY FUNCTION TESTS
FVC_PERCENT_PREDICTED_PRE: 78
FEV1/FVC: 74
FEV1/FVC_PREDICTED: 77
FEV1_PREDICTED: 1.92
FVC: 1.97
FVC_PREDICTED: 2.51
FEV1_PERCENT_PREDICTED_PRE: 76
FEV1: 1.45

## 2024-11-05 ENCOUNTER — NURSE ONLY (OUTPATIENT)
Dept: PULMONOLOGY | Age: 79
End: 2024-11-05

## 2024-11-05 DIAGNOSIS — R06.09 DYSPNEA ON EXERTION: Primary | ICD-10-CM

## 2024-11-05 DIAGNOSIS — R06.09 DYSPNEA ON EXERTION: ICD-10-CM

## 2024-11-05 LAB
ANION GAP SERPL CALC-SCNC: 11 MMOL/L (ref 7–16)
BUN SERPL-MCNC: 30 MG/DL (ref 8–23)
CALCIUM SERPL-MCNC: 8.9 MG/DL (ref 8.8–10.2)
CHLORIDE SERPL-SCNC: 99 MMOL/L (ref 98–107)
CO2 SERPL-SCNC: 31 MMOL/L (ref 20–29)
CREAT SERPL-MCNC: 1.4 MG/DL (ref 0.6–1.1)
D DIMER PPP FEU-MCNC: 0.9 UG/ML(FEU)
FEF25-27, POC: 1.1 L/S
FET, POC: NORMAL
FEV 1 , POC: 1.59 L
FEV1/FVC, POC: NORMAL
FVC, POC: NORMAL
GLUCOSE SERPL-MCNC: 111 MG/DL (ref 70–99)
LUNG AGE, POC: NORMAL
NT PRO BNP: 151 PG/ML (ref 0–450)
PEF, POC: 4.59 L/S
POTASSIUM SERPL-SCNC: 4.1 MMOL/L (ref 3.5–5.1)
SODIUM SERPL-SCNC: 140 MMOL/L (ref 136–145)

## 2024-11-08 ENCOUNTER — TELEPHONE (OUTPATIENT)
Dept: PULMONOLOGY | Age: 79
End: 2024-11-08

## 2024-11-08 DIAGNOSIS — R79.89 ELEVATED D-DIMER: Primary | ICD-10-CM

## 2024-11-08 DIAGNOSIS — R06.09 DYSPNEA ON EXERTION: ICD-10-CM

## 2024-11-08 NOTE — TELEPHONE ENCOUNTER
Spoke to patient and informed her Dr. Muse end a V/Q scan to assess for a blood clot since she has an elevated d-dimer.  Patient agrees and voices understanding.    Order placed for VQ and cxr

## 2024-11-08 NOTE — TELEPHONE ENCOUNTER
----- Message from Dr. Yamilet Muse MD sent at 11/5/2024  6:11 PM EST -----  Please let Mrs. Valle know I recommend a V/Q scan to assess for a blood clot since she has an elevated d-dimer.  If she agrees, please order this.

## 2024-11-12 ENCOUNTER — OFFICE VISIT (OUTPATIENT)
Dept: SLEEP MEDICINE | Age: 79
End: 2024-11-12
Payer: MEDICARE

## 2024-11-12 VITALS
DIASTOLIC BLOOD PRESSURE: 68 MMHG | WEIGHT: 275 LBS | SYSTOLIC BLOOD PRESSURE: 124 MMHG | BODY MASS INDEX: 48.73 KG/M2 | HEIGHT: 63 IN | HEART RATE: 95 BPM | OXYGEN SATURATION: 95 % | RESPIRATION RATE: 15 BRPM

## 2024-11-12 DIAGNOSIS — G47.10 HYPERSOMNIA, UNSPECIFIED: ICD-10-CM

## 2024-11-12 DIAGNOSIS — R22.43 LOCALIZED SWELLING OF BOTH LOWER LEGS: ICD-10-CM

## 2024-11-12 DIAGNOSIS — R09.02 HYPOXEMIA: ICD-10-CM

## 2024-11-12 DIAGNOSIS — R29.818 SUSPECTED SLEEP APNEA: Primary | ICD-10-CM

## 2024-11-12 DIAGNOSIS — J44.9 OBSTRUCTIVE LUNG DISEASE (HCC): ICD-10-CM

## 2024-11-12 PROCEDURE — 1123F ACP DISCUSS/DSCN MKR DOCD: CPT | Performed by: INTERNAL MEDICINE

## 2024-11-12 PROCEDURE — 3023F SPIROM DOC REV: CPT | Performed by: INTERNAL MEDICINE

## 2024-11-12 PROCEDURE — 3074F SYST BP LT 130 MM HG: CPT | Performed by: INTERNAL MEDICINE

## 2024-11-12 PROCEDURE — 1090F PRES/ABSN URINE INCON ASSESS: CPT | Performed by: INTERNAL MEDICINE

## 2024-11-12 PROCEDURE — 1036F TOBACCO NON-USER: CPT | Performed by: INTERNAL MEDICINE

## 2024-11-12 PROCEDURE — G8417 CALC BMI ABV UP PARAM F/U: HCPCS | Performed by: INTERNAL MEDICINE

## 2024-11-12 PROCEDURE — 99215 OFFICE O/P EST HI 40 MIN: CPT | Performed by: INTERNAL MEDICINE

## 2024-11-12 PROCEDURE — 3078F DIAST BP <80 MM HG: CPT | Performed by: INTERNAL MEDICINE

## 2024-11-12 PROCEDURE — 1160F RVW MEDS BY RX/DR IN RCRD: CPT | Performed by: INTERNAL MEDICINE

## 2024-11-12 PROCEDURE — G8482 FLU IMMUNIZE ORDER/ADMIN: HCPCS | Performed by: INTERNAL MEDICINE

## 2024-11-12 PROCEDURE — 1159F MED LIST DOCD IN RCRD: CPT | Performed by: INTERNAL MEDICINE

## 2024-11-12 PROCEDURE — G8427 DOCREV CUR MEDS BY ELIG CLIN: HCPCS | Performed by: INTERNAL MEDICINE

## 2024-11-12 PROCEDURE — G8400 PT W/DXA NO RESULTS DOC: HCPCS | Performed by: INTERNAL MEDICINE

## 2024-11-12 ASSESSMENT — SLEEP AND FATIGUE QUESTIONNAIRES
HOW LIKELY ARE YOU TO NOD OFF OR FALL ASLEEP WHILE LYING DOWN TO REST IN THE AFTERNOON WHEN CIRCUMSTANCES PERMIT: HIGH CHANCE OF DOZING
HOW LIKELY ARE YOU TO NOD OFF OR FALL ASLEEP WHILE SITTING AND TALKING TO SOMEONE: WOULD NEVER DOZE
HOW LIKELY ARE YOU TO NOD OFF OR FALL ASLEEP WHILE SITTING QUIETLY AFTER LUNCH WITHOUT ALCOHOL: MODERATE CHANCE OF DOZING
ESS TOTAL SCORE: 10
HOW LIKELY ARE YOU TO NOD OFF OR FALL ASLEEP IN A CAR, WHILE STOPPED FOR A FEW MINUTES IN TRAFFIC: WOULD NEVER DOZE
HOW LIKELY ARE YOU TO NOD OFF OR FALL ASLEEP WHILE SITTING INACTIVE IN A PUBLIC PLACE: SLIGHT CHANCE OF DOZING
HOW LIKELY ARE YOU TO NOD OFF OR FALL ASLEEP WHEN YOU ARE A PASSENGER IN A CAR FOR AN HOUR WITHOUT A BREAK: WOULD NEVER DOZE
HOW LIKELY ARE YOU TO NOD OFF OR FALL ASLEEP WHILE WATCHING TV: MODERATE CHANCE OF DOZING
HOW LIKELY ARE YOU TO NOD OFF OR FALL ASLEEP WHILE SITTING AND READING: MODERATE CHANCE OF DOZING

## 2024-11-12 ASSESSMENT — ENCOUNTER SYMPTOMS
GASTROINTESTINAL NEGATIVE: 1
SHORTNESS OF BREATH: 1
ALLERGIC/IMMUNOLOGIC NEGATIVE: 1
EYES NEGATIVE: 1

## 2024-11-12 NOTE — PROGRESS NOTES
[facility]: New Patient Evaluation.  [address]  [place] [address] [phone]    Patient Name:  Lu Valle    YOB: 1945            Date of Service:  11/12/2024    Chief Complaint   Patient presents with    New Patient       History of Present Illness:    This pleasant lady was sent from the pulmonary office for evaluation of sleep disordered breathing.    Patient usually seen by Dr. Muse for her asthma and during her evaluation had risk factors for sleep apnea including snoring, daytime fatigue, headaches, etc.  She also reported that she had a prior diagnosis sleep apnea over 20 years ago and was on PAP therapy and at that time actually felt better.  This time around, patient had COVID in January 2023.  Had low oxygen levels.  And since then is still being evaluated for having nocturnal hypoxemia.    Patient did have a polysomnogram done showed her AHI was only 4.7 saturation was less than 89% for 190 minutes and as a result was sent here today.    Patient currently reports she does not have good quality sleep since she is no longer on PAP therapy.  She indicated recently additional workup may show that she may have had a blood clot and they are doing additional work.  She also indicates she frequently goes to the bathroom because of IBS and as a result has lost about 5 pounds.  She was a prior smoker but quit over 30 something years ago and indicated may have smoked for about 18 years about half a pack a day.  Did look over her recent complete PFTs and she does have a borderline obstructive deficit.    Patient wants to know what to do.    Her Munday currently is 10/24        11/12/2024     8:48 AM   Sleep Medicine   Sitting and reading 2   Watching TV 2   Sitting, inactive in a public place (e.g. a theatre or a meeting) 1   As a passenger in a car for an hour without a break 0   Lying down to rest in the afternoon when circumstances permit 3   Sitting and

## 2024-11-25 ENCOUNTER — HOSPITAL ENCOUNTER (OUTPATIENT)
Dept: GENERAL RADIOLOGY | Age: 79
Discharge: HOME | End: 2024-11-28
Attending: INTERNAL MEDICINE
Payer: MEDICARE

## 2024-11-25 ENCOUNTER — HOSPITAL ENCOUNTER (OUTPATIENT)
Dept: NUCLEAR MEDICINE | Age: 79
Discharge: HOME | End: 2024-11-28
Attending: INTERNAL MEDICINE
Payer: MEDICARE

## 2024-11-25 DIAGNOSIS — R06.09 DYSPNEA ON EXERTION: ICD-10-CM

## 2024-11-25 DIAGNOSIS — R79.89 ELEVATED D-DIMER: ICD-10-CM

## 2024-11-25 PROCEDURE — A9539 TC99M PENTETATE: HCPCS | Performed by: INTERNAL MEDICINE

## 2024-11-25 PROCEDURE — 78582 LUNG VENTILAT&PERFUS IMAGING: CPT

## 2024-11-25 PROCEDURE — A9540 TC99M MAA: HCPCS | Performed by: INTERNAL MEDICINE

## 2024-11-25 PROCEDURE — 3430000000 HC RX DIAGNOSTIC RADIOPHARMACEUTICAL: Performed by: INTERNAL MEDICINE

## 2024-11-25 PROCEDURE — 71046 X-RAY EXAM CHEST 2 VIEWS: CPT

## 2024-11-25 RX ORDER — KIT FOR THE PREPARATION OF TECHNETIUM TC 99M PENTETATE 20 MG/1
41.1 INJECTION, POWDER, LYOPHILIZED, FOR SOLUTION INTRAVENOUS; RESPIRATORY (INHALATION)
Status: COMPLETED | OUTPATIENT
Start: 2024-11-25 | End: 2024-11-25

## 2024-11-25 RX ADMIN — KIT FOR THE PREPARATION OF TECHNETIUM TC 99M PENTETATE 41.1 MILLICURIE: 20 INJECTION, POWDER, LYOPHILIZED, FOR SOLUTION INTRAVENOUS; RESPIRATORY (INHALATION) at 13:32

## 2024-11-25 RX ADMIN — KIT FOR THE PREPARATION OF TECHNETIUM TC 99M ALBUMIN AGGREGATED 6.2 MILLICURIE: 2.5 INJECTION, POWDER, FOR SOLUTION INTRAVENOUS at 13:59

## 2024-12-10 ENCOUNTER — TELEPHONE (OUTPATIENT)
Dept: PULMONOLOGY | Age: 79
End: 2024-12-10

## 2025-01-22 NOTE — PROGRESS NOTES
10/11/17 0902   Oxygen Therapy   O2 Sat (%) 95 %   Pulse via Oximetry 71 beats per minute   O2 Device Room air   Incentive Spirometry Treatment   Actual Volume (ml) 1700 ml   Number of Attempts 2   Patient instructed in the use of incentive spirometry. Good npc. Consent: The patient's consent was obtained including but not limited to risks of crusting, scabbing, blistering, scarring, darker or lighter pigmentary change, recurrence, incomplete removal and infection. Show Applicator Variable?: Yes Number Of Freeze-Thaw Cycles: 3 freeze-thaw cycles Duration Of Freeze Thaw-Cycle (Seconds): 3 Post-Care Instructions: I reviewed with the patient in detail post-care instructions. Patient is to wear sunprotection, and avoid picking at any of the treated lesions. Pt may apply Vaseline to crusted or scabbing areas. Render Note In Bullet Format When Appropriate: No Detail Level: Zone

## 2025-02-18 NOTE — PROGRESS NOTES
Patient Name:  uL Valle                               YOB: 1945  MRN: [id number]                                                Office Visit 2/19/2025    ASSESSMENT AND PLAN:  (Medical Decision Making)    1. Mild intermittent asthma without complication  Suspect more asthma than COPD based on normal DLCO, normal CT and elevated FENO.  Plan to start Airsupra to step up from albuterol given infrequent usage of albuterol and the elevated FENO test    2. Nocturnal hypoxemia  Arranging an upcoming home sleep test to reassess possible nocturnal hypoxemia.  She has brought her last and may have less hypoxia.  She does still have lots of daytime sleepiness and fatigue    3. Localized edema  Bilateral lower extremity edema has improved significantly and is only at a trace level now after addition of Lasix.    Yamilet Muse MD    Total time for encounter on day of encounter was 35 minutes.  This time includes chart prep, review of tests/procedures, review of other provider's notes, documentation and counseling patient regarding disease process and medications.     ___________________________________________________________________         ______      REASON FOR VISIT:   Chief Complaint   Patient presents with    Asthma    Dyspnea on Exertion       History of Present Illness  The patient is a 79-year-old female with a 16-pack-year smoking history (quit 1977). She is under the care of [facility] for asthma (vs COPD) and dyspnea.    The patient resides in an independent living facility and reports exertional dyspnea and wheezing, which are exacerbated by changes in weather. She denies having a chronic cough. She has a history of obstructive sleep apnea (BELKIS) and discontinued continuous positive airway pressure (CPAP) therapy due to filter warnings from [employer]. She has chronic lower extremity edema and persistent nocturnal hypoxemia following a COVID-19 infection in January 2023.     She was

## 2025-02-19 ENCOUNTER — OFFICE VISIT (OUTPATIENT)
Dept: PULMONOLOGY | Age: 80
End: 2025-02-19
Payer: MEDICARE

## 2025-02-19 VITALS
BODY MASS INDEX: 48.02 KG/M2 | HEART RATE: 92 BPM | TEMPERATURE: 97.1 F | SYSTOLIC BLOOD PRESSURE: 134 MMHG | OXYGEN SATURATION: 93 % | DIASTOLIC BLOOD PRESSURE: 74 MMHG | RESPIRATION RATE: 19 BRPM | WEIGHT: 271 LBS | HEIGHT: 63 IN

## 2025-02-19 DIAGNOSIS — G47.34 NOCTURNAL HYPOXEMIA: ICD-10-CM

## 2025-02-19 DIAGNOSIS — J45.20 MILD INTERMITTENT ASTHMA WITHOUT COMPLICATION: Primary | ICD-10-CM

## 2025-02-19 DIAGNOSIS — R60.0 LOCALIZED EDEMA: ICD-10-CM

## 2025-02-19 LAB — FENO: 40 PPB

## 2025-02-19 PROCEDURE — G8400 PT W/DXA NO RESULTS DOC: HCPCS | Performed by: INTERNAL MEDICINE

## 2025-02-19 PROCEDURE — 3078F DIAST BP <80 MM HG: CPT | Performed by: INTERNAL MEDICINE

## 2025-02-19 PROCEDURE — 1090F PRES/ABSN URINE INCON ASSESS: CPT | Performed by: INTERNAL MEDICINE

## 2025-02-19 PROCEDURE — 99214 OFFICE O/P EST MOD 30 MIN: CPT | Performed by: INTERNAL MEDICINE

## 2025-02-19 PROCEDURE — G8427 DOCREV CUR MEDS BY ELIG CLIN: HCPCS | Performed by: INTERNAL MEDICINE

## 2025-02-19 PROCEDURE — 95012 NITRIC OXIDE EXP GAS DETER: CPT | Performed by: INTERNAL MEDICINE

## 2025-02-19 PROCEDURE — 1123F ACP DISCUSS/DSCN MKR DOCD: CPT | Performed by: INTERNAL MEDICINE

## 2025-02-19 PROCEDURE — 1036F TOBACCO NON-USER: CPT | Performed by: INTERNAL MEDICINE

## 2025-02-19 PROCEDURE — 3075F SYST BP GE 130 - 139MM HG: CPT | Performed by: INTERNAL MEDICINE

## 2025-02-19 PROCEDURE — 1159F MED LIST DOCD IN RCRD: CPT | Performed by: INTERNAL MEDICINE

## 2025-02-19 PROCEDURE — G8417 CALC BMI ABV UP PARAM F/U: HCPCS | Performed by: INTERNAL MEDICINE

## 2025-02-19 ASSESSMENT — PULMONARY FUNCTION TESTS: FENO: 40

## 2025-05-12 ENCOUNTER — HOSPITAL ENCOUNTER (OUTPATIENT)
Age: 80
Discharge: HOME | End: 2025-05-14
Payer: MEDICARE

## 2025-05-12 PROCEDURE — 95806 SLEEP STUDY UNATT&RESP EFFT: CPT

## 2025-05-27 ENCOUNTER — TELEPHONE (OUTPATIENT)
Dept: SLEEP MEDICINE | Age: 80
End: 2025-05-27

## 2025-05-28 ENCOUNTER — OFFICE VISIT (OUTPATIENT)
Age: 80
End: 2025-05-28
Payer: MEDICARE

## 2025-05-28 VITALS
BODY MASS INDEX: 47.84 KG/M2 | SYSTOLIC BLOOD PRESSURE: 112 MMHG | WEIGHT: 270 LBS | HEART RATE: 80 BPM | DIASTOLIC BLOOD PRESSURE: 60 MMHG | HEIGHT: 63 IN

## 2025-05-28 DIAGNOSIS — R06.02 SHORTNESS OF BREATH: Primary | ICD-10-CM

## 2025-05-28 DIAGNOSIS — M79.89 LEG SWELLING: ICD-10-CM

## 2025-05-28 PROCEDURE — 3074F SYST BP LT 130 MM HG: CPT | Performed by: INTERNAL MEDICINE

## 2025-05-28 PROCEDURE — G8417 CALC BMI ABV UP PARAM F/U: HCPCS | Performed by: INTERNAL MEDICINE

## 2025-05-28 PROCEDURE — 1090F PRES/ABSN URINE INCON ASSESS: CPT | Performed by: INTERNAL MEDICINE

## 2025-05-28 PROCEDURE — 1123F ACP DISCUSS/DSCN MKR DOCD: CPT | Performed by: INTERNAL MEDICINE

## 2025-05-28 PROCEDURE — 1036F TOBACCO NON-USER: CPT | Performed by: INTERNAL MEDICINE

## 2025-05-28 PROCEDURE — G8428 CUR MEDS NOT DOCUMENT: HCPCS | Performed by: INTERNAL MEDICINE

## 2025-05-28 PROCEDURE — 99214 OFFICE O/P EST MOD 30 MIN: CPT | Performed by: INTERNAL MEDICINE

## 2025-05-28 PROCEDURE — 3078F DIAST BP <80 MM HG: CPT | Performed by: INTERNAL MEDICINE

## 2025-05-28 PROCEDURE — 1126F AMNT PAIN NOTED NONE PRSNT: CPT | Performed by: INTERNAL MEDICINE

## 2025-05-28 PROCEDURE — G8400 PT W/DXA NO RESULTS DOC: HCPCS | Performed by: INTERNAL MEDICINE

## 2025-05-28 ASSESSMENT — ENCOUNTER SYMPTOMS
ABDOMINAL PAIN: 0
SHORTNESS OF BREATH: 1

## 2025-05-28 NOTE — PROGRESS NOTES
Start date: 1963     Quit date: 1977     Years since quittin.4    Smokeless tobacco: Never    Tobacco comments:     Quit smoking: QUIT IN    Substance Use Topics    Alcohol use: Not Currently       ROS:    Review of Systems   Constitutional: Negative for chills, diaphoresis and fever.   HENT:  Negative for hearing loss.    Eyes:  Negative for visual disturbance.   Cardiovascular:         As per the HPI   Respiratory:  Positive for shortness of breath.    Hematologic/Lymphatic: Does not bruise/bleed easily.   Gastrointestinal:  Negative for abdominal pain.   Genitourinary:  Negative for dysuria.   Neurological:  Negative for focal weakness.   Psychiatric/Behavioral:  Negative for suicidal ideas.           PHYSICAL EXAM:   /60   Pulse 80   Ht 1.6 m (5' 3\")   Wt 122.5 kg (270 lb)   BMI 47.83 kg/m²      Wt Readings from Last 3 Encounters:   25 122.5 kg (270 lb)   25 122.9 kg (271 lb)   24 124.7 kg (275 lb)     BP Readings from Last 3 Encounters:   25 112/60   25 134/74   24 124/68     Pulse Readings from Last 3 Encounters:   25 80   25 92   24 95           Physical Exam  Vitals reviewed.   Constitutional:       Appearance: Normal appearance.      Comments: Appears younger than stated age   HENT:      Head: Normocephalic and atraumatic.   Eyes:      General: No scleral icterus.  Neck:      Vascular: No carotid bruit.   Cardiovascular:      Rate and Rhythm: Normal rate and regular rhythm.      Heart sounds: No murmur heard.     No gallop.   Pulmonary:      Breath sounds: Normal breath sounds.   Abdominal:      Palpations: Abdomen is soft.   Musculoskeletal:         General: No swelling.      Cervical back: Neck supple.   Skin:     General: Skin is warm and dry.   Neurological:      Mental Status: She is alert and oriented to person, place, and time.   Psychiatric:         Mood and Affect: Mood normal.         Medical problems and test results

## 2025-06-11 ASSESSMENT — SLEEP AND FATIGUE QUESTIONNAIRES
HOW LIKELY ARE YOU TO NOD OFF OR FALL ASLEEP WHILE WATCHING TV: SLIGHT CHANCE OF DOZING
HOW LIKELY ARE YOU TO NOD OFF OR FALL ASLEEP WHILE WATCHING TV: SLIGHT CHANCE OF DOZING
HOW LIKELY ARE YOU TO NOD OFF OR FALL ASLEEP WHILE LYING DOWN TO REST IN THE AFTERNOON WHEN CIRCUMSTANCES PERMIT: SLIGHT CHANCE OF DOZING
ESS TOTAL SCORE: 6
HOW LIKELY ARE YOU TO NOD OFF OR FALL ASLEEP WHILE SITTING AND TALKING TO SOMEONE: WOULD NEVER DOZE
HOW LIKELY ARE YOU TO NOD OFF OR FALL ASLEEP WHILE LYING DOWN TO REST IN THE AFTERNOON WHEN CIRCUMSTANCES PERMIT: SLIGHT CHANCE OF DOZING
HOW LIKELY ARE YOU TO NOD OFF OR FALL ASLEEP WHILE SITTING INACTIVE IN A PUBLIC PLACE: WOULD NEVER DOZE
HOW LIKELY ARE YOU TO NOD OFF OR FALL ASLEEP IN A CAR, WHILE STOPPED FOR A FEW MINUTES IN TRAFFIC: WOULD NEVER DOZE
HOW LIKELY ARE YOU TO NOD OFF OR FALL ASLEEP WHILE SITTING INACTIVE IN A PUBLIC PLACE: WOULD NEVER DOZE
HOW LIKELY ARE YOU TO NOD OFF OR FALL ASLEEP WHILE SITTING AND READING: MODERATE CHANCE OF DOZING
HOW LIKELY ARE YOU TO NOD OFF OR FALL ASLEEP WHILE SITTING AND READING: MODERATE CHANCE OF DOZING
HOW LIKELY ARE YOU TO NOD OFF OR FALL ASLEEP WHEN YOU ARE A PASSENGER IN A CAR FOR AN HOUR WITHOUT A BREAK: SLIGHT CHANCE OF DOZING
HOW LIKELY ARE YOU TO NOD OFF OR FALL ASLEEP WHILE SITTING QUIETLY AFTER LUNCH WITHOUT ALCOHOL: SLIGHT CHANCE OF DOZING
HOW LIKELY ARE YOU TO NOD OFF OR FALL ASLEEP WHILE SITTING QUIETLY AFTER LUNCH WITHOUT ALCOHOL: SLIGHT CHANCE OF DOZING
HOW LIKELY ARE YOU TO NOD OFF OR FALL ASLEEP IN A CAR, WHILE STOPPED FOR A FEW MINUTES IN TRAFFIC: WOULD NEVER DOZE
HOW LIKELY ARE YOU TO NOD OFF OR FALL ASLEEP WHEN YOU ARE A PASSENGER IN A CAR FOR AN HOUR WITHOUT A BREAK: SLIGHT CHANCE OF DOZING
HOW LIKELY ARE YOU TO NOD OFF OR FALL ASLEEP WHILE SITTING AND TALKING TO SOMEONE: WOULD NEVER DOZE

## 2025-06-12 ENCOUNTER — OFFICE VISIT (OUTPATIENT)
Dept: SLEEP MEDICINE | Age: 80
End: 2025-06-12
Payer: MEDICARE

## 2025-06-12 VITALS
RESPIRATION RATE: 19 BRPM | HEART RATE: 96 BPM | TEMPERATURE: 97.2 F | DIASTOLIC BLOOD PRESSURE: 74 MMHG | BODY MASS INDEX: 48.2 KG/M2 | WEIGHT: 272 LBS | OXYGEN SATURATION: 91 % | SYSTOLIC BLOOD PRESSURE: 120 MMHG | HEIGHT: 63 IN

## 2025-06-12 DIAGNOSIS — R22.43 LOCALIZED SWELLING OF BOTH LOWER LEGS: ICD-10-CM

## 2025-06-12 DIAGNOSIS — G47.34 NOCTURNAL HYPOXEMIA: ICD-10-CM

## 2025-06-12 DIAGNOSIS — E66.813 CLASS 3 SEVERE OBESITY WITH SERIOUS COMORBIDITY AND BODY MASS INDEX (BMI) OF 45.0 TO 49.9 IN ADULT, UNSPECIFIED OBESITY TYPE (HCC): ICD-10-CM

## 2025-06-12 DIAGNOSIS — G47.10 HYPERSOMNOLENCE: ICD-10-CM

## 2025-06-12 DIAGNOSIS — G47.21 DELAYED SLEEP PHASE SYNDROME: ICD-10-CM

## 2025-06-12 DIAGNOSIS — Z72.821 POOR SLEEP HYGIENE: ICD-10-CM

## 2025-06-12 DIAGNOSIS — G47.33 OSA (OBSTRUCTIVE SLEEP APNEA): Primary | ICD-10-CM

## 2025-06-12 PROCEDURE — G8417 CALC BMI ABV UP PARAM F/U: HCPCS | Performed by: NURSE PRACTITIONER

## 2025-06-12 PROCEDURE — 1159F MED LIST DOCD IN RCRD: CPT | Performed by: NURSE PRACTITIONER

## 2025-06-12 PROCEDURE — G8400 PT W/DXA NO RESULTS DOC: HCPCS | Performed by: NURSE PRACTITIONER

## 2025-06-12 PROCEDURE — 99213 OFFICE O/P EST LOW 20 MIN: CPT | Performed by: NURSE PRACTITIONER

## 2025-06-12 PROCEDURE — 1036F TOBACCO NON-USER: CPT | Performed by: NURSE PRACTITIONER

## 2025-06-12 PROCEDURE — 3078F DIAST BP <80 MM HG: CPT | Performed by: NURSE PRACTITIONER

## 2025-06-12 PROCEDURE — G2211 COMPLEX E/M VISIT ADD ON: HCPCS | Performed by: NURSE PRACTITIONER

## 2025-06-12 PROCEDURE — 3074F SYST BP LT 130 MM HG: CPT | Performed by: NURSE PRACTITIONER

## 2025-06-12 PROCEDURE — 1123F ACP DISCUSS/DSCN MKR DOCD: CPT | Performed by: NURSE PRACTITIONER

## 2025-06-12 PROCEDURE — G8427 DOCREV CUR MEDS BY ELIG CLIN: HCPCS | Performed by: NURSE PRACTITIONER

## 2025-06-12 PROCEDURE — 1090F PRES/ABSN URINE INCON ASSESS: CPT | Performed by: NURSE PRACTITIONER

## 2025-06-12 ASSESSMENT — ENCOUNTER SYMPTOMS
SHORTNESS OF BREATH: 0
SORE THROAT: 0
VOICE CHANGE: 0
APNEA: 1

## 2025-06-12 NOTE — PROGRESS NOTES
[facility]  [address]  [place] [address]  [phone]    Patient Name:  Lu Valle  YOB: 1945      Office Visit 6/12/2025    CHIEF COMPLAINT:    Chief Complaint   Patient presents with    Follow-up    Sleep Apnea         HISTORY OF PRESENT ILLNESS:  Patient is a 79 y.o. female seen today for follow up of BELKIS.  She was previously seen in November '24 to discuss a negative PSG performed on 9/16/2024.  Patient also remarks that the PSG was not accurate as they made her sleep in a bed and she only sleeps in a recliner. Due to her history of BELKIS and 20 year history on PAP therapy, a HST was ordered.    The diagnostic HST on 11/12/2024 was notable for an apnea hypopnea index of 27 including 7 obstructive apneas and 245 hypopneas.  Oxygen desaturations are low as 79% were noted with SpO2 less than 89% for a total of 462.9 minutes of the test.  Patient is agreeable to move forward with an in-lab CPAP titration as long as she is able to sleep in a recliner.  She also prefers that the titration be performed on the same day of the week as her previous PSG in September.    Patient stopped using PAP therapy after the cancer risk recall notice came out several years ago.  She was feeling good when she would sleep in her recliner without her PAP, so she discontinued use.  She has returned for evaluation as she has been sleeping more, feeling tired, and experiences shallow breathing.  [place] is 6/24 today.  She goes to bed between 2 to 3 AM.  She reports nocturia once per night and does endorse swelling in her ankles.  She is on a water pill that she takes every morning.  Patient wakes at 10 AM and does not feel refreshed.  Her blood pressure is well-controlled on medications and her weight has been stable between 250 to 270 pounds over the past 10 years.      REVIEW OF SYSTEMS:    Review of Systems   Constitutional:  Positive for fatigue. Negative for activity change and

## 2025-06-12 NOTE — PATIENT INSTRUCTIONS
to your doctor if you're still having problems. If these things don't help, you might try a different type of machine.  Where can you learn more?  Go to https://www.[employer].net/patientEd and enter X266 to learn more about \"Learning About CPAP for Sleep Apnea.\"  Current as of: July 31, 2024  Content Version: 14.5  © 7449-3492 [employer].   Care instructions adapted under license by [employer]. If you have questions about a medical condition or this instruction, always ask your healthcare professional. [employer], [employer], disclaims any warranty or liability for your use of this information.

## 2025-08-11 ENCOUNTER — HOSPITAL ENCOUNTER (OUTPATIENT)
Dept: SLEEP MEDICINE | Age: 80
Discharge: HOME | End: 2025-08-14

## 2025-08-19 ENCOUNTER — OFFICE VISIT (OUTPATIENT)
Dept: PULMONOLOGY | Age: 80
End: 2025-08-19
Payer: MEDICARE

## 2025-08-19 VITALS
DIASTOLIC BLOOD PRESSURE: 72 MMHG | WEIGHT: 273.1 LBS | RESPIRATION RATE: 15 BRPM | HEIGHT: 63 IN | SYSTOLIC BLOOD PRESSURE: 115 MMHG | HEART RATE: 68 BPM | TEMPERATURE: 98.2 F | BODY MASS INDEX: 48.39 KG/M2 | OXYGEN SATURATION: 93 %

## 2025-08-19 DIAGNOSIS — G47.33 OSA (OBSTRUCTIVE SLEEP APNEA): ICD-10-CM

## 2025-08-19 DIAGNOSIS — J45.20 MILD INTERMITTENT ASTHMA WITHOUT COMPLICATION: Primary | ICD-10-CM

## 2025-08-19 LAB
EXPIRATORY TIME: NORMAL
FEF 25-75% %PRED-PRE: NORMAL
FEF 25-75% PRED: NORMAL
FEF 25-75-PRE: NORMAL
FEV1 %PRED-PRE: 73 %
FEV1 PRED: 1.9 L
FEV1/FVC %PRED-PRE: NORMAL
FEV1/FVC PRED: 97 %
FEV1/FVC: 75 %
FEV1: 1.38 L
FVC %PRED-PRE: 74 %
FVC PRED: 2.49 L
FVC: 1.83 L
PEF %PRED-PRE: NORMAL
PEF PRED: NORMAL
PEF-PRE: NORMAL

## 2025-08-19 PROCEDURE — 1123F ACP DISCUSS/DSCN MKR DOCD: CPT | Performed by: INTERNAL MEDICINE

## 2025-08-19 PROCEDURE — 3078F DIAST BP <80 MM HG: CPT | Performed by: INTERNAL MEDICINE

## 2025-08-19 PROCEDURE — 1090F PRES/ABSN URINE INCON ASSESS: CPT | Performed by: INTERNAL MEDICINE

## 2025-08-19 PROCEDURE — 99214 OFFICE O/P EST MOD 30 MIN: CPT | Performed by: INTERNAL MEDICINE

## 2025-08-19 PROCEDURE — 3074F SYST BP LT 130 MM HG: CPT | Performed by: INTERNAL MEDICINE

## 2025-08-19 PROCEDURE — G8400 PT W/DXA NO RESULTS DOC: HCPCS | Performed by: INTERNAL MEDICINE

## 2025-08-19 PROCEDURE — 1125F AMNT PAIN NOTED PAIN PRSNT: CPT | Performed by: INTERNAL MEDICINE

## 2025-08-19 PROCEDURE — 1159F MED LIST DOCD IN RCRD: CPT | Performed by: INTERNAL MEDICINE

## 2025-08-19 PROCEDURE — 94010 BREATHING CAPACITY TEST: CPT | Performed by: INTERNAL MEDICINE

## 2025-08-19 PROCEDURE — G8427 DOCREV CUR MEDS BY ELIG CLIN: HCPCS | Performed by: INTERNAL MEDICINE

## 2025-08-19 PROCEDURE — G8417 CALC BMI ABV UP PARAM F/U: HCPCS | Performed by: INTERNAL MEDICINE

## 2025-08-19 PROCEDURE — 1160F RVW MEDS BY RX/DR IN RCRD: CPT | Performed by: INTERNAL MEDICINE

## 2025-08-19 PROCEDURE — 1036F TOBACCO NON-USER: CPT | Performed by: INTERNAL MEDICINE

## 2025-08-19 ASSESSMENT — PULMONARY FUNCTION TESTS
FVC_PREDICTED: 2.49
FVC_PERCENT_PREDICTED_PRE: 74
FEV1/FVC_PREDICTED: 97
FEV1/FVC: 75
FEV1_PERCENT_PREDICTED_PRE: 73
FVC: 1.83
FEV1_PREDICTED: 1.90
FEV1: 1.38

## 2025-09-02 ENCOUNTER — RESULTS FOLLOW-UP (OUTPATIENT)
Dept: SLEEP MEDICINE | Age: 80
End: 2025-09-02

## 2025-09-02 DIAGNOSIS — G47.34 NOCTURNAL HYPOXEMIA: ICD-10-CM

## 2025-09-02 DIAGNOSIS — G47.33 OSA (OBSTRUCTIVE SLEEP APNEA): Primary | ICD-10-CM

## (undated) DEVICE — STOCKINETTE TUBE 6X48 -- MEDICHOICE

## (undated) DEVICE — STAPLER SKIN SQ 30 ABSRB STPL DISP INSORB

## (undated) DEVICE — X-LARGE COTTON GLOVE: Brand: DEROYAL

## (undated) DEVICE — COVER,MAYO STAND,XL,STERILE: Brand: MEDLINE

## (undated) DEVICE — SOLUTION IV 250ML 0.9% SOD CHL PH 5 INJ USP VIAFLX PLAS

## (undated) DEVICE — SUTURE ETHBND EXCEL SZ 5 L30IN NONABSORBABLE GRN L40MM V-37 MB66G

## (undated) DEVICE — GLOVE ORANGE PI 8 1/2   MSG9085

## (undated) DEVICE — GLOVE SURG SZ 7 L11.33IN FNGR THK9.8MIL STRW LTX POLYMER

## (undated) DEVICE — 3M™ IOBAN™ 2 ANTIMICROBIAL INCISE DRAPE 6651EZ: Brand: IOBAN™ 2

## (undated) DEVICE — DRESSING HYDROFIBER AQUACEL AG ADVANTAGE 3.5X10 IN

## (undated) DEVICE — 3M™ STERI-DRAPE™ INCISE DRAPE, XL 1051: Brand: STERI-DRAPE™

## (undated) DEVICE — SYR 50ML LR LCK 1ML GRAD NSAF --

## (undated) DEVICE — SPONGE LAPAROTOMY W18XL18IN WHITE STRUNG RADIOPAQUE STERILE

## (undated) DEVICE — SUTURE ABS MF 2-0 CT1 27IN STRATAFIX PDS+ SXPP1B412

## (undated) DEVICE — MCLASS OSCILLATING SAW BLADE 19 X 1.27 (0.050") X 90 MM: Brand: MCLASS

## (undated) DEVICE — (D)SYR 10ML 1/5ML GRAD NSAF -- PKGING CHANGE USE ITEM 338027

## (undated) DEVICE — SOLUTION IRRIG 3000ML 0.9% SOD CHL FLX CONT 0797208] ICU MEDICAL INC]

## (undated) DEVICE — BIPOLAR SEALER 23-112-1 AQM 6.0: Brand: AQUAMANTYS ®

## (undated) DEVICE — GLOVE SURG SZ 85 L12IN FNGR THK79MIL GRN LTX FREE

## (undated) DEVICE — MEDI-VAC YANKAUER SUCTION HANDLE W/BULBOUS TIP: Brand: CARDINAL HEALTH

## (undated) DEVICE — SUTURE STRATAFIX SYMMETRIC PDS + SZ 1 L18IN ABSRB VLT L48MM SXPP1A400

## (undated) DEVICE — DRAPE,TOP,102X53,STERILE: Brand: MEDLINE

## (undated) DEVICE — GLOVE SURG SZ 65 THK91MIL LTX FREE SYN POLYISOPRENE

## (undated) DEVICE — SUTURE ABS ANTIBACT 1-0 CTX 24IN STRATAFIX PDS+ SXPP1A445

## (undated) DEVICE — FAN SPRAY KIT: Brand: PULSAVAC®

## (undated) DEVICE — Z DISCONTINUED USE 2744636  DRESSING AQUACEL 14 IN ALG W3.5XL14IN POLYUR FLM CVR W/ HYDRCOLL

## (undated) DEVICE — SUTURE PDS II SZ 1 L54IN ABSRB VLT L65MM TP-1 1/2 CIR Z879G

## (undated) DEVICE — POWDER SURG CELLERATE RX 1 GM HYDROL COLLEGEN

## (undated) DEVICE — SOLUTION IV DEXTROSE/SALINE 5%-0.9% 500ML - 500ML

## (undated) DEVICE — SUTURE ABSRB X-1 REV CUT 1/2 CIR 22MM UD BRAID 27IN SZ 3-0 J458H

## (undated) DEVICE — T4 HOOD

## (undated) DEVICE — (D)PREP SKN CHLRAPRP APPL 26ML -- CONVERT TO ITEM 371833

## (undated) DEVICE — 3M™ STERI-DRAPE™ INSTRUMENT POUCH 1018: Brand: STERI-DRAPE™

## (undated) DEVICE — TRAY PREP DRY W/ PREM GLV 2 APPL 6 SPNG 2 UNDPD 1 OVERWRAP

## (undated) DEVICE — SOLUTION IV 1000ML 0.9% SOD CHL

## (undated) DEVICE — DRAPE,HIP,W/POUCHES,STERILE: Brand: MEDLINE

## (undated) DEVICE — SUT VCRL + 2-0 27IN CP1 VIO --

## (undated) DEVICE — 3000CC GUARDIAN II: Brand: GUARDIAN

## (undated) DEVICE — BUTTON SWITCH PENCIL BLADE ELECTRODE, HOLSTER: Brand: EDGE

## (undated) DEVICE — 2000CC GUARDIAN II: Brand: GUARDIAN

## (undated) DEVICE — SOLUTION IRRIG 3000ML 0.9% SOD CHL USP UROMATIC PLAS CONT

## (undated) DEVICE — SYR LR LCK 1ML GRAD NSAF 30ML --

## (undated) DEVICE — SKIN STAPLER: Brand: SIGNET

## (undated) DEVICE — SURGICAL PROCEDURE PACK TOT HIP CDS

## (undated) DEVICE — REM POLYHESIVE ADULT PATIENT RETURN ELECTRODE: Brand: VALLEYLAB

## (undated) DEVICE — TOTAL HIP DR JENNINGS: Brand: MEDLINE INDUSTRIES, INC.

## (undated) DEVICE — STERILE PVP: Brand: MEDLINE INDUSTRIES, INC.

## (undated) DEVICE — DUAL CUT SAGITTAL BLADE

## (undated) DEVICE — YANKAUER,FLEXIBLE HANDLE,REGLR CAPACITY: Brand: MEDLINE INDUSTRIES, INC.

## (undated) DEVICE — TRAY CATH 16F DRN BG LTX -- CONVERT TO ITEM 363158

## (undated) DEVICE — DRAPE TWL SURG 16X26IN BLU ORB04] ALLCARE INC]

## (undated) DEVICE — PIN FIX TROCAR PT 1 END 7/64X9 IN 1 PT STYL SMOOTH PLN STRL
Type: IMPLANTABLE DEVICE | Site: HIP | Status: NON-FUNCTIONAL
Removed: 2023-02-22

## (undated) DEVICE — SUT VCRL + 3-0 27IN X1 VIO --

## (undated) DEVICE — 450 ML BOTTLE OF 0.05% CHLORHEXIDINE GLUCONATE IN 99.95% STERILE WATER FOR IRRIGATION, USP AND APPLICATOR.: Brand: IRRISEPT ANTIMICROBIAL WOUND LAVAGE

## (undated) DEVICE — SOLUTION IRRIG 1000ML 0.9% SOD CHL USP POUR PLAS BTL